# Patient Record
Sex: MALE | Race: WHITE | Employment: OTHER | URBAN - NONMETROPOLITAN AREA
[De-identification: names, ages, dates, MRNs, and addresses within clinical notes are randomized per-mention and may not be internally consistent; named-entity substitution may affect disease eponyms.]

---

## 2018-03-26 ENCOUNTER — HOSPITAL ENCOUNTER (OUTPATIENT)
Dept: CT IMAGING | Age: 73
Discharge: HOME | End: 2018-03-28
Payer: MEDICARE

## 2018-03-26 ENCOUNTER — HOSPITAL ENCOUNTER (OUTPATIENT)
Age: 73
Discharge: HOME | End: 2018-03-28
Payer: MEDICARE

## 2018-03-26 DIAGNOSIS — N40.1 BPH WITH OBSTRUCTION/LOWER URINARY TRACT SYMPTOMS: ICD-10-CM

## 2018-03-26 DIAGNOSIS — R10.30 ABDOMINAL PAIN, LOWER: ICD-10-CM

## 2018-03-26 DIAGNOSIS — N13.8 BPH WITH OBSTRUCTION/LOWER URINARY TRACT SYMPTOMS: ICD-10-CM

## 2018-03-26 DIAGNOSIS — K59.00 CONSTIPATION, UNSPECIFIED CONSTIPATION TYPE: ICD-10-CM

## 2018-03-26 DIAGNOSIS — K43.6 VENTRAL HERNIA WITH OBSTRUCTION AND WITHOUT GANGRENE: ICD-10-CM

## 2018-03-26 LAB
BUN BLDV-MCNC: 21 MG/DL (ref 8–23)
CREAT SERPL-MCNC: 1.4 MG/DL (ref 0.7–1.2)
GFR AFRICAN AMERICAN: >60
GFR NON-AFRICAN AMERICAN: 50 ML/MIN/1.73

## 2018-03-26 PROCEDURE — 6360000004 HC RX CONTRAST MEDICATION: Performed by: RADIOLOGY

## 2018-03-26 PROCEDURE — 36415 COLL VENOUS BLD VENIPUNCTURE: CPT

## 2018-03-26 PROCEDURE — 82565 ASSAY OF CREATININE: CPT

## 2018-03-26 PROCEDURE — 84520 ASSAY OF UREA NITROGEN: CPT

## 2018-03-26 PROCEDURE — 74177 CT ABD & PELVIS W/CONTRAST: CPT

## 2018-03-26 RX ADMIN — IOPAMIDOL 100 ML: 755 INJECTION, SOLUTION INTRAVENOUS at 13:40

## 2018-03-26 RX ADMIN — IOHEXOL 50 ML: 240 INJECTION, SOLUTION INTRATHECAL; INTRAVASCULAR; INTRAVENOUS; ORAL at 12:00

## 2018-03-28 ENCOUNTER — HOSPITAL ENCOUNTER (OUTPATIENT)
Age: 73
Setting detail: SPECIMEN
Discharge: HOME | End: 2018-03-28
Payer: MEDICARE

## 2018-03-28 LAB
BUN BLDV-MCNC: 14 MG/DL (ref 8–23)
CREAT SERPL-MCNC: 1.3 MG/DL (ref 0.7–1.2)
GFR AFRICAN AMERICAN: >60
GFR NON-AFRICAN AMERICAN: 55 ML/MIN/1.73

## 2018-03-28 PROCEDURE — 82565 ASSAY OF CREATININE: CPT

## 2018-03-28 PROCEDURE — 36415 COLL VENOUS BLD VENIPUNCTURE: CPT

## 2018-03-28 PROCEDURE — 84520 ASSAY OF UREA NITROGEN: CPT

## 2018-03-31 ENCOUNTER — HOSPITAL ENCOUNTER (OUTPATIENT)
Dept: ULTRASOUND IMAGING | Age: 73
End: 2018-03-31
Payer: MEDICARE

## 2018-03-31 ENCOUNTER — HOSPITAL ENCOUNTER (OUTPATIENT)
Dept: ULTRASOUND IMAGING | Age: 73
Discharge: HOME | End: 2018-03-31
Payer: MEDICARE

## 2018-03-31 DIAGNOSIS — R33.9 URINARY RETENTION: ICD-10-CM

## 2018-03-31 PROCEDURE — 76770 US EXAM ABDO BACK WALL COMP: CPT

## 2019-12-09 ENCOUNTER — HOSPITAL ENCOUNTER (OUTPATIENT)
Dept: GENERAL RADIOLOGY | Age: 74
Discharge: HOME | End: 2019-12-11
Payer: MEDICARE

## 2019-12-09 ENCOUNTER — HOSPITAL ENCOUNTER (OUTPATIENT)
Dept: WOUND CARE | Age: 74
Discharge: HOME | End: 2019-12-09
Payer: MEDICARE

## 2019-12-09 VITALS
HEART RATE: 80 BPM | SYSTOLIC BLOOD PRESSURE: 158 MMHG | DIASTOLIC BLOOD PRESSURE: 80 MMHG | HEIGHT: 68 IN | RESPIRATION RATE: 18 BRPM | WEIGHT: 208 LBS | TEMPERATURE: 98.3 F | BODY MASS INDEX: 31.52 KG/M2

## 2019-12-09 DIAGNOSIS — L97.811 SKIN ULCER OF RIGHT PRETIBIAL REGION LIMITED TO BREAKDOWN OF SKIN (HCC): ICD-10-CM

## 2019-12-09 DIAGNOSIS — L97.811 SKIN ULCER OF RIGHT PRETIBIAL REGION LIMITED TO BREAKDOWN OF SKIN (HCC): Primary | ICD-10-CM

## 2019-12-09 PROCEDURE — 87070 CULTURE OTHR SPECIMN AEROBIC: CPT

## 2019-12-09 PROCEDURE — 73590 X-RAY EXAM OF LOWER LEG: CPT

## 2019-12-09 PROCEDURE — 87075 CULTR BACTERIA EXCEPT BLOOD: CPT

## 2019-12-09 PROCEDURE — 99213 OFFICE O/P EST LOW 20 MIN: CPT

## 2019-12-09 PROCEDURE — 11042 DBRDMT SUBQ TIS 1ST 20SQCM/<: CPT

## 2019-12-09 PROCEDURE — 87205 SMEAR GRAM STAIN: CPT

## 2019-12-09 RX ORDER — LIDOCAINE HYDROCHLORIDE 20 MG/ML
JELLY TOPICAL ONCE
Status: DISCONTINUED | OUTPATIENT
Start: 2019-12-09 | End: 2019-12-10 | Stop reason: HOSPADM

## 2019-12-09 SDOH — HEALTH STABILITY: MENTAL HEALTH: HOW OFTEN DO YOU HAVE A DRINK CONTAINING ALCOHOL?: NEVER

## 2019-12-09 ASSESSMENT — PAIN DESCRIPTION - ONSET: ONSET: ON-GOING

## 2019-12-09 ASSESSMENT — PAIN SCALES - GENERAL: PAINLEVEL_OUTOF10: 6

## 2019-12-09 ASSESSMENT — PAIN DESCRIPTION - PROGRESSION: CLINICAL_PROGRESSION: OTHER (COMMENT)

## 2019-12-09 ASSESSMENT — PAIN DESCRIPTION - LOCATION: LOCATION: LEG

## 2019-12-09 ASSESSMENT — PAIN DESCRIPTION - DESCRIPTORS: DESCRIPTORS: ACHING;BURNING;DISCOMFORT

## 2019-12-09 ASSESSMENT — PAIN DESCRIPTION - FREQUENCY: FREQUENCY: INTERMITTENT

## 2019-12-09 ASSESSMENT — PAIN DESCRIPTION - ORIENTATION: ORIENTATION: RIGHT

## 2019-12-09 ASSESSMENT — PAIN - FUNCTIONAL ASSESSMENT: PAIN_FUNCTIONAL_ASSESSMENT: ACTIVITIES ARE NOT PREVENTED

## 2019-12-09 ASSESSMENT — PAIN DESCRIPTION - PAIN TYPE: TYPE: CHRONIC PAIN

## 2019-12-11 LAB
GRAM STAIN RESULT: NORMAL
WOUND/ABSCESS: NORMAL

## 2019-12-14 LAB — ANAEROBIC CULTURE: NORMAL

## 2019-12-16 ENCOUNTER — HOSPITAL ENCOUNTER (OUTPATIENT)
Dept: WOUND CARE | Age: 74
Discharge: HOME | End: 2019-12-16
Payer: MEDICARE

## 2019-12-16 VITALS
DIASTOLIC BLOOD PRESSURE: 70 MMHG | WEIGHT: 208 LBS | HEART RATE: 100 BPM | TEMPERATURE: 98.1 F | SYSTOLIC BLOOD PRESSURE: 128 MMHG | HEIGHT: 68 IN | RESPIRATION RATE: 18 BRPM | BODY MASS INDEX: 31.52 KG/M2

## 2019-12-16 PROCEDURE — 11042 DBRDMT SUBQ TIS 1ST 20SQCM/<: CPT

## 2019-12-16 RX ORDER — FEXOFENADINE HYDROCHLORIDE 60 MG/1
60 TABLET, FILM COATED ORAL DAILY
COMMUNITY
End: 2022-09-29 | Stop reason: ALTCHOICE

## 2019-12-16 RX ORDER — ACARBOSE 25 MG/1
50 TABLET ORAL
COMMUNITY
End: 2020-05-07

## 2019-12-16 RX ORDER — METOLAZONE 5 MG/1
5 TABLET ORAL DAILY
COMMUNITY

## 2019-12-16 RX ORDER — GLIPIZIDE 10 MG/1
10 TABLET, FILM COATED, EXTENDED RELEASE ORAL 2 TIMES DAILY
COMMUNITY

## 2019-12-16 RX ORDER — LIDOCAINE HYDROCHLORIDE 20 MG/ML
JELLY TOPICAL ONCE
Status: DISCONTINUED | OUTPATIENT
Start: 2019-12-16 | End: 2019-12-17 | Stop reason: HOSPADM

## 2019-12-16 RX ORDER — LISINOPRIL 40 MG/1
40 TABLET ORAL DAILY
COMMUNITY

## 2019-12-16 RX ORDER — NIFEDIPINE 30 MG/1
30 TABLET, FILM COATED, EXTENDED RELEASE ORAL DAILY
COMMUNITY

## 2019-12-16 ASSESSMENT — PAIN DESCRIPTION - FREQUENCY: FREQUENCY: INTERMITTENT

## 2019-12-16 ASSESSMENT — PAIN SCALES - GENERAL: PAINLEVEL_OUTOF10: 3

## 2019-12-16 ASSESSMENT — PAIN - FUNCTIONAL ASSESSMENT: PAIN_FUNCTIONAL_ASSESSMENT: ACTIVITIES ARE NOT PREVENTED

## 2019-12-16 ASSESSMENT — PAIN DESCRIPTION - PAIN TYPE: TYPE: CHRONIC PAIN

## 2019-12-16 ASSESSMENT — PAIN DESCRIPTION - DESCRIPTORS: DESCRIPTORS: ITCHING;BURNING

## 2019-12-16 ASSESSMENT — PAIN DESCRIPTION - ORIENTATION: ORIENTATION: RIGHT

## 2019-12-16 ASSESSMENT — PAIN DESCRIPTION - ONSET: ONSET: GRADUAL

## 2019-12-16 ASSESSMENT — PAIN DESCRIPTION - LOCATION: LOCATION: LEG

## 2019-12-16 ASSESSMENT — PAIN DESCRIPTION - PROGRESSION: CLINICAL_PROGRESSION: OTHER (COMMENT)

## 2019-12-17 ENCOUNTER — HOSPITAL ENCOUNTER (OUTPATIENT)
Dept: INTERVENTIONAL RADIOLOGY/VASCULAR | Age: 74
Discharge: HOME | End: 2019-12-19
Payer: MEDICARE

## 2019-12-17 DIAGNOSIS — L97.811 SKIN ULCER OF RIGHT PRETIBIAL REGION LIMITED TO BREAKDOWN OF SKIN (HCC): ICD-10-CM

## 2019-12-17 PROCEDURE — 93923 UPR/LXTR ART STDY 3+ LVLS: CPT

## 2019-12-23 ENCOUNTER — HOSPITAL ENCOUNTER (OUTPATIENT)
Dept: WOUND CARE | Age: 74
Discharge: HOME | End: 2019-12-23
Payer: MEDICARE

## 2019-12-23 VITALS
HEIGHT: 68 IN | BODY MASS INDEX: 31.52 KG/M2 | SYSTOLIC BLOOD PRESSURE: 170 MMHG | HEART RATE: 100 BPM | TEMPERATURE: 99 F | DIASTOLIC BLOOD PRESSURE: 90 MMHG | RESPIRATION RATE: 18 BRPM | WEIGHT: 208 LBS

## 2019-12-23 PROCEDURE — 11042 DBRDMT SUBQ TIS 1ST 20SQCM/<: CPT

## 2019-12-23 RX ORDER — LIDOCAINE HYDROCHLORIDE 20 MG/ML
JELLY TOPICAL ONCE
Status: DISCONTINUED | OUTPATIENT
Start: 2019-12-23 | End: 2019-12-24 | Stop reason: HOSPADM

## 2019-12-23 ASSESSMENT — PAIN DESCRIPTION - PAIN TYPE: TYPE: CHRONIC PAIN

## 2019-12-23 ASSESSMENT — PAIN DESCRIPTION - LOCATION: LOCATION: LEG

## 2019-12-23 ASSESSMENT — PAIN DESCRIPTION - ONSET: ONSET: GRADUAL

## 2019-12-23 ASSESSMENT — PAIN DESCRIPTION - DESCRIPTORS: DESCRIPTORS: ACHING;CRUSHING;DISCOMFORT

## 2019-12-23 ASSESSMENT — PAIN DESCRIPTION - PROGRESSION: CLINICAL_PROGRESSION: NOT CHANGED

## 2019-12-23 ASSESSMENT — PAIN DESCRIPTION - ORIENTATION: ORIENTATION: RIGHT

## 2019-12-23 ASSESSMENT — PAIN - FUNCTIONAL ASSESSMENT: PAIN_FUNCTIONAL_ASSESSMENT: ACTIVITIES ARE NOT PREVENTED

## 2019-12-23 ASSESSMENT — PAIN SCALES - GENERAL: PAINLEVEL_OUTOF10: 3

## 2019-12-30 ENCOUNTER — HOSPITAL ENCOUNTER (OUTPATIENT)
Dept: WOUND CARE | Age: 74
Discharge: HOME | End: 2019-12-30
Payer: MEDICARE

## 2019-12-30 VITALS
HEART RATE: 108 BPM | HEIGHT: 68 IN | WEIGHT: 208 LBS | SYSTOLIC BLOOD PRESSURE: 148 MMHG | BODY MASS INDEX: 31.52 KG/M2 | RESPIRATION RATE: 18 BRPM | DIASTOLIC BLOOD PRESSURE: 80 MMHG | TEMPERATURE: 98.2 F

## 2019-12-30 PROCEDURE — 11042 DBRDMT SUBQ TIS 1ST 20SQCM/<: CPT

## 2019-12-30 RX ORDER — LIDOCAINE HYDROCHLORIDE 20 MG/ML
JELLY TOPICAL ONCE
Status: DISCONTINUED | OUTPATIENT
Start: 2019-12-30 | End: 2019-12-31 | Stop reason: HOSPADM

## 2019-12-30 ASSESSMENT — PAIN DESCRIPTION - PAIN TYPE: TYPE: CHRONIC PAIN

## 2019-12-30 ASSESSMENT — PAIN DESCRIPTION - LOCATION: LOCATION: LEG

## 2019-12-30 ASSESSMENT — PAIN SCALES - GENERAL: PAINLEVEL_OUTOF10: 8

## 2019-12-30 ASSESSMENT — PAIN DESCRIPTION - DESCRIPTORS: DESCRIPTORS: ACHING;CRUSHING;DISCOMFORT

## 2019-12-30 ASSESSMENT — PAIN DESCRIPTION - ORIENTATION: ORIENTATION: RIGHT

## 2020-01-06 ENCOUNTER — HOSPITAL ENCOUNTER (OUTPATIENT)
Dept: WOUND CARE | Age: 75
Discharge: HOME | End: 2020-01-06
Payer: MEDICARE

## 2020-01-06 VITALS
HEART RATE: 107 BPM | SYSTOLIC BLOOD PRESSURE: 142 MMHG | DIASTOLIC BLOOD PRESSURE: 78 MMHG | WEIGHT: 208 LBS | HEIGHT: 68 IN | TEMPERATURE: 97.6 F | RESPIRATION RATE: 20 BRPM | BODY MASS INDEX: 31.52 KG/M2

## 2020-01-06 PROCEDURE — 11042 DBRDMT SUBQ TIS 1ST 20SQCM/<: CPT

## 2020-01-06 RX ORDER — LIDOCAINE HYDROCHLORIDE 20 MG/ML
JELLY TOPICAL ONCE
Status: DISCONTINUED | OUTPATIENT
Start: 2020-01-06 | End: 2020-01-07 | Stop reason: HOSPADM

## 2020-01-06 ASSESSMENT — PAIN DESCRIPTION - DESCRIPTORS: DESCRIPTORS: BURNING

## 2020-01-06 ASSESSMENT — PAIN DESCRIPTION - ONSET: ONSET: ON-GOING

## 2020-01-06 ASSESSMENT — PAIN DESCRIPTION - ORIENTATION: ORIENTATION: RIGHT

## 2020-01-06 ASSESSMENT — PAIN DESCRIPTION - LOCATION: LOCATION: PRETIBIA

## 2020-01-06 ASSESSMENT — PAIN DESCRIPTION - PROGRESSION: CLINICAL_PROGRESSION: GRADUALLY IMPROVING

## 2020-01-06 ASSESSMENT — PAIN DESCRIPTION - PAIN TYPE: TYPE: CHRONIC PAIN

## 2020-01-06 ASSESSMENT — PAIN DESCRIPTION - FREQUENCY: FREQUENCY: CONTINUOUS

## 2020-01-06 ASSESSMENT — PAIN SCALES - GENERAL: PAINLEVEL_OUTOF10: 1

## 2020-01-06 NOTE — PROGRESS NOTES
CC) 30 MG extended release tablet Take 30 mg by mouth daily      fexofenadine (ALLEGRA ALLERGY) 60 MG tablet Take 60 mg by mouth daily      Dulaglutide (TRULICITY) 0.77 VY/8.8FO SOPN Inject 0.75 mg into the skin once a week Indications: pt unsure of the dose TUESDAYS      acarbose (PRECOSE) 25 MG tablet Take 50 mg by mouth 3 times daily (with meals)       No current facility-administered medications on file prior to encounter.         REVIEW OF SYSTEMS See HPI    Objective:    BP (!) 142/78 Comment: TOOK B/P MED THIS AM   Pulse 107   Temp 97.6 °F (36.4 °C) (Oral)   Resp 20   Ht 5' 8\" (1.727 m)   Wt 208 lb (94.3 kg)   BMI 31.63 kg/m²   Wt Readings from Last 3 Encounters:   01/06/20 208 lb (94.3 kg)   12/30/19 208 lb (94.3 kg)   12/23/19 208 lb (94.3 kg)     PHYSICAL EXAM  CONSTITUTIONAL:   Awake, alert, cooperative   EYES:  lids and lashes normal   ENT: external ears and nose without lesions   NECK:  supple, symmetrical, trachea midline   SKIN:  Open wound Present    Assessment:     Problem List Items Addressed This Visit     None          Pre Debridement Measurements:  Are located in the [place]  Documentation Flow Sheet  Post Debridement Measurements:  Wound/Ulcer Descriptions are Pre Debridement except measurements:     Wound 12/02/19 Leg Right #1 rt leg aquired 10/9/19 (Active)   Wound Image   1/6/2020  8:50 AM   Dressing Status Changed 12/30/2019 10:41 AM   Dressing Changed Changed/New 12/30/2019 10:41 AM   Dressing/Treatment Other (comment) 12/30/2019 10:41 AM   Wound Cleansed Rinsed/Irrigated with saline 12/30/2019 10:41 AM   Wound Length (cm) 9.4 cm 1/6/2020  8:50 AM   Wound Width (cm) 2.3 cm 1/6/2020  8:50 AM   Wound Depth (cm) 0.3 cm 1/6/2020  8:50 AM   Wound Surface Area (cm^2) 21.62 cm^2 1/6/2020  8:50 AM   Change in Wound Size % (l*w) 68.3 1/6/2020  8:50 AM   Wound Volume (cm^3) 6.49 cm^3 1/6/2020  8:50 AM   Wound Healing % 52 1/6/2020  8:50 AM   Post-Procedure Length (cm) 9.4 cm 1/6/2020

## 2020-01-09 NOTE — DISCHARGE INSTR - COC
Continuity of Care Form    Patient Name: Rosalinda Todd   :  1945  MRN:  [id number]    Admit date:  (Not on file)  Discharge date:  ***    Code Status Order: No Order   Advance Directives:     Admitting Physician:  No admitting provider for patient encounter. PCP: Paula Wyatt DO    Discharging Nurse: [facility] Unit/Room#: No information available for this encounter. Discharging Unit Phone Number: ***    Emergency Contact:   Extended Emergency Contact Information  Primary Emergency Contact: [facility] Phone: [phone]  Relation: Other    Past Surgical History:  Past Surgical History:   Procedure Laterality Date    ENDOSCOPY, COLON, DIAGNOSTIC      EYE SURGERY      HERNIA REPAIR      TONSILLECTOMY         Immunization History: There is no immunization history on file for this patient. Active Problems: There is no problem list on file for this patient. Isolation/Infection:   Isolation          No Isolation        Patient Infection Status     None to display          Nurse Assessment:  Last Vital Signs: There were no vitals taken for this visit.     Last documented pain score (0-10 scale):    Last Weight:   Wt Readings from Last 1 Encounters:   20 208 lb (94.3 kg)     Mental Status:  {IP PT MENTAL STATUS:74402}    IV Access:  { NASIR IV ACCESS:960535904}    Nursing Mobility/ADLs:  Walking   {CHP DME FMTT:069831775}  Transfer  {CHP DME AWCO:860344788}  Bathing  {CHP DME GBOW:350399401}  Dressing  {CHP DME OEBQ:098442737}  Toileting  {CHP DME YPGL:529955509}  Feeding  {P DME BWRZ:242501615}  Med Admin  {P DME BZHL:274492514}  Med Delivery   { NASIR MED Delivery:872526397}    Wound Care Documentation and Therapy:  Wound 19 Leg Right #1 rt leg aquired 10/9/19 (Active)   Wound Image   2020  8:50 AM   Dressing Status Changed 2019 10:41 AM   Dressing Changed Changed/New 2019 10:41 AM   Dressing/Treatment Dry dressing 2020 10:05 AM INTERVENTION:0516768770}  Weight Bearing Status/Restrictions: 50[facility] Weight Bearin}  Other Medical Equipment (for information only, NOT a DME order):  {EQUIPMENT:754523487}  Other Treatments: ***    Patient's personal belongings (please select all that are sent with patient):  {CHP DME Belongings:713352325}    RN SIGNATURE:  {Esignature:552486944}    CASE MANAGEMENT/SOCIAL WORK SECTION    Inpatient Status Date: ***    Readmission Risk Assessment Score:  Readmission Risk              Risk of Unplanned Readmission:        0           Discharging to Facility/ Agency   · Name:   · Address:  · Phone:  · Fax:    Dialysis Facility (if applicable)   · Name:  · Address:  · Dialysis Schedule:  · Phone:  · Fax:    / signature: {Esignature:723739920}    PHYSICIAN SECTION    Prognosis: {Prognosis:4812333958}    Condition at Discharge: 50[facility] Patient Condition:851419965}    Rehab Potential (if transferring to Rehab): {Prognosis:1632479387}    Recommended Labs or Other Treatments After Discharge: ***    Physician Certification: I certify the above information and transfer of Jennie Garvey  is necessary for the continuing treatment of the diagnosis listed and that he requires {Admit to Appropriate Level of Care:12706} for {GREATER/LESS:073371293} 30 days.      Update Admission H&P: {CHP DME Changes in XCTMK:734690769}    PHYSICIAN SIGNATURE:  {Esignature:106551328}

## 2020-01-13 ENCOUNTER — HOSPITAL ENCOUNTER (OUTPATIENT)
Dept: WOUND CARE | Age: 75
Discharge: HOME | End: 2020-01-13
Payer: MEDICARE

## 2020-01-13 VITALS
DIASTOLIC BLOOD PRESSURE: 80 MMHG | RESPIRATION RATE: 18 BRPM | HEART RATE: 98 BPM | SYSTOLIC BLOOD PRESSURE: 160 MMHG | WEIGHT: 200 LBS | TEMPERATURE: 98.2 F | BODY MASS INDEX: 30.31 KG/M2 | HEIGHT: 68 IN

## 2020-01-13 PROCEDURE — 11042 DBRDMT SUBQ TIS 1ST 20SQCM/<: CPT

## 2020-01-13 PROCEDURE — 87186 SC STD MICRODIL/AGAR DIL: CPT

## 2020-01-13 PROCEDURE — 87147 CULTURE TYPE IMMUNOLOGIC: CPT

## 2020-01-13 PROCEDURE — 87077 CULTURE AEROBIC IDENTIFY: CPT

## 2020-01-13 PROCEDURE — 87070 CULTURE OTHR SPECIMN AEROBIC: CPT

## 2020-01-13 PROCEDURE — 87075 CULTR BACTERIA EXCEPT BLOOD: CPT

## 2020-01-13 RX ORDER — DOXYCYCLINE HYCLATE 100 MG/1
100 CAPSULE ORAL 2 TIMES DAILY
COMMUNITY
End: 2020-02-03 | Stop reason: ALTCHOICE

## 2020-01-13 ASSESSMENT — PAIN SCALES - GENERAL: PAINLEVEL_OUTOF10: 0

## 2020-01-13 NOTE — PROGRESS NOTES
wound with normal saline solution and apply Santyl to the thickness of a nickel and cover with slightly moist gauze and dry dressing and change every day.     Treatment Orders: Vascular studies reviewed.  Dr SHELTON's office will call you with appt. Start Doxycycline 100 mg twice daily for 10 days. Called into [employer] in [place]. Culture taken today.     [facility] followup visit ___________one week__________________  (Please note your next appointment above and if you are unable to keep, kindly give a 24 hour notice. Thank you.)     Physician signature:__________________________        If you experience any of the following, please call the [facility] during business hours:     * Increase in Pain  * Temperature over 101  * Increase in drainage from your wound  * Drainage with a foul odor  * Bleeding  * Increase in swelling  * Need for compression bandage changes due to slippage, breakthrough drainage.     If you need medical attention outside of the business hours of the [facility] please contact your PCP or go to the nearest emergency room.         Electronically signed by Yoana Morocho DPM on 1/13/2020 at 9:38 AM

## 2020-01-15 LAB — ANAEROBIC CULTURE: NORMAL

## 2020-01-16 LAB
ORGANISM: ABNORMAL
WOUND/ABSCESS: ABNORMAL

## 2020-01-20 ENCOUNTER — HOSPITAL ENCOUNTER (OUTPATIENT)
Dept: WOUND CARE | Age: 75
Discharge: HOME | End: 2020-01-20
Payer: MEDICARE

## 2020-01-20 VITALS
DIASTOLIC BLOOD PRESSURE: 80 MMHG | BODY MASS INDEX: 30.31 KG/M2 | TEMPERATURE: 97.8 F | SYSTOLIC BLOOD PRESSURE: 138 MMHG | HEIGHT: 68 IN | HEART RATE: 94 BPM | RESPIRATION RATE: 18 BRPM | WEIGHT: 200 LBS

## 2020-01-20 PROCEDURE — 11042 DBRDMT SUBQ TIS 1ST 20SQCM/<: CPT

## 2020-01-20 RX ORDER — LIDOCAINE HYDROCHLORIDE 20 MG/ML
JELLY TOPICAL ONCE
Status: DISCONTINUED | OUTPATIENT
Start: 2020-01-20 | End: 2020-01-21 | Stop reason: HOSPADM

## 2020-01-20 ASSESSMENT — PAIN DESCRIPTION - DESCRIPTORS: DESCRIPTORS: BURNING;DISCOMFORT;CONSTANT

## 2020-01-20 ASSESSMENT — PAIN DESCRIPTION - ONSET: ONSET: ON-GOING

## 2020-01-20 ASSESSMENT — PAIN - FUNCTIONAL ASSESSMENT: PAIN_FUNCTIONAL_ASSESSMENT: ACTIVITIES ARE NOT PREVENTED

## 2020-01-20 ASSESSMENT — PAIN DESCRIPTION - PAIN TYPE: TYPE: CHRONIC PAIN

## 2020-01-20 ASSESSMENT — PAIN DESCRIPTION - FREQUENCY: FREQUENCY: CONTINUOUS

## 2020-01-20 ASSESSMENT — PAIN DESCRIPTION - PROGRESSION: CLINICAL_PROGRESSION: GRADUALLY IMPROVING

## 2020-01-20 ASSESSMENT — PAIN DESCRIPTION - LOCATION: LOCATION: PRETIBIA

## 2020-01-20 ASSESSMENT — PAIN DESCRIPTION - ORIENTATION: ORIENTATION: RIGHT

## 2020-01-20 ASSESSMENT — PAIN SCALES - GENERAL: PAINLEVEL_OUTOF10: 2

## 2020-01-20 NOTE — PROGRESS NOTES
[facility] Followup Visit Note    Referring Physician : Betzaida Matt  MEDICAL RECORD NUMBER:  [id number]  AGE: 69 y.o. GENDER: male  : 1945  EPISODE DATE:  2020    Subjective:     Chief Complaint   Patient presents with    Wound Check     rt leg      HISTORY of PRESENT ILLNESS HPI   Walt Anderson is a 69 y.o. male who presents today in regards to follow up evaluation and treatment of wound/ulcer. That patient's past medical, family and social hx were reviewed and changes were made if present. History of Wound Context:  Right leg wound     Wound/Ulcer Pain Timing/Severity: intermittent  Quality of pain: sharp  Severity:  5 / 10   Modifying Factors: Pain worsens with walking  Associated Signs/Symptoms: edema    Ulcer Identification:  Ulcer Type: arterial  Contributing Factors: edema    Diabetic/Pressure/Non Pressure Ulcers only:  Ulcer: Non-Pressure ulcer, fat layer exposed    Wound: right leg        PAST MEDICAL HISTORY      Diagnosis Date    Arthritis     Diabetes mellitus ([address]     Hyperlipidemia     Hypertension      Past Surgical History:   Procedure Laterality Date    ENDOSCOPY, COLON, DIAGNOSTIC      EYE SURGERY      HERNIA REPAIR      TONSILLECTOMY       History reviewed. No pertinent family history.   Social History     Tobacco Use    Smoking status: Never Smoker    Smokeless tobacco: Never Used   Substance Use Topics    Alcohol use: Never     Frequency: Never    Drug use: Never     Allergies   Allergen Reactions    Penicillins     Seasonal     Sulfa Antibiotics      Current Outpatient Medications on File Prior to Encounter   Medication Sig Dispense Refill    doxycycline hyclate (VIBRAMYCIN) 100 MG capsule Take 100 mg by mouth 2 times daily      metOLazone (ZAROXOLYN) 5 MG tablet Take 5 mg by mouth daily      lisinopril (PRINIVIL;ZESTRIL) 40 MG tablet Take 40 mg by mouth daily      glipiZIDE (GLUCOTROL XL) 10 MG extended release tablet Take 10 mg by mouth daily      metFORMIN (GLUCOPHAGE) 500 MG tablet Take 500 mg by mouth 2 times daily (with meals)      NIFEdipine (ADALAT CC) 30 MG extended release tablet Take 30 mg by mouth daily      acarbose (PRECOSE) 25 MG tablet Take 50 mg by mouth 3 times daily (with meals)      fexofenadine (ALLEGRA ALLERGY) 60 MG tablet Take 60 mg by mouth daily      Dulaglutide (TRULICITY) 6.53 HE/3.7QY SOPN Inject 0.75 mg into the skin once a week Indications: pt unsure of the dose TUESDAYS       No current facility-administered medications on file prior to encounter.         REVIEW OF SYSTEMS See HPI    Objective:    /80   Pulse 94   Temp 97.8 °F (36.6 °C)   Resp 18   Ht 5' 8\" (1.727 m)   Wt 200 lb (90.7 kg)   BMI 30.41 kg/m²   Wt Readings from Last 3 Encounters:   01/20/20 200 lb (90.7 kg)   01/13/20 200 lb (90.7 kg)   01/06/20 208 lb (94.3 kg)     PHYSICAL EXAM  CONSTITUTIONAL:   Awake, alert, cooperative   EYES:  lids and lashes normal   ENT: external ears and nose without lesions   NECK:  supple, symmetrical, trachea midline   SKIN:  Open wound Present    Assessment:     Problem List Items Addressed This Visit     None          Pre Debridement Measurements:  Are located in the [place]  Documentation Flow Sheet  Post Debridement Measurements:  Wound/Ulcer Descriptions are Pre Debridement except measurements:     Wound 12/02/19 Leg Right #1 rt leg aquired 10/9/19 (Active)   Wound Image   1/6/2020  8:50 AM   Dressing Status Changed 12/30/2019 10:41 AM   Dressing Changed Changed/New 12/30/2019 10:41 AM   Dressing/Treatment Dry dressing 1/6/2020 10:05 AM   Wound Cleansed Rinsed/Irrigated with saline 1/6/2020 10:05 AM   Wound Length (cm) 7.9 cm 1/20/2020  8:54 AM   Wound Width (cm) 1.9 cm 1/20/2020  8:54 AM   Wound Depth (cm) 0.2 cm 1/20/2020  8:54 AM   Wound Surface Area (cm^2) 15.01 cm^2 1/20/2020  8:54 AM   Change in Wound Size % (l*w) 77.99 1/20/2020  8:54 AM   Wound Volume (cm^3) 3 cm^3 1/20/2020  8:54 AM   Wound Healing % 78 1/20/2020  8:54 AM   Post-Procedure Length (cm) 7.9 cm 1/20/2020  9:09 AM   Post-Procedure Width (cm) 1.9 cm 1/20/2020  9:09 AM   Post-Procedure Depth (cm) 0.2 cm 1/20/2020  9:09 AM   Post-Procedure Surface Area (cm^2) 15.01 cm^2 1/20/2020  9:09 AM   Post-Procedure Volume (cm^3) 3 cm^3 1/20/2020  9:09 AM   Wound Assessment Slough; Yellow;Red 1/20/2020  8:54 AM   Drainage Amount Moderate 1/20/2020  8:54 AM   Drainage Description Yellow 1/20/2020  8:54 AM   Odor None 1/20/2020  8:54 AM   Kesha-wound Assessment Pink 1/13/2020  8:54 AM   Number of days: 48          Procedure Note  Indications:  Based on my examination of this patient's wound(s)/ulcer(s) today, debridement is required to promote healing and evaluate the wound base. Performed by: Morales Bergeron DPM    Consent obtained:  Yes    Time out taken:  Yes    Pain Control: Anesthetic  Anesthetic: 2% Lidocaine Gel Topical     Debridement:Excisional Debridement    Using curette the wound(s)/ulcer(s) was/were sharply debrided down through and including the removal of subcutaneous tissue. Devitalized Tissue Debrided:  slough to stimulate bleeding to promote healing, post debridement good bleeding base and wound edges noted    Wound/Ulcer #: 1    Percent of Wound/Ulcer Debrided: 100%    Total Surface Area Debrided:  15.01 sq cm     Estimated Blood Loss:  Minimal  Hemostasis Achieved:  by pressure    Procedural Pain:  3  / 10   Post Procedural Pain:  3 / 10     Response to treatment:  Well tolerated by patient. Plan:   Treatment Note please see attached Discharge Instructions    Written patient dismissal instructions given to patient and signed by patient or POA.          Discharge Instructions       Visit Discharge/Physician Orders     Discharge condition: Stable     Assessment of pain at discharge: minimal     Anesthetic used: 2% lidocaine gel     Discharge to: Home     Left via:Private automobile     Accompanied by: accompanied by    ECF/HHA: [facility] care     Dressing Orders:  Cleanse wound with normal saline solution and apply Santyl to the thickness of a nickel and cover with slightly moist gauze and dry dressing and change every day.     Treatment Orders: Vascular studies reviewed.  Dr SHELTON's office will call you with appt. continue Doxycycline 100 mg twice daily for 10 days. Called into [address] [place] [address] in [place]. Culture reviewed in clinic today.      [facility] followup visit ___________one week__________________  (Please note your next appointment above and if you are unable to keep, kindly give a 24 hour notice. Thank you.)     Physician signature:__________________________        If you experience any of the following, please call the [facility] during business hours:     * Increase in Pain  * Temperature over 101  * Increase in drainage from your wound  * Drainage with a foul odor  * Bleeding  * Increase in swelling  * Need for compression bandage changes due to slippage, breakthrough drainage.     If you need medical attention outside of the business hours of the [facility] please contact your PCP or go to the nearest emergency room.         Electronically signed by Jeri Felipe DPM on 1/20/2020 at 9:15 AM

## 2020-01-27 ENCOUNTER — HOSPITAL ENCOUNTER (OUTPATIENT)
Dept: WOUND CARE | Age: 75
Discharge: HOME | End: 2020-01-27
Payer: MEDICARE

## 2020-01-27 VITALS
HEART RATE: 84 BPM | WEIGHT: 200 LBS | BODY MASS INDEX: 30.31 KG/M2 | SYSTOLIC BLOOD PRESSURE: 160 MMHG | RESPIRATION RATE: 18 BRPM | TEMPERATURE: 98 F | HEIGHT: 68 IN | DIASTOLIC BLOOD PRESSURE: 72 MMHG

## 2020-01-27 PROCEDURE — 11042 DBRDMT SUBQ TIS 1ST 20SQCM/<: CPT

## 2020-01-27 RX ORDER — LIDOCAINE HYDROCHLORIDE 20 MG/ML
JELLY TOPICAL ONCE
Status: DISCONTINUED | OUTPATIENT
Start: 2020-01-27 | End: 2020-01-28 | Stop reason: HOSPADM

## 2020-01-27 ASSESSMENT — PAIN DESCRIPTION - DESCRIPTORS: DESCRIPTORS: DISCOMFORT;CONSTANT;BURNING

## 2020-01-27 ASSESSMENT — PAIN DESCRIPTION - LOCATION: LOCATION: PRETIBIA

## 2020-01-27 ASSESSMENT — PAIN DESCRIPTION - FREQUENCY: FREQUENCY: CONTINUOUS

## 2020-01-27 ASSESSMENT — PAIN DESCRIPTION - PAIN TYPE: TYPE: CHRONIC PAIN

## 2020-01-27 ASSESSMENT — PAIN DESCRIPTION - ORIENTATION: ORIENTATION: RIGHT

## 2020-01-27 ASSESSMENT — PAIN DESCRIPTION - PROGRESSION: CLINICAL_PROGRESSION: GRADUALLY IMPROVING

## 2020-01-27 ASSESSMENT — PAIN DESCRIPTION - ONSET: ONSET: ON-GOING

## 2020-01-27 ASSESSMENT — PAIN SCALES - GENERAL: PAINLEVEL_OUTOF10: 2

## 2020-01-27 NOTE — PROGRESS NOTES
daily      metFORMIN (GLUCOPHAGE) 500 MG tablet Take 500 mg by mouth 2 times daily (with meals)      NIFEdipine (ADALAT CC) 30 MG extended release tablet Take 30 mg by mouth daily      acarbose (PRECOSE) 25 MG tablet Take 50 mg by mouth 3 times daily (with meals)      fexofenadine (ALLEGRA ALLERGY) 60 MG tablet Take 60 mg by mouth daily      Dulaglutide (TRULICITY) 4.10 JM/7.8HQ SOPN Inject 0.75 mg into the skin once a week Indications: pt unsure of the dose TUESDAYS       No current facility-administered medications on file prior to encounter. REVIEW OF SYSTEMS See HPI    Objective:    BP (!) 160/72   Pulse 84   Temp 98 °F (36.7 °C) (Oral)   Resp 18   Ht 5' 8\" (1.727 m)   Wt 200 lb (90.7 kg)   BMI 30.41 kg/m²   Wt Readings from Last 3 Encounters:   01/27/20 200 lb (90.7 kg)   01/20/20 200 lb (90.7 kg)   01/13/20 200 lb (90.7 kg)     PHYSICAL EXAM  CONSTITUTIONAL:   Awake, alert, cooperative   EYES:  lids and lashes normal   ENT: external ears and nose without lesions   NECK:  supple, symmetrical, trachea midline   SKIN:  Open wound Present    Assessment:     Problem List Items Addressed This Visit     None          Pre Debridement Measurements:  Are located in the [place]  Documentation Flow Sheet  Post Debridement Measurements:  Wound/Ulcer Descriptions are Pre Debridement except measurements:     Wound 12/02/19 Leg Right #1 rt leg aquired 10/9/19 (Active)   Wound Image   1/6/2020  8:50 AM   Dressing Status Intact; Changed 1/20/2020  9:32 AM   Dressing Changed Changed/New 1/20/2020  9:32 AM   Wound Cleansed Rinsed/Irrigated with saline 1/20/2020  9:32 AM   Wound Length (cm) 7.8 cm 1/27/2020  8:49 AM   Wound Width (cm) 1.5 cm 1/27/2020  8:49 AM   Wound Depth (cm) 0.2 cm 1/27/2020  8:49 AM   Wound Surface Area (cm^2) 11.7 cm^2 1/27/2020  8:49 AM   Change in Wound Size % (l*w) 82.84 1/27/2020  8:49 AM   Wound Volume (cm^3) 2.34 cm^3 1/27/2020  8:49 AM   Wound Healing % 83 1/27/2020  8:49 AM Post-Procedure Length (cm) 7.8 cm 1/27/2020  8:59 AM   Post-Procedure Width (cm) 1.5 cm 1/27/2020  8:59 AM   Post-Procedure Depth (cm) 0.2 cm 1/27/2020  8:59 AM   Post-Procedure Surface Area (cm^2) 11.7 cm^2 1/27/2020  8:59 AM   Post-Procedure Volume (cm^3) 2.34 cm^3 1/27/2020  8:59 AM   Wound Assessment Slough; Yellow;Red 1/27/2020  8:49 AM   Drainage Amount Moderate 1/27/2020  8:49 AM   Drainage Description Yellow 1/27/2020  8:49 AM   Odor None 1/27/2020  8:49 AM   Kesha-wound Assessment Pink 1/27/2020  8:49 AM   Number of days: 55          Procedure Note  Indications:  Based on my examination of this patient's wound(s)/ulcer(s) today, debridement is required to promote healing and evaluate the wound base. Performed by: Pretty Grover DPM    Consent obtained:  Yes    Time out taken:  Yes    Pain Control: Anesthetic  Anesthetic: 2% Lidocaine Gel Topical     Debridement:Excisional Debridement    Using curette the wound(s)/ulcer(s) was/were sharply debrided down through and including the removal of subcutaneous tissue. Devitalized Tissue Debrided:  slough to stimulate bleeding to promote healing, post debridement good bleeding base and wound edges noted    Wound/Ulcer #: 1    Percent of Wound/Ulcer Debrided: 100%    Total Surface Area Debrided:  11.7 sq cm     Estimated Blood Loss:  None  Hemostasis Achieved:  by pressure    Procedural Pain:  5  / 10   Post Procedural Pain:  5 / 10     Response to treatment:  Well tolerated by patient. Plan:   Treatment Note please see attached Discharge Instructions    Written patient dismissal instructions given to patient and signed by patient or POA.          Discharge Instructions       Visit Discharge/Physician Orders     Discharge condition: Stable     Assessment of pain at discharge: minimal     Anesthetic used: 2% lidocaine gel     Discharge to: Home     Left via:Private automobile     Accompanied by: accompanied by      ECF/HHA: [id number] [facility]

## 2020-02-03 ENCOUNTER — HOSPITAL ENCOUNTER (OUTPATIENT)
Dept: WOUND CARE | Age: 75
Discharge: HOME | End: 2020-02-03
Payer: MEDICARE

## 2020-02-03 VITALS
DIASTOLIC BLOOD PRESSURE: 70 MMHG | BODY MASS INDEX: 30.31 KG/M2 | TEMPERATURE: 98.2 F | RESPIRATION RATE: 16 BRPM | HEIGHT: 68 IN | HEART RATE: 88 BPM | SYSTOLIC BLOOD PRESSURE: 152 MMHG | WEIGHT: 200 LBS

## 2020-02-03 PROCEDURE — 11042 DBRDMT SUBQ TIS 1ST 20SQCM/<: CPT

## 2020-02-03 RX ORDER — LIDOCAINE HYDROCHLORIDE 20 MG/ML
JELLY TOPICAL ONCE
Status: DISCONTINUED | OUTPATIENT
Start: 2020-02-03 | End: 2020-02-04 | Stop reason: HOSPADM

## 2020-02-03 NOTE — PROGRESS NOTES
 NIFEdipine (ADALAT CC) 30 MG extended release tablet Take 30 mg by mouth daily      acarbose (PRECOSE) 25 MG tablet Take 50 mg by mouth 3 times daily (with meals)      fexofenadine (ALLEGRA ALLERGY) 60 MG tablet Take 60 mg by mouth daily      Dulaglutide (TRULICITY) 6.96 AC/6.0OH SOPN Inject 0.75 mg into the skin once a week Indications: pt unsure of the dose TUESDAYS       No current facility-administered medications on file prior to encounter. REVIEW OF SYSTEMS See HPI    Objective:    BP (!) 152/70   Pulse 88   Temp 98.2 °F (36.8 °C) (Oral)   Resp 16   Ht 5' 8\" (1.727 m)   Wt 200 lb (90.7 kg)   BMI 30.41 kg/m²   Wt Readings from Last 3 Encounters:   02/03/20 200 lb (90.7 kg)   01/27/20 200 lb (90.7 kg)   01/20/20 200 lb (90.7 kg)     PHYSICAL EXAM  CONSTITUTIONAL:   Awake, alert, cooperative   EYES:  lids and lashes normal   ENT: external ears and nose without lesions   NECK:  supple, symmetrical, trachea midline   SKIN:  Open wound Present    Assessment:     Problem List Items Addressed This Visit     None          Pre Debridement Measurements:  Are located in the [place]  Documentation Flow Sheet  Post Debridement Measurements:  Wound/Ulcer Descriptions are Pre Debridement except measurements:     Wound 12/02/19 Leg Right #1 rt leg aquired 10/9/19 (Active)   Wound Image   1/6/2020  8:50 AM   Dressing Status Intact; Changed 1/20/2020  9:32 AM   Dressing Changed Changed/New 1/27/2020  9:43 AM   Dressing/Treatment Dry dressing 1/27/2020  9:43 AM   Wound Cleansed Rinsed/Irrigated with saline 1/27/2020  9:43 AM   Wound Length (cm) 8 cm 2/3/2020  9:04 AM   Wound Width (cm) 1.8 cm 2/3/2020  9:04 AM   Wound Depth (cm) 0.2 cm 2/3/2020  9:04 AM   Wound Surface Area (cm^2) 14.4 cm^2 2/3/2020  9:04 AM   Change in Wound Size % (l*w) 78.89 2/3/2020  9:04 AM   Wound Volume (cm^3) 2.88 cm^3 2/3/2020  9:04 AM   Wound Healing % 79 2/3/2020  9:04 AM   Post-Procedure Length (cm) 7.8 cm 1/27/2020  8:59 AM Post-Procedure Width (cm) 1.5 cm 1/27/2020  8:59 AM   Post-Procedure Depth (cm) 0.2 cm 1/27/2020  8:59 AM   Post-Procedure Surface Area (cm^2) 11.7 cm^2 1/27/2020  8:59 AM   Post-Procedure Volume (cm^3) 2.34 cm^3 1/27/2020  8:59 AM   Wound Assessment Yellow;Pink;Slough 2/3/2020  9:04 AM   Drainage Amount Moderate 2/3/2020  9:04 AM   Drainage Description Yellow 2/3/2020  9:04 AM   Odor None 2/3/2020  9:04 AM   Kesha-wound Assessment Pale;Pink;Maceration 2/3/2020  9:04 AM   Number of days: 62          Procedure Note  Indications:  Based on my examination of this patient's wound(s)/ulcer(s) today, debridement is required to promote healing and evaluate the wound base. Performed by: Matthew Mcdermott DPM    Consent obtained:  Yes    Time out taken:  Yes    Pain Control: Anesthetic  Anesthetic: 2% Lidocaine Gel Topical     Debridement:Excisional Debridement    Using curette the wound(s)/ulcer(s) was/were sharply debrided down through and including the removal of subcutaneous tissue. Devitalized Tissue Debrided:  slough to stimulate bleeding to promote healing, post debridement good bleeding base and wound edges noted    Wound/Ulcer #: 1    Percent of Wound/Ulcer Debrided: 100%    Total Surface Area Debrided:  11.7 sq cm     Estimated Blood Loss:  Minimal  Hemostasis Achieved:  by pressure    Procedural Pain:  5  / 10   Post Procedural Pain:  5 / 10     Response to treatment:  Well tolerated by patient. Plan:   Treatment Note please see attached Discharge Instructions    Written patient dismissal instructions given to patient and signed by patient or POA.          Discharge Instructions       Visit Discharge/Physician Orders     Discharge condition: Stable     Assessment of pain at discharge: minimal     Anesthetic used: 2% lidocaine gel     Discharge to: Home     Left via:Private automobile     Accompanied by: accompanied by      ECF/HHA: [facility] care     Dressing Orders:  Cleanse wound with normal

## 2020-02-10 ENCOUNTER — HOSPITAL ENCOUNTER (OUTPATIENT)
Dept: WOUND CARE | Age: 75
Discharge: HOME | End: 2020-02-10
Payer: MEDICARE

## 2020-02-10 VITALS
HEART RATE: 86 BPM | HEIGHT: 68 IN | TEMPERATURE: 97.7 F | BODY MASS INDEX: 30.31 KG/M2 | DIASTOLIC BLOOD PRESSURE: 70 MMHG | SYSTOLIC BLOOD PRESSURE: 124 MMHG | WEIGHT: 200 LBS | RESPIRATION RATE: 16 BRPM

## 2020-02-10 PROCEDURE — 11042 DBRDMT SUBQ TIS 1ST 20SQCM/<: CPT

## 2020-02-10 RX ORDER — LIDOCAINE HYDROCHLORIDE 20 MG/ML
JELLY TOPICAL ONCE
Status: DISCONTINUED | OUTPATIENT
Start: 2020-02-10 | End: 2020-02-11 | Stop reason: HOSPADM

## 2020-02-10 NOTE — PROGRESS NOTES
Wound Healing Center Followup Visit Note    Referring Physician : Maria Teresa Matt  MEDICAL RECORD NUMBER:  [id number]  AGE: 69 y.o. GENDER: male  : 1945  EPISODE DATE:  2/10/2020    Subjective:     Chief Complaint   Patient presents with    Wound Check     rt elg      HISTORY of PRESENT ILLNESS HPI   Trudy Fatima is a 69 y.o. male who presents today in regards to follow up evaluation and treatment of wound/ulcer. That patient's past medical, family and social hx were reviewed and changes were made if present. History of Wound Context:  Right leg wound with edema     Wound/Ulcer Pain Timing/Severity: intermittent  Quality of pain: sharp  Severity:  3 / 10   Modifying Factors: Pain worsens with walking  Associated Signs/Symptoms: edema    Ulcer Identification:  Ulcer Type: venous  Contributing Factors: edema    Diabetic/Pressure/Non Pressure Ulcers only:  Ulcer: Non-Pressure ulcer, fat layer exposed    Wound: N/A        PAST MEDICAL HISTORY      Diagnosis Date    Arthritis     Diabetes mellitus ([address]     Hyperlipidemia     Hypertension      Past Surgical History:   Procedure Laterality Date    ENDOSCOPY, COLON, DIAGNOSTIC      EYE SURGERY      HERNIA REPAIR      TONSILLECTOMY       History reviewed. No pertinent family history.   Social History     Tobacco Use    Smoking status: Never Smoker    Smokeless tobacco: Never Used   Substance Use Topics    Alcohol use: Never     Frequency: Never    Drug use: Never     Allergies   Allergen Reactions    Penicillins     Seasonal     Sulfa Antibiotics      Current Outpatient Medications on File Prior to Encounter   Medication Sig Dispense Refill    metOLazone (ZAROXOLYN) 5 MG tablet Take 5 mg by mouth daily      lisinopril (PRINIVIL;ZESTRIL) 40 MG tablet Take 40 mg by mouth daily      glipiZIDE (GLUCOTROL XL) 10 MG extended release tablet Take 10 mg by mouth daily      metFORMIN (GLUCOPHAGE) 500 MG tablet Take 500 mg by mouth 2 times daily (with meals)      NIFEdipine (ADALAT CC) 30 MG extended release tablet Take 30 mg by mouth daily      acarbose (PRECOSE) 25 MG tablet Take 50 mg by mouth 3 times daily (with meals)      fexofenadine (ALLEGRA ALLERGY) 60 MG tablet Take 60 mg by mouth daily      Dulaglutide (TRULICITY) 9.92 EL/9.5JK SOPN Inject 0.75 mg into the skin once a week Indications: pt unsure of the dose TUESDAYS       No current facility-administered medications on file prior to encounter. REVIEW OF SYSTEMS See HPI    Objective:    /70   Pulse 86   Temp 97.7 °F (36.5 °C)   Resp 16   Ht 5' 8\" (1.727 m)   Wt 200 lb (90.7 kg)   BMI 30.41 kg/m²   Wt Readings from Last 3 Encounters:   02/10/20 200 lb (90.7 kg)   02/03/20 200 lb (90.7 kg)   01/27/20 200 lb (90.7 kg)     PHYSICAL EXAM  CONSTITUTIONAL:   Awake, alert, cooperative   EYES:  lids and lashes normal   ENT: external ears and nose without lesions   NECK:  supple, symmetrical, trachea midline   SKIN:  Open wound Present    Assessment:     Problem List Items Addressed This Visit     None          Pre Debridement Measurements:  Are located in the [place]  Documentation Flow Sheet  Post Debridement Measurements:  Wound/Ulcer Descriptions are Pre Debridement except measurements:     Wound 12/02/19 Leg Right #1 rt leg aquired 10/9/19 (Active)   Wound Image   1/6/2020  8:50 AM   Dressing Status Intact; Changed 1/20/2020  9:32 AM   Dressing Changed Changed/New 2/3/2020  9:04 AM   Dressing/Treatment Pharmaceutical agent (see MAR); Dry dressing 2/3/2020  9:04 AM   Wound Cleansed Rinsed/Irrigated with saline 2/3/2020  9:04 AM   Wound Length (cm) 7.9 cm 2/10/2020  8:45 AM   Wound Width (cm) 1.5 cm 2/10/2020  8:45 AM   Wound Depth (cm) 0.2 cm 2/10/2020  8:45 AM   Wound Surface Area (cm^2) 11.85 cm^2 2/10/2020  8:45 AM   Change in Wound Size % (l*w) 82.62 2/10/2020  8:45 AM   Wound Volume (cm^3) 2.37 cm^3 2/10/2020  8:45 AM   Wound Healing % 83 2/10/2020  8:45 AM care     Dressing Orders:  Cleanse wound with normal saline solution and apply collagen and unna boot and coban. Treatment Orders: Do not get wrap wet.     [facility] followup visit ___________one week__________________  (Please note your next appointment above and if you are unable to keep, kindly give a 24 hour notice. Thank you.)     Physician signature:__________________________        If you experience any of the following, please call the [facility] during business hours:     * Increase in Pain  * Temperature over 101  * Increase in drainage from your wound  * Drainage with a foul odor  * Bleeding  * Increase in swelling  * Need for compression bandage changes due to slippage, breakthrough drainage.     If you need medical attention outside of the business hours of the [facility] please contact your PCP or go to the nearest emergency room.         Electronically signed by Leopoldo Bennett DPM on 2/10/2020 at 9:21 AM

## 2020-02-17 ENCOUNTER — HOSPITAL ENCOUNTER (OUTPATIENT)
Dept: WOUND CARE | Age: 75
Discharge: HOME | End: 2020-02-17
Payer: MEDICARE

## 2020-02-17 VITALS
SYSTOLIC BLOOD PRESSURE: 140 MMHG | HEIGHT: 68 IN | DIASTOLIC BLOOD PRESSURE: 82 MMHG | RESPIRATION RATE: 16 BRPM | TEMPERATURE: 97.6 F | BODY MASS INDEX: 30.31 KG/M2 | WEIGHT: 200 LBS | HEART RATE: 80 BPM

## 2020-02-17 PROCEDURE — 11042 DBRDMT SUBQ TIS 1ST 20SQCM/<: CPT

## 2020-02-17 RX ORDER — LIDOCAINE HYDROCHLORIDE 40 MG/ML
SOLUTION TOPICAL ONCE
Status: DISCONTINUED | OUTPATIENT
Start: 2020-02-17 | End: 2020-02-18 | Stop reason: HOSPADM

## 2020-02-17 NOTE — PROGRESS NOTES
mouth 2 times daily (with meals)      NIFEdipine (ADALAT CC) 30 MG extended release tablet Take 30 mg by mouth daily      acarbose (PRECOSE) 25 MG tablet Take 50 mg by mouth 3 times daily (with meals)      fexofenadine (ALLEGRA ALLERGY) 60 MG tablet Take 60 mg by mouth daily      Dulaglutide (TRULICITY) 0.98 CALLUM/0.0IF SOPN Inject 0.75 mg into the skin once a week Indications: pt unsure of the dose TUESDAYS       No current facility-administered medications on file prior to encounter. REVIEW OF SYSTEMS See HPI    Objective:    BP (!) 140/82   Pulse 80   Temp 97.6 °F (36.4 °C) (Oral)   Resp 16   Ht 5' 8\" (1.727 m)   Wt 200 lb (90.7 kg)   BMI 30.41 kg/m²   Wt Readings from Last 3 Encounters:   02/17/20 200 lb (90.7 kg)   02/10/20 200 lb (90.7 kg)   02/03/20 200 lb (90.7 kg)     PHYSICAL EXAM  CONSTITUTIONAL:   Awake, alert, cooperative   EYES:  lids and lashes normal   ENT: external ears and nose without lesions   NECK:  supple, symmetrical, trachea midline   SKIN:  Open wound Present    Assessment:     Problem List Items Addressed This Visit     None          Pre Debridement Measurements:  Are located in the [place]  Documentation Flow Sheet  Post Debridement Measurements:  Wound/Ulcer Descriptions are Pre Debridement except measurements:     Wound 12/02/19 Leg Right #1 rt leg aquired 10/9/19 (Active)   Wound Image   1/6/2020  8:50 AM   Dressing Status Intact; Changed 1/20/2020  9:32 AM   Dressing Changed Changed/New 2/3/2020  9:04 AM   Dressing/Treatment Coban;Collagen 2/17/2020  9:49 AM   Wound Cleansed Rinsed/Irrigated with saline 2/17/2020  9:49 AM   Wound Length (cm) 11 cm 2/17/2020  8:47 AM   Wound Width (cm) 2 cm 2/17/2020  8:47 AM   Wound Depth (cm) 0.1 cm 2/17/2020  8:47 AM   Wound Surface Area (cm^2) 22 cm^2 2/17/2020  8:47 AM   Change in Wound Size % (l*w) 67.74 2/17/2020  8:47 AM   Wound Volume (cm^3) 2.2 cm^3 2/17/2020  8:47 AM   Wound Healing % 84 2/17/2020  8:47 AM   Post-Procedure  Cleanse wound with normal saline solution and apply collagen and unna boot and coban.     Treatment Orders: Do not get wrap wet.     [facility] followup visit ___________one week__________________  (Please note your next appointment above and if you are unable to keep, kindly give a 24 hour notice. Thank you.)     Physician signature:__________________________        If you experience any of the following, please call the [facility] during business hours:     * Increase in Pain  * Temperature over 101  * Increase in drainage from your wound  * Drainage with a foul odor  * Bleeding  * Increase in swelling  * Need for compression bandage changes due to slippage, breakthrough drainage.     If you need medical attention outside of the business hours of the [facility] please contact your PCP or go to the nearest emergency room.         Electronically signed by Liane Marina DPM on 2/17/2020 at 9:57 AM

## 2020-02-24 ENCOUNTER — HOSPITAL ENCOUNTER (OUTPATIENT)
Dept: WOUND CARE | Age: 75
Discharge: HOME | End: 2020-02-24
Payer: MEDICARE

## 2020-02-24 VITALS
SYSTOLIC BLOOD PRESSURE: 160 MMHG | TEMPERATURE: 98.4 F | HEART RATE: 86 BPM | RESPIRATION RATE: 16 BRPM | DIASTOLIC BLOOD PRESSURE: 80 MMHG

## 2020-02-24 PROCEDURE — 11042 DBRDMT SUBQ TIS 1ST 20SQCM/<: CPT

## 2020-02-24 NOTE — PROGRESS NOTES
Wound Healing Center Followup Visit Note    Referring Physician : Florin Matt  MEDICAL RECORD NUMBER:  [id number]  AGE: 69 y.o. GENDER: male  : 1945  EPISODE DATE:  2020    Subjective:     Chief Complaint   Patient presents with    Wound Check     wound right leg      HISTORY of PRESENT ILLNESS HPI   Sheri Blevins is a 69 y.o. male who presents today in regards to follow up evaluation and treatment of wound/ulcer. That patient's past medical, family and social hx were reviewed and changes were made if present. :  History of Wound Context:  Right leg wound      Wound/Ulcer Pain Timing/Severity: intermittent  Quality of pain: sharp  Severity:  6 / 10   Modifying Factors: Pain is relieved/improved with rest  Associated Signs/Symptoms: edema     Ulcer Identification:  Ulcer Type: venous  Contributing Factors: venous stasis    :    History of Wound Context:  Right leg wound     Wound/Ulcer Pain Timing/Severity: intermittent  Quality of pain: sharp  Severity:  7 / 10   Modifying Factors: Pain is relieved/improved with rest  Associated Signs/Symptoms: edema    Ulcer Identification:  Ulcer Type: venous  Contributing Factors: venous stasis    Diabetic/Pressure/Non Pressure Ulcers only:  Ulcer: Non-Pressure ulcer, fat layer exposed    Wound: N/A     Wound is improving. Smaller in size. Responding well to compression. Recommend pain management referral - [facility] pain management group        PAST MEDICAL HISTORY      Diagnosis Date    Arthritis     Diabetes mellitus ([address]     Hyperlipidemia     Hypertension      Past Surgical History:   Procedure Laterality Date    ENDOSCOPY, COLON, DIAGNOSTIC      EYE SURGERY      HERNIA REPAIR      TONSILLECTOMY       History reviewed. No pertinent family history.   Social History     Tobacco Use    Smoking status: Never Smoker    Smokeless tobacco: Never Used   Substance Use Topics    Alcohol use: Never     Frequency: Never    Drug use: Never     Allergies   Allergen Reactions    Penicillins     Seasonal     Sulfa Antibiotics      Current Outpatient Medications on File Prior to Encounter   Medication Sig Dispense Refill    metOLazone (ZAROXOLYN) 5 MG tablet Take 5 mg by mouth daily      lisinopril (PRINIVIL;ZESTRIL) 40 MG tablet Take 40 mg by mouth daily      glipiZIDE (GLUCOTROL XL) 10 MG extended release tablet Take 10 mg by mouth daily      metFORMIN (GLUCOPHAGE) 500 MG tablet Take 500 mg by mouth 2 times daily (with meals)      NIFEdipine (ADALAT CC) 30 MG extended release tablet Take 30 mg by mouth daily      acarbose (PRECOSE) 25 MG tablet Take 50 mg by mouth 3 times daily (with meals)      fexofenadine (ALLEGRA ALLERGY) 60 MG tablet Take 60 mg by mouth daily      Dulaglutide (TRULICITY) 0.35 XJ/5.7RR SOPN Inject 0.75 mg into the skin once a week Indications: pt unsure of the dose TUESDAYS       No current facility-administered medications on file prior to encounter. REVIEW OF SYSTEMS See HPI    Objective:    BP (!) 160/80   Pulse 86   Temp 98.4 °F (36.9 °C) (Oral)   Resp 16   Wt Readings from Last 3 Encounters:   02/17/20 200 lb (90.7 kg)   02/10/20 200 lb (90.7 kg)   02/03/20 200 lb (90.7 kg)     PHYSICAL EXAM  CONSTITUTIONAL:   Awake, alert, cooperative   EYES:  lids and lashes normal   ENT: external ears and nose without lesions   NECK:  supple, symmetrical, trachea midline   SKIN:  Open wound Present    Assessment:     Problem List Items Addressed This Visit     None          Pre Debridement Measurements:  Are located in the [place]  Documentation Flow Sheet  Post Debridement Measurements:  Wound/Ulcer Descriptions are Pre Debridement except measurements:     Wound 12/02/19 Leg Right #1 rt leg aquired 10/9/19 (Active)   Wound Image   1/6/2020  8:50 AM   Dressing Status Intact; Changed 1/20/2020  9:32 AM   Dressing Changed Changed/New 2/3/2020  9:04 AM   Dressing/Treatment Coban;Collagen 2/17/2020  9:49 AM

## 2020-03-02 ENCOUNTER — HOSPITAL ENCOUNTER (OUTPATIENT)
Dept: WOUND CARE | Age: 75
Discharge: HOME | End: 2020-03-02
Payer: MEDICARE

## 2020-03-02 VITALS
TEMPERATURE: 98.6 F | WEIGHT: 200 LBS | SYSTOLIC BLOOD PRESSURE: 156 MMHG | HEIGHT: 68 IN | RESPIRATION RATE: 18 BRPM | HEART RATE: 76 BPM | DIASTOLIC BLOOD PRESSURE: 76 MMHG | BODY MASS INDEX: 30.31 KG/M2

## 2020-03-02 PROCEDURE — 11042 DBRDMT SUBQ TIS 1ST 20SQCM/<: CPT

## 2020-03-02 RX ORDER — LIDOCAINE HYDROCHLORIDE 40 MG/ML
SOLUTION TOPICAL ONCE
Status: DISCONTINUED | OUTPATIENT
Start: 2020-03-02 | End: 2020-03-03 | Stop reason: HOSPADM

## 2020-03-02 ASSESSMENT — PAIN SCALES - GENERAL: PAINLEVEL_OUTOF10: 0

## 2020-03-02 NOTE — PROGRESS NOTES
Surgical History:   Procedure Laterality Date    ENDOSCOPY, COLON, DIAGNOSTIC      EYE SURGERY      HERNIA REPAIR      TONSILLECTOMY       History reviewed. No pertinent family history. Social History     Tobacco Use    Smoking status: Never Smoker    Smokeless tobacco: Never Used   Substance Use Topics    Alcohol use: Never     Frequency: Never    Drug use: Never     Allergies   Allergen Reactions    Penicillins     Seasonal     Sulfa Antibiotics      Current Outpatient Medications on File Prior to Encounter   Medication Sig Dispense Refill    metOLazone (ZAROXOLYN) 5 MG tablet Take 5 mg by mouth daily      lisinopril (PRINIVIL;ZESTRIL) 40 MG tablet Take 40 mg by mouth daily      glipiZIDE (GLUCOTROL XL) 10 MG extended release tablet Take 10 mg by mouth daily      metFORMIN (GLUCOPHAGE) 500 MG tablet Take 500 mg by mouth 2 times daily (with meals)      NIFEdipine (ADALAT CC) 30 MG extended release tablet Take 30 mg by mouth daily      acarbose (PRECOSE) 25 MG tablet Take 50 mg by mouth 3 times daily (with meals)      fexofenadine (ALLEGRA ALLERGY) 60 MG tablet Take 60 mg by mouth daily      Dulaglutide (TRULICITY) 7.80 QD/6.5HF SOPN Inject 0.75 mg into the skin once a week Indications: pt unsure of the dose TUESDAYS       No current facility-administered medications on file prior to encounter.         REVIEW OF SYSTEMS See HPI    Objective:    BP (!) 156/76   Pulse 76   Temp 98.6 °F (37 °C) (Oral)   Resp 18   Ht 5' 8\" (1.727 m)   Wt 200 lb (90.7 kg)   BMI 30.41 kg/m²   Wt Readings from Last 3 Encounters:   03/02/20 200 lb (90.7 kg)   02/17/20 200 lb (90.7 kg)   02/10/20 200 lb (90.7 kg)     PHYSICAL EXAM  CONSTITUTIONAL:   Awake, alert, cooperative   EYES:  lids and lashes normal   ENT: external ears and nose without lesions   NECK:  supple, symmetrical, trachea midline   SKIN:  Open wound Present    Assessment:     Problem List Items Addressed This Visit     None          Pre Debridement Measurements:  Are located in the [facility]  Documentation Flow Sheet  Post Debridement Measurements:  Wound/Ulcer Descriptions are Pre Debridement except measurements:     Wound 12/02/19 Leg Right #1 rt leg aquired 10/9/19 (Active)   Wound Image   1/6/2020  8:50 AM   Dressing Status Intact; Changed 1/20/2020  9:32 AM   Dressing Changed Changed/New 2/3/2020  9:04 AM   Dressing/Treatment Coban;Collagen 2/17/2020  9:49 AM   Wound Cleansed Rinsed/Irrigated with saline 2/17/2020  9:49 AM   Wound Length (cm) 10.6 cm 3/2/2020  9:49 AM   Wound Width (cm) 1.4 cm 3/2/2020  9:49 AM   Wound Depth (cm) 0.2 cm 3/2/2020  9:49 AM   Wound Surface Area (cm^2) 14.84 cm^2 3/2/2020  9:49 AM   Change in Wound Size % (l*w) 78.24 3/2/2020  9:49 AM   Wound Volume (cm^3) 2.97 cm^3 3/2/2020  9:49 AM   Wound Healing % 78 3/2/2020  9:49 AM   Post-Procedure Length (cm) 10.6 cm 3/2/2020  9:58 AM   Post-Procedure Width (cm) 1.4 cm 3/2/2020  9:58 AM   Post-Procedure Depth (cm) 0.2 cm 3/2/2020  9:58 AM   Post-Procedure Surface Area (cm^2) 14.84 cm^2 3/2/2020  9:58 AM   Post-Procedure Volume (cm^3) 2.97 cm^3 3/2/2020  9:58 AM   Wound Assessment Pink;Slough; Yellow 3/2/2020  9:49 AM   Drainage Amount Moderate 3/2/2020  9:49 AM   Drainage Description Serosanguinous; Yellow 3/2/2020  9:49 AM   Odor None 3/2/2020  9:49 AM   Kesha-wound Assessment Pink; Intact 3/2/2020  9:49 AM   Number of days: 90          Procedure Note  Indications:  Based on my examination of this patient's wound(s)/ulcer(s) today, debridement is required to promote healing and evaluate the wound base. Performed by: Pretty Grover DPM    Consent obtained:  Yes    Time out taken:  Yes    Pain Control: Anesthetic  Anesthetic: 4% Lidocaine Liquid Topical     Debridement:Excisional Debridement    Using curette the wound(s)/ulcer(s) was/were sharply debrided down through and including the removal of subcutaneous tissue.         Devitalized Tissue Debrided:  slough to stimulate bleeding to

## 2020-03-09 ENCOUNTER — HOSPITAL ENCOUNTER (OUTPATIENT)
Dept: WOUND CARE | Age: 75
Discharge: HOME | End: 2020-03-09
Payer: MEDICARE

## 2020-03-09 VITALS
TEMPERATURE: 98 F | HEART RATE: 78 BPM | HEIGHT: 68 IN | WEIGHT: 200 LBS | BODY MASS INDEX: 30.31 KG/M2 | DIASTOLIC BLOOD PRESSURE: 70 MMHG | SYSTOLIC BLOOD PRESSURE: 118 MMHG | RESPIRATION RATE: 18 BRPM

## 2020-03-09 PROCEDURE — 11042 DBRDMT SUBQ TIS 1ST 20SQCM/<: CPT

## 2020-03-09 RX ORDER — LIDOCAINE HYDROCHLORIDE 40 MG/ML
SOLUTION TOPICAL ONCE
Status: DISCONTINUED | OUTPATIENT
Start: 2020-03-09 | End: 2020-03-10 | Stop reason: HOSPADM

## 2020-03-09 ASSESSMENT — PAIN SCALES - GENERAL: PAINLEVEL_OUTOF10: 2

## 2020-03-09 ASSESSMENT — PAIN DESCRIPTION - FREQUENCY: FREQUENCY: CONTINUOUS

## 2020-03-09 ASSESSMENT — PAIN DESCRIPTION - ONSET: ONSET: ON-GOING

## 2020-03-09 ASSESSMENT — PAIN DESCRIPTION - LOCATION: LOCATION: PRETIBIA

## 2020-03-09 ASSESSMENT — PAIN DESCRIPTION - ORIENTATION: ORIENTATION: RIGHT

## 2020-03-09 ASSESSMENT — PAIN DESCRIPTION - DESCRIPTORS: DESCRIPTORS: DISCOMFORT;CONSTANT;BURNING

## 2020-03-09 ASSESSMENT — PAIN - FUNCTIONAL ASSESSMENT: PAIN_FUNCTIONAL_ASSESSMENT: ACTIVITIES ARE NOT PREVENTED

## 2020-03-09 ASSESSMENT — PAIN DESCRIPTION - PROGRESSION: CLINICAL_PROGRESSION: GRADUALLY WORSENING

## 2020-03-09 ASSESSMENT — PAIN DESCRIPTION - PAIN TYPE: TYPE: CHRONIC PAIN

## 2020-03-09 NOTE — PROGRESS NOTES
Wound Healing Center Followup Visit Note    Referring Physician : Vladimir Matt  MEDICAL RECORD NUMBER:  [id number]  AGE: 69 y.o. GENDER: male  : 1945  EPISODE DATE:  3/9/2020    Subjective:     Chief Complaint   Patient presents with    Wound Check     rt leg      HISTORY of PRESENT ILLNESS HPI   Thais Palomares is a 69 y.o. male who presents today in regards to follow up evaluation and treatment of wound/ulcer. That patient's past medical, family and social hx were reviewed and changes were made if present. :  History of Wound Context:  Right leg wound      Wound/Ulcer Pain Timing/Severity: intermittent  Quality of pain: sharp  Severity:  6 / 10   Modifying Factors: Pain is relieved/improved with rest  Associated Signs/Symptoms: edema     Ulcer Identification:  Ulcer Type: venous  Contributing Factors: venous stasis    :    History of Wound Context:  Right leg wound     Wound/Ulcer Pain Timing/Severity: intermittent  Quality of pain: sharp  Severity:  7 / 10   Modifying Factors: Pain is relieved/improved with rest  Associated Signs/Symptoms: edema    Ulcer Identification:  Ulcer Type: venous  Contributing Factors: venous stasis     3/2:    History of Wound Context:  Right leg wound     Wound/Ulcer Pain Timing/Severity: intermittent  Quality of pain: sharp  Severity:  7 / 10   Modifying Factors: Pain is relieved/improved with rest  Associated Signs/Symptoms: edema    Ulcer Identification:  Ulcer Type: venous  Contributing Factors: venous stasis    Diabetic/Pressure/Non Pressure Ulcers only:  Ulcer: Non-Pressure ulcer, fat layer exposed    Wound: N/A     Wound is improving. Smaller in size. Responding well to compression. [facility]. Continue Unna boot compression and debridement. 3/9: Wound is improving. Smaller in size. Responding well to compression.   Recommend pain management referral - [facility] without lesions   NECK:  supple, symmetrical, trachea midline   SKIN:  Open wound Present    Assessment:     Problem List Items Addressed This Visit     None          Pre Debridement Measurements:  Are located in the [place]  Documentation Flow Sheet  Post Debridement Measurements:  Wound/Ulcer Descriptions are Pre Debridement except measurements:     Wound 12/02/19 Leg Right #1 rt leg aquired 10/9/19 (Active)   Wound Image   1/6/2020  8:50 AM   Dressing Status Intact; Changed 1/20/2020  9:32 AM   Dressing Changed Changed/New 2/3/2020  9:04 AM   Dressing/Treatment Collagen;Alginate 3/2/2020  9:58 AM   Wound Cleansed Rinsed/Irrigated with saline 2/17/2020  9:49 AM   Wound Length (cm) 10 cm 3/9/2020  9:21 AM   Wound Width (cm) 1.3 cm 3/9/2020  9:21 AM   Wound Depth (cm) 0.1 cm 3/9/2020  9:21 AM   Wound Surface Area (cm^2) 13 cm^2 3/9/2020  9:21 AM   Change in Wound Size % (l*w) 80.94 3/9/2020  9:21 AM   Wound Volume (cm^3) 1.3 cm^3 3/9/2020  9:21 AM   Wound Healing % 90 3/9/2020  9:21 AM   Post-Procedure Length (cm) 10.6 cm 3/2/2020  9:58 AM   Post-Procedure Width (cm) 1.4 cm 3/2/2020  9:58 AM   Post-Procedure Depth (cm) 0.2 cm 3/2/2020  9:58 AM   Post-Procedure Surface Area (cm^2) 14.84 cm^2 3/2/2020  9:58 AM   Post-Procedure Volume (cm^3) 2.97 cm^3 3/2/2020  9:58 AM   Wound Assessment Bleeding;Red;Pink;Slough; Yellow 3/9/2020  9:21 AM   Drainage Amount Moderate 3/9/2020  9:21 AM   Drainage Description Serosanguinous; Yellow 3/9/2020  9:21 AM   Odor None 3/9/2020  9:21 AM   Kesha-wound Assessment Pink; Intact 3/9/2020  9:21 AM   Number of days: 97          Procedure Note  Indications:  Based on my examination of this patient's wound(s)/ulcer(s) today, debridement is required to promote healing and evaluate the wound base.     Performed by: Morales Bergeron DPM    Consent obtained:  Yes    Time out taken:  Yes    Pain Control: Anesthetic  Anesthetic: 4% Lidocaine Liquid Topical     Debridement:Excisional Debridement    Using curette the wound(s)/ulcer(s) was/were sharply debrided down through and including the removal of subcutaneous tissue. Devitalized Tissue Debrided:  slough to stimulate bleeding to promote healing, post debridement good bleeding base and wound edges noted    Wound/Ulcer #: 1    Percent of Wound/Ulcer Debrided: 100%    Total Surface Area Debrided:  14.84 sq cm     Estimated Blood Loss:  Minimal  Hemostasis Achieved:  by pressure    Procedural Pain:  5  / 10   Post Procedural Pain:  5 / 10     Response to treatment:  Well tolerated by patient. Plan:   Treatment Note please see attached Discharge Instructions    Written patient dismissal instructions given to patient and signed by patient or POA. Discharge Instructions            Visit Discharge/Physician Orders     Discharge condition: Stable     Assessment of pain at discharge: minimal     Anesthetic used: 4% lidocaine solution   Discharge to: Home     Left via:Private automobile     Accompanied by: accompanied by      ECF/HHA: N/A     Dressing Orders:  Cleanse wound with normal saline solution and apply collagen covered with a piece of alginate and unna boot and coban.     Treatment Orders: Do not get wrap wet. [facility] followup visit ___________one week__________________  (Please note your next appointment above and if you are unable to keep, kindly give a 24 hour notice.  Thank you.)     Physician signature:__________________________        If you experience any of the following, please call the [facility] [address] during business hours:     * Increase in Pain  * Temperature over 101  * Increase in drainage from your wound  * Drainage with a foul odor  * Bleeding  * Increase in swelling  * Need for compression bandage changes due to slippage, breakthrough drainage.     If you need medical attention outside of the business hours of the [facility] [address] please contact your PCP or go to the nearest emergency room.          Electronically signed by Selena Staples DPM on 3/9/2020 at 10:00 AM

## 2020-03-16 ENCOUNTER — HOSPITAL ENCOUNTER (OUTPATIENT)
Dept: WOUND CARE | Age: 75
Discharge: HOME | End: 2020-03-16
Payer: MEDICARE

## 2020-03-16 VITALS
DIASTOLIC BLOOD PRESSURE: 80 MMHG | HEART RATE: 90 BPM | RESPIRATION RATE: 16 BRPM | SYSTOLIC BLOOD PRESSURE: 158 MMHG | TEMPERATURE: 97.7 F

## 2020-03-16 PROCEDURE — 11042 DBRDMT SUBQ TIS 1ST 20SQCM/<: CPT

## 2020-03-16 RX ORDER — LIDOCAINE HYDROCHLORIDE 40 MG/ML
SOLUTION TOPICAL ONCE
Status: DISCONTINUED | OUTPATIENT
Start: 2020-03-16 | End: 2020-03-17 | Stop reason: HOSPADM

## 2020-03-16 NOTE — PROGRESS NOTES
[facility]aling Center Followup Visit Note    Referring Physician : Rian Matt  MEDICAL RECORD NUMBER:  [id number]  AGE: 69 y.o. GENDER: male  : 1945  EPISODE DATE:  3/16/2020    Subjective:     Chief Complaint   Patient presents with    Wound Check     wound right leg      HISTORY of PRESENT ILLNESS HPI   Nena Solis is a 69 y.o. male who presents today in regards to follow up evaluation and treatment of wound/ulcer. That patient's past medical, family and social hx were reviewed and changes were made if present. :  History of Wound Context:  Right leg wound      Wound/Ulcer Pain Timing/Severity: intermittent  Quality of pain: sharp  Severity:  6 / 10   Modifying Factors: Pain is relieved/improved with rest  Associated Signs/Symptoms: edema     Ulcer Identification:  Ulcer Type: venous  Contributing Factors: venous stasis    :    History of Wound Context:  Right leg wound     Wound/Ulcer Pain Timing/Severity: intermittent  Quality of pain: sharp  Severity:  7 / 10   Modifying Factors: Pain is relieved/improved with rest  Associated Signs/Symptoms: edema    Ulcer Identification:  Ulcer Type: venous  Contributing Factors: venous stasis     3/2:    History of Wound Context:  Right leg wound     Wound/Ulcer Pain Timing/Severity: intermittent  Quality of pain: sharp  Severity:  7 / 10   Modifying Factors: Pain is relieved/improved with rest  Associated Signs/Symptoms: edema    Ulcer Identification:  Ulcer Type: venous  Contributing Factors: venous stasis    Diabetic/Pressure/Non Pressure Ulcers only:  Ulcer: Non-Pressure ulcer, fat layer exposed    Wound: N/A     Wound is improving. Smaller in size. Responding well to compression. [facility]. Continue Unna boot compression and debridement. 3/9: Wound is improving. Smaller in size. Responding well to compression.   Recommend [facility] - [facility])   02/17/20 200 lb (90.7 kg)     PHYSICAL EXAM  CONSTITUTIONAL:   Awake, alert, cooperative   EYES:  lids and lashes normal   ENT: external ears and nose without lesions   NECK:  supple, symmetrical, trachea midline   SKIN:  Open wound Present    Assessment:     Problem List Items Addressed This Visit     None          Pre Debridement Measurements:  Are located in the [place]  Documentation Flow Sheet  Post Debridement Measurements:  Wound/Ulcer Descriptions are Pre Debridement except measurements:     Wound 12/02/19 Leg Right #1 rt leg aquired 10/9/19 (Active)   Wound Image   1/6/2020  8:50 AM   Dressing Status Intact; Changed 1/20/2020  9:32 AM   Dressing Changed Changed/New 2/3/2020  9:04 AM   Dressing/Treatment Collagen;Alginate 3/2/2020  9:58 AM   Wound Cleansed Rinsed/Irrigated with saline 2/17/2020  9:49 AM   Wound Length (cm) 10 cm 3/16/2020  8:46 AM   Wound Width (cm) 1.1 cm 3/16/2020  8:46 AM   Wound Depth (cm) 0.2 cm 3/16/2020  8:46 AM   Wound Surface Area (cm^2) 11 cm^2 3/16/2020  8:46 AM   Change in Wound Size % (l*w) 83.87 3/16/2020  8:46 AM   Wound Volume (cm^3) 2.2 cm^3 3/16/2020  8:46 AM   Wound Healing % 84 3/16/2020  8:46 AM   Post-Procedure Length (cm) 10 cm 3/16/2020  8:53 AM   Post-Procedure Width (cm) 1.1 cm 3/16/2020  8:53 AM   Post-Procedure Depth (cm) 0.2 cm 3/16/2020  8:53 AM   Post-Procedure Surface Area (cm^2) 11 cm^2 3/16/2020  8:53 AM   Post-Procedure Volume (cm^3) 2.2 cm^3 3/16/2020  8:53 AM   Wound Assessment Drainage;Red;Yellow 3/16/2020  8:46 AM   Drainage Amount Small 3/16/2020  8:46 AM   Drainage Description Serosanguinous 3/16/2020  8:46 AM   Odor None 3/16/2020  8:46 AM   Kesha-wound Assessment Dry; Intact 3/16/2020  8:46 AM   Number of days: 104          Procedure Note  Indications:  Based on my examination of this patient's wound(s)/ulcer(s) today, debridement is required to promote healing and evaluate the wound base.     Performed by: DON Mike

## 2020-03-23 ENCOUNTER — HOSPITAL ENCOUNTER (OUTPATIENT)
Dept: WOUND CARE | Age: 75
Discharge: HOME | End: 2020-03-23
Payer: MEDICARE

## 2020-03-23 VITALS
TEMPERATURE: 98.8 F | HEIGHT: 68 IN | HEART RATE: 88 BPM | BODY MASS INDEX: 30.31 KG/M2 | DIASTOLIC BLOOD PRESSURE: 74 MMHG | SYSTOLIC BLOOD PRESSURE: 140 MMHG | RESPIRATION RATE: 16 BRPM | WEIGHT: 200 LBS

## 2020-03-23 PROCEDURE — 11042 DBRDMT SUBQ TIS 1ST 20SQCM/<: CPT

## 2020-03-23 RX ORDER — LIDOCAINE HYDROCHLORIDE 40 MG/ML
SOLUTION TOPICAL ONCE
Status: DISCONTINUED | OUTPATIENT
Start: 2020-03-23 | End: 2020-03-24 | Stop reason: HOSPADM

## 2020-03-23 ASSESSMENT — PAIN SCALES - GENERAL: PAINLEVEL_OUTOF10: 0

## 2020-03-27 NOTE — PROGRESS NOTES
See HPI    Objective:    BP (!) 140/74   Pulse 88   Temp 98.8 °F (37.1 °C) (Oral)   Resp 16   Ht 5' 8\" (1.727 m)   Wt 200 lb (90.7 kg)   BMI 30.41 kg/m²   Wt Readings from Last 3 Encounters:   03/23/20 200 lb (90.7 kg)   03/09/20 200 lb (90.7 kg)   03/02/20 200 lb (90.7 kg)     PHYSICAL EXAM  CONSTITUTIONAL:   Awake, alert, cooperative   EYES:  lids and lashes normal   ENT: external ears and nose without lesions   NECK:  supple, symmetrical, trachea midline   SKIN:  Open wound Present    Assessment:     Problem List Items Addressed This Visit     None          Pre Debridement Measurements:  Are located in the [place]  Documentation Flow Sheet  Post Debridement Measurements:  Wound/Ulcer Descriptions are Pre Debridement except measurements:     Wound 12/02/19 Leg Right #1 rt leg aquired 10/9/19 (Active)   Wound Image   3/23/2020  8:55 AM   Dressing Status Intact; Changed 1/20/2020  9:32 AM   Dressing Changed Changed/New 2/3/2020  9:04 AM   Dressing/Treatment Collagen;Alginate 3/2/2020  9:58 AM   Wound Cleansed Rinsed/Irrigated with saline 2/17/2020  9:49 AM   Wound Length (cm) 10 cm 3/23/2020  8:55 AM   Wound Width (cm) 1.1 cm 3/23/2020  8:55 AM   Wound Depth (cm) 0.2 cm 3/23/2020  8:55 AM   Wound Surface Area (cm^2) 11 cm^2 3/23/2020  8:55 AM   Change in Wound Size % (l*w) 83.87 3/23/2020  8:55 AM   Wound Volume (cm^3) 2.2 cm^3 3/23/2020  8:55 AM   Wound Healing % 84 3/23/2020  8:55 AM   Post-Procedure Length (cm) 7.4 cm 3/23/2020  9:13 AM   Post-Procedure Width (cm) 1 cm 3/23/2020  9:13 AM   Post-Procedure Depth (cm) 0.1 cm 3/23/2020  9:13 AM   Post-Procedure Surface Area (cm^2) 7.4 cm^2 3/23/2020  9:13 AM   Post-Procedure Volume (cm^3) 0.74 cm^3 3/23/2020  9:13 AM   Wound Assessment Drainage;Brown;Pink;Red; Tan 3/23/2020  8:55 AM   Drainage Amount Moderate 3/23/2020  9:13 AM   Drainage Description Serosanguinous 3/23/2020  9:13 AM   Odor None 3/23/2020  8:55 AM   Kesha-wound Assessment Dry; Intact 3/23/2020 8:55 AM   Number of days: 115          Procedure Note  Indications:  Based on my examination of this patient's wound(s)/ulcer(s) today, debridement is required to promote healing and evaluate the wound base. Performed by: Govind Goldstein DPM    Consent obtained:  Yes    Time out taken:  Yes    Pain Control: Anesthetic  Anesthetic: 4% Lidocaine Liquid Topical     Debridement:Excisional Debridement    Using curette the wound(s)/ulcer(s) was/were sharply debrided down through and including the removal of subcutaneous tissue. Devitalized Tissue Debrided:  slough to stimulate bleeding to promote healing, post debridement good bleeding base and wound edges noted    Wound/Ulcer #: 1    Percent of Wound/Ulcer Debrided: 100%    Total Surface Area Debrided:  7.4 sq cm     Estimated Blood Loss:  Minimal  Hemostasis Achieved:  by pressure    Procedural Pain:  5  / 10   Post Procedural Pain:  5 / 10     Response to treatment:  Well tolerated by patient. Plan:   Treatment Note please see attached Discharge Instructions    Written patient dismissal instructions given to patient and signed by patient or POA. Discharge Instructions       Visit Discharge/Physician Orders     Discharge condition: Stable     Assessment of pain at discharge: minimal     Anesthetic used: 4% lidocaine solution   Discharge to: Home     Left via:Private automobile     Accompanied by: accompanied by      ECF/HHA: N/A     Dressing Orders:  RT LOWER LEG: Cleanse wound with normal saline solution and apply collagen covered with adaptic and unna boot and coban.     Treatment Orders: Do not get wrap wet. COMPRESSION HOSE ORDERED      [address] [place] [address],[address] followup visit ___________one week__________________  (Please note your next appointment above and if you are unable to keep, kindly give a 24 hour notice.  Thank you.)     Physician signature:__________________________        If you experience any of the following, please call the [address] during business hours:     * Increase in Pain  * Temperature over 101  * Increase in drainage from your wound  * Drainage with a foul odor  * Bleeding  * Increase in swelling  * Need for compression bandage changes due to slippage, breakthrough drainage.     If you need medical attention outside of the business hours of the [address] [place] [address] Road please contact your PCP or go to the nearest emergency room.         Electronically signed by Jennifer Wei DPM on 3/27/2020 at 8:00 AM

## 2020-03-30 ENCOUNTER — HOSPITAL ENCOUNTER (OUTPATIENT)
Dept: WOUND CARE | Age: 75
Discharge: HOME | End: 2020-03-30
Payer: MEDICARE

## 2020-03-30 VITALS
TEMPERATURE: 99.1 F | SYSTOLIC BLOOD PRESSURE: 110 MMHG | DIASTOLIC BLOOD PRESSURE: 80 MMHG | RESPIRATION RATE: 20 BRPM | HEART RATE: 72 BPM

## 2020-03-30 PROCEDURE — 11042 DBRDMT SUBQ TIS 1ST 20SQCM/<: CPT

## 2020-03-30 RX ORDER — LIDOCAINE HYDROCHLORIDE 40 MG/ML
SOLUTION TOPICAL ONCE
Status: DISCONTINUED | OUTPATIENT
Start: 2020-03-30 | End: 2020-03-31 | Stop reason: HOSPADM

## 2020-03-30 ASSESSMENT — PAIN SCALES - GENERAL: PAINLEVEL_OUTOF10: 2

## 2020-03-30 ASSESSMENT — PAIN DESCRIPTION - PAIN TYPE: TYPE: CHRONIC PAIN

## 2020-03-30 ASSESSMENT — PAIN DESCRIPTION - ORIENTATION: ORIENTATION: RIGHT

## 2020-03-30 ASSESSMENT — PAIN DESCRIPTION - DESCRIPTORS: DESCRIPTORS: NUMBNESS;TINGLING

## 2020-03-30 ASSESSMENT — PAIN DESCRIPTION - LOCATION: LOCATION: LEG

## 2020-03-30 NOTE — PROGRESS NOTES
Wound Healing Center Followup Visit Note    Referring Physician : Jessi Matt  MEDICAL RECORD NUMBER:  [id number]  AGE: 69 y.o. GENDER: male  : 1945  EPISODE DATE:  3/30/2020    Subjective:     Chief Complaint   Patient presents with    Wound Check     right pretib VLU      HISTORY of PRESENT ILLNESS HPI   Gladys Dunaway is a 69 y.o. male who presents today in regards to follow up evaluation and treatment of wound/ulcer. That patient's past medical, family and social hx were reviewed and changes were made if present. :  History of Wound Context:  Right leg wound      Wound/Ulcer Pain Timing/Severity: intermittent  Quality of pain: sharp  Severity:  6 / 10   Modifying Factors: Pain is relieved/improved with rest  Associated Signs/Symptoms: edema     Ulcer Identification:  Ulcer Type: venous  Contributing Factors: venous stasis    :    History of Wound Context:  Right leg wound     Wound/Ulcer Pain Timing/Severity: intermittent  Quality of pain: sharp  Severity:  7 / 10   Modifying Factors: Pain is relieved/improved with rest  Associated Signs/Symptoms: edema    Ulcer Identification:  Ulcer Type: venous  Contributing Factors: venous stasis     3/2:    History of Wound Context:  Right leg wound     Wound/Ulcer Pain Timing/Severity: intermittent  Quality of pain: sharp  Severity:  7 / 10   Modifying Factors: Pain is relieved/improved with rest  Associated Signs/Symptoms: edema    Ulcer Identification:  Ulcer Type: venous  Contributing Factors: venous stasis    Diabetic/Pressure/Non Pressure Ulcers only:  Ulcer: Non-Pressure ulcer, fat layer exposed    Wound: N/A     Wound is improving. Smaller in size. Responding well to compression. [facility]. Continue Unna boot compression and debridement. 3/9: Wound is improving. Smaller in size. Responding well to compression.   Recommend [facility]. Continue Unna boot compression and debridement. 3/16: Wound is improving. Smaller in size. Responding well to compression. Recommend pain management referral - [facility] group. Continue Unna boot compression and debridement. 3/23: Wound is improving. Smaller in size. Responding well to compression. Recommend pain management referral - [facility] management group. Continue Unna boot compression and debridement. 3/30 - wound resolving. Referral for compression stocking. FU 1 week. Continue unna boot compression and debridement. PAST MEDICAL HISTORY      Diagnosis Date    Arthritis     Diabetes mellitus ([address]     Hyperlipidemia     Hypertension      Past Surgical History:   Procedure Laterality Date    ENDOSCOPY, COLON, DIAGNOSTIC      EYE SURGERY      HERNIA REPAIR      TONSILLECTOMY       History reviewed. No pertinent family history.   Social History     Tobacco Use    Smoking status: Never Smoker    Smokeless tobacco: Never Used   Substance Use Topics    Alcohol use: Never     Frequency: Never    Drug use: Never     Allergies   Allergen Reactions    Penicillins     Seasonal     Sulfa Antibiotics      Current Outpatient Medications on File Prior to Encounter   Medication Sig Dispense Refill    metOLazone (ZAROXOLYN) 5 MG tablet Take 5 mg by mouth daily      lisinopril (PRINIVIL;ZESTRIL) 40 MG tablet Take 40 mg by mouth daily      glipiZIDE (GLUCOTROL XL) 10 MG extended release tablet Take 10 mg by mouth daily      NIFEdipine (ADALAT CC) 30 MG extended release tablet Take 30 mg by mouth daily      acarbose (PRECOSE) 25 MG tablet Take 50 mg by mouth 3 times daily (with meals)      fexofenadine (ALLEGRA ALLERGY) 60 MG tablet Take 60 mg by mouth daily      Dulaglutide (TRULICITY) 3.13 BJ/5.1SZ SOPN Inject 0.75 mg into the skin once a week Indications: pt unsure of the dose TUESDAYS      metFORMIN (GLUCOPHAGE) 500 MG tablet Take 500 mg by mouth 2 times daily (with meals)       No current facility-administered medications on file prior to encounter. REVIEW OF SYSTEMS See HPI    Objective:    /80   Pulse 72   Temp 99.1 °F (37.3 °C) (Oral)   Resp 20   Wt Readings from Last 3 Encounters:   03/23/20 200 lb (90.7 kg)   03/09/20 200 lb (90.7 kg)   03/02/20 200 lb (90.7 kg)     PHYSICAL EXAM  CONSTITUTIONAL:   Awake, alert, cooperative   EYES:  lids and lashes normal   ENT: external ears and nose without lesions   NECK:  supple, symmetrical, trachea midline   SKIN:  Open wound Present    Assessment:     Problem List Items Addressed This Visit     None          Pre Debridement Measurements:  Are located in the [place]  Documentation Flow Sheet  Post Debridement Measurements:  Wound/Ulcer Descriptions are Pre Debridement except measurements:     Wound 12/02/19 Leg Right #1 rt leg aquired 10/9/19 (Active)   Wound Image   3/23/2020  8:55 AM   Dressing Status Intact; Changed 1/20/2020  9:32 AM   Dressing Changed Changed/New 2/3/2020  9:04 AM   Dressing/Treatment Collagen;Alginate 3/2/2020  9:58 AM   Wound Cleansed Rinsed/Irrigated with saline 2/17/2020  9:49 AM   Wound Length (cm) 1.9 cm 3/30/2020  8:41 AM   Wound Width (cm) 0.7 cm 3/30/2020  8:41 AM   Wound Depth (cm) 0.2 cm 3/30/2020  8:41 AM   Wound Surface Area (cm^2) 1.33 cm^2 3/30/2020  8:41 AM   Change in Wound Size % (l*w) 98.05 3/30/2020  8:41 AM   Wound Volume (cm^3) 0.27 cm^3 3/30/2020  8:41 AM   Wound Healing % 98 3/30/2020  8:41 AM   Post-Procedure Length (cm) 1.9 cm 3/30/2020  9:37 AM   Post-Procedure Width (cm) 0.7 cm 3/30/2020  9:37 AM   Post-Procedure Depth (cm) 0.2 cm 3/30/2020  9:37 AM   Post-Procedure Surface Area (cm^2) 1.33 cm^2 3/30/2020  9:37 AM   Post-Procedure Volume (cm^3) 0.27 cm^3 3/30/2020  9:37 AM   Wound Assessment Yellow;Pink 3/30/2020  8:41 AM   Drainage Amount Moderate 3/30/2020  8:41 AM   Drainage Description Yellow 3/30/2020  8:41 AM   Odor None 3/30/2020 8:41 AM   Kesha-wound Assessment Intact; Pink 3/30/2020  8:41 AM   Number of days: 118          Procedure Note  Indications:  Based on my examination of this patient's wound(s)/ulcer(s) today, debridement is required to promote healing and evaluate the wound base. Performed by: Yoana Morocho DPM    Consent obtained:  Yes    Time out taken:  Yes    Pain Control: Anesthetic  Anesthetic: 4% Lidocaine Liquid Topical     Debridement:Excisional Debridement    Using curette the wound(s)/ulcer(s) was/were sharply debrided down through and including the removal of subcutaneous tissue. Devitalized Tissue Debrided:  slough to stimulate bleeding to promote healing, post debridement good bleeding base and wound edges noted    Wound/Ulcer #: 1    Percent of Wound/Ulcer Debrided: 100%    Total Surface Area Debrided:  1.33 sq cm     Estimated Blood Loss:  Minimal  Hemostasis Achieved:  by pressure    Procedural Pain:  5  / 10   Post Procedural Pain:  5 / 10     Response to treatment:  Well tolerated by patient. Plan:   Treatment Note please see attached Discharge Instructions    Written patient dismissal instructions given to patient and signed by patient or POA. Discharge Instructions       Visit Discharge/Physician Orders     Discharge condition: Stable     Assessment of pain at discharge: minimal     Anesthetic used: 4% lidocaine solution   Discharge to: Home     Left via:Private automobile     Accompanied by: accompanied by      ECF/HHA: N/A     Dressing Orders:  RT LOWER LEG: Cleanse wound with normal saline solution and apply collagen covered with adaptic and unna boot and coban.     Treatment Orders: Do not get wrap wet.     COMPRESSION HOSE RECEIVED     [facility] followup visit ___________one week__________________  (Please note your next appointment above and if you are unable to keep, kindly give a 24 hour notice.  Thank you.)     Physician signature:__________________________        If you experience any of the following, please call the [facility] during business hours:     * Increase in Pain  * Temperature over 101  * Increase in drainage from your wound  * Drainage with a foul odor  * Bleeding  * Increase in swelling  * Need for compression bandage changes due to slippage, breakthrough drainage.     If you need medical attention outside of the business hours of the [facility] please contact your PCP or go to the nearest emergency room.                 Electronically signed by Karma Zamora DPM on 3/30/2020 at 9:48 AM

## 2020-04-06 ENCOUNTER — HOSPITAL ENCOUNTER (OUTPATIENT)
Dept: WOUND CARE | Age: 75
Discharge: HOME | End: 2020-04-06
Payer: MEDICARE

## 2020-04-06 VITALS
RESPIRATION RATE: 20 BRPM | DIASTOLIC BLOOD PRESSURE: 78 MMHG | TEMPERATURE: 97.9 F | HEART RATE: 84 BPM | SYSTOLIC BLOOD PRESSURE: 132 MMHG

## 2020-04-06 PROCEDURE — 11042 DBRDMT SUBQ TIS 1ST 20SQCM/<: CPT

## 2020-04-06 RX ORDER — LIDOCAINE HYDROCHLORIDE 40 MG/ML
SOLUTION TOPICAL ONCE
Status: DISCONTINUED | OUTPATIENT
Start: 2020-04-06 | End: 2020-04-07 | Stop reason: HOSPADM

## 2020-04-06 ASSESSMENT — PAIN SCALES - GENERAL: PAINLEVEL_OUTOF10: 0

## 2020-04-13 ENCOUNTER — HOSPITAL ENCOUNTER (OUTPATIENT)
Dept: WOUND CARE | Age: 75
Discharge: HOME | End: 2020-04-13
Payer: MEDICARE

## 2020-04-13 VITALS — HEART RATE: 100 BPM | TEMPERATURE: 98.8 F | RESPIRATION RATE: 16 BRPM

## 2020-04-13 PROCEDURE — 29580 STRAPPING UNNA BOOT: CPT

## 2020-04-13 ASSESSMENT — PAIN SCALES - GENERAL: PAINLEVEL_OUTOF10: 0

## 2020-04-20 ENCOUNTER — HOSPITAL ENCOUNTER (OUTPATIENT)
Dept: WOUND CARE | Age: 75
Discharge: HOME | End: 2020-04-20
Payer: MEDICARE

## 2020-04-20 VITALS
DIASTOLIC BLOOD PRESSURE: 66 MMHG | WEIGHT: 202 LBS | TEMPERATURE: 98.4 F | RESPIRATION RATE: 16 BRPM | HEIGHT: 68 IN | HEART RATE: 84 BPM | SYSTOLIC BLOOD PRESSURE: 124 MMHG | BODY MASS INDEX: 30.62 KG/M2

## 2020-04-20 PROCEDURE — 11042 DBRDMT SUBQ TIS 1ST 20SQCM/<: CPT

## 2020-04-20 RX ORDER — LIDOCAINE HYDROCHLORIDE 40 MG/ML
SOLUTION TOPICAL ONCE
Status: DISCONTINUED | OUTPATIENT
Start: 2020-04-20 | End: 2020-04-21 | Stop reason: HOSPADM

## 2020-04-27 ENCOUNTER — HOSPITAL ENCOUNTER (OUTPATIENT)
Dept: WOUND CARE | Age: 75
Discharge: HOME | End: 2020-04-27
Payer: MEDICARE

## 2020-04-27 VITALS
HEIGHT: 68 IN | HEART RATE: 90 BPM | TEMPERATURE: 98.1 F | RESPIRATION RATE: 18 BRPM | WEIGHT: 202 LBS | BODY MASS INDEX: 30.62 KG/M2 | SYSTOLIC BLOOD PRESSURE: 124 MMHG | DIASTOLIC BLOOD PRESSURE: 80 MMHG

## 2020-04-27 PROCEDURE — 99212 OFFICE O/P EST SF 10 MIN: CPT

## 2020-04-27 NOTE — PROGRESS NOTES
group. Continue Unna boot compression and debridement. 3/16: Wound is improving. Smaller in size. Responding well to compression. Recommend pain management referral - [facility] pain management group. Continue Unna boot compression and debridement. 3/23: Wound is improving. Smaller in size. Responding well to compression. Recommend pain management referral - [facility] pain management group. Continue Unna boot compression and debridement. 3/30 - wound resolving. Referral for compression stocking. FU 1 week. Continue unna boot compression and debridement. 4/6 - wound resolving. FU 1 week. Continue unna boot compression and debridement. 4/20 - wound resolving. FU 1 week. Continue unna boot compression and debridement. 4/27 - wound healed. Transition to new compression stocking. PAST MEDICAL HISTORY      Diagnosis Date    Arthritis     Diabetes mellitus [address] [address]     Hyperlipidemia     Hypertension      Past Surgical History:   Procedure Laterality Date    ENDOSCOPY, COLON, DIAGNOSTIC      EYE SURGERY      HERNIA REPAIR      TONSILLECTOMY       History reviewed. No pertinent family history.   Social History     Tobacco Use    Smoking status: Never Smoker    Smokeless tobacco: Never Used   Substance Use Topics    Alcohol use: Never     Frequency: Never    Drug use: Never     Allergies   Allergen Reactions    Penicillins     Seasonal     Sulfa Antibiotics      Current Outpatient Medications on File Prior to Encounter   Medication Sig Dispense Refill    metOLazone (ZAROXOLYN) 5 MG tablet Take 5 mg by mouth daily      lisinopril (PRINIVIL;ZESTRIL) 40 MG tablet Take 40 mg by mouth daily      glipiZIDE (GLUCOTROL XL) 10 MG extended release tablet Take 10 mg by mouth daily      metFORMIN (GLUCOPHAGE) 500 MG tablet Take 500 mg by mouth 2 times daily (with meals)      NIFEdipine (ADALAT CC) 30 MG extended release tablet Take 30 mg by mouth daily      acarbose (PRECOSE) 25 MG

## 2020-04-27 NOTE — DISCHARGE INSTR - COC
HKBL:[id number]}  Med Admin  {CHP DME YOEM:341557854}  Med Delivery   { NASIR MED Delivery:251501687}    Wound Care Documentation and Therapy:  Wound 12/02/19 Leg Right #1 rt leg aquired 10/9/19 (Active)   Wound Image   4/27/2020  8:48 AM   Dressing Status Clean;Dry; Intact 4/6/2020 10:33 AM   Dressing Changed Changed/New 4/20/2020 10:05 AM   Dressing/Treatment Collagen;Vaseline gauze 4/20/2020 10:05 AM   Wound Cleansed Rinsed/Irrigated with saline 4/20/2020 10:05 AM   Wound Length (cm) 0 cm 4/27/2020  8:48 AM   Wound Width (cm) 0 cm 4/27/2020  8:48 AM   Wound Depth (cm) 0 cm 4/27/2020  8:48 AM   Wound Surface Area (cm^2) 0 cm^2 4/27/2020  8:48 AM   Change in Wound Size % (l*w) 100 4/27/2020  8:48 AM   Wound Volume (cm^3) 0 cm^3 4/27/2020  8:48 AM   Wound Healing % 100 4/27/2020  8:48 AM   Post-Procedure Length (cm) 0.5 cm 4/20/2020 10:05 AM   Post-Procedure Width (cm) 0.5 cm 4/20/2020 10:05 AM   Post-Procedure Depth (cm) 0.2 cm 4/20/2020 10:05 AM   Post-Procedure Surface Area (cm^2) 0.25 cm^2 4/20/2020 10:05 AM   Post-Procedure Volume (cm^3) 0.05 cm^3 4/20/2020 10:05 AM   Wound Assessment Red 4/20/2020  9:54 AM   Drainage Amount Scant 4/20/2020  9:54 AM   Drainage Description Yellow 4/20/2020  9:54 AM   Odor None 4/20/2020  9:54 AM   Kesha-wound Assessment Intact 4/20/2020  9:54 AM   Number of days: 146        Elimination:  Continence:   · Bowel: {YES / TT:62818}  · Bladder: {YES / NE:45050}  Urinary Catheter: {Urinary Catheter:453086397}   Colostomy/Ileostomy/Ileal Conduit: {YES / RI:07577}       Date of Last BM: ***  No intake or output data in the 24 hours ending 04/27/20 0858  No intake/output data recorded.     Safety Concerns:     [address] [employer] Safety Concerns:088341837}    Impairments/Disabilities:      [address] [employer] Impairments/Disabilities:100702610}    Nutrition Therapy:  Current Nutrition Therapy:   [address] [employer] Diet List:552424333}    Routes of Feeding: {CHP DME Other Feedings:333433543}  Liquids: {Slp liquid thickness:35536}  Daily Fluid Restriction: {CHP DME Yes amt example:339077339}  Last Modified Barium Swallow with Video (Video Swallowing Test): {Done Not Done GNJ:923035917}    Treatments at the Time of Hospital Discharge:   Respiratory Treatments: ***  Oxygen Therapy:  {Therapy; copd oxygen:82710}  Ventilator:    { CC Vent UTO}    Rehab Therapies: {THERAPEUTIC INTERVENTION:4227056137}  Weight Bearing Status/Restrictions: { CC Weight Bearin}  Other Medical Equipment (for information only, NOT a DME order):  {EQUIPMENT:024001785}  Other Treatments: ***    Patient's personal belongings (please select all that are sent with patient):  {CHP DME Belongings:899666667}    RN SIGNATURE:  {Esignature:224116127}    CASE MANAGEMENT/SOCIAL WORK SECTION    Inpatient Status Date: ***    Readmission Risk Assessment Score:  Readmission Risk              Risk of Unplanned Readmission:        0           Discharging to Facility/ Agency   · Name:   · Address:  · Phone:  · Fax:    Dialysis Facility (if applicable)   · Name:  · Address:  · Dialysis Schedule:  · Phone:  · Fax:    / signature: {Esignature:254661422}    PHYSICIAN SECTION    Prognosis: {Prognosis:9281538095}    Condition at Discharge: [facility] Patient Condition:965902313}    Rehab Potential (if transferring to Rehab): {Prognosis:3965494305}    Recommended Labs or Other Treatments After Discharge: ***    Physician Certification: I certify the above information and transfer of Alexei Conroy  is necessary for the continuing treatment of the diagnosis listed and that he requires {Admit to Appropriate Level of Care:61833} for {GREATER/LESS:282370278} 30 days.      Update Admission H&P: {[facility] DME [facility]}    PHYSICIAN SIGNATURE:  {Esignature:941379698}

## 2020-05-05 NOTE — H&P
Department of Orthopedic Surgery  Physician Assistant Pre-operative History and Physical        DIAGNOSIS:  Right shoulder arthropathy    INDICATION:  Right shoulder pain    PROCEDURE:  Right reverse total shoulder arthroplasty    CHIEF COMPLAINT:  Right shoulder pain    History Obtained From:  patient    HISTORY OF PRESENT ILLNESS:      The patient is a 69 y.o. male with significant past medical history of PMHx-Type 2 DM (Hgb A1C 6.9), HTN, no kidney, liver, or pulmonary disease, nonsmoker, no hx of VTE  who presents with shoulder pain and decreased ROM here for definitive treatment. Past Medical History:        Diagnosis Date    Arthritis     Diabetes mellitus ([facility] [address]     Hyperlipidemia     Hypertension      Past Surgical History:        Procedure Laterality Date    ENDOSCOPY, COLON, DIAGNOSTIC      EYE SURGERY      HERNIA REPAIR      TONSILLECTOMY       Medications Prior to Admission:   No medications prior to admission. Allergies:  Penicillins;  Seasonal; and Sulfa antibiotics  History of allergic reaction to anesthesia:  No    Social History:   Social History     Socioeconomic History    Marital status: Single     Spouse name: Not on file    Number of children: Not on file    Years of education: Not on file    Highest education level: Not on file   Occupational History    Not on file   Social Needs    Financial resource strain: Not on file    Food insecurity     Worry: Not on file     Inability: Not on file    Transportation needs     Medical: Not on file     Non-medical: Not on file   Tobacco Use    Smoking status: Never Smoker    Smokeless tobacco: Never Used   Substance and Sexual Activity    Alcohol use: Never     Frequency: Never    Drug use: Never    Sexual activity: Not on file   Lifestyle    Physical activity     Days per week: Not on file     Minutes per session: Not on file    Stress: Not on file   Relationships    Social connections     Talks on phone: Not on file     Gets together: Not on file     Attends Quaker service: Not on file     Active member of club or organization: Not on file     Attends meetings of clubs or organizations: Not on file     Relationship status: Not on file    Intimate partner violence     Fear of current or ex partner: Not on file     Emotionally abused: Not on file     Physically abused: Not on file     Forced sexual activity: Not on file   Other Topics Concern    Not on file   Social History Narrative    Not on file       Family History:   No family history on file. REVIEW OF SYSTEMS:  Right shoulder pain    PHYSICAL EXAM:     VITALS:  There were no vitals taken for this visit. Eyes:  Lids and lashes normal, pupils equal, round and reactive to light, extra ocular muscles intact, sclera clear, conjunctiva normal    Head/ENT:  Normocephalic, without obvious abnormality, atraumatic, sinuses nontender on palpation, external ears without lesions, oral pharynx with moist mucus membranes, tonsils without erythema or exudates, gums normal and good dentition. Neck:  Supple, symmetrical, trachea midline, no adenopathy, thyroid symmetric, not enlarged and no tenderness, skin normal    Heart:  Normal apical impulse, regular rate and rhythm, normal S1 and S2, no S3 or S4, and no murmur noted    Lungs:  Clear to auscultation    Abdomen:  No scars, normal bowel sounds, soft, non-distended, non-tender, no masses palpated, no hepatosplenomegally    Extremities:  No clubbing, cyanosis, or edema    Musculoskeletal:  Right shoulder skin clean and dry; PROM 90/15/L2; Strength 4-/5 ER and ABD      DATA:  Preoperative labs as ordered    ASSESSMENT AND PLAN:    1. Patient is a 69 y.o. male with above specified procedure planned right reverse total shoulder arthroplasty with anesthesia    2. Procedure options, risks and benefits reviewed with patient. Patient expresses understanding.       Electronically signed by Cricket Pedroza PA-C on 5/5/2020 at 8:29 AM

## 2020-05-07 ENCOUNTER — HOSPITAL ENCOUNTER (OUTPATIENT)
Dept: PREADMISSION TESTING | Age: 75
Discharge: HOME | End: 2020-05-07
Payer: MEDICARE

## 2020-05-07 ENCOUNTER — HOSPITAL ENCOUNTER (OUTPATIENT)
Age: 75
Discharge: HOME | End: 2020-05-09
Payer: MEDICARE

## 2020-05-07 ENCOUNTER — ANESTHESIA EVENT (OUTPATIENT)
Dept: OPERATING ROOM | Age: 75
DRG: 483 | End: 2020-05-07
Payer: MEDICARE

## 2020-05-07 VITALS
HEART RATE: 92 BPM | TEMPERATURE: 97.1 F | OXYGEN SATURATION: 96 % | BODY MASS INDEX: 32.3 KG/M2 | SYSTOLIC BLOOD PRESSURE: 141 MMHG | DIASTOLIC BLOOD PRESSURE: 83 MMHG | HEIGHT: 66 IN | RESPIRATION RATE: 16 BRPM | WEIGHT: 201 LBS

## 2020-05-07 LAB
ANION GAP SERPL CALCULATED.3IONS-SCNC: 13 MMOL/L (ref 7–16)
BASOPHILS ABSOLUTE: 0.05 E9/L (ref 0–0.2)
BASOPHILS RELATIVE PERCENT: 0.7 % (ref 0–2)
BUN BLDV-MCNC: 18 MG/DL (ref 8–23)
CALCIUM SERPL-MCNC: 9.6 MG/DL (ref 8.6–10.2)
CHLORIDE BLD-SCNC: 97 MMOL/L (ref 98–107)
CO2: 29 MMOL/L (ref 22–29)
CREAT SERPL-MCNC: 1.1 MG/DL (ref 0.7–1.2)
EKG ATRIAL RATE: 87 BPM
EKG P AXIS: 6 DEGREES
EKG P-R INTERVAL: 190 MS
EKG Q-T INTERVAL: 368 MS
EKG QRS DURATION: 100 MS
EKG QTC CALCULATION (BAZETT): 442 MS
EKG R AXIS: 0 DEGREES
EKG T AXIS: 55 DEGREES
EKG VENTRICULAR RATE: 87 BPM
EOSINOPHILS ABSOLUTE: 0.15 E9/L (ref 0.05–0.5)
EOSINOPHILS RELATIVE PERCENT: 2.2 % (ref 0–6)
GFR AFRICAN AMERICAN: >60
GFR NON-AFRICAN AMERICAN: >60 ML/MIN/1.73
GLUCOSE BLD-MCNC: 162 MG/DL (ref 74–99)
HCT VFR BLD CALC: 40.8 % (ref 37–54)
HEMOGLOBIN: 13.5 G/DL (ref 12.5–16.5)
IMMATURE GRANULOCYTES #: 0.02 E9/L
IMMATURE GRANULOCYTES %: 0.3 % (ref 0–5)
LYMPHOCYTES ABSOLUTE: 2.01 E9/L (ref 1.5–4)
LYMPHOCYTES RELATIVE PERCENT: 29 % (ref 20–42)
MCH RBC QN AUTO: 29.9 PG (ref 26–35)
MCHC RBC AUTO-ENTMCNC: 33.1 % (ref 32–34.5)
MCV RBC AUTO: 90.5 FL (ref 80–99.9)
MONOCYTES ABSOLUTE: 0.47 E9/L (ref 0.1–0.95)
MONOCYTES RELATIVE PERCENT: 6.8 % (ref 2–12)
NEUTROPHILS ABSOLUTE: 4.22 E9/L (ref 1.8–7.3)
NEUTROPHILS RELATIVE PERCENT: 61 % (ref 43–80)
PDW BLD-RTO: 12.8 FL (ref 11.5–15)
PLATELET # BLD: 242 E9/L (ref 130–450)
PMV BLD AUTO: 10.8 FL (ref 7–12)
POTASSIUM SERPL-SCNC: 4.4 MMOL/L (ref 3.5–5)
PREALBUMIN: 27 MG/DL (ref 20–40)
RBC # BLD: 4.51 E12/L (ref 3.8–5.8)
SODIUM BLD-SCNC: 139 MMOL/L (ref 132–146)
WBC # BLD: 6.9 E9/L (ref 4.5–11.5)

## 2020-05-07 PROCEDURE — 93005 ELECTROCARDIOGRAM TRACING: CPT | Performed by: PHYSICIAN ASSISTANT

## 2020-05-07 PROCEDURE — 36415 COLL VENOUS BLD VENIPUNCTURE: CPT

## 2020-05-07 PROCEDURE — 84134 ASSAY OF PREALBUMIN: CPT

## 2020-05-07 PROCEDURE — 80048 BASIC METABOLIC PNL TOTAL CA: CPT

## 2020-05-07 PROCEDURE — 87081 CULTURE SCREEN ONLY: CPT

## 2020-05-07 PROCEDURE — U0003 INFECTIOUS AGENT DETECTION BY NUCLEIC ACID (DNA OR RNA); SEVERE ACUTE RESPIRATORY SYNDROME CORONAVIRUS 2 (SARS-COV-2) (CORONAVIRUS DISEASE [COVID-19]), AMPLIFIED PROBE TECHNIQUE, MAKING USE OF HIGH THROUGHPUT TECHNOLOGIES AS DESCRIBED BY CMS-2020-01-R: HCPCS

## 2020-05-07 PROCEDURE — 93010 ELECTROCARDIOGRAM REPORT: CPT | Performed by: INTERNAL MEDICINE

## 2020-05-07 PROCEDURE — 85025 COMPLETE CBC W/AUTO DIFF WBC: CPT

## 2020-05-07 RX ORDER — ROPIVACAINE HYDROCHLORIDE 5 MG/ML
30 INJECTION, SOLUTION EPIDURAL; INFILTRATION; PERINEURAL ONCE
Status: CANCELLED | OUTPATIENT
Start: 2020-05-07 | End: 2020-05-07

## 2020-05-07 RX ORDER — FENTANYL CITRATE 50 UG/ML
100 INJECTION, SOLUTION INTRAMUSCULAR; INTRAVENOUS ONCE
Status: CANCELLED | OUTPATIENT
Start: 2020-05-07

## 2020-05-07 RX ORDER — MIDAZOLAM HYDROCHLORIDE 1 MG/ML
2 INJECTION INTRAMUSCULAR; INTRAVENOUS ONCE
Status: CANCELLED | OUTPATIENT
Start: 2020-05-07 | End: 2020-05-07

## 2020-05-07 RX ORDER — MIDAZOLAM HYDROCHLORIDE 1 MG/ML
2 INJECTION INTRAMUSCULAR; INTRAVENOUS ONCE
Status: DISCONTINUED | OUTPATIENT
Start: 2020-05-07 | End: 2020-05-08 | Stop reason: HOSPADM

## 2020-05-07 RX ORDER — ROPIVACAINE HYDROCHLORIDE 5 MG/ML
30 INJECTION, SOLUTION EPIDURAL; INFILTRATION; PERINEURAL ONCE
Status: DISCONTINUED | OUTPATIENT
Start: 2020-05-07 | End: 2020-05-08 | Stop reason: HOSPADM

## 2020-05-07 RX ORDER — FENTANYL CITRATE 50 UG/ML
100 INJECTION, SOLUTION INTRAMUSCULAR; INTRAVENOUS ONCE
Status: DISCONTINUED | OUTPATIENT
Start: 2020-05-07 | End: 2020-05-08 | Stop reason: HOSPADM

## 2020-05-07 RX ORDER — ACETAMINOPHEN 500 MG
500 TABLET ORAL EVERY 6 HOURS PRN
COMMUNITY

## 2020-05-07 RX ORDER — M-VIT,TX,IRON,MINS/CALC/FOLIC 27MG-0.4MG
1 TABLET ORAL DAILY
COMMUNITY

## 2020-05-07 RX ORDER — ACARBOSE 50 MG/1
TABLET ORAL
COMMUNITY
Start: 2020-03-10

## 2020-05-07 ASSESSMENT — PAIN DESCRIPTION - PAIN TYPE
TYPE_2: CHRONIC PAIN
TYPE: CHRONIC PAIN

## 2020-05-07 ASSESSMENT — PAIN DESCRIPTION - DURATION: DURATION_2: INTERMITTENT

## 2020-05-07 ASSESSMENT — PAIN DESCRIPTION - PROGRESSION
CLINICAL_PROGRESSION_2: RAPIDLY WORSENING
CLINICAL_PROGRESSION: RAPIDLY WORSENING

## 2020-05-07 ASSESSMENT — PAIN - FUNCTIONAL ASSESSMENT: PAIN_FUNCTIONAL_ASSESSMENT: PREVENTS OR INTERFERES WITH MANY ACTIVE NOT PASSIVE ACTIVITIES

## 2020-05-07 ASSESSMENT — PAIN SCALES - GENERAL: PAINLEVEL_OUTOF10: 0

## 2020-05-07 ASSESSMENT — PAIN DESCRIPTION - ORIENTATION
ORIENTATION: RIGHT
ORIENTATION_2: LEFT

## 2020-05-07 ASSESSMENT — PAIN DESCRIPTION - DESCRIPTORS
DESCRIPTORS: STABBING
DESCRIPTORS_2: STABBING

## 2020-05-07 ASSESSMENT — PAIN DESCRIPTION - INTENSITY: RATING_2: 0

## 2020-05-07 ASSESSMENT — PAIN DESCRIPTION - ONSET
ONSET_2: GRADUAL
ONSET: PROGRESSIVE

## 2020-05-07 ASSESSMENT — PAIN DESCRIPTION - LOCATION
LOCATION: SHOULDER
LOCATION_2: SHOULDER

## 2020-05-07 ASSESSMENT — PAIN DESCRIPTION - FREQUENCY: FREQUENCY: INTERMITTENT

## 2020-05-07 NOTE — PROGRESS NOTES
Message left on VM for Kiah Suero at [facility] office ext [phone], pt has had recent wound right leg treated per podiatry, pt states healed.

## 2020-05-07 NOTE — ANESTHESIA PRE PROCEDURE
Department of Anesthesiology  Preprocedure Note       Name:  Yany Cortes   Age:  69 y.o.  :  1945                                          MRN:  [id number]         Date:  2020      Surgeon: Vicki Arndt):  Riaan Owen MD    Procedure: RIGHT SHOULDER TOTAL ARTHROPLASTY REVERSE (DARA) (Right )    Medications prior to admission:   Prior to Admission medications    Medication Sig Start Date End Date Taking?  Authorizing Provider   acarbose (PRECOSE) 50 MG tablet take 1 tablet by mouth three times a day with meals 3/10/20  Yes Historical Provider, MD   Multiple Vitamins-Minerals (THERAPEUTIC MULTIVITAMIN-MINERALS) tablet Take 1 tablet by mouth daily Last taken 2020   Yes Historical Provider, MD   Ascorbic Acid (VITAMIN C PO) Take 1 tablet by mouth daily Last taken    Yes Historical Provider, MD   acetaminophen (TYLENOL) 500 MG tablet Take 500 mg by mouth every 6 hours as needed for Pain   Yes Historical Provider, MD   metOLazone (ZAROXOLYN) 5 MG tablet Take 5 mg by mouth daily   Yes Historical Provider, MD   lisinopril (PRINIVIL;ZESTRIL) 40 MG tablet Take 40 mg by mouth daily   Yes Historical Provider, MD   glipiZIDE (GLUCOTROL XL) 10 MG extended release tablet Take 10 mg by mouth daily   Yes Historical Provider, MD   metFORMIN (GLUCOPHAGE) 500 MG tablet Take 500 mg by mouth 2 times daily (with meals)   Yes Historical Provider, MD   NIFEdipine (ADALAT CC) 30 MG extended release tablet Take 30 mg by mouth daily Take am day of surgery 2020   Yes Historical Provider, MD   fexofenadine (ALLEGRA ALLERGY) 60 MG tablet Take 60 mg by mouth daily   Yes Historical Provider, MD   Dulaglutide (TRULICITY) 8.15 VX/7.2ZI SOPN Inject 0.75 mg into the skin once a week Indications: pt unsure of the dose Friday's   Yes Historical Provider, MD       Current medications:    Current Outpatient Medications   Medication Sig Dispense Refill    acarbose (PRECOSE) 50 MG tablet take 1 tablet by mouth three times a

## 2020-05-08 LAB
SARS-COV-2: NOT DETECTED
SOURCE: NORMAL

## 2020-05-09 LAB — MRSA CULTURE ONLY: NORMAL

## 2020-05-12 ENCOUNTER — HOSPITAL ENCOUNTER (INPATIENT)
Age: 75
LOS: 1 days | Discharge: HOME | DRG: 483 | End: 2020-05-13
Attending: ORTHOPAEDIC SURGERY | Admitting: ORTHOPAEDIC SURGERY
Payer: MEDICARE

## 2020-05-12 ENCOUNTER — APPOINTMENT (OUTPATIENT)
Dept: GENERAL RADIOLOGY | Age: 75
DRG: 483 | End: 2020-05-12
Attending: ORTHOPAEDIC SURGERY
Payer: MEDICARE

## 2020-05-12 ENCOUNTER — ANESTHESIA (OUTPATIENT)
Dept: OPERATING ROOM | Age: 75
DRG: 483 | End: 2020-05-12
Payer: MEDICARE

## 2020-05-12 VITALS — OXYGEN SATURATION: 100 % | SYSTOLIC BLOOD PRESSURE: 166 MMHG | DIASTOLIC BLOOD PRESSURE: 91 MMHG | TEMPERATURE: 96.4 F

## 2020-05-12 PROBLEM — M19.011 ARTHROPATHY OF RIGHT SHOULDER: Status: ACTIVE | Noted: 2020-05-12

## 2020-05-12 LAB
METER GLUCOSE: 123 MG/DL (ref 74–99)
METER GLUCOSE: 172 MG/DL (ref 74–99)
METER GLUCOSE: 284 MG/DL (ref 74–99)
METER GLUCOSE: 296 MG/DL (ref 74–99)

## 2020-05-12 PROCEDURE — 3600000005 HC SURGERY LEVEL 5 BASE: Performed by: ORTHOPAEDIC SURGERY

## 2020-05-12 PROCEDURE — 2720000010 HC SURG SUPPLY STERILE: Performed by: ORTHOPAEDIC SURGERY

## 2020-05-12 PROCEDURE — 99221 1ST HOSP IP/OBS SF/LOW 40: CPT | Performed by: INTERNAL MEDICINE

## 2020-05-12 PROCEDURE — 2500000003 HC RX 250 WO HCPCS

## 2020-05-12 PROCEDURE — 73030 X-RAY EXAM OF SHOULDER: CPT

## 2020-05-12 PROCEDURE — 6360000002 HC RX W HCPCS: Performed by: ANESTHESIOLOGY

## 2020-05-12 PROCEDURE — 3700000001 HC ADD 15 MINUTES (ANESTHESIA): Performed by: ORTHOPAEDIC SURGERY

## 2020-05-12 PROCEDURE — 7100000000 HC PACU RECOVERY - FIRST 15 MIN: Performed by: ORTHOPAEDIC SURGERY

## 2020-05-12 PROCEDURE — 97161 PT EVAL LOW COMPLEX 20 MIN: CPT

## 2020-05-12 PROCEDURE — 1200000000 HC SEMI PRIVATE

## 2020-05-12 PROCEDURE — 2580000003 HC RX 258: Performed by: PHYSICIAN ASSISTANT

## 2020-05-12 PROCEDURE — 97530 THERAPEUTIC ACTIVITIES: CPT

## 2020-05-12 PROCEDURE — 3600000015 HC SURGERY LEVEL 5 ADDTL 15MIN: Performed by: ORTHOPAEDIC SURGERY

## 2020-05-12 PROCEDURE — 0RRJ00Z REPLACEMENT OF RIGHT SHOULDER JOINT WITH REVERSE BALL AND SOCKET SYNTHETIC SUBSTITUTE, OPEN APPROACH: ICD-10-PCS | Performed by: ORTHOPAEDIC SURGERY

## 2020-05-12 PROCEDURE — 7100000001 HC PACU RECOVERY - ADDTL 15 MIN: Performed by: ORTHOPAEDIC SURGERY

## 2020-05-12 PROCEDURE — 2500000003 HC RX 250 WO HCPCS: Performed by: NURSE ANESTHETIST, CERTIFIED REGISTERED

## 2020-05-12 PROCEDURE — 6360000002 HC RX W HCPCS: Performed by: PHYSICIAN ASSISTANT

## 2020-05-12 PROCEDURE — 6370000000 HC RX 637 (ALT 250 FOR IP): Performed by: INTERNAL MEDICINE

## 2020-05-12 PROCEDURE — 6370000000 HC RX 637 (ALT 250 FOR IP): Performed by: PHYSICIAN ASSISTANT

## 2020-05-12 PROCEDURE — 82962 GLUCOSE BLOOD TEST: CPT

## 2020-05-12 PROCEDURE — 64415 NJX AA&/STRD BRCH PLXS IMG: CPT | Performed by: ANESTHESIOLOGY

## 2020-05-12 PROCEDURE — C1769 GUIDE WIRE: HCPCS | Performed by: ORTHOPAEDIC SURGERY

## 2020-05-12 PROCEDURE — C1776 JOINT DEVICE (IMPLANTABLE): HCPCS | Performed by: ORTHOPAEDIC SURGERY

## 2020-05-12 PROCEDURE — 6360000002 HC RX W HCPCS: Performed by: NURSE ANESTHETIST, CERTIFIED REGISTERED

## 2020-05-12 PROCEDURE — 3700000000 HC ANESTHESIA ATTENDED CARE: Performed by: ORTHOPAEDIC SURGERY

## 2020-05-12 PROCEDURE — 2709999900 HC NON-CHARGEABLE SUPPLY: Performed by: ORTHOPAEDIC SURGERY

## 2020-05-12 PROCEDURE — 97535 SELF CARE MNGMENT TRAINING: CPT

## 2020-05-12 PROCEDURE — 97165 OT EVAL LOW COMPLEX 30 MIN: CPT

## 2020-05-12 DEVICE — 6.5MM CENTER SCREW
Type: IMPLANTABLE DEVICE | Site: SHOULDER | Status: FUNCTIONAL
Brand: REUNION

## 2020-05-12 DEVICE — GLENOSPHERE , CONCENTRIC
Type: IMPLANTABLE DEVICE | Site: SHOULDER | Status: FUNCTIONAL
Brand: REUNION

## 2020-05-12 DEVICE — BASEPLATE
Type: IMPLANTABLE DEVICE | Status: FUNCTIONAL
Brand: REUNION

## 2020-05-12 DEVICE — PRESS-FIT HUMERAL STEM
Type: IMPLANTABLE DEVICE | Site: SHOULDER | Status: FUNCTIONAL
Brand: REUNION

## 2020-05-12 DEVICE — X3 HUMERAL INSERT, STANDARD
Type: IMPLANTABLE DEVICE | Site: SHOULDER | Status: FUNCTIONAL
Brand: REUNION

## 2020-05-12 DEVICE — 4.5MM PERIPHERAL SCREW
Type: IMPLANTABLE DEVICE | Site: SHOULDER | Status: FUNCTIONAL
Brand: REUNION

## 2020-05-12 DEVICE — HUMERAL CUP
Type: IMPLANTABLE DEVICE | Site: SHOULDER | Status: FUNCTIONAL
Brand: REUNION

## 2020-05-12 RX ORDER — CEPHALEXIN 500 MG/1
500 CAPSULE ORAL 4 TIMES DAILY
Qty: 4 CAPSULE | Refills: 0 | Status: SHIPPED | OUTPATIENT
Start: 2020-05-12 | End: 2020-05-13 | Stop reason: HOSPADM

## 2020-05-12 RX ORDER — METOLAZONE 5 MG/1
5 TABLET ORAL DAILY
Status: DISCONTINUED | OUTPATIENT
Start: 2020-05-13 | End: 2020-05-13 | Stop reason: HOSPADM

## 2020-05-12 RX ORDER — SODIUM CHLORIDE 0.9 % (FLUSH) 0.9 %
10 SYRINGE (ML) INJECTION EVERY 12 HOURS SCHEDULED
Status: DISCONTINUED | OUTPATIENT
Start: 2020-05-12 | End: 2020-05-12 | Stop reason: HOSPADM

## 2020-05-12 RX ORDER — GABAPENTIN 300 MG/1
300 CAPSULE ORAL ONCE
Status: COMPLETED | OUTPATIENT
Start: 2020-05-12 | End: 2020-05-12

## 2020-05-12 RX ORDER — PROMETHAZINE HYDROCHLORIDE 25 MG/ML
6.25 INJECTION, SOLUTION INTRAMUSCULAR; INTRAVENOUS PRN
Status: DISCONTINUED | OUTPATIENT
Start: 2020-05-12 | End: 2020-05-12 | Stop reason: HOSPADM

## 2020-05-12 RX ORDER — GLYCOPYRROLATE 1 MG/5 ML
SYRINGE (ML) INTRAVENOUS PRN
Status: DISCONTINUED | OUTPATIENT
Start: 2020-05-12 | End: 2020-05-12 | Stop reason: SDUPTHER

## 2020-05-12 RX ORDER — FENTANYL CITRATE 50 UG/ML
INJECTION, SOLUTION INTRAMUSCULAR; INTRAVENOUS PRN
Status: DISCONTINUED | OUTPATIENT
Start: 2020-05-12 | End: 2020-05-12 | Stop reason: SDUPTHER

## 2020-05-12 RX ORDER — NICOTINE POLACRILEX 4 MG
15 LOZENGE BUCCAL PRN
Status: DISCONTINUED | OUTPATIENT
Start: 2020-05-12 | End: 2020-05-13 | Stop reason: HOSPADM

## 2020-05-12 RX ORDER — MIDAZOLAM HYDROCHLORIDE 1 MG/ML
2 INJECTION INTRAMUSCULAR; INTRAVENOUS ONCE
Status: COMPLETED | OUTPATIENT
Start: 2020-05-12 | End: 2020-05-12

## 2020-05-12 RX ORDER — LIDOCAINE HYDROCHLORIDE 20 MG/ML
INJECTION, SOLUTION EPIDURAL; INFILTRATION; INTRACAUDAL; PERINEURAL PRN
Status: DISCONTINUED | OUTPATIENT
Start: 2020-05-12 | End: 2020-05-12 | Stop reason: SDUPTHER

## 2020-05-12 RX ORDER — OXYCODONE HYDROCHLORIDE AND ACETAMINOPHEN 5; 325 MG/1; MG/1
1 TABLET ORAL
Status: DISCONTINUED | OUTPATIENT
Start: 2020-05-12 | End: 2020-05-12 | Stop reason: HOSPADM

## 2020-05-12 RX ORDER — PROPOFOL 10 MG/ML
INJECTION, EMULSION INTRAVENOUS PRN
Status: DISCONTINUED | OUTPATIENT
Start: 2020-05-12 | End: 2020-05-12 | Stop reason: SDUPTHER

## 2020-05-12 RX ORDER — SODIUM CHLORIDE 9 MG/ML
INJECTION, SOLUTION INTRAVENOUS CONTINUOUS
Status: DISCONTINUED | OUTPATIENT
Start: 2020-05-12 | End: 2020-05-13 | Stop reason: HOSPADM

## 2020-05-12 RX ORDER — MEPERIDINE HYDROCHLORIDE 25 MG/ML
12.5 INJECTION INTRAMUSCULAR; INTRAVENOUS; SUBCUTANEOUS EVERY 10 MIN PRN
Status: DISCONTINUED | OUTPATIENT
Start: 2020-05-12 | End: 2020-05-12 | Stop reason: HOSPADM

## 2020-05-12 RX ORDER — DEXTROSE MONOHYDRATE 25 G/50ML
12.5 INJECTION, SOLUTION INTRAVENOUS PRN
Status: DISCONTINUED | OUTPATIENT
Start: 2020-05-12 | End: 2020-05-13 | Stop reason: HOSPADM

## 2020-05-12 RX ORDER — NIFEDIPINE 30 MG/1
30 TABLET, FILM COATED, EXTENDED RELEASE ORAL DAILY
Status: DISCONTINUED | OUTPATIENT
Start: 2020-05-13 | End: 2020-05-13 | Stop reason: HOSPADM

## 2020-05-12 RX ORDER — FENTANYL CITRATE 50 UG/ML
100 INJECTION, SOLUTION INTRAMUSCULAR; INTRAVENOUS ONCE
Status: COMPLETED | OUTPATIENT
Start: 2020-05-12 | End: 2020-05-12

## 2020-05-12 RX ORDER — DEXAMETHASONE SODIUM PHOSPHATE 10 MG/ML
8 INJECTION, SOLUTION INTRAMUSCULAR; INTRAVENOUS ONCE
Status: DISCONTINUED | OUTPATIENT
Start: 2020-05-12 | End: 2020-05-12 | Stop reason: HOSPADM

## 2020-05-12 RX ORDER — SODIUM CHLORIDE 0.9 % (FLUSH) 0.9 %
10 SYRINGE (ML) INJECTION EVERY 12 HOURS SCHEDULED
Status: DISCONTINUED | OUTPATIENT
Start: 2020-05-12 | End: 2020-05-13 | Stop reason: HOSPADM

## 2020-05-12 RX ORDER — ONDANSETRON 4 MG/1
4 TABLET, ORALLY DISINTEGRATING ORAL EVERY 8 HOURS PRN
Status: DISCONTINUED | OUTPATIENT
Start: 2020-05-12 | End: 2020-05-13 | Stop reason: HOSPADM

## 2020-05-12 RX ORDER — LISINOPRIL 20 MG/1
40 TABLET ORAL DAILY
Status: DISCONTINUED | OUTPATIENT
Start: 2020-05-12 | End: 2020-05-13 | Stop reason: HOSPADM

## 2020-05-12 RX ORDER — ROPIVACAINE HYDROCHLORIDE 5 MG/ML
30 INJECTION, SOLUTION EPIDURAL; INFILTRATION; PERINEURAL ONCE
Status: DISCONTINUED | OUTPATIENT
Start: 2020-05-12 | End: 2020-05-12 | Stop reason: SDUPTHER

## 2020-05-12 RX ORDER — MORPHINE SULFATE 2 MG/ML
2 INJECTION, SOLUTION INTRAMUSCULAR; INTRAVENOUS EVERY 4 HOURS PRN
Status: DISCONTINUED | OUTPATIENT
Start: 2020-05-12 | End: 2020-05-13 | Stop reason: HOSPADM

## 2020-05-12 RX ORDER — ROCURONIUM BROMIDE 10 MG/ML
INJECTION, SOLUTION INTRAVENOUS PRN
Status: DISCONTINUED | OUTPATIENT
Start: 2020-05-12 | End: 2020-05-12 | Stop reason: SDUPTHER

## 2020-05-12 RX ORDER — NEOSTIGMINE METHYLSULFATE 1 MG/ML
INJECTION, SOLUTION INTRAVENOUS PRN
Status: DISCONTINUED | OUTPATIENT
Start: 2020-05-12 | End: 2020-05-12 | Stop reason: SDUPTHER

## 2020-05-12 RX ORDER — ACETAMINOPHEN 325 MG/1
650 TABLET ORAL EVERY 6 HOURS
Status: DISCONTINUED | OUTPATIENT
Start: 2020-05-12 | End: 2020-05-13 | Stop reason: HOSPADM

## 2020-05-12 RX ORDER — SODIUM CHLORIDE, SODIUM LACTATE, POTASSIUM CHLORIDE, CALCIUM CHLORIDE 600; 310; 30; 20 MG/100ML; MG/100ML; MG/100ML; MG/100ML
INJECTION, SOLUTION INTRAVENOUS CONTINUOUS
Status: DISCONTINUED | OUTPATIENT
Start: 2020-05-12 | End: 2020-05-13 | Stop reason: HOSPADM

## 2020-05-12 RX ORDER — OXYCODONE AND ACETAMINOPHEN 7.5; 325 MG/1; MG/1
1 TABLET ORAL EVERY 4 HOURS PRN
Qty: 40 TABLET | Refills: 0 | Status: SHIPPED | OUTPATIENT
Start: 2020-05-12 | End: 2020-05-19

## 2020-05-12 RX ORDER — DEXAMETHASONE SODIUM PHOSPHATE 4 MG/ML
INJECTION, SOLUTION INTRA-ARTICULAR; INTRALESIONAL; INTRAMUSCULAR; INTRAVENOUS; SOFT TISSUE PRN
Status: DISCONTINUED | OUTPATIENT
Start: 2020-05-12 | End: 2020-05-12 | Stop reason: SDUPTHER

## 2020-05-12 RX ORDER — DEXTROSE MONOHYDRATE 50 MG/ML
100 INJECTION, SOLUTION INTRAVENOUS PRN
Status: DISCONTINUED | OUTPATIENT
Start: 2020-05-12 | End: 2020-05-13 | Stop reason: HOSPADM

## 2020-05-12 RX ORDER — CEFAZOLIN SODIUM 2 G/50ML
2 SOLUTION INTRAVENOUS EVERY 8 HOURS
Status: COMPLETED | OUTPATIENT
Start: 2020-05-12 | End: 2020-05-13

## 2020-05-12 RX ORDER — OXYCODONE HYDROCHLORIDE AND ACETAMINOPHEN 5; 325 MG/1; MG/1
1 TABLET ORAL EVERY 4 HOURS PRN
Status: DISCONTINUED | OUTPATIENT
Start: 2020-05-12 | End: 2020-05-13 | Stop reason: HOSPADM

## 2020-05-12 RX ORDER — MORPHINE SULFATE 4 MG/ML
4 INJECTION, SOLUTION INTRAMUSCULAR; INTRAVENOUS EVERY 4 HOURS PRN
Status: DISCONTINUED | OUTPATIENT
Start: 2020-05-12 | End: 2020-05-13 | Stop reason: HOSPADM

## 2020-05-12 RX ORDER — CEFAZOLIN SODIUM 2 G/50ML
2 SOLUTION INTRAVENOUS
Status: COMPLETED | OUTPATIENT
Start: 2020-05-12 | End: 2020-05-12

## 2020-05-12 RX ORDER — ROPIVACAINE HYDROCHLORIDE 5 MG/ML
30 INJECTION, SOLUTION EPIDURAL; INFILTRATION; PERINEURAL ONCE
Status: COMPLETED | OUTPATIENT
Start: 2020-05-12 | End: 2020-05-12

## 2020-05-12 RX ORDER — IBUPROFEN 400 MG/1
400 TABLET ORAL ONCE
Status: COMPLETED | OUTPATIENT
Start: 2020-05-12 | End: 2020-05-12

## 2020-05-12 RX ORDER — MIDAZOLAM HYDROCHLORIDE 1 MG/ML
INJECTION INTRAMUSCULAR; INTRAVENOUS PRN
Status: DISCONTINUED | OUTPATIENT
Start: 2020-05-12 | End: 2020-05-12 | Stop reason: SDUPTHER

## 2020-05-12 RX ORDER — MIDAZOLAM HYDROCHLORIDE 1 MG/ML
2 INJECTION INTRAMUSCULAR; INTRAVENOUS ONCE
Status: DISCONTINUED | OUTPATIENT
Start: 2020-05-12 | End: 2020-05-12 | Stop reason: SDUPTHER

## 2020-05-12 RX ORDER — ACETAMINOPHEN 500 MG
1000 TABLET ORAL ONCE
Status: COMPLETED | OUTPATIENT
Start: 2020-05-12 | End: 2020-05-12

## 2020-05-12 RX ORDER — SODIUM CHLORIDE 0.9 % (FLUSH) 0.9 %
10 SYRINGE (ML) INJECTION PRN
Status: DISCONTINUED | OUTPATIENT
Start: 2020-05-12 | End: 2020-05-12 | Stop reason: HOSPADM

## 2020-05-12 RX ORDER — MELOXICAM 7.5 MG/1
3.75 TABLET ORAL DAILY
Status: DISCONTINUED | OUTPATIENT
Start: 2020-05-12 | End: 2020-05-13 | Stop reason: HOSPADM

## 2020-05-12 RX ORDER — SENNA AND DOCUSATE SODIUM 50; 8.6 MG/1; MG/1
1 TABLET, FILM COATED ORAL 2 TIMES DAILY
Status: DISCONTINUED | OUTPATIENT
Start: 2020-05-12 | End: 2020-05-13 | Stop reason: HOSPADM

## 2020-05-12 RX ORDER — FENTANYL CITRATE 50 UG/ML
50 INJECTION, SOLUTION INTRAMUSCULAR; INTRAVENOUS EVERY 5 MIN PRN
Status: DISCONTINUED | OUTPATIENT
Start: 2020-05-12 | End: 2020-05-12 | Stop reason: HOSPADM

## 2020-05-12 RX ORDER — LIDOCAINE HYDROCHLORIDE 10 MG/ML
INJECTION, SOLUTION INFILTRATION; PERINEURAL
Status: COMPLETED
Start: 2020-05-12 | End: 2020-05-12

## 2020-05-12 RX ORDER — FENTANYL CITRATE 50 UG/ML
100 INJECTION, SOLUTION INTRAMUSCULAR; INTRAVENOUS ONCE
Status: DISCONTINUED | OUTPATIENT
Start: 2020-05-12 | End: 2020-05-12 | Stop reason: SDUPTHER

## 2020-05-12 RX ORDER — OXYCODONE HYDROCHLORIDE AND ACETAMINOPHEN 5; 325 MG/1; MG/1
2 TABLET ORAL EVERY 4 HOURS PRN
Status: DISCONTINUED | OUTPATIENT
Start: 2020-05-12 | End: 2020-05-13 | Stop reason: HOSPADM

## 2020-05-12 RX ORDER — LIDOCAINE HYDROCHLORIDE 10 MG/ML
5 INJECTION, SOLUTION EPIDURAL; INFILTRATION; INTRACAUDAL; PERINEURAL ONCE
Status: DISCONTINUED | OUTPATIENT
Start: 2020-05-12 | End: 2020-05-12 | Stop reason: HOSPADM

## 2020-05-12 RX ORDER — SODIUM CHLORIDE 0.9 % (FLUSH) 0.9 %
10 SYRINGE (ML) INJECTION PRN
Status: DISCONTINUED | OUTPATIENT
Start: 2020-05-12 | End: 2020-05-13 | Stop reason: HOSPADM

## 2020-05-12 RX ORDER — ROPIVACAINE HYDROCHLORIDE 5 MG/ML
INJECTION, SOLUTION EPIDURAL; INFILTRATION; PERINEURAL
Status: COMPLETED | OUTPATIENT
Start: 2020-05-12 | End: 2020-05-12

## 2020-05-12 RX ORDER — ONDANSETRON 2 MG/ML
4 INJECTION INTRAMUSCULAR; INTRAVENOUS EVERY 6 HOURS PRN
Status: DISCONTINUED | OUTPATIENT
Start: 2020-05-12 | End: 2020-05-13 | Stop reason: HOSPADM

## 2020-05-12 RX ORDER — ONDANSETRON 2 MG/ML
INJECTION INTRAMUSCULAR; INTRAVENOUS PRN
Status: DISCONTINUED | OUTPATIENT
Start: 2020-05-12 | End: 2020-05-12 | Stop reason: SDUPTHER

## 2020-05-12 RX ADMIN — SODIUM CHLORIDE, POTASSIUM CHLORIDE, SODIUM LACTATE AND CALCIUM CHLORIDE: 600; 310; 30; 20 INJECTION, SOLUTION INTRAVENOUS at 15:32

## 2020-05-12 RX ADMIN — ONDANSETRON HYDROCHLORIDE 4 MG: 2 INJECTION, SOLUTION INTRAMUSCULAR; INTRAVENOUS at 12:53

## 2020-05-12 RX ADMIN — ACETAMINOPHEN 650 MG: 325 TABLET ORAL at 20:22

## 2020-05-12 RX ADMIN — PHENYLEPHRINE HYDROCHLORIDE 100 MCG: 10 INJECTION INTRAVENOUS at 12:22

## 2020-05-12 RX ADMIN — PHENYLEPHRINE HYDROCHLORIDE 50 MCG: 10 INJECTION INTRAVENOUS at 12:09

## 2020-05-12 RX ADMIN — PROPOFOL 140 MG: 10 INJECTION, EMULSION INTRAVENOUS at 11:15

## 2020-05-12 RX ADMIN — ACETAMINOPHEN 1000 MG: 500 TABLET ORAL at 09:14

## 2020-05-12 RX ADMIN — LIDOCAINE HYDROCHLORIDE 50 MG: 10 INJECTION, SOLUTION INFILTRATION; PERINEURAL at 10:07

## 2020-05-12 RX ADMIN — SENNOSIDES AND DOCUSATE SODIUM 1 TABLET: 8.6; 5 TABLET ORAL at 20:22

## 2020-05-12 RX ADMIN — ROPIVACAINE HYDROCHLORIDE 30 ML: 5 INJECTION, SOLUTION EPIDURAL; INFILTRATION; PERINEURAL at 10:11

## 2020-05-12 RX ADMIN — LIDOCAINE HYDROCHLORIDE 40 MG: 20 INJECTION, SOLUTION EPIDURAL; INFILTRATION; INTRACAUDAL; PERINEURAL at 11:15

## 2020-05-12 RX ADMIN — ACETAMINOPHEN 650 MG: 325 TABLET ORAL at 15:32

## 2020-05-12 RX ADMIN — ROPIVACAINE HYDROCHLORIDE 30 ML: 5 INJECTION, SOLUTION EPIDURAL; INFILTRATION; PERINEURAL at 10:10

## 2020-05-12 RX ADMIN — LISINOPRIL 40 MG: 20 TABLET ORAL at 18:02

## 2020-05-12 RX ADMIN — FENTANYL CITRATE 50 MCG: 50 INJECTION, SOLUTION INTRAMUSCULAR; INTRAVENOUS at 11:15

## 2020-05-12 RX ADMIN — FENTANYL CITRATE 50 MCG: 50 INJECTION, SOLUTION INTRAMUSCULAR; INTRAVENOUS at 10:00

## 2020-05-12 RX ADMIN — ROCURONIUM BROMIDE 10 MG: 10 INJECTION, SOLUTION INTRAVENOUS at 11:56

## 2020-05-12 RX ADMIN — CEFAZOLIN SODIUM 2 G: 2 SOLUTION INTRAVENOUS at 18:44

## 2020-05-12 RX ADMIN — MIDAZOLAM 1 MG: 1 INJECTION INTRAMUSCULAR; INTRAVENOUS at 11:04

## 2020-05-12 RX ADMIN — GABAPENTIN 300 MG: 300 CAPSULE ORAL at 09:14

## 2020-05-12 RX ADMIN — PHENYLEPHRINE HYDROCHLORIDE 100 MCG: 10 INJECTION INTRAVENOUS at 12:49

## 2020-05-12 RX ADMIN — PHENYLEPHRINE HYDROCHLORIDE 100 MCG: 10 INJECTION INTRAVENOUS at 12:13

## 2020-05-12 RX ADMIN — INSULIN LISPRO 6 UNITS: 100 INJECTION, SOLUTION INTRAVENOUS; SUBCUTANEOUS at 18:14

## 2020-05-12 RX ADMIN — PHENYLEPHRINE HYDROCHLORIDE 100 MCG: 10 INJECTION INTRAVENOUS at 12:57

## 2020-05-12 RX ADMIN — ROCURONIUM BROMIDE 40 MG: 10 INJECTION, SOLUTION INTRAVENOUS at 11:16

## 2020-05-12 RX ADMIN — INSULIN LISPRO 3 UNITS: 100 INJECTION, SOLUTION INTRAVENOUS; SUBCUTANEOUS at 20:22

## 2020-05-12 RX ADMIN — PHENYLEPHRINE HYDROCHLORIDE 100 MCG: 10 INJECTION INTRAVENOUS at 12:02

## 2020-05-12 RX ADMIN — MELOXICAM 3.75 MG: 7.5 TABLET ORAL at 16:33

## 2020-05-12 RX ADMIN — DEXAMETHASONE SODIUM PHOSPHATE 10 MG: 4 INJECTION, SOLUTION INTRAMUSCULAR; INTRAVENOUS at 11:28

## 2020-05-12 RX ADMIN — SODIUM CHLORIDE: 9 INJECTION, SOLUTION INTRAVENOUS at 12:31

## 2020-05-12 RX ADMIN — SODIUM CHLORIDE: 9 INJECTION, SOLUTION INTRAVENOUS at 11:04

## 2020-05-12 RX ADMIN — PHENYLEPHRINE HYDROCHLORIDE 100 MCG: 10 INJECTION INTRAVENOUS at 12:39

## 2020-05-12 RX ADMIN — PHENYLEPHRINE HYDROCHLORIDE 50 MCG: 10 INJECTION INTRAVENOUS at 11:51

## 2020-05-12 RX ADMIN — PHENYLEPHRINE HYDROCHLORIDE 50 MCG: 10 INJECTION INTRAVENOUS at 11:59

## 2020-05-12 RX ADMIN — Medication 3 MG: at 13:14

## 2020-05-12 RX ADMIN — MIDAZOLAM HYDROCHLORIDE 1 MG: 1 INJECTION, SOLUTION INTRAMUSCULAR; INTRAVENOUS at 10:00

## 2020-05-12 RX ADMIN — CEFAZOLIN SODIUM 2 G: 2 SOLUTION INTRAVENOUS at 11:07

## 2020-05-12 RX ADMIN — IBUPROFEN 400 MG: 400 TABLET ORAL at 09:45

## 2020-05-12 RX ADMIN — Medication 0.6 MG: at 13:14

## 2020-05-12 RX ADMIN — PHENYLEPHRINE HYDROCHLORIDE 50 MCG: 10 INJECTION INTRAVENOUS at 12:26

## 2020-05-12 ASSESSMENT — PULMONARY FUNCTION TESTS
PIF_VALUE: 15
PIF_VALUE: 13
PIF_VALUE: 11
PIF_VALUE: 4
PIF_VALUE: 16
PIF_VALUE: 12
PIF_VALUE: 16
PIF_VALUE: 16
PIF_VALUE: 15
PIF_VALUE: 13
PIF_VALUE: 15
PIF_VALUE: 20
PIF_VALUE: 16
PIF_VALUE: 15
PIF_VALUE: 0
PIF_VALUE: 20
PIF_VALUE: 16
PIF_VALUE: 16
PIF_VALUE: 12
PIF_VALUE: 2
PIF_VALUE: 1
PIF_VALUE: 15
PIF_VALUE: 15
PIF_VALUE: 16
PIF_VALUE: 3
PIF_VALUE: 12
PIF_VALUE: 16
PIF_VALUE: 2
PIF_VALUE: 16
PIF_VALUE: 16
PIF_VALUE: 15
PIF_VALUE: 16
PIF_VALUE: 13
PIF_VALUE: 13
PIF_VALUE: 17
PIF_VALUE: 0
PIF_VALUE: 16
PIF_VALUE: 15
PIF_VALUE: 16
PIF_VALUE: 0
PIF_VALUE: 3
PIF_VALUE: 16
PIF_VALUE: 16
PIF_VALUE: 5
PIF_VALUE: 15
PIF_VALUE: 12
PIF_VALUE: 0
PIF_VALUE: 12
PIF_VALUE: 15
PIF_VALUE: 16
PIF_VALUE: 15
PIF_VALUE: 16
PIF_VALUE: 3
PIF_VALUE: 4
PIF_VALUE: 15
PIF_VALUE: 15
PIF_VALUE: 16
PIF_VALUE: 15
PIF_VALUE: 16
PIF_VALUE: 15
PIF_VALUE: 16
PIF_VALUE: 15
PIF_VALUE: 17
PIF_VALUE: 16
PIF_VALUE: 17
PIF_VALUE: 12
PIF_VALUE: 16
PIF_VALUE: 15
PIF_VALUE: 16
PIF_VALUE: 15
PIF_VALUE: 12
PIF_VALUE: 16
PIF_VALUE: 17
PIF_VALUE: 16
PIF_VALUE: 1
PIF_VALUE: 16
PIF_VALUE: 12
PIF_VALUE: 16
PIF_VALUE: 16
PIF_VALUE: 15
PIF_VALUE: 15
PIF_VALUE: 16
PIF_VALUE: 20
PIF_VALUE: 12
PIF_VALUE: 17
PIF_VALUE: 15
PIF_VALUE: 16
PIF_VALUE: 15
PIF_VALUE: 1
PIF_VALUE: 16
PIF_VALUE: 15
PIF_VALUE: 2
PIF_VALUE: 16
PIF_VALUE: 16
PIF_VALUE: 14
PIF_VALUE: 16
PIF_VALUE: 12
PIF_VALUE: 16
PIF_VALUE: 6
PIF_VALUE: 16
PIF_VALUE: 15
PIF_VALUE: 16

## 2020-05-12 ASSESSMENT — PAIN SCALES - GENERAL
PAINLEVEL_OUTOF10: 1
PAINLEVEL_OUTOF10: 0
PAINLEVEL_OUTOF10: 3
PAINLEVEL_OUTOF10: 0

## 2020-05-12 ASSESSMENT — PAIN - FUNCTIONAL ASSESSMENT: PAIN_FUNCTIONAL_ASSESSMENT: 0-10

## 2020-05-12 NOTE — BRIEF OP NOTE
Brief Postoperative Note      Patient: Gelacio Oviedo  YOB: 1945  MRN: [id number]    Date of Procedure: 5/12/2020    Pre-Op Diagnosis: Right shoulder rotator cuff arthropathy    Post-Op Diagnosis: Same       Procedure(s):  RIGHT SHOULDER TOTAL ARTHROPLASTY REVERSE (ASHWIN)    Surgeon(s):  Rochelle Saleh MD    Assistant:  JESSE Melendez PA-C    Anesthesia: General and regional block    Estimated Blood Loss (mL): less than 350     Complications: None    Specimens:   None    Implants:  Ashwin reverse TSA implants      Drains: none    Electronically signed by Berenice Storm PA-C on 5/12/2020 at 6:54 AM

## 2020-05-12 NOTE — H&P
Reviewed prior H&P. No changes or updates.     Electronically signed by Juan Tadeo PA-C on 5/12/2020 at 6:58 AM

## 2020-05-12 NOTE — PROGRESS NOTES
evaluation. Treatment/Education:     UE testing deferred to OT. Mobility as above . Pulse ox on RA at rest 93%. Decreased to 82% after mobility. O2 re-applied @ 2LNC , recovered to 95%. RN informed     Pt educated on fall risk,  R UE precautions, safety with mobility        Patient response to education:   Pt verbalized understanding Pt demonstrated skill Pt requires further education in this area   x With cues  x       Comments:  Pt left in chair after session, with call light in reach. Rehab potential is Good for reaching above PT goals. Pts/ family goals   1. Home     Patient and or family understand(s) diagnosis, prognosis, and plan of care. -  Yes     PLAN  PT care will be provided in accordance with the objectives noted above. Whenever appropriate, clear delegation orders will be provided for nursing staff. Exercises and functional mobility practice will be used as well as appropriate assistive devices or modalities to obtain goals. Patient and family education will also be administered as needed. Frequency of treatments will be 2-5x/week x  1-2 days. Time in: 1530   Time out:  1600       Evaluation Time includes thorough review of current medical information, gathering information on past medical history/social history and prior level of function, completion of standardized testing/informal observation of tasks, assessment of data and education on plan of care and goals.     CPT codes:  [x] Low Complexity PT evaluation 06917  [] Moderate Complexity PT evaluation 70642  [] High Complexity PT evaluation 49166  [] PT Re-evaluation 55555  [] Gait training 03959  minutes  [x] Therapeutic activities 18566 10 minutes  [] Therapeutic exercises 92034  minutes  [] Neuromuscular reeducation 09694  minutes       Brennan  number:  PT 2618

## 2020-05-12 NOTE — CONSULTS
[facility]       Reason for Consult:  Medical management   Informant(s) for H&P: Pt and chart     History of Present Illness:    Virginie Torres is a 69 y.o. male with PMH (HTN, DM) who was admitted for elective right shoulder arthroplasty due to Right shoulder rotator cuff arthropathy, patient had the surgery this morning, he feels better, not having pain. Internal medicine was consulted for medical management   · Denied any fever, nausea, vomiting, diarrhea, blood in stool or black stool. · Denied any chest pain, no recent lightheadedness or syncope     REVIEW OF SYSTEMS:  A comprehensive review of systems was negative except for: what is in the HPI    PMH:  Past Medical History:   Diagnosis Date    Arthritis     Bilateral shoulder pain 05/2020    for TJAS Right 05/12/2020 Dr Saira Harris    Diabetes mellitus [facility])     Enlarged prostate     follows with Dr Damion Srinivasan History of irregular heartbeat     follows with PCP    Hyperlipidemia     Hypertension     Leg wound, right 05/2020    recent history of; see podiatry notes in epic       Surgical History:  Past Surgical History:   Procedure Laterality Date    CATARACT REMOVAL WITH IMPLANT Bilateral     ENDOSCOPY, COLON, DIAGNOSTIC      EYE SURGERY      HERNIA REPAIR      TONSILLECTOMY         Medications Prior to Admission:    Prior to Admission medications    Medication Sig Start Date End Date Taking? Authorizing Provider   oxyCODONE-acetaminophen (PERCOCET) 7.5-325 MG per tablet Take 1 tablet by mouth every 4 hours as needed for Pain for up to 7 days.  Intended supply: 30 days 5/12/20 5/19/20 Yes TRE Drake   oxaprozin (DAYPRO) 600 MG tablet Take 1 tablet by mouth daily for 14 days 5/12/20 5/26/20 Yes TRE Drake   cephALEXin (KEFLEX) 500 MG capsule Take 1 capsule by mouth 4 times daily for 1 day 5/12/20 5/13/20 Yes TRE Drake   acarbose (PRECOSE) 50 MG tablet take 1 tablet by mouth three times a day with meals 3/10/20  Yes Historical Provider, MD   acetaminophen (TYLENOL) 500 MG tablet Take 500 mg by mouth every 6 hours as needed for Pain   Yes Historical Provider, MD   metOLazone (ZAROXOLYN) 5 MG tablet Take 5 mg by mouth daily   Yes Historical Provider, MD   lisinopril (PRINIVIL;ZESTRIL) 40 MG tablet Take 40 mg by mouth daily   Yes Historical Provider, MD   glipiZIDE (GLUCOTROL XL) 10 MG extended release tablet Take 10 mg by mouth daily   Yes Historical Provider, MD   metFORMIN (GLUCOPHAGE) 500 MG tablet Take 500 mg by mouth 2 times daily (with meals)   Yes Historical Provider, MD   NIFEdipine (ADALAT CC) 30 MG extended release tablet Take 30 mg by mouth daily Take am day of surgery 05/12/2020   Yes Historical Provider, MD   fexofenadine (ALLEGRA ALLERGY) 60 MG tablet Take 60 mg by mouth daily   Yes Historical Provider, MD   Dulaglutide (TRULICITY) 0.38 TM/2.3EA SOPN Inject 0.75 mg into the skin once a week Indications: pt unsure of the dose Friday's   Yes Historical Provider, MD   Multiple Vitamins-Minerals (THERAPEUTIC MULTIVITAMIN-MINERALS) tablet Take 1 tablet by mouth daily Last taken 05/03/2020    Historical Provider, MD   Ascorbic Acid (VITAMIN C PO) Take 1 tablet by mouth daily Last taken 05/03    Historical Provider, MD       Allergies:    Penicillins; Seasonal; and Sulfa antibiotics    Social History:    reports that he has never smoked. He has never used smokeless tobacco. He reports that he does not drink alcohol or use drugs. Family History:   family history includes No Known Problems in his father and mother.   PHYSICAL EXAM:  Vitals:  BP (!) 140/67   Pulse 66   Temp 97.1 °F (36.2 °C) (Oral)   Resp 18   Wt 199 lb (90.3 kg)   SpO2 97%   BMI 32.61 kg/m²   General Appearance: AOx3 and NAD  Skin: Warm and dry  Head: Normocephalic and atraumatic, mucous membranes well hydrated   Eyes: Pupils equal, round, and reactive to light  Neck: Supple and non tender without mass   Pulmonary/Chest: Clear to

## 2020-05-12 NOTE — PROGRESS NOTES
Report called to floor RN  Patient stable for transfer without complaints of pain or discomfort    Floor notified that we are awaiting further orders    Critical access hospital

## 2020-05-12 NOTE — PROGRESS NOTES
Min A - with use of AE, as needed/appropriate   Bathing Mod A  Min A - with use of AE/DME, as needed/appropriate   Toileting Mod A  Mod I   Bed Mobility  Supine-to-Sit: SBA   Independent   Functional Transfers Sit-to-Stand: Min A   from standard-height toilet (with use of arm rest on L side)  Independent   Functional Mobility CGA - Min A  (with handheld assistance through L hand occasionally) within patient's room, bathroom, and hallway. Mod I / Independent - with use of device, as needed/appropriate   Balance Sitting: Good  (at EOB)  Standing: Fair- to Page Siad  (with occasional handheld assistance)  Good dynamic standing balance during completion of ADLs and other functional tasks. Activity Tolerance Fair  Patient will demonstrate Good understanding and consistent implementation of energy conservation techniques and work simplification techniques into ADL/IADL routines. Visual/  Perceptual WFL  Patient wears glasses, as needed. N/A     Long-Term Goal: Patient will increase functional independence to PLOF in order to allow patient to live in least restrictive environment. Strength: ROM: Additional Information:    R UE  Not assessed. R shoulder not assessed. AAROM of R elbow, wrist, and hand WFL. L UE Proximally: 3-/5  Distally: WFL 0 - 90 degrees of active shoulder flexion; distal AROM WFL    Hand Dominance: Right    Hearing: Impaired - wears bilateral hearing aids  Sensation: Patient reported experiencing numbness/tingling in his R thumb only. Tone: WFL  Edema: No    Comments: RN approved patient's participation in [address] [place] [address] activities. Upon arrival, patient supine in bed. At end of session, patient seated in bedside chair with call light and phone within reach and all lines and tubes intact. Patient would benefit from continued skilled OT to increase safety and independence with completion of ADL/IADL tasks for functional independence and quality of life.     Treatment: Patient education provided regarding:

## 2020-05-12 NOTE — ANESTHESIA PROCEDURE NOTES
Peripheral Block    Patient location during procedure: procedure area  Start time: 5/12/2020 10:01 AM  End time: 5/12/2020 10:11 AM  Staffing  Anesthesiologist: Maldonaod Last MD  Performed: anesthesiologist   Preanesthetic Checklist  Completed: patient identified, site marked, surgical consent, pre-op evaluation, timeout performed, IV checked, risks and benefits discussed, monitors and equipment checked, anesthesia consent given, oxygen available and patient being monitored  Peripheral Block  Patient position: supine  Prep: ChloraPrep  Patient monitoring: cardiac monitor, continuous pulse ox, frequent blood pressure checks and IV access  Block type: Brachial plexus  Laterality: right  Injection technique: single-shot  Procedures: ultrasound guided and nerve stimulator  Local infiltration: lidocaine  Infiltration strength: 1 %  Dose: 3 mL  Interscalene  Provider prep: mask and sterile gloves  Local infiltration: lidocaine  Needle  Needle type: combined needle/nerve stimulator   Needle gauge: 22 G  Needle length: 5 cm  Needle localization: ultrasound guidance and nerve stimulator  Assessment  Injection assessment: no paresthesia on injection, negative aspiration for heme and local visualized surrounding nerve on ultrasound  Paresthesia pain: none  Slow fractionated injection: yes  Hemodynamics: stable  Medications Administered  Ropivacaine (NAROPIN) injection 0.5%, 30 mL  Reason for block: post-op pain management and at surgeon's request

## 2020-05-12 NOTE — OP NOTE
Operative Note      Patient: Yany Cortes  YOB: 1945  MRN: [id number]    Date of Procedure: 5/12/2020    Pre-Op Diagnosis: Rotator cuff tear arthropathy  RIGHT SHOULDER    Post-Op Diagnosis: Same       Procedure(s):  RIGHT SHOULDER TOTAL ARTHROPLASTY REVERSE    Surgeon(s):  Riana Owen MD    First Assistant:   Denia Angulo, [facility]    2nd Assistant:  Kerline La, DO - PGY 2    Anesthesia: General + Scalene block    Estimated Blood Loss (mL): less than 457     Complications: None    Specimens:   * No specimens in log *    Implants:  Implant Name Type Inv.  Item Serial No.  Lot No. LRB No. Used Action   IMPL REUNION BASEPLATE GLENOID 27GY Shoulder/Arm/Wrist/Hand IMPL REUNION BASEPLATE GLENOID [id number]  DARA: ORTHOP[employer] [id number] Right 1 Implanted   SCREW CTR 6.5X24 Screw/Plate/Nail/Wilman SCREW CTR 6.5X24  DARA: ORTHOPAEDICS [id number] Right 1 Implanted   SCREW PERIPH 4.5X32MM Screw/Plate/Nail/Wilman SCREW PERIPH 4.5X32MM  DARA: ORTHOPAEDICS [id number] Right 2 Implanted   SCREW PERIPH 4.5X24 Screw/Plate/Nail/Wilman SCREW PERIPH 4.5X24  DARA: ORTHOPAEDICS [id number] Right 1 Implanted   SCREW PERIPH 4.5X16 Screw/Plate/Nail/Wilman SCREW PERIPH 4.5X16  DARA: ORTHOPAEDICS [id number] Right 1 Implanted   IMPL REUNION CONCENTRIC GLENOSPHERE Shoulder/Arm/Wrist/Hand IMPL REUNION CONCENTRIC GLENOSPHERE  DARA: ORTHOPAEDICS [id number] Right 1 Implanted   IMPL STEM HUMERAL [id number] SHLD Shoulder/Arm/Wrist/Hand IMPL STEM HUMERAL [id number] SHLD  DARA: ORTHOPAEDICS [id number] Right 1 Implanted   IMPL HUM INSRT X3 REUNION 36X4MM Shoulder/Arm/Wrist/Hand IMPL HUM INSRT X3 REUNION 36X4MM  DARA: ORTHOPAEDICS [id number] Right 1 Implanted   IMPL HUM CUP 36X4MM Shoulder/Arm/Wrist/Hand IMPL HUM CUP 36X4MM  DARA: ORTHOPAEDICS [id number] Right 1 Implanted         Drains: * No LDAs found *    Findings: Rotator cuff tear arthropathy with complete deficiency of superior cuff, severe LHB degeneration, GH and AC DJD    Detailed Description stable and permitted internal rotation to the abdomen w/out bounce, external rotation w/out impingement or instability and abduction and adduction without separation of components. Tension in the deltoid and conjoint tendon was appropriate. Trial components were then removed. The actual 36 +6 glenosphere was then impacted and the humerus was  reduced. The shoulder was re-irrigated. Platelet rich gel was injected. The subscapularis was assessed and was left unrepaired due to lack of tendon excursion and attenuation. Repair to the LT would have had to be done in less than 20 degrees ER. The long head bicep tendon was tenodesed in its groove w/#2 fiberwire. The deltopectoral interval was reapproximated with 0-vicryl suture. The skin was closed in layers using 3-0 monocryl. A dry sterile dressing was applied. The arm was placed in a sling. The patient was transferred to an orthopaedic bed and then to recovery in stable condition.       Electronically signed by Lyn Jordan MD on 5/12/2020 at 5:23 PM

## 2020-05-13 VITALS
DIASTOLIC BLOOD PRESSURE: 70 MMHG | OXYGEN SATURATION: 94 % | SYSTOLIC BLOOD PRESSURE: 156 MMHG | WEIGHT: 199 LBS | TEMPERATURE: 98.2 F | HEART RATE: 82 BPM | RESPIRATION RATE: 16 BRPM | BODY MASS INDEX: 32.61 KG/M2

## 2020-05-13 LAB
ANION GAP SERPL CALCULATED.3IONS-SCNC: 10 MMOL/L (ref 7–16)
BUN BLDV-MCNC: 21 MG/DL (ref 8–23)
CALCIUM SERPL-MCNC: 8.9 MG/DL (ref 8.6–10.2)
CHLORIDE BLD-SCNC: 99 MMOL/L (ref 98–107)
CO2: 27 MMOL/L (ref 22–29)
CREAT SERPL-MCNC: 1.2 MG/DL (ref 0.7–1.2)
GFR AFRICAN AMERICAN: >60
GFR NON-AFRICAN AMERICAN: 59 ML/MIN/1.73
GLUCOSE BLD-MCNC: 202 MG/DL (ref 74–99)
HCT VFR BLD CALC: 36 % (ref 37–54)
HEMOGLOBIN: 12 G/DL (ref 12.5–16.5)
MCH RBC QN AUTO: 30.2 PG (ref 26–35)
MCHC RBC AUTO-ENTMCNC: 33.3 % (ref 32–34.5)
MCV RBC AUTO: 90.7 FL (ref 80–99.9)
METER GLUCOSE: 178 MG/DL (ref 74–99)
PDW BLD-RTO: 12.7 FL (ref 11.5–15)
PLATELET # BLD: 245 E9/L (ref 130–450)
PMV BLD AUTO: 10.7 FL (ref 7–12)
POTASSIUM SERPL-SCNC: 4.7 MMOL/L (ref 3.5–5)
RBC # BLD: 3.97 E12/L (ref 3.8–5.8)
SODIUM BLD-SCNC: 136 MMOL/L (ref 132–146)
WBC # BLD: 11.6 E9/L (ref 4.5–11.5)

## 2020-05-13 PROCEDURE — 82962 GLUCOSE BLOOD TEST: CPT

## 2020-05-13 PROCEDURE — 97110 THERAPEUTIC EXERCISES: CPT

## 2020-05-13 PROCEDURE — 97530 THERAPEUTIC ACTIVITIES: CPT

## 2020-05-13 PROCEDURE — 6370000000 HC RX 637 (ALT 250 FOR IP): Performed by: INTERNAL MEDICINE

## 2020-05-13 PROCEDURE — 6360000002 HC RX W HCPCS: Performed by: PHYSICIAN ASSISTANT

## 2020-05-13 PROCEDURE — 6370000000 HC RX 637 (ALT 250 FOR IP): Performed by: PHYSICIAN ASSISTANT

## 2020-05-13 PROCEDURE — 80048 BASIC METABOLIC PNL TOTAL CA: CPT

## 2020-05-13 PROCEDURE — 97535 SELF CARE MNGMENT TRAINING: CPT

## 2020-05-13 PROCEDURE — 2580000003 HC RX 258: Performed by: PHYSICIAN ASSISTANT

## 2020-05-13 PROCEDURE — 36415 COLL VENOUS BLD VENIPUNCTURE: CPT

## 2020-05-13 PROCEDURE — 85027 COMPLETE CBC AUTOMATED: CPT

## 2020-05-13 RX ORDER — OXYCODONE HYDROCHLORIDE AND ACETAMINOPHEN 5; 325 MG/1; MG/1
1 TABLET ORAL EVERY 4 HOURS PRN
Qty: 40 TABLET | Refills: 0 | Status: SHIPPED | OUTPATIENT
Start: 2020-05-13 | End: 2020-05-20

## 2020-05-13 RX ORDER — SENNA AND DOCUSATE SODIUM 50; 8.6 MG/1; MG/1
1 TABLET, FILM COATED ORAL 2 TIMES DAILY
Qty: 30 TABLET | Refills: 0 | Status: SHIPPED | OUTPATIENT
Start: 2020-05-13 | End: 2022-09-29

## 2020-05-13 RX ADMIN — INSULIN LISPRO 2 UNITS: 100 INJECTION, SOLUTION INTRAVENOUS; SUBCUTANEOUS at 09:28

## 2020-05-13 RX ADMIN — CEFAZOLIN SODIUM 2 G: 2 SOLUTION INTRAVENOUS at 03:50

## 2020-05-13 RX ADMIN — NIFEDIPINE 30 MG: 30 TABLET, EXTENDED RELEASE ORAL at 09:23

## 2020-05-13 RX ADMIN — ACETAMINOPHEN 650 MG: 325 TABLET ORAL at 03:50

## 2020-05-13 RX ADMIN — MELOXICAM 3.75 MG: 7.5 TABLET ORAL at 09:22

## 2020-05-13 RX ADMIN — SODIUM CHLORIDE, PRESERVATIVE FREE 10 ML: 5 INJECTION INTRAVENOUS at 04:58

## 2020-05-13 RX ADMIN — SENNOSIDES AND DOCUSATE SODIUM 1 TABLET: 8.6; 5 TABLET ORAL at 09:22

## 2020-05-13 RX ADMIN — LISINOPRIL 40 MG: 20 TABLET ORAL at 09:23

## 2020-05-13 RX ADMIN — METOLAZONE 5 MG: 5 TABLET ORAL at 09:22

## 2020-05-13 RX ADMIN — OXYCODONE HYDROCHLORIDE AND ACETAMINOPHEN 2 TABLET: 5; 325 TABLET ORAL at 04:59

## 2020-05-13 RX ADMIN — Medication 10 ML: at 09:24

## 2020-05-13 RX ADMIN — OXYCODONE HYDROCHLORIDE AND ACETAMINOPHEN 2 TABLET: 5; 325 TABLET ORAL at 00:50

## 2020-05-13 ASSESSMENT — PAIN SCALES - GENERAL
PAINLEVEL_OUTOF10: 6
PAINLEVEL_OUTOF10: 4
PAINLEVEL_OUTOF10: 6
PAINLEVEL_OUTOF10: 4
PAINLEVEL_OUTOF10: 6

## 2020-05-13 ASSESSMENT — PAIN DESCRIPTION - DESCRIPTORS: DESCRIPTORS: ACHING;DULL

## 2020-05-13 ASSESSMENT — PAIN DESCRIPTION - PAIN TYPE: TYPE: SURGICAL PAIN;ACUTE PAIN

## 2020-05-13 ASSESSMENT — PAIN DESCRIPTION - ORIENTATION: ORIENTATION: RIGHT

## 2020-05-13 ASSESSMENT — PAIN DESCRIPTION - FREQUENCY: FREQUENCY: CONTINUOUS

## 2020-05-13 ASSESSMENT — PAIN DESCRIPTION - ONSET: ONSET: ON-GOING

## 2020-05-13 ASSESSMENT — PAIN DESCRIPTION - LOCATION: LOCATION: SHOULDER

## 2020-05-13 ASSESSMENT — PAIN - FUNCTIONAL ASSESSMENT: PAIN_FUNCTIONAL_ASSESSMENT: PREVENTS OR INTERFERES SOME ACTIVE ACTIVITIES AND ADLS

## 2020-05-13 NOTE — PROGRESS NOTES
Occupational Therapy  OT BEDSIDE TREATMENT NOTE      Date:2020  Patient Name: Ty Severino  MRN: [id number]  : 1945  Room: [address] [place], [place] [address]     Referring Provider: Ethel Roche PA-C  Evaluating OT: Kristy Esparza. Jeana OTR/L - BP.4345     AM-PAC Daily Activity Raw Score: 17      Recommended Adaptive Equipment: Continue to assess.     Diagnosis: Arthropathy of right shoulder [M19.011]  Surgery: Patient underwent Right RTSA on 2020. Pertinent Medical History: DM, arthritis, HTN     Precautions: falls, NWB R UE, R UE sling, no shoulder ROM - AROM of wrist, hand, and digits only     Home Living: Patient lives alone in a one-floor home (2 steps to enter - no handrail); patient's bedroom and bathroom are on the main living level. Bathroom Setup: walk-in shower (no grab bar, no seat)  Equipment Owned: N/A     Prior Level of Function (PLOF): Per patient, he was independent with ADLs, independent with IADLs, and independent with functional mobility (without device) prior to this hospitalization. Patient noted that two friends will be staying with him upon his return home; patient indicated that his friends can assist with IADLs, as needed. Driving: Yes     Pain Level: pain 3/10  In shoulder   Cognition: Pt alert and oriented. Limited safety awareness.                    Functional Assessment:    Initial Eval Status  Date: 2020 Treatment Status   Short Term Goals  Treatment Frequency/Duration: 2-5x/week for 3-7 days PRN   Feeding SBA with self-feeding of applesauce toward conclusion of this session. Setup   Setup   Grooming Min A for washing of L hand while standing at sink.  min  A Mod I  (standing/seated at sink)   UB Dressing Mod A to [facility] hospital gown. Mod A after instruction of dressing technique.   SBA   LB Dressing Max A Min A   Assist to fasten pants and buckle belt.   Assist to arrange underwear over R hip/buttock   Min A - with use of AE, as needed/appropriate   Bathing Mod A   Min A - for R distal UE completed. Therapeutic activity to address balance and endurance for ADL and transfers. Other: pt remained in chair at end of the session. · Pt has made  progress towards set goals.    · Continue with current plan of care        Treatment Charges: Mins Units    ADL/Home Mgt 23784 20 1    Thera Activities 96375      Ther Ex 43623 10 1    Manual Therapy 25227      Neuro Re-ed 39550      Orthotic manage/training  83417      Non Billable Time      Total Timed Treatment 30  [address] [place]/L [address]

## 2020-05-13 NOTE — PROGRESS NOTES
Department of Orthopedic Surgery  Physician Assistant LEANNE Progress Note    Kelley Wilhelm  5/13/2020  7:21 AM      Subjective:  No complaints    Vitals  VITALS:  BP (!) 166/76   Pulse 72   Temp 97.7 °F (36.5 °C) (Oral)   Resp 16   Wt 199 lb (90.3 kg)   SpO2 99%   BMI 32.61 kg/m²     PHYSICAL EXAM:    Orientation:  alert and oriented to person, place and time    Right Upper Extremity  Incision:  intact  Sling in place properly. Upper arm and forearm compartments soft, non-tender. Axillary sensation intact. Musculocutaneous nerve function intact. R,U,M, AIN, PIN motor function intact. Able to move all 5 digits. Normal capillary refill. Digits well perfused and sensate. 2+ radial and brachial pulses.     LABS:  CBC:   Lab Results   Component Value Date    WBC 11.6 05/13/2020    RBC 3.97 05/13/2020    HGB 12.0 05/13/2020    HCT 36.0 05/13/2020    MCV 90.7 05/13/2020    MCH 30.2 05/13/2020    MCHC 33.3 05/13/2020    RDW 12.7 05/13/2020     05/13/2020    MPV 10.7 05/13/2020     CBC with Differential:    Lab Results   Component Value Date    WBC 11.6 05/13/2020    RBC 3.97 05/13/2020    HGB 12.0 05/13/2020    HCT 36.0 05/13/2020     05/13/2020    MCV 90.7 05/13/2020    MCH 30.2 05/13/2020    MCHC 33.3 05/13/2020    RDW 12.7 05/13/2020    LYMPHOPCT 29.0 05/07/2020    MONOPCT 6.8 05/07/2020    BASOPCT 0.7 05/07/2020    MONOSABS 0.47 05/07/2020    LYMPHSABS 2.01 05/07/2020    EOSABS 0.15 05/07/2020    BASOSABS 0.05 05/07/2020     WBC:    Lab Results   Component Value Date    WBC 11.6 05/13/2020     Platelets:    Lab Results   Component Value Date     05/13/2020     Hemoglobin/Hematocrit:    Lab Results   Component Value Date    HGB 12.0 05/13/2020    HCT 36.0 05/13/2020       ASSESSMENT AND PLAN:    Post operative day #1 status post right reverse total shoulder arthroplasty  Doing well no complaints  Dressing intact  NV intact  No shoulder ROM tested  D/c home today         Travis Wintersberg Samson HOLCOMB

## 2020-05-13 NOTE — CARE COORDINATION
Met with patient about diagnosis and discharge plan of care. Pt lives alone but does have friends staying with him. He is on 1 st floor of home. Feels he needs nothing for discharge. Home with no needs-[facility]     The Plan for Transition of Care is related to the following treatment goals: home     The Patient and/or patient representative pt  was provided with a choice of provider and agrees   with the discharge plan. [x] Yes [] No    Freedom of choice list was provided with basic dialogue that supports the patient's individualized plan of care/goals, treatment preferences and shares the quality data associated with the providers.  [x] Yes [] No

## 2020-05-13 NOTE — DISCHARGE SUMMARY
deformity   Heart:    Regular rate and rhythm, S1 and S2 normal, no murmur, rub   or gallop   Abdomen:     Soft, non-tender, bowel sounds active all four quadrants,     no masses, no organomegaly   Genitalia:    Normal male without lesion, discharge or tenderness   Rectal:    Normal tone, normal prostate, no masses or tenderness;    guaiac negative stool   Extremities:   Extremities normal, atraumatic, no cyanosis or edema   Pulses:   2+ and symmetric all extremities   Skin:   Skin color, texture, turgor normal, no rashes or lesions   Lymph nodes:   Cervical, supraclavicular, and axillary nodes normal   Neurologic:   CNII-XII intact. Normal strength, sensation and reflexes       throughout       Disposition: home    Patient Instructions:   Percocet and daypro   Activity: sling to arm; no shoulder ROM  Diet: regular diet  Wound Care: keep wound clean and dry; aquacell on for 7 days    Follow-up with Dr. Shadi Michael in 2 weeks.     SignedHassell Human  5/13/2020  7:22 AM

## 2022-03-18 NOTE — PROGRESS NOTES
Patient admitted to PACU and placed on monitor  Patient drowsy and able to follow commands  Small movement of fingers, partial sensation and strong radial pulse.  Sling intact  VSS  Rory Cones no

## 2022-07-29 ENCOUNTER — HOSPITAL ENCOUNTER (OUTPATIENT)
Dept: NON INVASIVE DIAGNOSTICS | Age: 77
Discharge: HOME | End: 2022-07-29
Payer: MEDICARE

## 2022-07-29 PROCEDURE — 93225 XTRNL ECG REC<48 HRS REC: CPT

## 2022-07-29 PROCEDURE — 93226 XTRNL ECG REC<48 HR SCAN A/R: CPT

## 2022-09-15 PROBLEM — E66.9 MODERATE OBESITY: Status: ACTIVE | Noted: 2022-09-15

## 2022-09-15 PROBLEM — E66.8 MODERATE OBESITY: Status: ACTIVE | Noted: 2022-09-15

## 2022-09-29 ENCOUNTER — OFFICE VISIT (OUTPATIENT)
Dept: CARDIOLOGY CLINIC | Age: 77
End: 2022-09-29
Payer: MEDICARE

## 2022-09-29 VITALS
HEIGHT: 68 IN | DIASTOLIC BLOOD PRESSURE: 72 MMHG | WEIGHT: 187 LBS | RESPIRATION RATE: 16 BRPM | SYSTOLIC BLOOD PRESSURE: 136 MMHG | HEART RATE: 93 BPM | BODY MASS INDEX: 28.34 KG/M2

## 2022-09-29 DIAGNOSIS — E78.00 PURE HYPERCHOLESTEROLEMIA: Chronic | ICD-10-CM

## 2022-09-29 DIAGNOSIS — I10 PRIMARY HYPERTENSION: Chronic | ICD-10-CM

## 2022-09-29 DIAGNOSIS — I48.0 PAROXYSMAL ATRIAL FIBRILLATION (HCC): Primary | ICD-10-CM

## 2022-09-29 DIAGNOSIS — E11.9 TYPE 2 DIABETES MELLITUS WITHOUT COMPLICATION, WITHOUT LONG-TERM CURRENT USE OF INSULIN (HCC): Chronic | ICD-10-CM

## 2022-09-29 PROCEDURE — 93000 ELECTROCARDIOGRAM COMPLETE: CPT | Performed by: INTERNAL MEDICINE

## 2022-09-29 PROCEDURE — G8427 DOCREV CUR MEDS BY ELIG CLIN: HCPCS | Performed by: INTERNAL MEDICINE

## 2022-09-29 PROCEDURE — 1036F TOBACCO NON-USER: CPT | Performed by: INTERNAL MEDICINE

## 2022-09-29 PROCEDURE — 3046F HEMOGLOBIN A1C LEVEL >9.0%: CPT | Performed by: INTERNAL MEDICINE

## 2022-09-29 PROCEDURE — 99204 OFFICE O/P NEW MOD 45 MIN: CPT | Performed by: INTERNAL MEDICINE

## 2022-09-29 PROCEDURE — 1123F ACP DISCUSS/DSCN MKR DOCD: CPT | Performed by: INTERNAL MEDICINE

## 2022-09-29 PROCEDURE — 3017F COLORECTAL CA SCREEN DOC REV: CPT | Performed by: INTERNAL MEDICINE

## 2022-09-29 PROCEDURE — G8417 CALC BMI ABV UP PARAM F/U: HCPCS | Performed by: INTERNAL MEDICINE

## 2022-09-29 PROCEDURE — 2022F DILAT RTA XM EVC RTNOPTHY: CPT | Performed by: INTERNAL MEDICINE

## 2022-09-29 RX ORDER — DULAGLUTIDE 1.5 MG/.5ML
INJECTION, SOLUTION SUBCUTANEOUS
COMMUNITY
Start: 2022-09-16

## 2022-09-29 RX ORDER — ATORVASTATIN CALCIUM 40 MG/1
TABLET, FILM COATED ORAL
COMMUNITY
Start: 2022-07-29

## 2022-09-29 NOTE — PROGRESS NOTES
OUTPATIENT CARDIOLOGY CONSULT    Name: Dot Snellen    Age: 78 y.o. Date of Service: 9/29/2022    Reason for Consultation: Paroxysmal atrial fibrillation, hypertension    Referring Physician: Ignacio Cohen DO    History of Present Illness: The patient is a 60-year-old white male with no previous documented structural heart disease and a risk profile of hypertension, diabetes and hyperlipidemia. He was apparently in the absence of symptoms noted to have evidence of an irregular heartbeat with arrhythmia monitoring performed demonstrating the predominance of sinus rhythm with a less than 1% atrial fibrillation burden heart rates within atrial fibrillation ranging  bpm with a mean ventricular response of 75 bpm and a longest reported atrial fibrillation episode of approximately 45 seconds. He denies any symptoms of palpitations or other arrhythmia related symptoms and has noted symptoms of mild fatigue with a slight reduction of his functional capabilities. He otherwise denies symptoms of anginal-like chest discomfort or other ischemic equivalents nor additional manifestations of decompensated left ventricular systolic dysfunction or volume overload. He relates no symptoms of a focal neurologic origin or bleeding. On day of evaluation, borderline stage I systolic hypertension is noted with by report most recent acceptable control of his diabetes with glycosylated hemoglobin levels of approximately 7%. Review of Systems: The remainder of a complete multisystem review including consitutional, central nervous, respiratory, circulatory, gastrointestinal, genitourinary, endocrinologic, hematologic, musculoskeletal and psychiatric are negative.     Past Medical History:  Past Medical History:   Diagnosis Date    Arthritis     Bilateral shoulder pain 05/2020    for TJAS Right 05/12/2020 Dr Stringer Sis    Diabetes mellitus [facility])     Enlarged prostate     follows with Dr Aline Bland    History of Holter monitoring 07/29/2022    History of irregular heartbeat     follows with PCP    Hyperlipidemia     Hypertension     Leg wound, right 05/2020    recent history of; see podiatry notes in epic       Past Surgical History:  Past Surgical History:   Procedure Laterality Date    CATARACT REMOVAL WITH IMPLANT Bilateral     ENDOSCOPY, COLON, DIAGNOSTIC      EYE SURGERY      HERNIA REPAIR      SHOULDER SURGERY Right 5/12/2020    RIGHT SHOULDER TOTAL ARTHROPLASTY REVERSE performed by Liz Martinez MD at [place] [id number]         Family History:  Family History   Problem Relation Age of Onset    No Known Problems Mother     No Known Problems Father        Social History:  Social History     Socioeconomic History    Marital status: Single     Spouse name: Not on file    Number of children: Not on file    Years of education: Not on file    Highest education level: Not on file   Occupational History    Not on file   Tobacco Use    Smoking status: Never    Smokeless tobacco: Never   Vaping Use    Vaping Use: Never used   Substance and Sexual Activity    Alcohol use: Never    Drug use: Never    Sexual activity: Not on file   Other Topics Concern    Not on file   Social History Narrative    Not on file     Social Determinants of Health     Financial Resource Strain: Not on file   Food Insecurity: Not on file   Transportation Needs: Not on file   Physical Activity: Not on file   Stress: Not on file   Social Connections: Not on file   Intimate Partner Violence: Not on file   Housing Stability: Not on file       Allergies:   Allergies   Allergen Reactions    Penicillins Other (See Comments)     Occurred as child, rxn unknown    Seasonal     Sulfa Antibiotics Other (See Comments)     Occurred as child, rxn unknown       Current Medications:  Current Outpatient Medications   Medication Sig Dispense Refill    atorvastatin (LIPITOR) 40 MG tablet take 1 tablet by mouth daily      TRULICITY 1.5 ZT/5.0UW SOPN inject 0.5 milliliters ( 1 AND 1/2 milligrams ) subcutaneously every week      acarbose (PRECOSE) 50 MG tablet take 1 tablet by mouth three times a day with meals      Multiple Vitamins-Minerals (THERAPEUTIC MULTIVITAMIN-MINERALS) tablet Take 1 tablet by mouth daily Last taken 05/03/2020      Ascorbic Acid (VITAMIN C PO) Take 1 tablet by mouth daily Last taken 05/03      acetaminophen (TYLENOL) 500 MG tablet Take 500 mg by mouth every 6 hours as needed for Pain      metOLazone (ZAROXOLYN) 5 MG tablet Take 5 mg by mouth daily      lisinopril (PRINIVIL;ZESTRIL) 40 MG tablet Take 40 mg by mouth daily      glipiZIDE (GLUCOTROL XL) 10 MG extended release tablet Take 10 mg by mouth in the morning and at bedtime      metFORMIN (GLUCOPHAGE) 500 MG tablet Take 500 mg by mouth 2 times daily (with meals)      NIFEdipine (ADALAT CC) 30 MG extended release tablet Take 30 mg by mouth daily Take am day of surgery 05/12/2020      sennosides-docusate sodium (SENOKOT-S) 8.6-50 MG tablet Take 1 tablet by mouth 2 times daily (Patient not taking: Reported on 9/29/2022) 30 tablet 0    oxaprozin (DAYPRO) 600 MG tablet Take 1 tablet by mouth daily for 14 days 14 tablet 0    fexofenadine (ALLEGRA) 60 MG tablet Take 60 mg by mouth daily (Patient not taking: Reported on 9/29/2022)       No current facility-administered medications for this visit. Physical Exam:  /72   Pulse 93   Resp 16   Ht 5' 8\" (1.727 m)   Wt 187 lb (84.8 kg)   BMI 28.43 kg/m²   Wt Readings from Last 3 Encounters:   09/29/22 187 lb (84.8 kg)   05/12/20 199 lb (90.3 kg)   05/07/20 201 lb (91.2 kg)     The patient is awake, alert and in no discomfort or distress. No gross musculoskeletal deformity or lymphadenopathy are present. No significant skin or nail changes are present. Gross examination of head, eyes, nose and throat are negative. Jugular venous pressure is normal and no carotid bruits are present. No thyromegaly is noted.  Normal respiratory effort is noted with no accessory muscle usage present. Lung fields are clear to ascultation. Cardiac examination is notable for an irregular rhythm with no palpable thrill. No gallop rhythm or cardiac murmur are identified. A benign abdominal examination is present with no masses or organomegaly. A normal abdominal aortic pulsation is present. Intact pulses are present throughout all extremities and no peripheral edema is present. No focal neurologic deficits are present. Laboratory Tests:  Lab Results   Component Value Date    CREATININE 1.2 05/13/2020    BUN 21 05/13/2020     05/13/2020    K 4.7 05/13/2020    CL 99 05/13/2020    CO2 27 05/13/2020     No results found for: BNP  Lab Results   Component Value Date/Time    WBC 11.6 05/13/2020 03:50 AM    RBC 3.97 05/13/2020 03:50 AM    HGB 12.0 05/13/2020 03:50 AM    HCT 36.0 05/13/2020 03:50 AM    MCV 90.7 05/13/2020 03:50 AM    MCH 30.2 05/13/2020 03:50 AM    MCHC 33.3 05/13/2020 03:50 AM    RDW 12.7 05/13/2020 03:50 AM     05/13/2020 03:50 AM    MPV 10.7 05/13/2020 03:50 AM     No results for input(s): ALKPHOS, ALT, AST, PROT, BILITOT, BILIDIR, LABALBU in the last 72 hours. No results found for: MG  No results found for: PROTIME, INR  Lab Results   Component Value Date/Time    TSH 1.696 03/28/2011 06:05 PM     No components found for: CHLPL  No results found for: TRIG  No results found for: HDL  No results found for: [address]    Cardiac Tests:  ECG: A resting electrocardiogram demonstrates evidence of atrial fibrillation with a mean ventricular response of approximately 95 bpm with nonspecific ST changes      ASSESSMENT / PLAN: On a clinical basis, the patient presents with recurrent paroxysmal atrial fibrillation presently with acceptable rate control in the absence of therapy suggesting a component of sinus node dysfunction.   I have extensively discussed this with him and have recommended the initiation of medical management and on a temporary basis a rate control and

## 2022-10-27 ENCOUNTER — HOSPITAL ENCOUNTER (OUTPATIENT)
Dept: CARDIOLOGY | Age: 77
Discharge: HOME | End: 2022-10-27
Payer: MEDICARE

## 2022-10-27 DIAGNOSIS — I48.0 PAROXYSMAL ATRIAL FIBRILLATION (HCC): ICD-10-CM

## 2022-10-27 LAB
LV EF: 58 %
LVEF MODALITY: NORMAL

## 2022-10-27 PROCEDURE — 93306 TTE W/DOPPLER COMPLETE: CPT

## 2022-10-31 ENCOUNTER — TELEPHONE (OUTPATIENT)
Dept: CARDIOLOGY CLINIC | Age: 77
End: 2022-10-31

## 2022-10-31 NOTE — TELEPHONE ENCOUNTER
----- Message from Ary Ruggiero MD sent at 10/27/2022  1:45 PM EDT -----  Please notify patient that echocardiogram demonstrates normal heart muscle function in spite of his arrhythmia.   Continue as directed and will reassess status at time of follow-up

## 2022-10-31 NOTE — TELEPHONE ENCOUNTER
----- Message from Vince Lopez MD sent at 10/27/2022  1:45 PM EDT -----  Please notify patient that echocardiogram demonstrates normal heart muscle function in spite of his arrhythmia.   Continue as directed and will reassess status at time of follow-up

## 2022-11-14 ENCOUNTER — OFFICE VISIT (OUTPATIENT)
Dept: CARDIOLOGY CLINIC | Age: 77
End: 2022-11-14
Payer: MEDICARE

## 2022-11-14 ENCOUNTER — TELEPHONE (OUTPATIENT)
Dept: CARDIOLOGY CLINIC | Age: 77
End: 2022-11-14

## 2022-11-14 ENCOUNTER — HOSPITAL ENCOUNTER (OUTPATIENT)
Age: 77
Discharge: HOME | End: 2022-11-14
Payer: MEDICARE

## 2022-11-14 VITALS
DIASTOLIC BLOOD PRESSURE: 70 MMHG | WEIGHT: 188 LBS | HEIGHT: 68 IN | HEART RATE: 85 BPM | RESPIRATION RATE: 16 BRPM | SYSTOLIC BLOOD PRESSURE: 122 MMHG | BODY MASS INDEX: 28.49 KG/M2

## 2022-11-14 DIAGNOSIS — E11.9 TYPE 2 DIABETES MELLITUS WITHOUT COMPLICATION, WITHOUT LONG-TERM CURRENT USE OF INSULIN (HCC): Chronic | ICD-10-CM

## 2022-11-14 DIAGNOSIS — I10 PRIMARY HYPERTENSION: Chronic | ICD-10-CM

## 2022-11-14 DIAGNOSIS — E78.00 PURE HYPERCHOLESTEROLEMIA: Chronic | ICD-10-CM

## 2022-11-14 DIAGNOSIS — I48.0 PAROXYSMAL ATRIAL FIBRILLATION (HCC): Primary | ICD-10-CM

## 2022-11-14 DIAGNOSIS — I48.0 PAROXYSMAL ATRIAL FIBRILLATION (HCC): ICD-10-CM

## 2022-11-14 LAB
ANION GAP SERPL CALCULATED.3IONS-SCNC: 8 MMOL/L (ref 7–16)
BUN BLDV-MCNC: 13 MG/DL (ref 6–23)
CALCIUM SERPL-MCNC: 9.4 MG/DL (ref 8.6–10.2)
CHLORIDE BLD-SCNC: 97 MMOL/L (ref 98–107)
CO2: 31 MMOL/L (ref 22–29)
CREAT SERPL-MCNC: 1.3 MG/DL (ref 0.7–1.2)
GFR SERPL CREATININE-BSD FRML MDRD: 57 ML/MIN/1.73
GLUCOSE BLD-MCNC: 160 MG/DL (ref 74–99)
POTASSIUM SERPL-SCNC: 4.4 MMOL/L (ref 3.5–5)
SODIUM BLD-SCNC: 136 MMOL/L (ref 132–146)

## 2022-11-14 PROCEDURE — 3046F HEMOGLOBIN A1C LEVEL >9.0%: CPT | Performed by: INTERNAL MEDICINE

## 2022-11-14 PROCEDURE — 99214 OFFICE O/P EST MOD 30 MIN: CPT | Performed by: INTERNAL MEDICINE

## 2022-11-14 PROCEDURE — G8484 FLU IMMUNIZE NO ADMIN: HCPCS | Performed by: INTERNAL MEDICINE

## 2022-11-14 PROCEDURE — 1123F ACP DISCUSS/DSCN MKR DOCD: CPT | Performed by: INTERNAL MEDICINE

## 2022-11-14 PROCEDURE — 93000 ELECTROCARDIOGRAM COMPLETE: CPT | Performed by: INTERNAL MEDICINE

## 2022-11-14 PROCEDURE — 3017F COLORECTAL CA SCREEN DOC REV: CPT | Performed by: INTERNAL MEDICINE

## 2022-11-14 PROCEDURE — G8427 DOCREV CUR MEDS BY ELIG CLIN: HCPCS | Performed by: INTERNAL MEDICINE

## 2022-11-14 PROCEDURE — 80048 BASIC METABOLIC PNL TOTAL CA: CPT

## 2022-11-14 PROCEDURE — 1036F TOBACCO NON-USER: CPT | Performed by: INTERNAL MEDICINE

## 2022-11-14 PROCEDURE — 3078F DIAST BP <80 MM HG: CPT | Performed by: INTERNAL MEDICINE

## 2022-11-14 PROCEDURE — G8417 CALC BMI ABV UP PARAM F/U: HCPCS | Performed by: INTERNAL MEDICINE

## 2022-11-14 PROCEDURE — 36415 COLL VENOUS BLD VENIPUNCTURE: CPT

## 2022-11-14 PROCEDURE — 2022F DILAT RTA XM EVC RTNOPTHY: CPT | Performed by: INTERNAL MEDICINE

## 2022-11-14 PROCEDURE — 3074F SYST BP LT 130 MM HG: CPT | Performed by: INTERNAL MEDICINE

## 2022-11-14 RX ORDER — DAPAGLIFLOZIN 5 MG/1
TABLET, FILM COATED ORAL
COMMUNITY
Start: 2022-10-23

## 2022-11-14 RX ORDER — SODIUM CHLORIDE 0.9 % (FLUSH) 0.9 %
5-40 SYRINGE (ML) INJECTION EVERY 12 HOURS SCHEDULED
Status: CANCELLED | OUTPATIENT
Start: 2022-11-14

## 2022-11-14 RX ORDER — SODIUM CHLORIDE 9 MG/ML
INJECTION, SOLUTION INTRAVENOUS PRN
Status: CANCELLED | OUTPATIENT
Start: 2022-11-14

## 2022-11-14 RX ORDER — SODIUM CHLORIDE 0.9 % (FLUSH) 0.9 %
5-40 SYRINGE (ML) INJECTION PRN
Status: CANCELLED | OUTPATIENT
Start: 2022-11-14

## 2022-11-14 NOTE — PROGRESS NOTES
OUTPATIENT CARDIOLOGY FOLLOW-UP    Name: Rossy Vences    Age: 78 y.o. Primary Care Physician: Dori Mazariegos DO    Date of Service: 11/14/2022    Chief Complaint: Paroxysmal atrial fibrillation, hypertension    Interim History: Since his initial evaluation, the patient remains compensated from a cardiovascular standpoint with persistent atrial fibrillation and acceptable rate control in the absence of negative chronotropic therapy suggesting a component of sinus node dysfunction. 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 He has undergone modification of his oral hypoglycemic medical regimen as recommended and has remained compliant with oral anticoagulation with no dose omission and in the absence of symptoms of a focal neurologic origin nor bleeding. He is normotensive at the time of his assessment. Review of Systems: The remainder of a complete multisystem review including consitutional, central nervous, respiratory, circulatory, gastrointestinal, genitourinary, endocrinologic, hematologic, musculoskeletal and psychiatric are negative.     Past Medical History:  Past Medical History:   Diagnosis Date    Arthritis     Bilateral shoulder pain 05/2020    for TJAS Right 05/12/2020 Dr Cinthia Candelario    Diabetes mellitus [facility])     Enlarged prostate     follows with Dr Leland Daniels    History of Holter monitoring 07/29/2022    History of irregular heartbeat     follows with PCP    Hyperlipidemia     Hypertension     Leg wound, right 05/2020    recent history of; see podiatry notes in epic       Past Surgical History:  Past Surgical History:   Procedure Laterality Date    CATARACT REMOVAL WITH IMPLANT Bilateral     ENDOSCOPY, COLON, DIAGNOSTIC      EYE SURGERY      HERNIA REPAIR      SHOULDER SURGERY Right 5/12/2020    RIGHT SHOULDER TOTAL ARTHROPLASTY REVERSE performed by Winford Nageotte, MD at [place] [address]         Family History:  Family History   Problem Relation Age of Onset    No Known Problems Mother     No Known Problems Father        Social History:  Social History     Socioeconomic History    Marital status: Single     Spouse name: Not on file    Number of children: Not on file    Years of education: Not on file    Highest education level: Not on file   Occupational History    Not on file   Tobacco Use    Smoking status: Never    Smokeless tobacco: Never   Vaping Use    Vaping Use: Never used   Substance and Sexual Activity    Alcohol use: Never    Drug use: Never    Sexual activity: Not on file   Other Topics Concern    Not on file   Social History Narrative    Not on file     Social Determinants of Health     Financial Resource Strain: Not on file   Food Insecurity: Not on file   Transportation Needs: Not on file   Physical Activity: Not on file   Stress: Not on file   Social Connections: Not on file   Intimate Partner Violence: Not on file   Housing Stability: Not on file       Allergies:   Allergies   Allergen Reactions    Penicillins Other (See Comments)     Occurred as child, rxn unknown    Seasonal     Sulfa Antibiotics Other (See Comments)     Occurred as child, rxn unknown       Current Medications:  Current Outpatient Medications   Medication Sig Dispense Refill    FARXIGA 5 MG tablet take 1 tablet by mouth once daily      apixaban (ELIQUIS) 5 MG TABS tablet Take 1 tablet by mouth 2 times daily 180 tablet 3    atorvastatin (LIPITOR) 40 MG tablet take 1 tablet by mouth daily      TRULICITY 1.5 RE/2.8JC SOPN inject 0.5 milliliters ( 1 AND 1/2 milligrams ) subcutaneously every week      Multiple Vitamins-Minerals (THERAPEUTIC MULTIVITAMIN-MINERALS) tablet Take 1 tablet by mouth daily Last taken 05/03/2020 Ascorbic Acid (VITAMIN C PO) Take 1 tablet by mouth daily Last taken 05/03      acetaminophen (TYLENOL) 500 MG tablet Take 500 mg by mouth every 6 hours as needed for Pain      metOLazone (ZAROXOLYN) 5 MG tablet Take 5 mg by mouth daily      lisinopril (PRINIVIL;ZESTRIL) 40 MG tablet Take 40 mg by mouth daily      metFORMIN (GLUCOPHAGE) 500 MG tablet Take 500 mg by mouth 2 times daily (with meals)      NIFEdipine (ADALAT CC) 30 MG extended release tablet Take 30 mg by mouth daily Take am day of surgery 05/12/2020      acarbose (PRECOSE) 50 MG tablet take 1 tablet by mouth three times a day with meals (Patient not taking: Reported on 11/14/2022)      glipiZIDE (GLUCOTROL XL) 10 MG extended release tablet Take 10 mg by mouth in the morning and at bedtime (Patient not taking: Reported on 11/14/2022)       No current facility-administered medications for this visit. Physical Exam:  /70   Pulse 85   Resp 16   Ht 5' 8\" (1.727 m)   Wt 188 lb (85.3 kg)   BMI 28.59 kg/m²   Wt Readings from Last 3 Encounters:   11/14/22 188 lb (85.3 kg)   09/29/22 187 lb (84.8 kg)   05/12/20 199 lb (90.3 kg)     The patient is awake, alert and in no discomfort or distress. No gross musculoskeletal deformity is present. No significant skin or nail changes are present. Gross examination of head, eyes, nose and throat are negative. Jugular venous pressure is normal and no carotid bruits are present. Normal respiratory effort is noted with no accessory muscle usage present. Lung fields are clear to ascultation. Cardiac examination is notable for a regular rate and rhythm with no palpable thrill. No gallop rhythm or cardiac murmur are identified. A benign abdominal examination is present with no masses or organomegaly. Intact pulses are present throughout all extremities and no peripheral edema is present. No focal neurologic deficits are present.     Laboratory Tests:  Lab Results   Component Value Date    CREATININE 1.2 05/13/2020    BUN 21 05/13/2020     05/13/2020    K 4.7 05/13/2020    CL 99 05/13/2020    CO2 27 05/13/2020     No results found for: BNP  Lab Results   Component Value Date/Time    WBC 11.6 05/13/2020 03:50 AM    RBC 3.97 05/13/2020 03:50 AM    HGB 12.0 05/13/2020 03:50 AM    HCT 36.0 05/13/2020 03:50 AM    MCV 90.7 05/13/2020 03:50 AM    MCH 30.2 05/13/2020 03:50 AM    MCHC 33.3 05/13/2020 03:50 AM    RDW 12.7 05/13/2020 03:50 AM     05/13/2020 03:50 AM    MPV 10.7 05/13/2020 03:50 AM     No results for input(s): ALKPHOS, ALT, AST, PROT, BILITOT, BILIDIR, LABALBU in the last 72 hours. No results found for: MG  No results found for: PROTIME, INR  Lab Results   Component Value Date/Time    TSH 1.696 03/28/2011 06:05 PM     No components found for: CHLPL  No results found for: TRIG  No results found for: HDL  No results found for: [address]    Cardiac Tests:  ECG: A resting electrocardiogram demonstrates evidence of atrial fibrillation with a mean ventricular response of approximately 85 bpm with nonspecific ST changes  Last Echocardiogram: An echocardiogram of October, 2022 demonstrated evidence of a normal-sized left ventricular chamber with mild concentric left ventricular hypertrophy and normal left ventricular systolic function with mild left atrial enlargement and a 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   I have discussed cardioversion in detail including that of proposed benefits, risks and alternatives and he is agreeable in proceeding in the near future. Prior to the procedure, I have obtained repeat laboratory assessment of serum chemistries to evaluate renal function and electrolytes. On long-term basis, ongoing aggressive risk factor modification of blood pressure, diabetes and serum lipids will remain essential to reducing risk of future atherosclerotic development. I presently plan his clinical reevaluation in 1 month and would happily reevaluate him in the interim should additional cardiovascular difficulties or concerns arise. The patient's current medication list, allergies, problem list and results of all previously ordered testing were reviewed at today's visit. Follow-up office visit in 1 month      Note: This report was completed using computerized voice recognition software. Every effort has been made to ensure accuracy, however; inadvertent computerized transcription errors may be present. Trang José.  Jesus Alves, [address] [place] [address] Cardiology    An electronic copy of this follow-up progress note was forwarded to Dr. Maryam Whitman

## 2022-11-14 NOTE — TELEPHONE ENCOUNTER
Patient scheduled for a cardioversion for 11/15/2022 arrival time was at 10:30 for 12:30 appointment.

## 2022-11-15 ENCOUNTER — HOSPITAL ENCOUNTER (OUTPATIENT)
Dept: CARDIAC CATH/INVASIVE PROCEDURES | Age: 77
Discharge: HOME | End: 2022-11-15
Payer: MEDICARE

## 2022-11-15 PROCEDURE — 93005 ELECTROCARDIOGRAM TRACING: CPT | Performed by: INTERNAL MEDICINE

## 2022-11-15 RX ORDER — SODIUM CHLORIDE 0.9 % (FLUSH) 0.9 %
5-40 SYRINGE (ML) INJECTION PRN
Status: DISCONTINUED | OUTPATIENT
Start: 2022-11-15 | End: 2022-11-16 | Stop reason: HOSPADM

## 2022-11-15 RX ORDER — SODIUM CHLORIDE 9 MG/ML
INJECTION, SOLUTION INTRAVENOUS PRN
Status: DISCONTINUED | OUTPATIENT
Start: 2022-11-15 | End: 2022-11-16 | Stop reason: HOSPADM

## 2022-11-15 RX ORDER — SODIUM CHLORIDE 0.9 % (FLUSH) 0.9 %
5-40 SYRINGE (ML) INJECTION EVERY 12 HOURS SCHEDULED
Status: DISCONTINUED | OUTPATIENT
Start: 2022-11-15 | End: 2022-11-16 | Stop reason: HOSPADM

## 2022-11-15 NOTE — PROCEDURES
Patient presented for elective DC cardioversion and was found to be in sinus rhythm. Procedure thus cancelled. Patient instructed to continue same medications and follow up with Dr. Gilberto Alaniz.     Sima Ramirez MD

## 2022-11-16 LAB
EKG ATRIAL RATE: 72 BPM
EKG P AXIS: 4 DEGREES
EKG P-R INTERVAL: 202 MS
EKG Q-T INTERVAL: 394 MS
EKG QRS DURATION: 98 MS
EKG QTC CALCULATION (BAZETT): 431 MS
EKG R AXIS: 0 DEGREES
EKG T AXIS: 27 DEGREES
EKG VENTRICULAR RATE: 72 BPM

## 2022-12-26 ENCOUNTER — APPOINTMENT (OUTPATIENT)
Dept: ULTRASOUND IMAGING | Age: 77
End: 2022-12-26
Payer: MEDICARE

## 2022-12-26 ENCOUNTER — APPOINTMENT (OUTPATIENT)
Dept: GENERAL RADIOLOGY | Age: 77
End: 2022-12-26
Payer: MEDICARE

## 2022-12-26 ENCOUNTER — HOSPITAL ENCOUNTER (EMERGENCY)
Age: 77
Discharge: HOME | End: 2022-12-26
Payer: MEDICARE

## 2022-12-26 VITALS
SYSTOLIC BLOOD PRESSURE: 178 MMHG | TEMPERATURE: 98.3 F | RESPIRATION RATE: 17 BRPM | HEART RATE: 94 BPM | DIASTOLIC BLOOD PRESSURE: 84 MMHG | OXYGEN SATURATION: 100 %

## 2022-12-26 DIAGNOSIS — S80.01XA HEMATOMA OF RIGHT KNEE REGION: ICD-10-CM

## 2022-12-26 DIAGNOSIS — M25.561 ACUTE PAIN OF RIGHT KNEE: Primary | ICD-10-CM

## 2022-12-26 DIAGNOSIS — S80.01XA CONTUSION OF RIGHT KNEE, INITIAL ENCOUNTER: ICD-10-CM

## 2022-12-26 LAB
ALBUMIN SERPL-MCNC: 4.3 G/DL (ref 3.5–5.2)
ALP BLD-CCNC: 107 U/L (ref 40–129)
ALT SERPL-CCNC: 11 U/L (ref 0–40)
ANION GAP SERPL CALCULATED.3IONS-SCNC: 15 MMOL/L (ref 7–16)
AST SERPL-CCNC: 18 U/L (ref 0–39)
BASOPHILS ABSOLUTE: 0.04 E9/L (ref 0–0.2)
BASOPHILS RELATIVE PERCENT: 0.5 % (ref 0–2)
BILIRUB SERPL-MCNC: 0.8 MG/DL (ref 0–1.2)
BUN BLDV-MCNC: 20 MG/DL (ref 6–23)
CALCIUM SERPL-MCNC: 10.1 MG/DL (ref 8.6–10.2)
CHLORIDE BLD-SCNC: 93 MMOL/L (ref 98–107)
CO2: 27 MMOL/L (ref 22–29)
CREAT SERPL-MCNC: 1.5 MG/DL (ref 0.7–1.2)
EOSINOPHILS ABSOLUTE: 0.07 E9/L (ref 0.05–0.5)
EOSINOPHILS RELATIVE PERCENT: 0.8 % (ref 0–6)
GFR SERPL CREATININE-BSD FRML MDRD: 48 ML/MIN/1.73
GLUCOSE BLD-MCNC: 122 MG/DL (ref 74–99)
HCT VFR BLD CALC: 27.8 % (ref 37–54)
HEMOGLOBIN: 9.5 G/DL (ref 12.5–16.5)
IMMATURE GRANULOCYTES #: 0.04 E9/L
IMMATURE GRANULOCYTES %: 0.5 % (ref 0–5)
LACTIC ACID: 1.9 MMOL/L (ref 0.5–2.2)
LYMPHOCYTES ABSOLUTE: 1.17 E9/L (ref 1.5–4)
LYMPHOCYTES RELATIVE PERCENT: 13.8 % (ref 20–42)
MCH RBC QN AUTO: 30.6 PG (ref 26–35)
MCHC RBC AUTO-ENTMCNC: 34.2 % (ref 32–34.5)
MCV RBC AUTO: 89.7 FL (ref 80–99.9)
MONOCYTES ABSOLUTE: 0.61 E9/L (ref 0.1–0.95)
MONOCYTES RELATIVE PERCENT: 7.2 % (ref 2–12)
NEUTROPHILS ABSOLUTE: 6.56 E9/L (ref 1.8–7.3)
NEUTROPHILS RELATIVE PERCENT: 77.2 % (ref 43–80)
PDW BLD-RTO: 13.5 FL (ref 11.5–15)
PLATELET # BLD: 357 E9/L (ref 130–450)
PMV BLD AUTO: 9.6 FL (ref 7–12)
POTASSIUM REFLEX MAGNESIUM: 4.4 MMOL/L (ref 3.5–5)
RBC # BLD: 3.1 E12/L (ref 3.8–5.8)
SEDIMENTATION RATE, ERYTHROCYTE: 50 MM/HR (ref 0–15)
SODIUM BLD-SCNC: 135 MMOL/L (ref 132–146)
TOTAL PROTEIN: 7 G/DL (ref 6.4–8.3)
WBC # BLD: 8.5 E9/L (ref 4.5–11.5)

## 2022-12-26 PROCEDURE — 99284 EMERGENCY DEPT VISIT MOD MDM: CPT

## 2022-12-26 PROCEDURE — 93971 EXTREMITY STUDY: CPT

## 2022-12-26 PROCEDURE — 73590 X-RAY EXAM OF LOWER LEG: CPT

## 2022-12-26 PROCEDURE — 80053 COMPREHEN METABOLIC PANEL: CPT

## 2022-12-26 PROCEDURE — 83605 ASSAY OF LACTIC ACID: CPT

## 2022-12-26 PROCEDURE — 85651 RBC SED RATE NONAUTOMATED: CPT

## 2022-12-26 PROCEDURE — 73552 X-RAY EXAM OF FEMUR 2/>: CPT

## 2022-12-26 PROCEDURE — 85025 COMPLETE CBC W/AUTO DIFF WBC: CPT

## 2022-12-26 PROCEDURE — 73560 X-RAY EXAM OF KNEE 1 OR 2: CPT

## 2022-12-26 RX ORDER — HYDROCODONE BITARTRATE AND ACETAMINOPHEN 5; 325 MG/1; MG/1
1 TABLET ORAL EVERY 6 HOURS PRN
Qty: 12 TABLET | Refills: 0 | Status: SHIPPED | OUTPATIENT
Start: 2022-12-26 | End: 2022-12-29

## 2022-12-26 NOTE — ED PROVIDER NOTES
[address] [facility]                                                                                                                                    Department of Emergency Medicine   ED  Provider Note  Admit Date/RoomTime: 12/26/2022  2:47 PM  ED Room: 25/25        HPI:  12/26/22,   Time: 6:28 PM ROSALES Ortiz is a 78 y.o. male presenting to the ED for right knee and leg pain, beginning 1 week ago. The complaint has been persistent, moderate in severity, and worsened by walking, bending. Patient states that he dropped some steel on his right knee last week. He reports that it hit the medial surface of the knee. Since then he has had worsening pain, swelling and bruising to the site. The patient is on Eliquis. He states that he is able to put weight on the extremity but it is quite difficult to walk and bend the knee. The patient has no other complaints. ROS:     Constitutional: Negative for fever and chills  HENT: Negative for ear pain, sore throat and sinus pressure  Eyes: Negative for pain, discharge and redness  Respiratory:  Negative for shortness of breath, cough and wheezing  Cardiovascular: Negative for CP, edema or palpitations  Gastrointestinal: Negative for nausea, vomiting, diarrhea and abdominal distention  Genitourinary: Negative for dysuria and frequency  Musculoskeletal:  See HPI  Skin: Negative for rash and wound  Neurological: Negative for weakness and headaches  Hematological: Negative for adenopathy    All other systems reviewed and are negative      -------------------------------- PAST HISTORY ----------------------------------  Past Medical History:  has a past medical history of Arthritis, Bilateral shoulder pain, Diabetes mellitus ([facility] [address] Enlarged prostate, History of Holter monitoring, History of irregular heartbeat, Hyperlipidemia, Hypertension, and Leg wound, right.     Past Surgical History:  has a past surgical history that includes Endoscopy, colon, diagnostic; eye surgery; hernia repair; Tonsillectomy; Cataract removal with implant (Bilateral); and shoulder surgery (Right, 5/12/2020). Social History:  reports that he has never smoked. He has never used smokeless tobacco. He reports that he does not drink alcohol and does not use drugs. Family History: family history includes No Known Problems in his father and mother. The patients home medications have been reviewed.     Allergies: Penicillins, Seasonal, and Sulfa antibiotics    --------------------------------- RESULTS ------------------------------------------  All laboratory and radiology results have been personally reviewed by myself   LABS:  Results for orders placed or performed during the hospital encounter of 12/26/22   CBC with Auto Differential   Result Value Ref Range    WBC 8.5 4.5 - 11.5 E9/L    RBC 3.10 (L) 3.80 - 5.80 E12/L    Hemoglobin 9.5 (L) 12.5 - 16.5 g/dL    Hematocrit 27.8 (L) 37.0 - 54.0 %    MCV 89.7 80.0 - 99.9 fL    MCH 30.6 26.0 - 35.0 pg    MCHC 34.2 32.0 - 34.5 %    RDW 13.5 11.5 - 15.0 fL    Platelets 918 748 - 756 E9/L    MPV 9.6 7.0 - 12.0 fL    Neutrophils % 77.2 43.0 - 80.0 %    Immature Granulocytes % 0.5 0.0 - 5.0 %    Lymphocytes % 13.8 (L) 20.0 - 42.0 %    Monocytes % 7.2 2.0 - 12.0 %    Eosinophils % 0.8 0.0 - 6.0 %    Basophils % 0.5 0.0 - 2.0 %    Neutrophils Absolute 6.56 1.80 - 7.30 E9/L    Immature Granulocytes # 0.04 E9/L    Lymphocytes Absolute 1.17 (L) 1.50 - 4.00 E9/L    Monocytes Absolute 0.61 0.10 - 0.95 E9/L    Eosinophils Absolute 0.07 0.05 - 0.50 E9/L    Basophils Absolute 0.04 0.00 - 0.20 E9/L   Comprehensive Metabolic Panel w/ Reflex to MG   Result Value Ref Range    Sodium 135 132 - 146 mmol/L    Potassium reflex Magnesium 4.4 3.5 - 5.0 mmol/L    Chloride 93 (L) 98 - 107 mmol/L    CO2 27 22 - 29 mmol/L    Anion Gap 15 7 - 16 mmol/L    Glucose 122 (H) 74 - 99 mg/dL    BUN 20 6 - 23 mg/dL    Creatinine 1.5 (H) 0.7 - 1.2 mg/dL    Est, Glom Filt Rate 48 >=60 mL/min/1.73    Calcium 10.1 8.6 - 10.2 mg/dL    Total Protein 7.0 6.4 - 8.3 g/dL    Albumin 4.3 3.5 - 5.2 g/dL    Total Bilirubin 0.8 0.0 - 1.2 mg/dL    Alkaline Phosphatase 107 40 - 129 U/L    ALT 11 0 - 40 U/L    AST 18 0 - 39 U/L   Lactic Acid   Result Value Ref Range    Lactic Acid 1.9 0.5 - 2.2 mmol/L   Sedimentation Rate   Result Value Ref Range    Sed Rate 50 (H) 0 - 15 mm/Hr       RADIOLOGY:  Interpreted by Radiologist.  US DUP LOWER EXTREMITY RIGHT MARILIN   Final Result   No evidence of DVT in the right lower extremity. XR FEMUR RIGHT (MIN 2 VIEWS)   Final Result   No acute osseous abnormality. XR TIBIA FIBULA RIGHT (2 VIEWS)   Final Result   No acute osseous abnormality. XR KNEE RIGHT (1-2 VIEWS)   Final Result   No acute bony abnormalities. Mild joint effusion. Chondrocalcinosis.             ----------------- NURSING NOTES AND VITALS REVIEWED ---------------   The nursing notes within the ED encounter and vital signs as below have been reviewed. BP (!) 178/84   Pulse 94   Temp 98.3 °F (36.8 °C)   Resp 17   SpO2 100%   Oxygen Saturation Interpretation: Normal      --------------------------------PHYSICAL EXAM------------------------------------      Constitutional/General: Alert and oriented x3, well appearing, non toxic in NAD  Head: NC/AT  Eyes: PERRL, EOMI  Mouth: Oropharynx clear, handling secretions, no trismus  Neck: Supple, full ROM, no meningeal signs  Pulmonary: Lungs clear to auscultation bilaterally, no wheezes, rales, or rhonchi. Not in respiratory distress  Cardiovascular:  Regular rate and rhythm, no murmurs, gallops, or rubs. 2+ distal pulses  Abdomen: Soft, + BS. No distension. Nontender. No palpable rigidity, rebound or guarding  Extremities: Moves all extremities x 4. See pictures. Marked swelling, bruising and discoloration to right knee and lower leg. Significant point tenderness as  well over right medial knee joint. Antalgic gait noted. Decreased ROM right knee due to pain and edema. Warm and well perfused  Skin:  See HPI  Neurologic: GCS 15,  Intact. No focal deficits  Psych: Normal Affect                ------------------------ ED COURSE/MEDICAL DECISION MAKING----------------------  Medications - No data to display      Medical Decision Making:    X-rays of right knee and leg negative for acute bony injury or fracture. US demonstrated no DVT. There is large hematoma right medial knee joint. He remains on the Eliquis. The patient was advised that this blood and fluid is all good to take weeks to months even to resolve. He has been seen by the orthopedist at [address] [place] [address] in the past and should make an appointment for follow-up. There is no evidence of compartment syndrome. Repeat CBC is advised at some point. We did talk about this. I Ronda Jj give him something for pain for home. Counseling: The emergency provider has spoken with the patient and discussed todays results, in addition to providing specific details for the plan of care and counseling regarding the diagnosis and prognosis. Questions are answered at this time and they are agreeable with the plan.      ------------------------ IMPRESSION AND DISPOSITION -------------------------------    IMPRESSION  1. Acute pain of right knee    2. Contusion of right knee, initial encounter    3.  Hematoma of right knee region        DISPOSITION  Disposition: Discharge to home  Patient condition is stable                   Stas Samuels PA-C  12/26/22 5528

## 2022-12-26 NOTE — ED NOTES
Department of Emergency Medicine  FIRST PROVIDER TRIAGE NOTE             Independent MLP           12/26/22  2:15 PM EST    Date of Encounter: 12/26/22   MRN: [id number]      HPI: Raghav Medina is a 78 y.o. male who presents to the ED for Knee Pain (R knee pain dropped steel on it last week, leg is now swollen & bruised )   Patient dropped 100 pound steel piece of metal on his leg 1 week ago having increasing pain swelling ecchymosis. Denies any fever or chills. ROS: Negative for cp or sob. PE: Gen Appearance/Constitutional: alert  CV: regular rate  Pulm: CTA bilat     Initial Plan of Care: All treatment areas with department are currently occupied. Plan to order/Initiate the following while awaiting opening in ED: labs and imaging studies.   Initiate Treatment-Testing, Proceed toTreatment Area When Bed Available for ED Attending/MLP to Continue Care    Electronically signed by Harris Severe, PA   DD: 12/26/22      Harris Severe, PA  12/26/22 5320

## 2023-01-05 ENCOUNTER — APPOINTMENT (OUTPATIENT)
Dept: CT IMAGING | Age: 78
DRG: 638 | End: 2023-01-05
Payer: MEDICARE

## 2023-01-05 ENCOUNTER — HOSPITAL ENCOUNTER (INPATIENT)
Age: 78
LOS: 6 days | Discharge: NURSING, REHAB OR LONG-TERM CARE | DRG: 638 | End: 2023-01-11
Attending: EMERGENCY MEDICINE | Admitting: INTERNAL MEDICINE
Payer: MEDICARE
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
## 2023-01-05 DIAGNOSIS — E13.10 DIABETIC KETOACIDOSIS WITHOUT COMA ASSOCIATED WITH OTHER SPECIFIED DIABETES MELLITUS (HCC): Primary | ICD-10-CM

## 2023-01-05 DIAGNOSIS — E11.42 TYPE 2 DIABETES MELLITUS WITH DIABETIC POLYNEUROPATHY, WITHOUT LONG-TERM CURRENT USE OF INSULIN (HCC): Chronic | ICD-10-CM

## 2023-01-05 DIAGNOSIS — K92.2 GASTROINTESTINAL HEMORRHAGE, UNSPECIFIED GASTROINTESTINAL HEMORRHAGE TYPE: ICD-10-CM

## 2023-01-05 DIAGNOSIS — D64.9 ANEMIA, UNSPECIFIED TYPE: ICD-10-CM

## 2023-01-05 DIAGNOSIS — E87.20 LACTIC ACIDOSIS: ICD-10-CM

## 2023-01-05 DIAGNOSIS — N17.9 ACUTE KIDNEY INJURY (HCC): ICD-10-CM

## 2023-01-05 DIAGNOSIS — E87.5 HYPERKALEMIA: ICD-10-CM

## 2023-01-05 PROBLEM — E10.10 DKA, TYPE 1, NOT AT GOAL (HCC): Status: ACTIVE | Noted: 2023-01-05

## 2023-01-05 LAB
ABO/RH: NORMAL
ALBUMIN SERPL-MCNC: 2.8 G/DL (ref 3.5–5.2)
ALP BLD-CCNC: 53 U/L (ref 40–129)
ALT SERPL-CCNC: 7 U/L (ref 0–40)
ANION GAP SERPL CALCULATED.3IONS-SCNC: 25 MMOL/L (ref 7–16)
ANION GAP SERPL CALCULATED.3IONS-SCNC: 28 MMOL/L (ref 7–16)
ANION GAP SERPL CALCULATED.3IONS-SCNC: 36 MMOL/L (ref 7–16)
ANTIBODY SCREEN: NORMAL
APTT: 27.5 SEC (ref 24.5–35.1)
AST SERPL-CCNC: 10 U/L (ref 0–39)
BASOPHILS ABSOLUTE: 0.02 E9/L (ref 0–0.2)
BASOPHILS RELATIVE PERCENT: 0.1 % (ref 0–2)
BETA-HYDROXYBUTYRATE: 0.99 MMOL/L (ref 0.02–0.27)
BILIRUB SERPL-MCNC: 0.2 MG/DL (ref 0–1.2)
BILIRUBIN URINE: NEGATIVE
BLOOD, URINE: NEGATIVE
BUN BLDV-MCNC: 135 MG/DL (ref 6–23)
BUN BLDV-MCNC: 139 MG/DL (ref 6–23)
BUN BLDV-MCNC: 141 MG/DL (ref 6–23)
CALCIUM SERPL-MCNC: 7.8 MG/DL (ref 8.6–10.2)
CALCIUM SERPL-MCNC: 7.9 MG/DL (ref 8.6–10.2)
CALCIUM SERPL-MCNC: 8.9 MG/DL (ref 8.6–10.2)
CHLORIDE BLD-SCNC: 81 MMOL/L (ref 98–107)
CHLORIDE BLD-SCNC: 91 MMOL/L (ref 98–107)
CHLORIDE BLD-SCNC: 92 MMOL/L (ref 98–107)
CLARITY: CLEAR
CO2: 10 MMOL/L (ref 22–29)
CO2: 11 MMOL/L (ref 22–29)
CO2: 9 MMOL/L (ref 22–29)
COLOR: ABNORMAL
CREAT SERPL-MCNC: 1.8 MG/DL (ref 0.7–1.2)
CREAT SERPL-MCNC: 1.9 MG/DL (ref 0.7–1.2)
CREAT SERPL-MCNC: 2.1 MG/DL (ref 0.7–1.2)
EOSINOPHILS ABSOLUTE: 0 E9/L (ref 0.05–0.5)
EOSINOPHILS RELATIVE PERCENT: 0 % (ref 0–6)
GFR SERPL CREATININE-BSD FRML MDRD: 32 ML/MIN/1.73
GFR SERPL CREATININE-BSD FRML MDRD: 36 ML/MIN/1.73
GFR SERPL CREATININE-BSD FRML MDRD: 39 ML/MIN/1.73
GLUCOSE BLD-MCNC: 498 MG/DL (ref 74–99)
GLUCOSE BLD-MCNC: 556 MG/DL (ref 74–99)
GLUCOSE BLD-MCNC: 662 MG/DL (ref 74–99)
GLUCOSE URINE: >=1000 MG/DL
HCT VFR BLD CALC: 19.1 % (ref 37–54)
HEMOGLOBIN: 5.7 G/DL (ref 12.5–16.5)
IMMATURE GRANULOCYTES #: 0.45 E9/L
IMMATURE GRANULOCYTES %: 2.5 % (ref 0–5)
INFLUENZA A BY PCR: NOT DETECTED
INFLUENZA B BY PCR: NOT DETECTED
INR BLD: 2.1
KETONES, URINE: NEGATIVE MG/DL
LACTIC ACID: 17.2 MMOL/L (ref 0.5–2.2)
LEUKOCYTE ESTERASE, URINE: NEGATIVE
LIPASE: 99 U/L (ref 13–60)
LYMPHOCYTES ABSOLUTE: 1.48 E9/L (ref 1.5–4)
LYMPHOCYTES RELATIVE PERCENT: 8.2 % (ref 20–42)
MAGNESIUM: 1.4 MG/DL (ref 1.6–2.6)
MAGNESIUM: 1.6 MG/DL (ref 1.6–2.6)
MCH RBC QN AUTO: 29.1 PG (ref 26–35)
MCHC RBC AUTO-ENTMCNC: 29.8 % (ref 32–34.5)
MCV RBC AUTO: 97.4 FL (ref 80–99.9)
METER GLUCOSE: >500 MG/DL (ref 74–99)
MONOCYTES ABSOLUTE: 0.79 E9/L (ref 0.1–0.95)
MONOCYTES RELATIVE PERCENT: 4.4 % (ref 2–12)
NEUTROPHILS ABSOLUTE: 15.37 E9/L (ref 1.8–7.3)
NEUTROPHILS RELATIVE PERCENT: 84.8 % (ref 43–80)
NITRITE, URINE: NEGATIVE
PDW BLD-RTO: 14.4 FL (ref 11.5–15)
PH UA: 5 (ref 5–9)
PH VENOUS: 7.15 (ref 7.35–7.45)
PHOSPHORUS: 2.9 MG/DL (ref 2.5–4.5)
PLATELET # BLD: 527 E9/L (ref 130–450)
PMV BLD AUTO: 10.9 FL (ref 7–12)
POTASSIUM SERPL-SCNC: 3.9 MMOL/L (ref 3.5–5)
POTASSIUM SERPL-SCNC: 4.2 MMOL/L (ref 3.5–5)
POTASSIUM SERPL-SCNC: 6.9 MMOL/L (ref 3.5–5)
PROTEIN UA: NEGATIVE MG/DL
PROTHROMBIN TIME: 23.7 SEC (ref 9.3–12.4)
RBC # BLD: 1.96 E12/L (ref 3.8–5.8)
SARS-COV-2, NAAT: NOT DETECTED
SODIUM BLD-SCNC: 126 MMOL/L (ref 132–146)
SODIUM BLD-SCNC: 126 MMOL/L (ref 132–146)
SODIUM BLD-SCNC: 131 MMOL/L (ref 132–146)
SPECIFIC GRAVITY UA: 1.01 (ref 1–1.03)
TOTAL CK: 81 U/L (ref 20–200)
TOTAL PROTEIN: 4.2 G/DL (ref 6.4–8.3)
TROPONIN, HIGH SENSITIVITY: 40 NG/L (ref 0–11)
TROPONIN, HIGH SENSITIVITY: 52 NG/L (ref 0–11)
UROBILINOGEN, URINE: 0.2 E.U./DL
WBC # BLD: 18.1 E9/L (ref 4.5–11.5)

## 2023-01-05 PROCEDURE — 84100 ASSAY OF PHOSPHORUS: CPT

## 2023-01-05 PROCEDURE — P9016 RBC LEUKOCYTES REDUCED: HCPCS

## 2023-01-05 PROCEDURE — 86901 BLOOD TYPING SEROLOGIC RH(D): CPT

## 2023-01-05 PROCEDURE — 87040 BLOOD CULTURE FOR BACTERIA: CPT

## 2023-01-05 PROCEDURE — 83550 IRON BINDING TEST: CPT

## 2023-01-05 PROCEDURE — 2580000003 HC RX 258

## 2023-01-05 PROCEDURE — 83690 ASSAY OF LIPASE: CPT

## 2023-01-05 PROCEDURE — 83605 ASSAY OF LACTIC ACID: CPT

## 2023-01-05 PROCEDURE — 81003 URINALYSIS AUTO W/O SCOPE: CPT

## 2023-01-05 PROCEDURE — 80053 COMPREHEN METABOLIC PANEL: CPT

## 2023-01-05 PROCEDURE — 71275 CT ANGIOGRAPHY CHEST: CPT

## 2023-01-05 PROCEDURE — 96361 HYDRATE IV INFUSION ADD-ON: CPT

## 2023-01-05 PROCEDURE — 86900 BLOOD TYPING SEROLOGIC ABO: CPT

## 2023-01-05 PROCEDURE — 6370000000 HC RX 637 (ALT 250 FOR IP): Performed by: EMERGENCY MEDICINE

## 2023-01-05 PROCEDURE — 83735 ASSAY OF MAGNESIUM: CPT

## 2023-01-05 PROCEDURE — 70450 CT HEAD/BRAIN W/O DYE: CPT

## 2023-01-05 PROCEDURE — 82800 BLOOD PH: CPT

## 2023-01-05 PROCEDURE — 36415 COLL VENOUS BLD VENIPUNCTURE: CPT

## 2023-01-05 PROCEDURE — P9059 PLASMA, FRZ BETWEEN 8-24HOUR: HCPCS

## 2023-01-05 PROCEDURE — 85610 PROTHROMBIN TIME: CPT

## 2023-01-05 PROCEDURE — 82607 VITAMIN B-12: CPT

## 2023-01-05 PROCEDURE — 86923 COMPATIBILITY TEST ELECTRIC: CPT

## 2023-01-05 PROCEDURE — 74177 CT ABD & PELVIS W/CONTRAST: CPT

## 2023-01-05 PROCEDURE — 02HV33Z INSERTION OF INFUSION DEVICE INTO SUPERIOR VENA CAVA, PERCUTANEOUS APPROACH: ICD-10-PCS | Performed by: EMERGENCY MEDICINE

## 2023-01-05 PROCEDURE — 6370000000 HC RX 637 (ALT 250 FOR IP)

## 2023-01-05 PROCEDURE — 6360000002 HC RX W HCPCS

## 2023-01-05 PROCEDURE — 85730 THROMBOPLASTIN TIME PARTIAL: CPT

## 2023-01-05 PROCEDURE — 87635 SARS-COV-2 COVID-19 AMP PRB: CPT

## 2023-01-05 PROCEDURE — 2000000000 HC ICU R&B

## 2023-01-05 PROCEDURE — 96365 THER/PROPH/DIAG IV INF INIT: CPT

## 2023-01-05 PROCEDURE — 83540 ASSAY OF IRON: CPT

## 2023-01-05 PROCEDURE — 99285 EMERGENCY DEPT VISIT HI MDM: CPT

## 2023-01-05 PROCEDURE — 87502 INFLUENZA DNA AMP PROBE: CPT

## 2023-01-05 PROCEDURE — 36592 COLLECT BLOOD FROM PICC: CPT

## 2023-01-05 PROCEDURE — 82550 ASSAY OF CK (CPK): CPT

## 2023-01-05 PROCEDURE — 96375 TX/PRO/DX INJ NEW DRUG ADDON: CPT

## 2023-01-05 PROCEDURE — 36430 TRANSFUSION BLD/BLD COMPNT: CPT

## 2023-01-05 PROCEDURE — 86850 RBC ANTIBODY SCREEN: CPT

## 2023-01-05 PROCEDURE — 72125 CT NECK SPINE W/O DYE: CPT

## 2023-01-05 PROCEDURE — 2580000003 HC RX 258: Performed by: EMERGENCY MEDICINE

## 2023-01-05 PROCEDURE — 82962 GLUCOSE BLOOD TEST: CPT

## 2023-01-05 PROCEDURE — 85025 COMPLETE CBC W/AUTO DIFF WBC: CPT

## 2023-01-05 PROCEDURE — 6360000002 HC RX W HCPCS: Performed by: EMERGENCY MEDICINE

## 2023-01-05 PROCEDURE — 93005 ELECTROCARDIOGRAM TRACING: CPT

## 2023-01-05 PROCEDURE — 82746 ASSAY OF FOLIC ACID SERUM: CPT

## 2023-01-05 PROCEDURE — 6360000004 HC RX CONTRAST MEDICATION: Performed by: RADIOLOGY

## 2023-01-05 PROCEDURE — 84484 ASSAY OF TROPONIN QUANT: CPT

## 2023-01-05 PROCEDURE — 82010 KETONE BODYS QUAN: CPT

## 2023-01-05 PROCEDURE — 80048 BASIC METABOLIC PNL TOTAL CA: CPT

## 2023-01-05 RX ORDER — 0.9 % SODIUM CHLORIDE 0.9 %
1000 INTRAVENOUS SOLUTION INTRAVENOUS ONCE
Status: COMPLETED | OUTPATIENT
Start: 2023-01-05 | End: 2023-01-05

## 2023-01-05 RX ORDER — DEXTROSE AND SODIUM CHLORIDE 5; .45 G/100ML; G/100ML
INJECTION, SOLUTION INTRAVENOUS CONTINUOUS PRN
Status: DISCONTINUED | OUTPATIENT
Start: 2023-01-05 | End: 2023-01-11 | Stop reason: HOSPADM

## 2023-01-05 RX ORDER — POTASSIUM CHLORIDE 7.45 MG/ML
10 INJECTION INTRAVENOUS ONCE
Status: COMPLETED | OUTPATIENT
Start: 2023-01-05 | End: 2023-01-05

## 2023-01-05 RX ORDER — POTASSIUM CHLORIDE 7.45 MG/ML
10 INJECTION INTRAVENOUS PRN
Status: DISCONTINUED | OUTPATIENT
Start: 2023-01-05 | End: 2023-01-08

## 2023-01-05 RX ORDER — SODIUM CHLORIDE 9 MG/ML
INJECTION, SOLUTION INTRAVENOUS CONTINUOUS
Status: DISCONTINUED | OUTPATIENT
Start: 2023-01-05 | End: 2023-01-07

## 2023-01-05 RX ORDER — POTASSIUM CHLORIDE 20 MEQ/1
40 TABLET, EXTENDED RELEASE ORAL ONCE
Status: COMPLETED | OUTPATIENT
Start: 2023-01-05 | End: 2023-01-05

## 2023-01-05 RX ORDER — MAGNESIUM SULFATE 1 G/100ML
1000 INJECTION INTRAVENOUS PRN
Status: DISCONTINUED | OUTPATIENT
Start: 2023-01-05 | End: 2023-01-08

## 2023-01-05 RX ORDER — SODIUM CHLORIDE 9 MG/ML
INJECTION, SOLUTION INTRAVENOUS PRN
Status: DISCONTINUED | OUTPATIENT
Start: 2023-01-05 | End: 2023-01-07

## 2023-01-05 RX ADMIN — POTASSIUM CHLORIDE 10 MEQ: 7.46 INJECTION, SOLUTION INTRAVENOUS at 22:06

## 2023-01-05 RX ADMIN — SODIUM CHLORIDE: 9 INJECTION, SOLUTION INTRAVENOUS at 20:03

## 2023-01-05 RX ADMIN — SODIUM CHLORIDE 1000 ML: 9 INJECTION, SOLUTION INTRAVENOUS at 17:23

## 2023-01-05 RX ADMIN — SODIUM CHLORIDE 1000 ML: 9 INJECTION, SOLUTION INTRAVENOUS at 15:22

## 2023-01-05 RX ADMIN — POTASSIUM CHLORIDE 10 MEQ: 7.46 INJECTION, SOLUTION INTRAVENOUS at 20:04

## 2023-01-05 RX ADMIN — SODIUM CHLORIDE 1000 ML: 9 INJECTION, SOLUTION INTRAVENOUS at 19:47

## 2023-01-05 RX ADMIN — SODIUM CHLORIDE 0.1 UNITS/KG/HR: 9 INJECTION, SOLUTION INTRAVENOUS at 21:22

## 2023-01-05 RX ADMIN — IOPAMIDOL 75 ML: 755 INJECTION, SOLUTION INTRAVENOUS at 18:34

## 2023-01-05 RX ADMIN — POTASSIUM CHLORIDE 40 MEQ: 1500 TABLET, EXTENDED RELEASE ORAL at 20:04

## 2023-01-05 ASSESSMENT — ENCOUNTER SYMPTOMS
DIARRHEA: 0
CHOKING: 0
VOMITING: 0
NAUSEA: 0
COUGH: 0
SHORTNESS OF BREATH: 1
FACIAL SWELLING: 0
PHOTOPHOBIA: 0
EYE PAIN: 0
CONSTIPATION: 1
ABDOMINAL PAIN: 0
BACK PAIN: 0
SORE THROAT: 0

## 2023-01-05 NOTE — ED NOTES
BGL RR high at this time. Dr. Fina Dumont notified and new orders for another liter of NS.       Higinio Santamaria, RN  01/05/23 [id number]

## 2023-01-05 NOTE — ED PROVIDER NOTES
[facility]  Department of Emergency Medicine     Written by: Selvin Garduno DO  Patient Name: Manny Vences  Attending Provider: Lucretia Plaza DO  Admit Date: 2023  2:02 PM  MRN: [id number]                   : 1945        Chief Complaint   Patient presents with    Fall     Last night on floor-witnessed seizure by ems no hx, bgl 521 per ems-hx type 2 diabetes-currently only c/o \"back pain from sitting on this cart\"    - Chief complaint    79-year-old male with history of diabetes, hyperlipidemia, arthritis, hypertension, atrial fibrillation on Eliquis the presents to the ED after a fall last night. The patient is alert and oriented, provides most of the history. The patient states that last night after 10 PM, he got up to take a few steps, however fell. He states that he has been weak for the past few days as he feels constipated has not been eating or drinking anything. He says that he hurt his right leg after a fall about a month ago where he got struck with an I-beam.  He states that before that, he was normally ambulatory without any issues and was healthy. The patient states that he was too weak to get up back into the bed, and therefore spent the night on the floor, slept on it. He said in the morning, he had to drag himself on the floor in order to reach the phone. His daughter, who is bedside, has history taken from her as well. She states that she texted them because she did not want to make him up in the morning, and then when the patient reached the phone, he sent a text message to his daughter stating that he fell on the floor and he was currently on the floor. She states that his farmer manager was nearby, and came to the house to help him. The patient states that he has not taken Eliquis on Monday and Tuesday due to him feeling constipated and not able to tolerate oral intake. He also states that he is not taking his diabetic medications.   As per EMS, the patient's blood glucose levels were above 500 when the picked him up. The daughter additionally states that he had an episode of \"blacking out\", and were concerned that he had a seizure. Upon EMS arrival, when they picked him up and on the way here, they witnessed him having another seizure, which lasted about a minute, and had postictal state about 3 minutes. He has no history of seizures. The patient himself also states that he has had shortness of breath that started today. He states that 2 nights ago, the patient had a Fleet enema, and last night he finally passed stool, in addition to the morning having 1 episode of passing stool. Review of Systems   Constitutional:  Positive for fatigue. Negative for appetite change, chills, diaphoresis and fever. HENT:  Negative for ear discharge, ear pain, facial swelling, sneezing and sore throat. Eyes:  Negative for photophobia and pain. Respiratory:  Positive for shortness of breath. Negative for cough and choking. Cardiovascular:  Positive for leg swelling. Negative for chest pain and palpitations. Gastrointestinal:  Positive for constipation. Negative for abdominal pain, diarrhea, nausea and vomiting. Genitourinary:  Negative for dysuria, enuresis, flank pain, frequency and hematuria. Musculoskeletal:  Positive for arthralgias, joint swelling and myalgias. Negative for back pain and neck pain. Skin:  Positive for rash and wound. Negative for pallor. Neurological:  Positive for weakness. Negative for light-headedness and headaches. Physical Exam  Constitutional:       General: He is not in acute distress. Appearance: Normal appearance. He is not ill-appearing. HENT:      Head: Normocephalic and atraumatic. Nose: Nose normal. No congestion. Mouth/Throat:      Mouth: Mucous membranes are dry. Pharynx: No posterior oropharyngeal erythema. Eyes:      General: No scleral icterus.      Extraocular Movements: Extraocular movements intact. Pupils: Pupils are equal, round, and reactive to light. Cardiovascular:      Rate and Rhythm: Regular rhythm. Tachycardia present. Pulses: Normal pulses. Heart sounds: Normal heart sounds. Pulmonary:      Effort: Pulmonary effort is normal. No respiratory distress. Breath sounds: Normal breath sounds. No stridor. No wheezing or rhonchi. Chest:      Chest wall: No tenderness. Abdominal:      General: Bowel sounds are normal. There is no distension. Palpations: Abdomen is soft. There is no mass. Tenderness: There is no abdominal tenderness. Musculoskeletal:         General: Swelling, tenderness and signs of injury present. No deformity. Normal range of motion. Cervical back: Normal range of motion. No rigidity or tenderness. Right lower leg: Edema present. Skin:     Capillary Refill: Capillary refill takes less than 2 seconds. Coloration: Skin is not jaundiced or pale. Findings: Rash present. No erythema. Neurological:      General: No focal deficit present. Mental Status: He is alert and oriented to person, place, and time. Mental status is at baseline. Cranial Nerves: No cranial nerve deficit. Sensory: No sensory deficit. Motor: No weakness. Procedures       MDM  Number of Diagnoses or Management Options  Acute kidney injury ([address]  Anemia, unspecified type  Diabetic ketoacidosis without coma associated with other specified diabetes mellitus ([address]  Fall, initial encounter  Gastrointestinal hemorrhage, unspecified gastrointestinal hemorrhage type  Hyperkalemia  Lactic acidosis  Diagnosis management comments: This is a 53-year-old male with history of diabetes, hyperlipidemia, arthritis, hypertension, atrial fibrillation on Eliquis the presents to the ED after a fall last night. The patient is alert and oriented, provides most of the history.   The patient states that last night after 10 PM, he got up to take a few steps, however fell. He states that he has been weak for the past few days as he feels constipated has not been eating or drinking anything. He says that he hurt his right leg after a fall about a month ago where he got struck with an I-beam.  He states that before that, he was normally ambulatory without any issues and was healthy. The patient states that he was too weak to get up back into the bed, and therefore spent the night on the floor, slept on it. He said in the morning, he had to drag himself on the floor in order to reach the phone. His daughter, who is bedside, has history taken from her as well. She states that she texted them because she did not want to make him up in the morning, and then when the patient reached the phone, he sent a text message to his daughter stating that he fell on the floor and he was currently on the floor. She states that his farmer manager was nearby, and came to the house to help him. The patient states that he has not taken Eliquis on Monday and Tuesday due to him feeling constipated and not able to tolerate oral intake. He also states that he is not taking his diabetic medications. As per EMS, the patient's blood glucose levels were above 500 when the picked him up. The daughter additionally states that he had an episode of \"blacking out\", and were concerned that he had a seizure. Upon EMS arrival, when they picked him up and on the way here, they witnessed him having another seizure, which lasted about a minute, and had postictal state about 3 minutes. He has no history of seizures. The patient himself also states that he has had shortness of breath that started today. He states that 2 nights ago, the patient had a Fleet enema, and last night he finally passed stool, in addition to the morning having 1 episode of passing stool.     The patient here in the ED is hemodynamically stable albeit tachycardia with a heart rate of about 105, and in no acute distress. They are calm, alert, oriented, and pleasant to speak with. The patient has clear lungs auscultation, no abnormal heart murmurs are heard. The patient has no abdominal pain or tenderness. The patient has dry oral mucosa, no evidence of scleral icterus or jaundice. The patient has good pulses and capillary refill bilaterally in both upper and lower extremities. The patient's right lower extremity has a large hematoma the medial side of the knee, which is ecchymotic, and has necrotic tissue on it. The daughter states that this was evaluated over here couple days ago, and has been progressively improving. Ecchymosis extends down the entire leg. The patient confirms his color and swelling is improving. The patient is alert and oriented. EKG is ordered to have documentation of patient's current rhythm, and to rule out any obvious acute cardiac illnesses such as ACS. Additionally, QT interval may be of use in decision making regarding any medications administered here in the ED. Patient is placed on cardiac monitor and continuous pulse ox for monitoring. POCT glucose ordered to rule out acute hyperglycemia or hypoglycemia as a cause of symptoms. CBC is ordered to evaluate for any signs of infection or inflammation by obtaining a WBC count, or any signs of acute anemia by interpreting hemoglobin. CMP was ordered to evaluate for any electrolyte imbalances, kidney function, or any elevations in anion gap. A metabolic panel with electrolytes was ordered to evaluate for an electrolyte abnormalities and/or to evaluate for kidney function which may impact decision on types, doses of medications or imaging studies ordered here in the ER. Troponin ordered to evaluate for possible cardiac etiology of symptoms including but not limited to STEMI or NSTEMI. Urinalysis ordered to evaluate for UTI as the possible cause of symptoms, and may also evaluate hematuria as well.  Lipase levels were ordered to evaluate for possible elevations which suggest pancreatic etiology of symptoms. Lactic acid levels were ordered to evaluate for signs of ischemia or decrease perfusion to organ systems. Viral swabs are ordered to evaluate possible viral etiology of symptoms. Blood cultures are ordered to evaluate, rule out and, if present, treat bacteremia with antibiotics narrowed down to the found organism. Type and screen ordered to determine patient blood type due to possible hemorrhage requiring eventual transfusion if increased or persistent. CK levels ordered to rule out rhabdomyolysis as cause of symptoms. Chest x-ray is ordered to evaluate for any possible signs of pneumonia, pleural effusions, cardiomegaly, pneumothorax, atelectasis, rib or sternal abnormalities including fractures. A CT abdomen with IV contrast was ordered to evaluate for, but without limitation, constipation, small bowel obstruction, bowel ischemia, pneumoperitoneum, diverticulitis, cholecystitis, appendicitis, perforation. CT head and cervical spine is ordered to evaluate for findings including but not limited to hemorrhage, fractures, subluxation or displacement. Amount and/or Complexity of Data Reviewed  Decide to obtain previous medical records or to obtain history from someone other than the patient: yes       ED Course as of 01/06/23 1325   Thu Jan 05, 2023   1548 EKG: This EKG is signed and interpreted by me. Rate: 122  Rhythm: Atrial fibrillation  Interpretation: atrial fibrillation (chronic) with RVR, normal QRS duration, no QT prolongation, no acute ST changes  Comparison: Previous EKG was normal sinus without signs of A. fib RVR [AH]   1647 Notably, patient's hemoglobin is 5.7. White count is elevated 18.1. These are both elevated from baseline. Viral swabs are negative. Patient had rectal exam performed at this time. [AH]   1658 Hemoccult was positive. [AH]   1718 Patient's glucose is 165 on metabolic panel.   Additionally, the patient has 9 CO2 on BMP with hyponatremia of 126. The patient's BUN is 135 and creatinine is 2.1. This is severely elevated from baseline kidney function, likely due to dehydration. Due to this, CT abdomen with IV contrast and CTA chest to rule out PE has not been completed due to worsening kidney function. Patient will be given IV 0.9% saline 1 L and further fluid resuscitation prior to repeating kidney function labs to determine whether patient can receive Cts with contrast.  Additionally, patient lactic acid 17.2. Patient remains hemodynamically stable at this time. [AH]   1723 Patient troponin is 52, repeat troponin will be ordered. [AH]   1728 Due to patient's anemia at 5.7, patient will have 2 units of blood transfused. [AH]   1820 Due to patient's elevated glucose, patient will be given 12 units of regular insulin IV in addition to 40 mEq of potassium p.o., and 10 mEq of potassium IV. []   Fri Jan 06, 2023 0226 Patient's pH venous 7.15, lactic acidosis of 17.2, beta hydroxybutyrate of 0.99. Patient hemodynamically stable, however due to DKA and blood transfusion needed, right femoral central line was placed. [AH]   0227 Central Line Placement Procedure Note    Indication: vascular access and centrally administered medications    Consent: The patient was counseled regarding the procedure, its indications, risks, potential complications and alternatives, and any questions were answered. Consent was obtained to proceed. Procedure: The patient was positioned appropriately and the skin over the right femoral vein was prepped with betadine and draped in a sterile fashion. Local anesthesia was obtained by infiltration using 1% Lidocaine without epinephrine. A large bore needle was used to identify the vein. A guide wire was then inserted into the vein through the needle. A triple lumen catheter was then inserted into the vessel over the guide wire using the Seldinger technique.   All ports showed good, free flowing blood return and were flushed with saline solution. The catheter was then securely fastened to the skin with suture at 2 cm. Two sutures were placed into the kit included tube clamp\", \"proximal eyelets\", \"a suture end from each of the securing sutures was extended around the catheter and tied to the proximal eyelets as an added measure to prevent dislodgement. An antibiotic disk was placed and the site was then covered with a sterile dressing. A post procedure X-ray was not indicated. The patient tolerated the procedure well. Complications: None []   0228 I spoke to GI, who agreed to consult for the patient regarding GI bleed, and I spoke to ICU, who agreed to consult for ICU admission, and I spoke to Dr. Wendy Deluca who agreed to admit the patient under Dr. Gilles Garcia. []      ED Course User Index  [] She Merrill MD         --------------------------------------------- PAST HISTORY ---------------------------------------------  Past Medical History:  has a past medical history of Arthritis, Bilateral shoulder pain, Diabetes mellitus ([address] Enlarged prostate, History of Holter monitoring, History of irregular heartbeat, Hyperlipidemia, Hypertension, and Leg wound, right. Past Surgical History:  has a past surgical history that includes Endoscopy, colon, diagnostic; eye surgery; hernia repair; Tonsillectomy; Cataract removal with implant (Bilateral); and shoulder surgery (Right, 5/12/2020). Social History:  reports that he has never smoked. He has never used smokeless tobacco. He reports that he does not drink alcohol and does not use drugs. Family History: family history includes No Known Problems in his father and mother. The patients home medications have been reviewed.     Allergies: Penicillins, Seasonal, and Sulfa antibiotics    -------------------------------------------------- RESULTS -------------------------------------------------    Lab  Results for orders placed or performed during the hospital encounter of 01/05/23   COVID-19, Rapid    Specimen: Nasopharyngeal Swab   Result Value Ref Range    SARS-CoV-2, NAAT Not Detected Not Detected   RAPID INFLUENZA A/B ANTIGENS    Specimen: Nasopharyngeal   Result Value Ref Range    Influenza A by PCR Not Detected Not Detected    Influenza B by PCR Not Detected Not Detected   CBC with Auto Differential   Result Value Ref Range    WBC 18.1 (H) 4.5 - 11.5 E9/L    RBC 1.96 (L) 3.80 - 5.80 E12/L    Hemoglobin 5.7 (LL) 12.5 - 16.5 g/dL    Hematocrit 19.1 (L) 37.0 - 54.0 %    MCV 97.4 80.0 - 99.9 fL    MCH 29.1 26.0 - 35.0 pg    MCHC 29.8 (L) 32.0 - 34.5 %    RDW 14.4 11.5 - 15.0 fL    Platelets 761 (H) 871 - 450 E9/L    MPV 10.9 7.0 - 12.0 fL    Neutrophils % 84.8 (H) 43.0 - 80.0 %    Immature Granulocytes % 2.5 0.0 - 5.0 %    Lymphocytes % 8.2 (L) 20.0 - 42.0 %    Monocytes % 4.4 2.0 - 12.0 %    Eosinophils % 0.0 0.0 - 6.0 %    Basophils % 0.1 0.0 - 2.0 %    Neutrophils Absolute 15.37 (H) 1.80 - 7.30 E9/L    Immature Granulocytes # 0.45 E9/L    Lymphocytes Absolute 1.48 (L) 1.50 - 4.00 E9/L    Monocytes Absolute 0.79 0.10 - 0.95 E9/L    Eosinophils Absolute 0.00 (L) 0.05 - 0.50 E9/L    Basophils Absolute 0.02 0.00 - 0.20 E9/L   BMP   Result Value Ref Range    Sodium 126 (L) 132 - 146 mmol/L    Potassium 3.9 3.5 - 5.0 mmol/L    Chloride 81 (L) 98 - 107 mmol/L    CO2 9 (LL) 22 - 29 mmol/L    Anion Gap 36 (H) 7 - 16 mmol/L    Glucose 662 (HH) 74 - 99 mg/dL     (HH) 6 - 23 mg/dL    Creatinine 2.1 (H) 0.7 - 1.2 mg/dL    Est, Glom Filt Rate 32 >=60 mL/min/1.73    Calcium 8.9 8.6 - 10.2 mg/dL   Lactic Acid   Result Value Ref Range    Lactic Acid 17.2 (HH) 0.5 - 2.2 mmol/L   Lipase   Result Value Ref Range    Lipase 99 (H) 13 - 60 U/L   Urinalysis   Result Value Ref Range    Color, UA Straw Straw/Yellow    Clarity, UA Clear Clear    Glucose, Ur >=1000 (A) Negative mg/dL    Bilirubin Urine Negative Negative    Ketones, Urine Negative Negative mg/dL    Specific Gravity, UA 1.010 1.005 - 1.030    Blood, Urine Negative Negative    pH, UA 5.0 5.0 - 9.0    Protein, UA Negative Negative mg/dL    Urobilinogen, Urine 0.2 <2.0 E.U./dL    Nitrite, Urine Negative Negative    Leukocyte Esterase, Urine Negative Negative   Troponin   Result Value Ref Range    Troponin, High Sensitivity 52 (H) 0 - 11 ng/L   Magnesium   Result Value Ref Range    Magnesium 1.6 1.6 - 2.6 mg/dL   CK   Result Value Ref Range    Total CK 81 20 - 200 U/L   Beta-Hydroxybutyrate   Result Value Ref Range    Beta-Hydroxybutyrate 0.99 (H) 0.02 - 0.27 mmol/L   pH, venous   Result Value Ref Range    pH, Trevon 7.15 (LL) 7.35 - 7.45   Troponin   Result Value Ref Range    Troponin, High Sensitivity 40 (H) 0 - 11 ng/L   Basic Metabolic Panel   Result Value Ref Range    Sodium 126 (L) 132 - 146 mmol/L    Potassium 6.9 (HH) 3.5 - 5.0 mmol/L    Chloride 91 (L) 98 - 107 mmol/L    CO2 10 (L) 22 - 29 mmol/L    Anion Gap 25 (H) 7 - 16 mmol/L    Glucose 498 (H) 74 - 99 mg/dL     (HH) 6 - 23 mg/dL    Creatinine 1.8 (H) 0.7 - 1.2 mg/dL    Est, Glom Filt Rate 39 >=60 mL/min/1.73    Calcium 7.9 (L) 8.6 - 10.2 mg/dL   Magnesium   Result Value Ref Range    Magnesium 1.4 (L) 1.6 - 2.6 mg/dL   Phosphorus   Result Value Ref Range    Phosphorus 2.9 2.5 - 4.5 mg/dL   Comprehensive Metabolic Panel   Result Value Ref Range    Sodium 131 (L) 132 - 146 mmol/L    Potassium 4.2 3.5 - 5.0 mmol/L    Chloride 92 (L) 98 - 107 mmol/L    CO2 11 (L) 22 - 29 mmol/L    Anion Gap 28 (H) 7 - 16 mmol/L    Glucose 556 (HH) 74 - 99 mg/dL     (HH) 6 - 23 mg/dL    Creatinine 1.9 (H) 0.7 - 1.2 mg/dL    Est, Glom Filt Rate 36 >=60 mL/min/1.73    Calcium 7.8 (L) 8.6 - 10.2 mg/dL    Total Protein 4.2 (L) 6.4 - 8.3 g/dL    Albumin 2.8 (L) 3.5 - 5.2 g/dL    Total Bilirubin 0.2 0.0 - 1.2 mg/dL    Alkaline Phosphatase 53 40 - 129 U/L    ALT 7 0 - 40 U/L    AST 10 0 - 39 U/L   APTT   Result Value Ref Range    aPTT 27.5 24.5 - 35.1 sec   Protime-INR   Result Value Ref Range    Protime 23.7 (H) 9.3 - 12.4 sec    INR 2.1    Basic Metabolic Panel   Result Value Ref Range    Sodium 133 132 - 146 mmol/L    Potassium 3.8 3.5 - 5.0 mmol/L    Chloride 96 (L) 98 - 107 mmol/L    CO2 17 (L) 22 - 29 mmol/L    Anion Gap 20 (H) 7 - 16 mmol/L    Glucose 393 (H) 74 - 99 mg/dL     (HH) 6 - 23 mg/dL    Creatinine 1.9 (H) 0.7 - 1.2 mg/dL    Est, Glom Filt Rate 36 >=60 mL/min/1.73    Calcium 8.2 (L) 8.6 - 10.2 mg/dL   CBC with Auto Differential   Result Value Ref Range    WBC 16.8 (H) 4.5 - 11.5 E9/L    RBC 1.96 (L) 3.80 - 5.80 E12/L    Hemoglobin 5.1 (LL) 12.5 - 16.5 g/dL    Hematocrit 16.2 (L) 37.0 - 54.0 %    MCV 82.7 80.0 - 99.9 fL    MCH 26.0 26.0 - 35.0 pg    MCHC 31.5 (L) 32.0 - 34.5 %    RDW 20.0 (H) 11.5 - 15.0 fL    Platelets 676 217 - 188 E9/L    MPV 10.5 7.0 - 12.0 fL    Neutrophils % 76.4 43.0 - 80.0 %    Immature Granulocytes % 2.1 0.0 - 5.0 %    Lymphocytes % 13.0 (L) 20.0 - 42.0 %    Monocytes % 8.4 2.0 - 12.0 %    Eosinophils % 0.0 0.0 - 6.0 %    Basophils % 0.1 0.0 - 2.0 %    Neutrophils Absolute 12.80 (H) 1.80 - 7.30 E9/L    Immature Granulocytes # 0.36 E9/L    Lymphocytes Absolute 2.18 1.50 - 4.00 E9/L    Monocytes Absolute 1.41 (H) 0.10 - 0.95 E9/L    Eosinophils Absolute 0.00 (L) 0.05 - 0.50 E9/L    Basophils Absolute 0.02 0.00 - 0.20 E9/L   Magnesium   Result Value Ref Range    Magnesium 1.5 (L) 1.6 - 2.6 mg/dL   Phosphorus   Result Value Ref Range    Phosphorus 1.9 (L) 2.5 - 4.5 mg/dL   Lactic Acid   Result Value Ref Range    Lactic Acid 2.5 (H) 0.5 - 2.2 mmol/L   Basic Metabolic Panel   Result Value Ref Range    Sodium 137 132 - 146 mmol/L    Potassium 4.2 3.5 - 5.0 mmol/L    Chloride 104 98 - 107 mmol/L    CO2 19 (L) 22 - 29 mmol/L    Anion Gap 14 7 - 16 mmol/L    Glucose 95 74 - 99 mg/dL     (HH) 6 - 23 mg/dL    Creatinine 1.8 (H) 0.7 - 1.2 mg/dL    Est, Glom Filt Rate 39 >=60 mL/min/1.73    Calcium 8.0 (L) 8.6 - 10.2 mg/dL   Magnesium   Result Value Ref Range    Magnesium 1.5 (L) 1.6 - 2.6 mg/dL   Phosphorus   Result Value Ref Range    Phosphorus 1.4 (L) 2.5 - 4.5 mg/dL   Hemoglobin and Hematocrit   Result Value Ref Range    Hemoglobin 6.6 (LL) 12.5 - 16.5 g/dL    Hematocrit 20.2 (L) 37.0 - 54.0 %   Hemoglobin and Hematocrit   Result Value Ref Range    Hemoglobin 7.1 (L) 12.5 - 16.5 g/dL    Hematocrit 21.6 (L) 37.0 - 54.0 %   Beta-Hydroxybutyrate   Result Value Ref Range    Beta-Hydroxybutyrate 1.57 (H) 0.02 - 0.27 mmol/L   POCT Glucose   Result Value Ref Range    Meter Glucose >500 (H) 74 - 99 mg/dL   POCT Glucose   Result Value Ref Range    Meter Glucose >500 (H) 74 - 99 mg/dL   POCT Glucose   Result Value Ref Range    Meter Glucose >500 (H) 74 - 99 mg/dL   POCT Glucose   Result Value Ref Range    Meter Glucose 423 (H) 74 - 99 mg/dL   POCT Glucose   Result Value Ref Range    Meter Glucose 470 (H) 74 - 99 mg/dL   POCT Glucose   Result Value Ref Range    Meter Glucose 337 (H) 74 - 99 mg/dL   POCT Glucose   Result Value Ref Range    Meter Glucose 273 (H) 74 - 99 mg/dL   POCT Glucose   Result Value Ref Range    Meter Glucose 178 (H) 74 - 99 mg/dL   POCT Glucose   Result Value Ref Range    Meter Glucose 158 (H) 74 - 99 mg/dL   POCT Glucose   Result Value Ref Range    Meter Glucose 196 (H) 74 - 99 mg/dL   POCT Glucose   Result Value Ref Range    Meter Glucose 137 (H) 74 - 99 mg/dL   POCT Glucose   Result Value Ref Range    Meter Glucose 136 (H) 74 - 99 mg/dL   POCT Glucose   Result Value Ref Range    Meter Glucose 183 (H) 74 - 99 mg/dL   POCT Glucose   Result Value Ref Range    Meter Glucose 199 (H) 74 - 99 mg/dL   POCT Glucose   Result Value Ref Range    Meter Glucose 303 (H) 74 - 99 mg/dL   EKG 12 Lead   Result Value Ref Range    Ventricular Rate 122 BPM    Atrial Rate 122 BPM    QRS Duration 94 ms    Q-T Interval 328 ms    QTc Calculation (Bazett) 467 ms    R Axis 8 degrees    T Axis 41 degrees   TYPE AND SCREEN   Result Value Ref Range    ABO/Rh B POS     Antibody Screen NEG    PREPARE RBC (CROSSMATCH), 2 Units   Result Value Ref Range    Product Code Blood Bank [id number]     Description Blood Bank Red Blood Cells, Leuko-reduced     Unit Number [id number]     Dispense Status Blood Bank issued     Product Code Blood Bank [id number]     Description Blood Bank Red Blood Cells, Leuko-reduced     Unit Number [id number]     Dispense Status Blood Bank transfused     Product Code Blood Bank [id number]     Description Blood Bank Red Blood Cells, Leuko-reduced     Unit Number [id number]     Dispense Status Blood Bank issued     Product Code Blood Bank [id number]     Description Blood Bank Red Blood Cells, Apheresis, Leuko-reduced     Unit Number [id number]     Dispense Status Blood Bank selected    PREPARE PLASMA   Result Value Ref Range    Product Code Blood Bank [id number]     Description Blood Bank Plasma, 5 Day, Thawed     Unit Number [id number]     Dispense Status Blood Bank issued        Radiology  CT ABDOMEN PELVIS W IV CONTRAST Additional Contrast? None   Final Result   No evidence of pulmonary embolism or acute pulmonary abnormality. Circumferential thickening of questionable esophageal is involving the   proximal through mid esophagus correlate with symptomatology for the need of   further investigation      Abdomen and pelvis: No acute findings of the abdomen or pelvis with   incidental findings as above including moderate prostatomegaly with central   area of low attenuation. No associated gas locules maximum dimension 12 mm   likely status post TURP findings of the central prostatic portion. No   inflammatory findings of the urinary bladder or obstructing uropathy      Hepatic steatosis         CTA PULMONARY W CONTRAST   Final Result   No evidence of pulmonary embolism or acute pulmonary abnormality.       Circumferential thickening of questionable esophageal is involving the   proximal through mid esophagus correlate with symptomatology for the need of   further investigation      Abdomen and pelvis: No acute findings of the abdomen or pelvis with   incidental findings as above including moderate prostatomegaly with central   area of low attenuation. No associated gas locules maximum dimension 12 mm   likely status post TURP findings of the central prostatic portion. No   inflammatory findings of the urinary bladder or obstructing uropathy      Hepatic steatosis         CT Head W/O Contrast   Final Result   1. There is no acute intracranial abnormality. Specifically, there is no   intracranial hemorrhage. 2. Atrophy and periventricular leukomalacia,         CT CSpine W/O Contrast   Final Result   1. There is no acute compression fracture or subluxation of the cervical   spine. 2. Advanced multilevel degenerative disc and degenerative joint disease.            Medications   0.9 % sodium chloride infusion (has no administration in time range)   potassium chloride 10 mEq/100 mL IVPB (Peripheral Line) (10 mEq IntraVENous New Bag 1/6/23 1045)   magnesium sulfate 1000 mg in dextrose 5% 100 mL IVPB ( IntraVENous Stopped 1/6/23 1024)   sodium phosphate 10 mmol in sodium chloride 0.9 % 250 mL IVPB ( IntraVENous See Alternative 1/6/23 1106)     Or   sodium phosphate 15 mmol in dextrose 5 % 250 mL IVPB ( IntraVENous See Alternative 1/6/23 1106)     Or   sodium phosphate 20 mmol in dextrose 5 % 500 mL IVPB ( IntraVENous Rate/Dose Verify 1/6/23 1106)   0.9 % sodium chloride infusion ( IntraVENous Stopped 1/6/23 0510)   dextrose 5 % and 0.45 % sodium chloride infusion ( IntraVENous New Bag 1/6/23 1226)   insulin regular (HUMULIN R;NOVOLIN R) 100 Units in sodium chloride 0.9 % 100 mL infusion (0.0187 Units/kg/hr × 85.3 kg IntraVENous Rate/Dose Change 1/6/23 1221)   0.9 % sodium chloride infusion (has no administration in time range)   0.9 % sodium chloride infusion (has no administration in time range)   pantoprazole (PROTONIX) 40 mg in sodium chloride (PF) 0.9 % 10 mL injection (40 mg IntraVENous Given 1/6/23 0822)   glucose chewable tablet 16 g (has no administration in time range)   dextrose bolus 10% 125 mL (has no administration in time range)     Or   dextrose bolus 10% 250 mL (has no administration in time range)   glucagon (rDNA) injection 1 mg (has no administration in time range)   dextrose 10 % infusion (has no administration in time range)   sodium chloride flush 0.9 % injection 5-40 mL (10 mLs IntraVENous Given 1/6/23 0827)   sodium chloride flush 0.9 % injection 5-40 mL (has no administration in time range)   0.9 % sodium chloride infusion (has no administration in time range)   acetaminophen (TYLENOL) tablet 650 mg (has no administration in time range)     Or   acetaminophen (TYLENOL) suppository 650 mg (has no administration in time range)   0.9 % sodium chloride infusion (has no administration in time range)   0.9 % sodium chloride bolus (0 mLs IntraVENous Stopped 1/5/23 1712)   0.9 % sodium chloride bolus (0 mLs IntraVENous Stopped 1/5/23 1908)   0.9 % sodium chloride bolus (0 mLs IntraVENous Stopped 1/5/23 1958)   iopamidol (ISOVUE-370) 76 % injection 75 mL (75 mLs IntraVENous Given 1/5/23 1834)   potassium chloride 10 mEq/100 mL IVPB (Peripheral Line) (0 mEq IntraVENous Stopped 1/5/23 2113)   potassium chloride (KLOR-CON M) extended release tablet 40 mEq (40 mEq Oral Given 1/5/23 2004)   pantoprazole (PROTONIX) 40 mg in sodium chloride (PF) 0.9 % 10 mL injection (40 mg IntraVENous Given 1/6/23 0304)       ------------------------- NURSING NOTES AND VITALS REVIEWED ---------------------------  Date / Time Roomed:  1/5/2023  2:02 PM  ED Bed Assignment:  [id number]    The nursing notes within the ED encounter and vital signs as below have been reviewed.    Patient Vitals for the past 24 hrs:   BP Temp Temp src Pulse Resp SpO2 Height Weight   01/06/23 1300 (!) 155/61 -- -- 91 17 98 % -- --   01/06/23 1208 (!) 156/64 98.5 °F (36.9 °C) Axillary 94 20 98 % -- --   01/06/23 1200 (!) 156/64 98.5 °F (36.9 °C) Axillary 97 19 95 % -- --   01/06/23 1100 (!) 151/80 -- -- (!) 101 19 100 % -- --   01/06/23 1000 (!) 150/56 -- -- 95 20 98 % -- --   01/06/23 0915 (!) 134/56 98.7 °F (37.1 °C) Axillary 92 18 99 % -- --   01/06/23 0900 (!) 137/59 -- -- 92 28 100 % -- --   01/06/23 0850 127/75 99.4 °F (37.4 °C) Axillary 96 26 100 % -- --   01/06/23 0800 (!) 124/59 99.3 °F (37.4 °C) Axillary 98 17 100 % -- --   01/06/23 0708 -- -- -- 93 17 -- -- --   01/06/23 0701 (!) 146/56 99.2 °F (37.3 °C) Axillary 94 18 100 % -- --   01/06/23 0700 (!) 146/56 -- -- 93 18 99 % -- --   01/06/23 0646 (!) 122/53 99.2 °F (37.3 °C) Axillary 88 18 100 % -- --   01/06/23 0601 (!) 122/53 99.2 °F (37.3 °C) Axillary 84 17 100 % -- --   01/06/23 0600 -- -- -- 84 18 100 % -- --   01/06/23 0500 127/61 -- -- 100 21 100 % -- --   01/06/23 0400 (!) 117/52 -- -- (!) 103 19 100 % -- --   01/06/23 0345 -- -- -- 98 17 100 % -- --   01/06/23 0343 -- -- -- 100 18 100 % 5' 8\" (1.727 m) 180 lb 9.6 oz (81.9 kg)   01/06/23 0328 -- -- -- (!) 105 -- -- -- --   01/06/23 0300 (!) 124/51 -- -- (!) 106 20 100 % -- --   01/06/23 0258 (!) 124/54 98.5 °F (36.9 °C) Axillary (!) 102 19 100 % -- --   01/06/23 0243 (!) 125/54 98.5 °F (36.9 °C) Axillary (!) 103 19 100 % -- --   01/06/23 0200 (!) 125/54 -- -- (!) 110 20 100 % -- --   01/06/23 0100 (!) 123/52 -- -- (!) 103 23 100 % -- --   01/06/23 0000 (!) 96/48 -- -- (!) 101 22 100 % -- --   01/05/23 2346 (!) 109/55 98.5 °F (36.9 °C) -- (!) 109 23 100 % -- --   01/05/23 2300 (!) 109/55 98.5 °F (36.9 °C) Axillary (!) 108 25 100 % -- 180 lb 9.6 oz (81.9 kg)   01/05/23 2200 123/60 -- -- (!) 107 26 95 % -- --   01/05/23 2134 (!) 114/52 98 °F (36.7 °C) Axillary (!) 107 25 99 % -- --   01/05/23 2131 (!) 113/54 98 °F (36.7 °C) Oral (!) 107 27 100 % -- --   01/05/23 2126 (!) 112/53 -- -- (!) 106 23 100 % -- --   01/05/23 2124 (!) 124/52 97.7 °F (36.5 °C) Oral (!) 109 19 100 % -- --   01/05/23 2118 -- -- -- (!) 110 24 98 % -- --   01/05/23 2116 125/61 97.7 °F (36.5 °C) Oral (!) 107 25 (!) 81 % -- --   01/05/23 2113 125/61 97.7 °F (36.5 °C) -- (!) 107 22 97 % -- --   01/05/23 1358 (!) 118/51 -- -- (!) 105 20 100 % -- 188 lb (85.3 kg)       Oxygen Saturation Interpretation: Normal      ------------------------------------------ PROGRESS NOTES ------------------------------------------  Re-evaluation(s):  Patients symptoms show no change  Repeat physical examination is not changed      I have spoken with the patient and daughter and discussed todays results, in addition to providing specific details for the plan of care and counseling regarding the diagnosis and prognosis. Their questions are answered at this time and they are agreeable with the plan.      --------------------------------- ADDITIONAL PROVIDER NOTES ---------------------------------  Consultations:  Spoke with GI, ICU and IM,  They will admit this patient and will provide consultation. This patient's ED course included: a personal history and physicial examination, re-evaluation prior to disposition, multiple bedside re-evaluations, IV medications, cardiac monitoring, continuous pulse oximetry, and complex medical decision making and emergency management    This patient has remained hemodynamically stable and been closely monitored during their ED course. Please note that the withdrawal or failure to initiate urgent interventions for this patient would likely result in a life threatening deterioration or permanent disability. Accordingly this patient received 47 minutes of critical care time, excluding separately billable procedures. Clinical Impression  1. Diabetic ketoacidosis without coma associated with other specified diabetes mellitus ([address]    2. Fall, initial encounter    3. Gastrointestinal hemorrhage, unspecified gastrointestinal hemorrhage type    4. Anemia, unspecified type    5.  Lactic acidosis 6. Hyperkalemia    7. Acute kidney injury ([facility] [address]          Disposition  Patient's disposition: Admit to CCU/ICU  Patient's condition is serious. Patient was seen and evaluated by myself and my attending Kan Villa DO. Assessment and Plan discussed with attending provider, please see attestation for final plan of care.      Art Medicine, DO Darryle Lent, MD  Resident  01/06/23 [id number] Marcos Lyles DO  01/06/23 2187

## 2023-01-06 ENCOUNTER — APPOINTMENT (OUTPATIENT)
Dept: NEUROLOGY | Age: 78
DRG: 638 | End: 2023-01-06
Payer: MEDICARE

## 2023-01-06 ENCOUNTER — ANESTHESIA EVENT (OUTPATIENT)
Dept: ENDOSCOPY | Age: 78
End: 2023-01-06
Payer: MEDICARE

## 2023-01-06 ENCOUNTER — ANESTHESIA (OUTPATIENT)
Dept: ENDOSCOPY | Age: 78
End: 2023-01-06
Payer: MEDICARE

## 2023-01-06 PROBLEM — E44.0 MODERATE PROTEIN-CALORIE MALNUTRITION (HCC): Chronic | Status: ACTIVE | Noted: 2023-01-06

## 2023-01-06 PROBLEM — R56.9 SEIZURE (HCC): Status: ACTIVE | Noted: 2023-01-06

## 2023-01-06 LAB
ANION GAP SERPL CALCULATED.3IONS-SCNC: 10 MMOL/L (ref 7–16)
ANION GAP SERPL CALCULATED.3IONS-SCNC: 13 MMOL/L (ref 7–16)
ANION GAP SERPL CALCULATED.3IONS-SCNC: 13 MMOL/L (ref 7–16)
ANION GAP SERPL CALCULATED.3IONS-SCNC: 14 MMOL/L (ref 7–16)
ANION GAP SERPL CALCULATED.3IONS-SCNC: 15 MMOL/L (ref 7–16)
ANION GAP SERPL CALCULATED.3IONS-SCNC: 20 MMOL/L (ref 7–16)
ANISOCYTOSIS: ABNORMAL
BASOPHILS ABSOLUTE: 0 E9/L (ref 0–0.2)
BASOPHILS ABSOLUTE: 0.02 E9/L (ref 0–0.2)
BASOPHILS RELATIVE PERCENT: 0.1 % (ref 0–2)
BASOPHILS RELATIVE PERCENT: 0.2 % (ref 0–2)
BETA-HYDROXYBUTYRATE: 1.57 MMOL/L (ref 0.02–0.27)
BLOOD BANK DISPENSE STATUS: NORMAL
BLOOD BANK PRODUCT CODE: NORMAL
BPU ID: NORMAL
BUN BLDV-MCNC: 101 MG/DL (ref 6–23)
BUN BLDV-MCNC: 111 MG/DL (ref 6–23)
BUN BLDV-MCNC: 121 MG/DL (ref 6–23)
BUN BLDV-MCNC: 127 MG/DL (ref 6–23)
BUN BLDV-MCNC: 146 MG/DL (ref 6–23)
BUN BLDV-MCNC: 149 MG/DL (ref 6–23)
BURR CELLS: ABNORMAL
CALCIUM SERPL-MCNC: 8 MG/DL (ref 8.6–10.2)
CALCIUM SERPL-MCNC: 8.1 MG/DL (ref 8.6–10.2)
CALCIUM SERPL-MCNC: 8.2 MG/DL (ref 8.6–10.2)
CALCIUM SERPL-MCNC: 8.2 MG/DL (ref 8.6–10.2)
CALCIUM SERPL-MCNC: 8.3 MG/DL (ref 8.6–10.2)
CALCIUM SERPL-MCNC: 8.3 MG/DL (ref 8.6–10.2)
CHLORIDE BLD-SCNC: 103 MMOL/L (ref 98–107)
CHLORIDE BLD-SCNC: 104 MMOL/L (ref 98–107)
CHLORIDE BLD-SCNC: 96 MMOL/L (ref 98–107)
CHLORIDE BLD-SCNC: 98 MMOL/L (ref 98–107)
CHLORIDE BLD-SCNC: 98 MMOL/L (ref 98–107)
CHLORIDE BLD-SCNC: 99 MMOL/L (ref 98–107)
CO2: 17 MMOL/L (ref 22–29)
CO2: 18 MMOL/L (ref 22–29)
CO2: 19 MMOL/L (ref 22–29)
CO2: 19 MMOL/L (ref 22–29)
CO2: 20 MMOL/L (ref 22–29)
CO2: 21 MMOL/L (ref 22–29)
CREAT SERPL-MCNC: 1.4 MG/DL (ref 0.7–1.2)
CREAT SERPL-MCNC: 1.5 MG/DL (ref 0.7–1.2)
CREAT SERPL-MCNC: 1.6 MG/DL (ref 0.7–1.2)
CREAT SERPL-MCNC: 1.6 MG/DL (ref 0.7–1.2)
CREAT SERPL-MCNC: 1.8 MG/DL (ref 0.7–1.2)
CREAT SERPL-MCNC: 1.9 MG/DL (ref 0.7–1.2)
DESCRIPTION BLOOD BANK: NORMAL
EKG ATRIAL RATE: 122 BPM
EKG Q-T INTERVAL: 328 MS
EKG QRS DURATION: 94 MS
EKG QTC CALCULATION (BAZETT): 467 MS
EKG R AXIS: 8 DEGREES
EKG T AXIS: 41 DEGREES
EKG VENTRICULAR RATE: 122 BPM
EOSINOPHILS ABSOLUTE: 0 E9/L (ref 0.05–0.5)
EOSINOPHILS ABSOLUTE: 0 E9/L (ref 0.05–0.5)
EOSINOPHILS RELATIVE PERCENT: 0 % (ref 0–6)
EOSINOPHILS RELATIVE PERCENT: 0 % (ref 0–6)
FOLATE: 13 NG/ML (ref 4.8–24.2)
GFR SERPL CREATININE-BSD FRML MDRD: 36 ML/MIN/1.73
GFR SERPL CREATININE-BSD FRML MDRD: 39 ML/MIN/1.73
GFR SERPL CREATININE-BSD FRML MDRD: 45 ML/MIN/1.73
GFR SERPL CREATININE-BSD FRML MDRD: 45 ML/MIN/1.73
GFR SERPL CREATININE-BSD FRML MDRD: 48 ML/MIN/1.73
GFR SERPL CREATININE-BSD FRML MDRD: 52 ML/MIN/1.73
GLUCOSE BLD-MCNC: 218 MG/DL (ref 74–99)
GLUCOSE BLD-MCNC: 279 MG/DL (ref 74–99)
GLUCOSE BLD-MCNC: 290 MG/DL (ref 74–99)
GLUCOSE BLD-MCNC: 393 MG/DL (ref 74–99)
GLUCOSE BLD-MCNC: 42 MG/DL (ref 74–99)
GLUCOSE BLD-MCNC: 95 MG/DL (ref 74–99)
HBA1C MFR BLD: 5.9 % (ref 4–5.6)
HCT VFR BLD CALC: 16.2 % (ref 37–54)
HCT VFR BLD CALC: 20.2 % (ref 37–54)
HCT VFR BLD CALC: 21.3 % (ref 37–54)
HCT VFR BLD CALC: 21.6 % (ref 37–54)
HCT VFR BLD CALC: 21.8 % (ref 37–54)
HEMOGLOBIN: 5.1 G/DL (ref 12.5–16.5)
HEMOGLOBIN: 6.6 G/DL (ref 12.5–16.5)
HEMOGLOBIN: 7.1 G/DL (ref 12.5–16.5)
HEMOGLOBIN: 7.2 G/DL (ref 12.5–16.5)
HEMOGLOBIN: 7.2 G/DL (ref 12.5–16.5)
IMMATURE GRANULOCYTES #: 0.36 E9/L
IMMATURE GRANULOCYTES %: 2.1 % (ref 0–5)
IRON SATURATION: 53 % (ref 20–55)
IRON: 96 MCG/DL (ref 59–158)
LACTIC ACID: 2.5 MMOL/L (ref 0.5–2.2)
LYMPHOCYTES ABSOLUTE: 1.42 E9/L (ref 1.5–4)
LYMPHOCYTES ABSOLUTE: 2.18 E9/L (ref 1.5–4)
LYMPHOCYTES RELATIVE PERCENT: 13 % (ref 20–42)
LYMPHOCYTES RELATIVE PERCENT: 7 % (ref 20–42)
MAGNESIUM: 1.5 MG/DL (ref 1.6–2.6)
MAGNESIUM: 1.5 MG/DL (ref 1.6–2.6)
MAGNESIUM: 1.7 MG/DL (ref 1.6–2.6)
MAGNESIUM: 1.8 MG/DL (ref 1.6–2.6)
MCH RBC QN AUTO: 26 PG (ref 26–35)
MCH RBC QN AUTO: 28.3 PG (ref 26–35)
MCHC RBC AUTO-ENTMCNC: 31.5 % (ref 32–34.5)
MCHC RBC AUTO-ENTMCNC: 33 % (ref 32–34.5)
MCV RBC AUTO: 82.7 FL (ref 80–99.9)
MCV RBC AUTO: 85.8 FL (ref 80–99.9)
METER GLUCOSE: 136 MG/DL (ref 74–99)
METER GLUCOSE: 137 MG/DL (ref 74–99)
METER GLUCOSE: 149 MG/DL (ref 74–99)
METER GLUCOSE: 158 MG/DL (ref 74–99)
METER GLUCOSE: 178 MG/DL (ref 74–99)
METER GLUCOSE: 183 MG/DL (ref 74–99)
METER GLUCOSE: 196 MG/DL (ref 74–99)
METER GLUCOSE: 199 MG/DL (ref 74–99)
METER GLUCOSE: 207 MG/DL (ref 74–99)
METER GLUCOSE: 258 MG/DL (ref 74–99)
METER GLUCOSE: 273 MG/DL (ref 74–99)
METER GLUCOSE: 293 MG/DL (ref 74–99)
METER GLUCOSE: 303 MG/DL (ref 74–99)
METER GLUCOSE: 318 MG/DL (ref 74–99)
METER GLUCOSE: 326 MG/DL (ref 74–99)
METER GLUCOSE: 337 MG/DL (ref 74–99)
METER GLUCOSE: 423 MG/DL (ref 74–99)
METER GLUCOSE: 470 MG/DL (ref 74–99)
METER GLUCOSE: 53 MG/DL (ref 74–99)
METER GLUCOSE: 93 MG/DL (ref 74–99)
METER GLUCOSE: 93 MG/DL (ref 74–99)
MONOCYTES ABSOLUTE: 0.81 E9/L (ref 0.1–0.95)
MONOCYTES ABSOLUTE: 1.41 E9/L (ref 0.1–0.95)
MONOCYTES RELATIVE PERCENT: 3.5 % (ref 2–12)
MONOCYTES RELATIVE PERCENT: 8.4 % (ref 2–12)
MYELOCYTE PERCENT: 0.9 % (ref 0–0)
NEUTROPHILS ABSOLUTE: 12.8 E9/L (ref 1.8–7.3)
NEUTROPHILS ABSOLUTE: 18.27 E9/L (ref 1.8–7.3)
NEUTROPHILS RELATIVE PERCENT: 76.4 % (ref 43–80)
NEUTROPHILS RELATIVE PERCENT: 88.6 % (ref 43–80)
PDW BLD-RTO: 17.4 FL (ref 11.5–15)
PDW BLD-RTO: 20 FL (ref 11.5–15)
PHOSPHORUS: 1.4 MG/DL (ref 2.5–4.5)
PHOSPHORUS: 1.9 MG/DL (ref 2.5–4.5)
PHOSPHORUS: 1.9 MG/DL (ref 2.5–4.5)
PHOSPHORUS: 2.1 MG/DL (ref 2.5–4.5)
PHOSPHORUS: 2.7 MG/DL (ref 2.5–4.5)
PHOSPHORUS: 3 MG/DL (ref 2.5–4.5)
PLATELET # BLD: 224 E9/L (ref 130–450)
PLATELET # BLD: 314 E9/L (ref 130–450)
PMV BLD AUTO: 10.5 FL (ref 7–12)
PMV BLD AUTO: 10.6 FL (ref 7–12)
POIKILOCYTES: ABNORMAL
POLYCHROMASIA: ABNORMAL
POTASSIUM SERPL-SCNC: 3.4 MMOL/L (ref 3.5–5)
POTASSIUM SERPL-SCNC: 3.4 MMOL/L (ref 3.5–5)
POTASSIUM SERPL-SCNC: 3.6 MMOL/L (ref 3.5–5)
POTASSIUM SERPL-SCNC: 3.8 MMOL/L (ref 3.5–5)
POTASSIUM SERPL-SCNC: 3.9 MMOL/L (ref 3.5–5)
POTASSIUM SERPL-SCNC: 4.2 MMOL/L (ref 3.5–5)
PROCALCITONIN: 1.03 NG/ML (ref 0–0.08)
RBC # BLD: 1.96 E12/L (ref 3.8–5.8)
RBC # BLD: 2.54 E12/L (ref 3.8–5.8)
SODIUM BLD-SCNC: 130 MMOL/L (ref 132–146)
SODIUM BLD-SCNC: 131 MMOL/L (ref 132–146)
SODIUM BLD-SCNC: 132 MMOL/L (ref 132–146)
SODIUM BLD-SCNC: 133 MMOL/L (ref 132–146)
SODIUM BLD-SCNC: 134 MMOL/L (ref 132–146)
SODIUM BLD-SCNC: 137 MMOL/L (ref 132–146)
T4 FREE: 1.87 NG/DL (ref 0.93–1.7)
TOTAL CK: 111 U/L (ref 20–200)
TOTAL IRON BINDING CAPACITY: 181 MCG/DL (ref 250–450)
TSH SERPL DL<=0.05 MIU/L-ACNC: 1.1 UIU/ML (ref 0.27–4.2)
VITAMIN B-12: 183 PG/ML (ref 211–946)
WBC # BLD: 16.8 E9/L (ref 4.5–11.5)
WBC # BLD: 20.3 E9/L (ref 4.5–11.5)

## 2023-01-06 PROCEDURE — 6370000000 HC RX 637 (ALT 250 FOR IP): Performed by: EMERGENCY MEDICINE

## 2023-01-06 PROCEDURE — 95819 EEG AWAKE AND ASLEEP: CPT

## 2023-01-06 PROCEDURE — 6360000002 HC RX W HCPCS: Performed by: INTERNAL MEDICINE

## 2023-01-06 PROCEDURE — C9113 INJ PANTOPRAZOLE SODIUM, VIA: HCPCS | Performed by: NURSE PRACTITIONER

## 2023-01-06 PROCEDURE — 87081 CULTURE SCREEN ONLY: CPT

## 2023-01-06 PROCEDURE — A4216 STERILE WATER/SALINE, 10 ML: HCPCS | Performed by: NURSE PRACTITIONER

## 2023-01-06 PROCEDURE — 3700000000 HC ANESTHESIA ATTENDED CARE: Performed by: INTERNAL MEDICINE

## 2023-01-06 PROCEDURE — 85025 COMPLETE CBC W/AUTO DIFF WBC: CPT

## 2023-01-06 PROCEDURE — 82962 GLUCOSE BLOOD TEST: CPT

## 2023-01-06 PROCEDURE — 2580000003 HC RX 258: Performed by: EMERGENCY MEDICINE

## 2023-01-06 PROCEDURE — 36415 COLL VENOUS BLD VENIPUNCTURE: CPT

## 2023-01-06 PROCEDURE — 36592 COLLECT BLOOD FROM PICC: CPT

## 2023-01-06 PROCEDURE — 83036 HEMOGLOBIN GLYCOSYLATED A1C: CPT

## 2023-01-06 PROCEDURE — 83735 ASSAY OF MAGNESIUM: CPT

## 2023-01-06 PROCEDURE — 3609017700 HC EGD DILATION GASTRIC/DUODENAL STRICTURE: Performed by: INTERNAL MEDICINE

## 2023-01-06 PROCEDURE — 6360000002 HC RX W HCPCS: Performed by: NURSE ANESTHETIST, CERTIFIED REGISTERED

## 2023-01-06 PROCEDURE — 2500000003 HC RX 250 WO HCPCS: Performed by: EMERGENCY MEDICINE

## 2023-01-06 PROCEDURE — 6360000002 HC RX W HCPCS: Performed by: NURSE PRACTITIONER

## 2023-01-06 PROCEDURE — 83605 ASSAY OF LACTIC ACID: CPT

## 2023-01-06 PROCEDURE — 93010 ELECTROCARDIOGRAM REPORT: CPT | Performed by: INTERNAL MEDICINE

## 2023-01-06 PROCEDURE — 2580000003 HC RX 258: Performed by: INTERNAL MEDICINE

## 2023-01-06 PROCEDURE — 84100 ASSAY OF PHOSPHORUS: CPT

## 2023-01-06 PROCEDURE — 2580000003 HC RX 258: Performed by: NURSE ANESTHETIST, CERTIFIED REGISTERED

## 2023-01-06 PROCEDURE — 0D738ZZ DILATION OF LOWER ESOPHAGUS, VIA NATURAL OR ARTIFICIAL OPENING ENDOSCOPIC: ICD-10-PCS | Performed by: INTERNAL MEDICINE

## 2023-01-06 PROCEDURE — 84145 PROCALCITONIN (PCT): CPT

## 2023-01-06 PROCEDURE — 6360000002 HC RX W HCPCS: Performed by: EMERGENCY MEDICINE

## 2023-01-06 PROCEDURE — 3700000001 HC ADD 15 MINUTES (ANESTHESIA): Performed by: INTERNAL MEDICINE

## 2023-01-06 PROCEDURE — 82010 KETONE BODYS QUAN: CPT

## 2023-01-06 PROCEDURE — 82550 ASSAY OF CK (CPK): CPT

## 2023-01-06 PROCEDURE — 2580000003 HC RX 258: Performed by: NURSE PRACTITIONER

## 2023-01-06 PROCEDURE — 2000000000 HC ICU R&B

## 2023-01-06 PROCEDURE — 84443 ASSAY THYROID STIM HORMONE: CPT

## 2023-01-06 PROCEDURE — 85018 HEMOGLOBIN: CPT

## 2023-01-06 PROCEDURE — 2709999900 HC NON-CHARGEABLE SUPPLY: Performed by: INTERNAL MEDICINE

## 2023-01-06 PROCEDURE — 36430 TRANSFUSION BLD/BLD COMPNT: CPT

## 2023-01-06 PROCEDURE — 80048 BASIC METABOLIC PNL TOTAL CA: CPT

## 2023-01-06 PROCEDURE — 85014 HEMATOCRIT: CPT

## 2023-01-06 PROCEDURE — 84439 ASSAY OF FREE THYROXINE: CPT

## 2023-01-06 RX ORDER — SODIUM CHLORIDE 0.9 % (FLUSH) 0.9 %
5-40 SYRINGE (ML) INJECTION EVERY 12 HOURS SCHEDULED
Status: DISCONTINUED | OUTPATIENT
Start: 2023-01-06 | End: 2023-01-11 | Stop reason: HOSPADM

## 2023-01-06 RX ORDER — SODIUM CHLORIDE 9 MG/ML
INJECTION, SOLUTION INTRAVENOUS CONTINUOUS PRN
Status: DISCONTINUED | OUTPATIENT
Start: 2023-01-06 | End: 2023-01-06 | Stop reason: SDUPTHER

## 2023-01-06 RX ORDER — SODIUM CHLORIDE 9 MG/ML
INJECTION, SOLUTION INTRAVENOUS PRN
Status: DISCONTINUED | OUTPATIENT
Start: 2023-01-06 | End: 2023-01-07

## 2023-01-06 RX ORDER — ACETAMINOPHEN 650 MG/1
650 SUPPOSITORY RECTAL EVERY 6 HOURS PRN
Status: DISCONTINUED | OUTPATIENT
Start: 2023-01-06 | End: 2023-01-11 | Stop reason: HOSPADM

## 2023-01-06 RX ORDER — SODIUM CHLORIDE 0.9 % (FLUSH) 0.9 %
5-40 SYRINGE (ML) INJECTION PRN
Status: DISCONTINUED | OUTPATIENT
Start: 2023-01-06 | End: 2023-01-11 | Stop reason: HOSPADM

## 2023-01-06 RX ORDER — PROPOFOL 10 MG/ML
INJECTION, EMULSION INTRAVENOUS CONTINUOUS PRN
Status: DISCONTINUED | OUTPATIENT
Start: 2023-01-06 | End: 2023-01-06 | Stop reason: SDUPTHER

## 2023-01-06 RX ORDER — ACETAMINOPHEN 325 MG/1
650 TABLET ORAL EVERY 6 HOURS PRN
Status: DISCONTINUED | OUTPATIENT
Start: 2023-01-06 | End: 2023-01-11 | Stop reason: HOSPADM

## 2023-01-06 RX ORDER — EPINEPHRINE 1 MG/ML
INJECTION, SOLUTION, CONCENTRATE INTRAVENOUS PRN
Status: DISCONTINUED | OUTPATIENT
Start: 2023-01-06 | End: 2023-01-06 | Stop reason: ALTCHOICE

## 2023-01-06 RX ORDER — DEXTROSE MONOHYDRATE 100 MG/ML
INJECTION, SOLUTION INTRAVENOUS CONTINUOUS PRN
Status: DISCONTINUED | OUTPATIENT
Start: 2023-01-06 | End: 2023-01-11 | Stop reason: HOSPADM

## 2023-01-06 RX ORDER — SODIUM CHLORIDE 9 MG/ML
INJECTION, SOLUTION INTRAVENOUS PRN
Status: DISCONTINUED | OUTPATIENT
Start: 2023-01-06 | End: 2023-01-11 | Stop reason: HOSPADM

## 2023-01-06 RX ADMIN — DEXTROSE AND SODIUM CHLORIDE: 5; 450 INJECTION, SOLUTION INTRAVENOUS at 05:16

## 2023-01-06 RX ADMIN — SODIUM CHLORIDE: 9 INJECTION, SOLUTION INTRAVENOUS at 02:01

## 2023-01-06 RX ADMIN — POTASSIUM CHLORIDE 10 MEQ: 7.46 INJECTION, SOLUTION INTRAVENOUS at 21:02

## 2023-01-06 RX ADMIN — SODIUM CHLORIDE, PRESERVATIVE FREE 40 MG: 5 INJECTION INTRAVENOUS at 20:06

## 2023-01-06 RX ADMIN — MAGNESIUM SULFATE HEPTAHYDRATE 1000 MG: 1 INJECTION, SOLUTION INTRAVENOUS at 08:24

## 2023-01-06 RX ADMIN — POTASSIUM CHLORIDE 10 MEQ: 7.46 INJECTION, SOLUTION INTRAVENOUS at 16:41

## 2023-01-06 RX ADMIN — POTASSIUM CHLORIDE 10 MEQ: 7.46 INJECTION, SOLUTION INTRAVENOUS at 17:44

## 2023-01-06 RX ADMIN — POTASSIUM CHLORIDE 10 MEQ: 7.46 INJECTION, SOLUTION INTRAVENOUS at 03:21

## 2023-01-06 RX ADMIN — POTASSIUM CHLORIDE 10 MEQ: 7.46 INJECTION, SOLUTION INTRAVENOUS at 18:46

## 2023-01-06 RX ADMIN — DEXTROSE AND SODIUM CHLORIDE: 5; 450 INJECTION, SOLUTION INTRAVENOUS at 12:26

## 2023-01-06 RX ADMIN — PROPOFOL 100 MCG/KG/MIN: 10 INJECTION, EMULSION INTRAVENOUS at 15:35

## 2023-01-06 RX ADMIN — POTASSIUM CHLORIDE 10 MEQ: 7.46 INJECTION, SOLUTION INTRAVENOUS at 08:25

## 2023-01-06 RX ADMIN — DEXTROSE MONOHYDRATE 125 ML: 100 INJECTION, SOLUTION INTRAVENOUS at 23:06

## 2023-01-06 RX ADMIN — SODIUM PHOSPHATE, MONOBASIC, MONOHYDRATE AND SODIUM PHOSPHATE, DIBASIC, ANHYDROUS 15 MMOL: 142; 276 INJECTION, SOLUTION INTRAVENOUS at 20:59

## 2023-01-06 RX ADMIN — POTASSIUM CHLORIDE 10 MEQ: 7.46 INJECTION, SOLUTION INTRAVENOUS at 10:45

## 2023-01-06 RX ADMIN — POTASSIUM CHLORIDE 10 MEQ: 7.46 INJECTION, SOLUTION INTRAVENOUS at 20:01

## 2023-01-06 RX ADMIN — MAGNESIUM SULFATE HEPTAHYDRATE 1000 MG: 1 INJECTION, SOLUTION INTRAVENOUS at 09:17

## 2023-01-06 RX ADMIN — POTASSIUM CHLORIDE 10 MEQ: 7.46 INJECTION, SOLUTION INTRAVENOUS at 22:20

## 2023-01-06 RX ADMIN — SODIUM CHLORIDE, PRESERVATIVE FREE 40 MG: 5 INJECTION INTRAVENOUS at 03:04

## 2023-01-06 RX ADMIN — SODIUM PHOSPHATE, MONOBASIC, MONOHYDRATE AND SODIUM PHOSPHATE, DIBASIC, ANHYDROUS 20 MMOL: 142; 276 INJECTION, SOLUTION INTRAVENOUS at 09:03

## 2023-01-06 RX ADMIN — Medication 10 ML: at 08:27

## 2023-01-06 RX ADMIN — DEXTROSE AND SODIUM CHLORIDE: 5; 450 INJECTION, SOLUTION INTRAVENOUS at 19:43

## 2023-01-06 RX ADMIN — POTASSIUM CHLORIDE 10 MEQ: 7.46 INJECTION, SOLUTION INTRAVENOUS at 04:19

## 2023-01-06 RX ADMIN — SODIUM CHLORIDE: 900 INJECTION, SOLUTION INTRAVENOUS at 15:35

## 2023-01-06 RX ADMIN — SODIUM CHLORIDE, PRESERVATIVE FREE 40 MG: 5 INJECTION INTRAVENOUS at 08:22

## 2023-01-06 RX ADMIN — POTASSIUM CHLORIDE 10 MEQ: 7.46 INJECTION, SOLUTION INTRAVENOUS at 05:25

## 2023-01-06 RX ADMIN — POTASSIUM CHLORIDE 10 MEQ: 7.46 INJECTION, SOLUTION INTRAVENOUS at 09:28

## 2023-01-06 RX ADMIN — SODIUM CHLORIDE 0.05 UNITS/KG/HR: 9 INJECTION, SOLUTION INTRAVENOUS at 06:13

## 2023-01-06 RX ADMIN — Medication 10 ML: at 20:06

## 2023-01-06 NOTE — PROGRESS NOTES
Patient is extremely hard of hearing. Has hearing aides in place, however batteries are depleted. Family Friend Frida Fowler states she will bring in the  for them this morning.

## 2023-01-06 NOTE — CONSULTS
History Of Present Illness: CHIEF COMPLAINT: Altered mental status     HISTORY OF PRESENT ILLNESS:    Patient is a 30-year-old male who presents to the ED with altered mental status. Patient was found on the floor at home unable to get up. Patient lives alone at home with 2 dogs. Otherwise patient states that he fell last night. Patient states that he fell while entering his home. He was able to get around but it was not able to ambulate. He slipped on the floor as he was not able to get onto the bed. Otherwise patient states he has noted bloody/melanotic stools the other day. States he has a previous upper endoscopy however is unsure of the results. Patient patient states that he has lost 40 pounds involuntarily over the last month or 2. Apparently patient suffered seizure as well on his way to the hospital.  He has no history of seizure. Overall patient has been weak over the last few weeks. He has also been having issues with constipation. 1/6/2023  Patient seen examined in ICU. Patient is very hard of hearing and does not have his hearing aids right now. Patient states he feels a lot better. He denies any chest or abdominal pain. He denies any nausea/vomiting or unusual shortness of breath. BUN/creatinine was 149/1.8 with CO2 electrolytes and 19. Lactic acid improved but still mildly elevated 2.5. Blood sugars improved. WBC still elevated 16.8 with hemoglobin is 6.6. Temperature 98.7 with heart rate of about 100 and blood pressure 151/80. O2 sat 100% on room air at rest with patient on insulin drip. Urinary output ranges 300-675 cc a shift. Patient is doing much better now than on admission. As above per Dr Lani Mack. The patient is a 78 y.o. male with significant past medical history of see below who presents with above.       The patient has the following symptoms:    Change in level of consciousness: alert    New Weakness: no    Numbness or Tingling: no    Difficulty Swallowing: no    Current Medications:   Scheduled Meds:   pantoprazole (PROTONIX) 40 mg injection  40 mg IntraVENous Q12H    sodium chloride flush  5-40 mL IntraVENous 2 times per day     Continuous Infusions:   sodium chloride      sodium chloride      dextrose      sodium chloride      sodium chloride      sodium chloride      sodium chloride Stopped (01/06/23 0510)    dextrose 5 % and 0.45 % NaCl 150 mL/hr at 01/06/23 1226    insulin 0.0187 Units/kg/hr (01/06/23 1221)     PRN Meds:sodium chloride, sodium chloride, glucose, dextrose bolus **OR** dextrose bolus, glucagon (rDNA), dextrose, sodium chloride flush, sodium chloride, acetaminophen **OR** acetaminophen, sodium chloride, sodium chloride, potassium chloride, magnesium sulfate, sodium phosphate IVPB **OR** sodium phosphate IVPB **OR** sodium phosphate IVPB, dextrose 5 % and 0.45 % NaCl    Allergies:  Penicillins, Seasonal, and Sulfa antibiotics    Social History:   TOBACCO:   reports that he has never smoked. He has never used smokeless tobacco.  ETOH:   reports no history of alcohol use. Past Medical History:        Diagnosis Date    Arthritis     Bilateral shoulder pain 05/2020    for TJAS Right 05/12/2020 Dr Sanchez Dark    Diabetes mellitus [facility])     Enlarged prostate     follows with Dr Baljinder Penaloza    History of Holter monitoring 07/29/2022    History of irregular heartbeat     follows with PCP    Hyperlipidemia     Hypertension     Leg wound, right 05/2020    recent history of; see podiatry notes in epic       Past Surgical History:        Procedure Laterality Date    CATARACT REMOVAL WITH IMPLANT Bilateral     ENDOSCOPY, COLON, DIAGNOSTIC      EYE SURGERY      HERNIA REPAIR      SHOULDER SURGERY Right 5/12/2020    RIGHT SHOULDER TOTAL ARTHROPLASTY REVERSE performed by Angi Wiggins MD at [place] [id number]           Outside reports reviewed: ER records, historical medical records, lab reports and radiology reports.     Patient's medications, allergies, past medical, surgical, social and family histories were reviewed and updated as appropriate. Review of Systems  A comprehensive review of systems was negative except for:       Objective:     Neuro exam 156/64 p 94 t 98  General: awake and alert. Profoundly deaf but language intact and follows simple commands with visual prompting (does not have hearing aids)  Cranial nerve testing  unable to cooperate. PERRL, corneal reflexes +  . Funduscopic eye exam revealed not testable. Motor exam:  4/5 . Deep tendon reflexes were absent bilaterally. Plantar responses were flexor bilaterally. Cerebellar exam noted finger to nose without dysmetria. Sensation was  . Lucyann Push Assessment:  Probable provoked seizure with DKA/ and severe anemia (hgb 5.7)  Head ct and ct neck negative         Plan:   EEG  Observe. No plan to start anticonvulsant unless has non provoked seizure.   No driving  GI work up in progress  Thanks for consult

## 2023-01-06 NOTE — PROGRESS NOTES
Pulmonary/Critical Care Consult Note    CHIEF COMPLAINT: Fall, hypotension, decreased appetite, and altered mental status    HISTORY OF PRESENT ILLNESS: Patient is a 79-year-old male with history of diabetes mellitus type 2, BPH, atrial fibrillation, hypertension, and hyperlipidemia. Patient presented to the ED on 1/5/2023 per EMS after being found on the floor per family. Patient apparently fell at home and was unable to to get back up. Patient also fell around 2 weeks ago and injured his right knee. Patient is unable to provide much history at this time and he was obtained per daughter, who is at bedside. Patient's daughter states the patient has not eaten since Sunday and is also recently been constipated. No history of overt GI bleeding, however his occult stool was positive for rectal exam in the ED. Patient found to be hypotensive and anemic with initial hemoglobin of 5.7. He received 3 L normal saline bolus in the ED as well as packed RBC transfusion. Patient's blood glucose was also elevated at 662 venous pH 7.15, anion gap of 36 and beta hydroxybutyrate 5.99. An insulin infusion was initiated in ED prior to admission for DKA. CT head showed atrophy and periventricular leukomalacia otherwise no intracranial abnormality. CT cervical spine negative for fracture but advanced multilevel degenerative disc and joint disease noted. Chest CTA showed no evidence of pulmonary embolism or acute pulmonary abnormality. CT abdomen pelvis with IV contrast shows circumferential thickening of the esophagus involving the proximal to mid esophagus. Prostatomegaly noted with no inflammatory findings of the urinary bladder or obstructive uropathy. Hepatic steatosis noted. Daily progress:  January 6, 2023: Patient was awake and communicating this morning. He is alert and oriented x3 however he is hard of hearing. Continues to be on insulin drip.   Beta hydroxybutyrate is mildly elevated.     ALLERGY:  Penicillins, Seasonal, and Sulfa antibiotics    FAMILY HISTORY:  Family History   Problem Relation Age of Onset    No Known Problems Mother     No Known Problems Father        SOCIAL HISTORY:  Social History     Socioeconomic History    Marital status: Single     Spouse name: Not on file    Number of children: Not on file    Years of education: Not on file    Highest education level: Not on file   Occupational History    Not on file   Tobacco Use    Smoking status: Never    Smokeless tobacco: Never   Vaping Use    Vaping Use: Never used   Substance and Sexual Activity    Alcohol use: Never    Drug use: Never    Sexual activity: Not on file   Other Topics Concern    Not on file   Social History Narrative    Not on file     Social Determinants of Health     Financial Resource Strain: Not on file   Food Insecurity: Not on file   Transportation Needs: Not on file   Physical Activity: Not on file   Stress: Not on file   Social Connections: Not on file   Intimate Partner Violence: Not on file   Housing Stability: Not on file       MEDICAL HISTORY:  Past Medical History:   Diagnosis Date    Arthritis     Bilateral shoulder pain 05/2020    for TJAS Right 05/12/2020 Dr Martha Sarkar    Diabetes mellitus [facility])     Enlarged prostate     follows with Dr Lopez King    History of Holter monitoring 07/29/2022    History of irregular heartbeat     follows with PCP    Hyperlipidemia     Hypertension     Leg wound, right 05/2020    recent history of; see podiatry notes in epic       MEDICATIONS:   pantoprazole (PROTONIX) 40 mg injection  40 mg IntraVENous Q12H    sodium chloride flush  5-40 mL IntraVENous 2 times per day      sodium chloride      sodium chloride      dextrose      sodium chloride      sodium chloride      sodium chloride      sodium chloride Stopped (01/06/23 0510)    dextrose 5 % and 0.45 % NaCl 150 mL/hr at 01/06/23 0811    insulin 0.0125 Units/kg/hr (01/06/23 1012)     sodium chloride, sodium chloride, glucose, dextrose bolus **OR** dextrose bolus, glucagon (rDNA), dextrose, sodium chloride flush, sodium chloride, acetaminophen **OR** acetaminophen, sodium chloride, sodium chloride, potassium chloride, magnesium sulfate, sodium phosphate IVPB **OR** sodium phosphate IVPB **OR** sodium phosphate IVPB, dextrose 5 % and 0.45 % NaCl    REVIEW OF SYSTEMS:  Constitutional: Denies fever, weight loss, night sweats, and fatigue  Skin: Denies pigmentation, dark lesions, and rashes   HEENT: Denies hearing loss, tinnitus, ear drainage, epistaxis, sore throat, and hoarseness. Cardiovascular: Denies palpitations, chest pain, and chest pressure. Respiratory: Denies cough, dyspnea at rest, hemoptysis, apnea, and choking.   Gastrointestinal: Denies nausea, vomiting, poor appetite, diarrhea, heartburn or reflux  Genitourinary: Denies dysuria, frequency, urgency or hematuria  Musculoskeletal: Denies myalgias, reports generalized muscle weakness and right knee pain/bruising/and swelling  Neurological: Denies dizziness, vertigo, headache, and focal weakness  Psychological: Denies anxiety and depression  Endocrine: Denies heat intolerance and cold intolerance  Hematopoietic/Lymphatic: Denies bleeding problems and blood transfusions    PHYSICAL EXAM:  Vitals:    01/06/23 0915   BP: (!) 134/56   Pulse: 92   Resp: 18   Temp: 98.7 °F (37.1 °C)   SpO2: 99%           O2 Device: None (Room air)    Constitutional: Very hard of hearing, no fever, chills, diaphoresis  HEENT: No head lesions, PERRL, EOMI, mouth without lesions, no nasal lesions, no cervical adenopathy palpated   Respiratory: Lungs with equal breath sounds bilaterally, no adventitious sounds auscultated, no accessory muscle use   CV: Regular rate and rhythm, no murmurs, JVD, his leg edema   abdomen: Soft, non tender, + bowel sounds, no lesions   Skin: Decreased skin turgor, no rash, capillary refill <2 seconds   Extremities: Muscular strength 4/4 in 4 limbs, moves 4 limbs spontaneously, distal pulses present, large amount of bruising and edema to the medial aspect of the right knee with areas of black eschar, surrounding erythema or drainage  Neurology: Awake with intermittent confusion, follows commands, moves 4 limbs on command and spontaneously, equal sensation, no dysmetria, neck is supple, no meningitic signs present.     LABS:  WBC   Date Value Ref Range Status   01/06/2023 16.8 (H) 4.5 - 11.5 E9/L Final   01/05/2023 18.1 (H) 4.5 - 11.5 E9/L Final   12/26/2022 8.5 4.5 - 11.5 E9/L Final     Hemoglobin   Date Value Ref Range Status   01/06/2023 6.6 (LL) 12.5 - 16.5 g/dL Final   01/06/2023 5.1 (LL) 12.5 - 16.5 g/dL Final   01/05/2023 5.7 (LL) 12.5 - 16.5 g/dL Final     Hematocrit   Date Value Ref Range Status   01/06/2023 20.2 (L) 37.0 - 54.0 % Final   01/06/2023 16.2 (L) 37.0 - 54.0 % Final   01/05/2023 19.1 (L) 37.0 - 54.0 % Final     MCV   Date Value Ref Range Status   01/06/2023 82.7 80.0 - 99.9 fL Final   01/05/2023 97.4 80.0 - 99.9 fL Final   12/26/2022 89.7 80.0 - 99.9 fL Final     Platelets   Date Value Ref Range Status   01/06/2023 314 130 - 450 E9/L Final   01/05/2023 527 (H) 130 - 450 E9/L Final   12/26/2022 357 130 - 450 E9/L Final     Sodium   Date Value Ref Range Status   01/06/2023 137 132 - 146 mmol/L Final   01/06/2023 133 132 - 146 mmol/L Final   01/05/2023 126 (L) 132 - 146 mmol/L Final     Potassium   Date Value Ref Range Status   01/06/2023 4.2 3.5 - 5.0 mmol/L Final   01/06/2023 3.8 3.5 - 5.0 mmol/L Final   01/05/2023 6.9 (HH) 3.5 - 5.0 mmol/L Final     Potassium reflex Magnesium   Date Value Ref Range Status   12/26/2022 4.4 3.5 - 5.0 mmol/L Final     Chloride   Date Value Ref Range Status   01/06/2023 104 98 - 107 mmol/L Final   01/06/2023 96 (L) 98 - 107 mmol/L Final   01/05/2023 91 (L) 98 - 107 mmol/L Final     CO2   Date Value Ref Range Status   01/06/2023 19 (L) 22 - 29 mmol/L Final   01/06/2023 17 (L) 22 - 29 mmol/L Final   01/05/2023 10 (L) 22 - 29 mmol/L Final     BUN   Date Value Ref Range Status   01/06/2023 149 (HH) 6 - 23 mg/dL Final   01/06/2023 146 (HH) 6 - 23 mg/dL Final   01/05/2023 139 (HH) 6 - 23 mg/dL Final     Creatinine   Date Value Ref Range Status   01/06/2023 1.8 (H) 0.7 - 1.2 mg/dL Final   01/06/2023 1.9 (H) 0.7 - 1.2 mg/dL Final   01/05/2023 1.8 (H) 0.7 - 1.2 mg/dL Final     Glucose   Date Value Ref Range Status   01/06/2023 95 74 - 99 mg/dL Final   01/06/2023 393 (H) 74 - 99 mg/dL Final   01/05/2023 498 (H) 74 - 99 mg/dL Final   03/28/2011 253 (H) 70 - 110 mg/dL Final     Calcium   Date Value Ref Range Status   01/06/2023 8.0 (L) 8.6 - 10.2 mg/dL Final   01/06/2023 8.2 (L) 8.6 - 10.2 mg/dL Final   01/05/2023 7.9 (L) 8.6 - 10.2 mg/dL Final     Total Protein   Date Value Ref Range Status   01/05/2023 4.2 (L) 6.4 - 8.3 g/dL Final   12/26/2022 7.0 6.4 - 8.3 g/dL Final   03/28/2011 7.2 5.7 - 8.2 g/dL Final     Albumin   Date Value Ref Range Status   01/05/2023 2.8 (L) 3.5 - 5.2 g/dL Final   12/26/2022 4.3 3.5 - 5.2 g/dL Final   03/28/2011 4.3 3.2 - 4.8 g/dL Final     Total Bilirubin   Date Value Ref Range Status   01/05/2023 0.2 0.0 - 1.2 mg/dL Final   12/26/2022 0.8 0.0 - 1.2 mg/dL Final     Bilirubin, Total   Date Value Ref Range Status   03/28/2011 0.2 (L) 0.3 - 1.2 mg/dL Final     Alkaline Phosphatase   Date Value Ref Range Status   01/05/2023 53 40 - 129 U/L Final   12/26/2022 107 40 - 129 U/L Final   03/28/2011 100 45 - 129 U/L Final     AST   Date Value Ref Range Status   01/05/2023 10 0 - 39 U/L Final   12/26/2022 18 0 - 39 U/L Final   03/28/2011 15 0 - 33 U/L Final     ALT   Date Value Ref Range Status   01/05/2023 7 0 - 40 U/L Final   12/26/2022 11 0 - 40 U/L Final   03/28/2011 20 10 - 49 U/L Final     Est, Glom Filt Rate   Date Value Ref Range Status   01/06/2023 39 >=60 mL/min/1.73 Final     Comment:     Pediatric calculator link  [email or web address]/gfr_calculatorped  Effective Oct 3, 2022  These results are not intended for use in patients  <25years of age. eGFR results are calculated without  a race factor using the 2021 CKD-EPI equation. Careful  clinical correlation is recommended, particularly when  comparing to results calculated using previous equations. The CKD-EPI equation is less accurate in patients with  extremes of muscle mass, extra-renal metabolism of  creatinine, excessive creatinine ingestion, or following  therapy that affects renal tubular secretion. 01/06/2023 36 >=60 mL/min/1.73 Final     Comment:     Pediatric calculator link  [email or web address] [email or web address]/gfr_calculatorped  Effective Oct 3, 2022  These results are not intended for use in patients  <25years of age. eGFR results are calculated without  a race factor using the 2021 CKD-EPI equation. Careful  clinical correlation is recommended, particularly when  comparing to results calculated using previous equations. The CKD-EPI equation is less accurate in patients with  extremes of muscle mass, extra-renal metabolism of  creatinine, excessive creatinine ingestion, or following  therapy that affects renal tubular secretion. 01/05/2023 39 >=60 mL/min/1.73 Final     Comment:     Pediatric calculator link  [email or web address] [email or web address]_[email or web address]  Effective Oct 3, 2022  These results are not intended for use in patients  <25years of age. eGFR results are calculated without  a race factor using the 2021 CKD-EPI equation. Careful  clinical correlation is recommended, particularly when  comparing to results calculated using previous equations. The CKD-EPI equation is less accurate in patients with  extremes of muscle mass, extra-renal metabolism of  creatinine, excessive creatinine ingestion, or following  therapy that affects renal tubular secretion.        GFR    Date Value Ref Range Status   05/13/2020 >60  Final   05/07/2020 >60  Final   03/28/2018 >60  Final     Magnesium   Date Value Ref Range Status   01/06/2023 1.5 (L) 1.6 - 2.6 mg/dL Final   01/06/2023 1.5 (L) 1.6 - 2.6 mg/dL Final   01/05/2023 1.4 (L) 1.6 - 2.6 mg/dL Final     Phosphorus   Date Value Ref Range Status   01/06/2023 1.4 (L) 2.5 - 4.5 mg/dL Final   01/06/2023 1.9 (L) 2.5 - 4.5 mg/dL Final   01/05/2023 2.9 2.5 - 4.5 mg/dL Final     No results for input(s): PH, PO2, PCO2, HCO3, BE, O2SAT in the last 72 hours. RADIOLOGY:  CT ABDOMEN PELVIS W IV CONTRAST Additional Contrast? None   Final Result   No evidence of pulmonary embolism or acute pulmonary abnormality. Circumferential thickening of questionable esophageal is involving the   proximal through mid esophagus correlate with symptomatology for the need of   further investigation      Abdomen and pelvis: No acute findings of the abdomen or pelvis with   incidental findings as above including moderate prostatomegaly with central   area of low attenuation. No associated gas locules maximum dimension 12 mm   likely status post TURP findings of the central prostatic portion. No   inflammatory findings of the urinary bladder or obstructing uropathy      Hepatic steatosis         CTA PULMONARY W CONTRAST   Final Result   No evidence of pulmonary embolism or acute pulmonary abnormality. Circumferential thickening of questionable esophageal is involving the   proximal through mid esophagus correlate with symptomatology for the need of   further investigation      Abdomen and pelvis: No acute findings of the abdomen or pelvis with   incidental findings as above including moderate prostatomegaly with central   area of low attenuation. No associated gas locules maximum dimension 12 mm   likely status post TURP findings of the central prostatic portion. No   inflammatory findings of the urinary bladder or obstructing uropathy      Hepatic steatosis         CT Head W/O Contrast   Final Result   1. There is no acute intracranial abnormality.   Specifically, there is no intracranial hemorrhage. 2. Atrophy and periventricular leukomalacia,         CT CSpine W/O Contrast   Final Result   1. There is no acute compression fracture or subluxation of the cervical   spine. 2. Advanced multilevel degenerative disc and degenerative joint disease. IMPRESSION:  S/P Fall  Anemia secondary to suspected upper GI bleed  Mild diabetic ketoacidosis  RED  Elevated lactic acid  Chronic atrial fibrillation on Eliquis  Protein calorie malnutrition  Right knee effusion versus large hematoma  Hypertension  Hyperlipidemia  Elevated troponin likely secondary to demand ischemia. PLAN:  N.p.o.  GI consult  H&H every 6 hours and transfuse PRBC as needed to keep hemoglobin greater than 7  Protonix 40 mg IV every 12 hours  Insulin drip per DKA protocol  BMP, magnesium, phosphorus every 4 hours and replace electrolytes as needed  Continue aggressive IV fluid hydration, monitor kidney function, and avoid nephrotoxic medications  Repeat lactic acid  Hold Eliquis  Cardiac telemetry  Inpatient orthopedic consult  Repeat beta hydroxybutyrate and if it improves insulin drip will be transitioned to subcu insulin. Patient is stable to undergo EGD from critical care point of view. ATTESTATION:  ICU Staff Physician note of personal involvement in Care  As the attending physician, I certify that I personally reviewed the patients history and personally examined the patient to confirm the physical findings described above,  And that I reviewed the relevant imaging studies and available reports. I also discussed the differential diagnosis and all of the proposed management plans with the patient and individuals accompanying the patient to this visit. They had the opportunity to ask questions about the proposed management plans and to have those questions answered.      This patient has a high probability of sudden, clinically significant deterioration, which requires the highest level of physician preparedness to intervene urgently. I managed/supervised life or organ supporting interventions that required frequent physician assessment. I devoted my full attention to the direct care of this patient for the amount of time indicated below. Time I spent with the family or surrogate(s) is included only if the patient was incapable of providing the necessary information or participating in medical decisions - Time devoted to teaching and to any procedures I billed separately is not included.     CRITICAL CARE TIME:  31 minutes    Electronically signed by Angelique Olivares MD on 1/6/2023 at 10:32 AM

## 2023-01-06 NOTE — PROGRESS NOTES
Dr Claudia Monte covering for Dr Yaya Schroeder. Dr Claudia Monte notified of ortho consult via [place].

## 2023-01-06 NOTE — CARE COORDINATION
1-6-Cm note: met with pt in ICU for transition of care, pt is extremely Hard of hearing, his family will bring in his hearing aide  so CM can do ACP . Pt asked CM to call Alexandr Agarwal (on contact list- \"unofficially adopted\" friend) per Alexandr Agarwal pt's cousins  Audra Pappas is his POA , placed call to [employer] but got no answer. Pt is independent, has no DME, no hx of SNF. Pt for EGD this afternoon, cm will follow with him at a later time to discuss dc needs/plan.  Electronically signed by Estevan Parkinson RN on 1/6/2023 at 3:01 PM

## 2023-01-06 NOTE — CONSULTS
Pulmonary/Critical Care Consult Note    CHIEF COMPLAINT: Fall, hypotension, decreased appetite, and altered mental status    HISTORY OF PRESENT ILLNESS: Patient is a 79-year-old male with history of diabetes mellitus type 2, BPH, atrial fibrillation, hypertension, and hyperlipidemia. Patient presented to the ED on 1/5/2023 per EMS after being found on the floor per family. Patient apparently fell at home and was unable to to get back up. Patient also fell around 2 weeks ago and injured his right knee. Patient is unable to provide much history at this time and he was obtained per daughter, who is at bedside. Patient's daughter states the patient has not eaten since Sunday and is also recently been constipated. No history of overt GI bleeding, however his occult stool was positive for rectal exam in the ED. Patient found to be hypotensive and anemic with initial hemoglobin of 5.7. He received 3 L normal saline bolus in the ED as well as packed RBC transfusion. Patient's blood glucose was also elevated at 662 venous pH 7.15, anion gap of 36 and beta hydroxybutyrate 5.99. An insulin infusion was initiated in ED prior to admission for DKA. CT head showed atrophy and periventricular leukomalacia otherwise no intracranial abnormality. CT cervical spine negative for fracture but advanced multilevel degenerative disc and joint disease noted. Chest CTA showed no evidence of pulmonary embolism or acute pulmonary abnormality. CT abdomen pelvis with IV contrast shows circumferential thickening of the esophagus involving the proximal to mid esophagus. Prostatomegaly noted with no inflammatory findings of the urinary bladder or obstructive uropathy. Hepatic steatosis noted.     ALLERGY:  Penicillins, Seasonal, and Sulfa antibiotics    FAMILY HISTORY:  Family History   Problem Relation Age of Onset    No Known Problems Mother     No Known Problems Father        SOCIAL HISTORY:  Social History     Socioeconomic History    Marital status: Single     Spouse name: Not on file    Number of children: Not on file    Years of education: Not on file    Highest education level: Not on file   Occupational History    Not on file   Tobacco Use    Smoking status: Never    Smokeless tobacco: Never   Vaping Use    Vaping Use: Never used   Substance and Sexual Activity    Alcohol use: Never    Drug use: Never    Sexual activity: Not on file   Other Topics Concern    Not on file   Social History Narrative    Not on file     Social Determinants of Health     Financial Resource Strain: Not on file   Food Insecurity: Not on file   Transportation Needs: Not on file   Physical Activity: Not on file   Stress: Not on file   Social Connections: Not on file   Intimate Partner Violence: Not on file   Housing Stability: Not on file       MEDICAL HISTORY:  Past Medical History:   Diagnosis Date    Arthritis     Bilateral shoulder pain 05/2020    for TJAS Right 05/12/2020 Dr Luciano Mishra    Diabetes mellitus [facility])     Enlarged prostate     follows with Dr Elva Balderrama    History of Holter monitoring 07/29/2022    History of irregular heartbeat     follows with PCP    Hyperlipidemia     Hypertension     Leg wound, right 05/2020    recent history of; see podiatry notes in epic       MEDICATIONS:   [START ON 1/6/2023] pantoprazole (PROTONIX) 40 mg injection  40 mg IntraVENous Daily      sodium chloride      sodium chloride 250 mL/hr at 01/05/23 2003    dextrose 5 % and 0.45 % NaCl      insulin 0.1 Units/kg/hr (01/05/23 2122)     sodium chloride, dextrose bolus **OR** dextrose bolus, potassium chloride, magnesium sulfate, sodium phosphate IVPB **OR** sodium phosphate IVPB **OR** sodium phosphate IVPB, dextrose 5 % and 0.45 % NaCl    REVIEW OF SYSTEMS:  Constitutional: Denies fever, weight loss, night sweats, and fatigue  Skin: Denies pigmentation, dark lesions, and rashes   HEENT: Denies hearing loss, tinnitus, ear drainage, epistaxis, sore throat, and hoarseness. Cardiovascular: Denies palpitations, chest pain, and chest pressure. Respiratory: Denies cough, dyspnea at rest, hemoptysis, apnea, and choking. Gastrointestinal: Denies nausea, vomiting, poor appetite, diarrhea, heartburn or reflux  Genitourinary: Denies dysuria, frequency, urgency or hematuria  Musculoskeletal: Denies myalgias, reports generalized muscle weakness and right knee pain/bruising/and swelling  Neurological: Denies dizziness, vertigo, headache, and focal weakness  Psychological: Denies anxiety and depression  Endocrine: Denies heat intolerance and cold intolerance  Hematopoietic/Lymphatic: Denies bleeding problems and blood transfusions    PHYSICAL EXAM:  Vitals:    01/05/23 2126   BP: (!) 112/53   Pulse: (!) 106   Resp: 23   Temp:    SpO2: 100%           O2 Device: None (Room air)    Constitutional: Very hard of hearing, no fever, chills, diaphoresis  HEENT: No head lesions, PERRL, EOMI, mouth without lesions, no nasal lesions, no cervical adenopathy palpated   Respiratory: Lungs with equal breath sounds bilaterally, no adventitious sounds auscultated, no accessory muscle use   CV: Regular rate and rhythm, no murmurs, JVD, his leg edema   abdomen: Soft, non tender, + bowel sounds, no lesions   Skin: Decreased skin turgor, no rash, capillary refill <2 seconds   Extremities: Muscular strength 4/4 in 4 limbs, moves 4 limbs spontaneously, distal pulses present, large amount of bruising and edema to the medial aspect of the right knee with areas of black eschar, surrounding erythema or drainage  Neurology: Awake with intermittent confusion, follows commands, moves 4 limbs on command and spontaneously, equal sensation, no dysmetria, neck is supple, no meningitic signs present.     LABS:  WBC   Date Value Ref Range Status   01/05/2023 18.1 (H) 4.5 - 11.5 E9/L Final   12/26/2022 8.5 4.5 - 11.5 E9/L Final   05/13/2020 11.6 (H) 4.5 - 11.5 E9/L Final     Hemoglobin   Date Value Ref Range Status 01/05/2023 5.7 (LL) 12.5 - 16.5 g/dL Final   12/26/2022 9.5 (L) 12.5 - 16.5 g/dL Final   05/13/2020 12.0 (L) 12.5 - 16.5 g/dL Final     Hematocrit   Date Value Ref Range Status   01/05/2023 19.1 (L) 37.0 - 54.0 % Final   12/26/2022 27.8 (L) 37.0 - 54.0 % Final   05/13/2020 36.0 (L) 37.0 - 54.0 % Final     MCV   Date Value Ref Range Status   01/05/2023 97.4 80.0 - 99.9 fL Final   12/26/2022 89.7 80.0 - 99.9 fL Final   05/13/2020 90.7 80.0 - 99.9 fL Final     Platelets   Date Value Ref Range Status   01/05/2023 527 (H) 130 - 450 E9/L Final   12/26/2022 357 130 - 450 E9/L Final   05/13/2020 245 130 - 450 E9/L Final     Sodium   Date Value Ref Range Status   01/05/2023 126 (L) 132 - 146 mmol/L Final   12/26/2022 135 132 - 146 mmol/L Final   11/14/2022 136 132 - 146 mmol/L Final     Potassium   Date Value Ref Range Status   01/05/2023 3.9 3.5 - 5.0 mmol/L Final   11/14/2022 4.4 3.5 - 5.0 mmol/L Final   05/13/2020 4.7 3.5 - 5.0 mmol/L Final     Potassium reflex Magnesium   Date Value Ref Range Status   12/26/2022 4.4 3.5 - 5.0 mmol/L Final     Chloride   Date Value Ref Range Status   01/05/2023 81 (L) 98 - 107 mmol/L Final   12/26/2022 93 (L) 98 - 107 mmol/L Final   11/14/2022 97 (L) 98 - 107 mmol/L Final     CO2   Date Value Ref Range Status   01/05/2023 9 (LL) 22 - 29 mmol/L Final   12/26/2022 27 22 - 29 mmol/L Final   11/14/2022 31 (H) 22 - 29 mmol/L Final     BUN   Date Value Ref Range Status   01/05/2023 135 (HH) 6 - 23 mg/dL Final   12/26/2022 20 6 - 23 mg/dL Final   11/14/2022 13 6 - 23 mg/dL Final     Creatinine   Date Value Ref Range Status   01/05/2023 2.1 (H) 0.7 - 1.2 mg/dL Final   12/26/2022 1.5 (H) 0.7 - 1.2 mg/dL Final   11/14/2022 1.3 (H) 0.7 - 1.2 mg/dL Final     Glucose   Date Value Ref Range Status   01/05/2023 662 (HH) 74 - 99 mg/dL Final   12/26/2022 122 (H) 74 - 99 mg/dL Final   11/14/2022 160 (H) 74 - 99 mg/dL Final   03/28/2011 253 (H) 70 - 110 mg/dL Final     Calcium   Date Value Ref Range Status   01/05/2023 8.9 8.6 - 10.2 mg/dL Final   12/26/2022 10.1 8.6 - 10.2 mg/dL Final   11/14/2022 9.4 8.6 - 10.2 mg/dL Final     Total Protein   Date Value Ref Range Status   12/26/2022 7.0 6.4 - 8.3 g/dL Final   03/28/2011 7.2 5.7 - 8.2 g/dL Final     Albumin   Date Value Ref Range Status   12/26/2022 4.3 3.5 - 5.2 g/dL Final   03/28/2011 4.3 3.2 - 4.8 g/dL Final     Total Bilirubin   Date Value Ref Range Status   12/26/2022 0.8 0.0 - 1.2 mg/dL Final     Bilirubin, Total   Date Value Ref Range Status   03/28/2011 0.2 (L) 0.3 - 1.2 mg/dL Final     Alkaline Phosphatase   Date Value Ref Range Status   12/26/2022 107 40 - 129 U/L Final   03/28/2011 100 45 - 129 U/L Final     AST   Date Value Ref Range Status   12/26/2022 18 0 - 39 U/L Final   03/28/2011 15 0 - 33 U/L Final     ALT   Date Value Ref Range Status   12/26/2022 11 0 - 40 U/L Final   03/28/2011 20 10 - 49 U/L Final     Est, Glom Filt Rate   Date Value Ref Range Status   01/05/2023 32 >=60 mL/min/1.73 Final     Comment:     Pediatric calculator link  [email or web address]_[email or web address]  Effective Oct 3, 2022  These results are not intended for use in patients  <25years of age. eGFR results are calculated without  a race factor using the 2021 CKD-EPI equation. Careful  clinical correlation is recommended, particularly when  comparing to results calculated using previous equations. The CKD-EPI equation is less accurate in patients with  extremes of muscle mass, extra-renal metabolism of  creatinine, excessive creatinine ingestion, or following  therapy that affects renal tubular secretion. 12/26/2022 48 >=60 mL/min/1.73 Final     Comment:     Pediatric calculator link  [email or web address]_[email or web address]  Effective Oct 3, 2022  These results are not intended for use in patients  <25years of age. eGFR results are calculated without  a race factor using the 2021 CKD-EPI equation.   Careful  clinical correlation is recommended, particularly when  comparing to results calculated using previous equations. The CKD-EPI equation is less accurate in patients with  extremes of muscle mass, extra-renal metabolism of  creatinine, excessive creatinine ingestion, or following  therapy that affects renal tubular secretion. 11/14/2022 57 >=60 mL/min/1.73 Final     Comment:     Pediatric calculator link  [email or web address]_[email or web address]  Effective Oct 3, 2022  These results are not intended for use in patients  <25years of age. eGFR results are calculated without  a race factor using the 2021 CKD-EPI equation. Careful  clinical correlation is recommended, particularly when  comparing to results calculated using previous equations. The CKD-EPI equation is less accurate in patients with  extremes of muscle mass, extra-renal metabolism of  creatinine, excessive creatinine ingestion, or following  therapy that affects renal tubular secretion. GFR    Date Value Ref Range Status   05/13/2020 >60  Final   05/07/2020 >60  Final   03/28/2018 >60  Final     Magnesium   Date Value Ref Range Status   01/05/2023 1.6 1.6 - 2.6 mg/dL Final     No results found for: PHOS  No results for input(s): PH, PO2, PCO2, HCO3, BE, O2SAT in the last 72 hours. RADIOLOGY:  CT ABDOMEN PELVIS W IV CONTRAST Additional Contrast? None   Final Result   No evidence of pulmonary embolism or acute pulmonary abnormality. Circumferential thickening of questionable esophageal is involving the   proximal through mid esophagus correlate with symptomatology for the need of   further investigation      Abdomen and pelvis: No acute findings of the abdomen or pelvis with   incidental findings as above including moderate prostatomegaly with central   area of low attenuation. No associated gas locules maximum dimension 12 mm   likely status post TURP findings of the central prostatic portion.   No inflammatory findings of the urinary bladder or obstructing uropathy      Hepatic steatosis         CTA PULMONARY W CONTRAST   Final Result   No evidence of pulmonary embolism or acute pulmonary abnormality. Circumferential thickening of questionable esophageal is involving the   proximal through mid esophagus correlate with symptomatology for the need of   further investigation      Abdomen and pelvis: No acute findings of the abdomen or pelvis with   incidental findings as above including moderate prostatomegaly with central   area of low attenuation. No associated gas locules maximum dimension 12 mm   likely status post TURP findings of the central prostatic portion. No   inflammatory findings of the urinary bladder or obstructing uropathy      Hepatic steatosis         CT Head W/O Contrast   Final Result   1. There is no acute intracranial abnormality. Specifically, there is no   intracranial hemorrhage. 2. Atrophy and periventricular leukomalacia,         CT CSpine W/O Contrast   Final Result   1. There is no acute compression fracture or subluxation of the cervical   spine. 2. Advanced multilevel degenerative disc and degenerative joint disease.              IMPRESSION:  Fall  Anemia secondary to suspected upper GI bleed  Diabetic ketoacidosis  RED  Elevated lactic acid  Chronic atrial fibrillation on Eliquis  Protein calorie malnutrition  Right knee effusion versus large hematoma  Hypertension  Hyperlipidemia      PLAN:  N.p.o.  GI consult  H&H every 6 hours and transfuse PRBC as needed to keep hemoglobin greater than 7  Protonix 40 mg IV every 12 hours  Insulin drip per DKA protocol  BMP, magnesium, phosphorus every 4 hours and replace electrolytes as needed  Continue aggressive IV fluid hydration, monitor kidney function, and avoid nephrotoxic medications  Repeat lactic acid  Hold Eliquis  Cardiac telemetry  Inpatient orthopedic consult        ATTESTATION:  ICU Staff Physician note of personal involvement in Care  As the attending physician, I certify that I personally reviewed the patients history and personally examined the patient to confirm the physical findings described above,  And that I reviewed the relevant imaging studies and available reports. I also discussed the differential diagnosis and all of the proposed management plans with the patient and individuals accompanying the patient to this visit. They had the opportunity to ask questions about the proposed management plans and to have those questions answered. This patient has a high probability of sudden, clinically significant deterioration, which requires the highest level of physician preparedness to intervene urgently. I managed/supervised life or organ supporting interventions that required frequent physician assessment. I devoted my full attention to the direct care of this patient for the amount of time indicated below. Time I spent with the family or surrogate(s) is included only if the patient was incapable of providing the necessary information or participating in medical decisions - Time devoted to teaching and to any procedures I billed separately is not included.     CRITICAL CARE TIME:  31 minutes    Electronically signed by JUAN R Evans CNP on 1/5/2023 at 9:27 PM

## 2023-01-06 NOTE — PROGRESS NOTES
Comprehensive Nutrition Assessment    Type and Reason for Visit:  Initial, Positive Nutrition Screen    Nutrition Recommendations/Plan:   Continue NPO status   Monitor nutrition progression and provide recommendations as indicated/medically appropriate        Malnutrition Assessment:  Malnutrition Status: Moderate malnutrition (01/06/23 1709)    Context:  Chronic Illness     Findings of the 6 clinical characteristics of malnutrition:  Energy Intake:  Mild decrease in energy intake (Comment)  Weight Loss:  Unable to assess ([place] d/t lack of measured wt hx per EMR, no significant wt changes noted. Per pt 40lb loss in 2 months but unable to verify.)     Body Fat Loss:  Mild body fat loss Triceps, Orbital   Muscle Mass Loss:  Mild muscle mass loss Temples (temporalis), Clavicles (pectoralis & deltoids), Hand (interosseous)  Fluid Accumulation:  Unable to assess     Strength:  Not Performed    Nutrition Assessment:    Pt admit s/p fall at home w/ DKA, RED, anemia 2/2 GI bleed - GI consulted - EGD showing Hiatus hernia, esophageal stricture. PMHx of T2DM, BPH, Afib, HTN, HLD. Pt meets criteria for moderate malnutrition, Pt is NPO at this time, on IVFs & insulin drip. Will continue to monitor for nutrition progression, will provide recommendations as indicated    Nutrition Related Findings:    abd soft, nontender, nondistended, active BS x4, no edema noted, A&O, [place], glasses, subjective wt loss 40lb in the past 2 months - per EMR no sign. wt change. Wound Type: None       Current Nutrition Intake & Therapies:    Average Meal Intake: NPO  Average Supplements Intake: NPO  Diet NPO Exceptions are: Sips of Water with Meds    Anthropometric Measures:  Height: 5' 8\" (172.7 cm)  Ideal Body Weight (IBW): 154 lbs (70 kg)    Admission Body Weight: 180 lb 10 oz (81.9 kg) (1/5 bed scale)  Current Body Weight: 180 lb 10 oz (81.9 kg) (1/5 bed scale), 117.3 % IBW.  Weight Source: Bed Scale  Current BMI (kg/m2): 27.5  Usual Body Weight:  ([place] d/t lack of measured wt per EMR, Pt reports UBW ~220lb)  Weight Adjustment For: No Adjustment  BMI Categories: Overweight (BMI 25.0-29. 9)    Estimated Daily Nutrient Needs:  Energy Requirements Based On: Formula  Weight Used for Energy Requirements: Current  Energy (kcal/day): 1800-2000kcal/day ( MSJ 1529 x 1.2-1.3)  Weight Used for Protein Requirements: Ideal  Protein (g/day): 95-105g/day ( 1.3-1.5g/kg IBW)  Method Used for Fluid Requirements: 1 ml/kcal  Fluid (ml/day): 1800-2000ml/day or 1ml/kcal    Nutrition Diagnosis:   Inadequate oral intake related to altered GI function as evidenced by NPO or clear liquid status due to medical condition    Nutrition Interventions:   Food and/or Nutrient Delivery: Continue NPO  Nutrition Education/Counseling: No recommendation at this time  Coordination of Nutrition Care: Continue to monitor while inpatient    Goals:  Goals: other (specify)  Specify Other Goals: Nutrition progression    Nutrition Monitoring and Evaluation:   Behavioral-Environmental Outcomes: None Identified  Food/Nutrient Intake Outcomes: Diet Advancement/Tolerance  Physical Signs/Symptoms Outcomes: Biochemical Data, Chewing or Swallowing, GI Status, Fluid Status or Edema, Nutrition Focused Physical Findings, Weight, Skin    Discharge Planning:     Too soon to determine     Sonja Mensah RD  Contact:

## 2023-01-06 NOTE — ACP (ADVANCE CARE PLANNING)
Advance Care Planning   Healthcare Decision Maker:  UNABLE TO COMPLETE AT THIS TIME       Click here to complete Healthcare Decision Makers including selection of the Healthcare Decision Maker Relationship (ie \"Primary\").

## 2023-01-06 NOTE — ED NOTES
October 7, 2022      Derick Abrams  [address]  [address]  [facility] [address]        To Whom It May Concern:    Derick Abrams  was seen in clinic today for an appointment.        Sincerely,        Gina Lambert DO     Potassium 4.2 on repeat cmp. Per order, pt to get 30 mEq potassium chloride. First bag hung.       Hever Hatfield, DONNA  01/05/23 9133

## 2023-01-06 NOTE — CONSULTS
Jonathon Power M.D. [facility]  Dr. Zunilda Winter M.D.,  Dr. Myron Briceño M.D.,  Dr. Yasmin Agarwal D.O.,  Dr. Otoniel Griggs D.O. ,  Dr. Hi Molina M.D.,          Levada Favors  78 y.o.  male      Re: GI bleed  Requesting physician: Dr. Norbert Vicente  Date:9:34 AM 1/6/2023          HPI: 72-year-old male patient seen in the hospital for above-described issue. Patient apparently is very hard of hearing and his hearing aid is not working secondary to depleted battery. Patient apparently presented to the hospital yesterday with chief complaint of witnessed seizure by EMS, hyperglycemia. According to medical records patient has previous history of diabetes mellitus, hyperlipidemia, arthritis, hypertension, atrial fibrillation with Eliquis. Patient also apparently has sustained a fall 3 to 4 weeks ago with some residual swelling cannot healing wounds on the  medial aspect of the right knee. Patient apparently also has history of constipation and had recently resorted to Fleet enema having bowel movement prior to coming to the hospital.  As much as possible to obtain from the patient he is not experiencing any abdominal pain, nausea or emesis. He is unaware of any bleeding at home. Also cannot provide details in regards to previous endoscopies.     Information sources:   -Patient  -medical record  -health care team    PMHx:  Past Medical History:   Diagnosis Date    Arthritis     Bilateral shoulder pain 05/2020    for TJAS Right 05/12/2020 Dr Tyrell Wing    Diabetes mellitus [facility])     Enlarged prostate     follows with Dr Leigh Alonso    History of Holter monitoring 07/29/2022    History of irregular heartbeat     follows with PCP    Hyperlipidemia     Hypertension     Leg wound, right 05/2020    recent history of; see podiatry notes in epic       PSHx:  Past Surgical History:   Procedure Laterality Date    CATARACT REMOVAL WITH IMPLANT Bilateral     ENDOSCOPY, COLON, DIAGNOSTIC      EYE SURGERY HERNIA REPAIR      SHOULDER SURGERY Right 5/12/2020    RIGHT SHOULDER TOTAL ARTHROPLASTY REVERSE performed by Bentley Olea MD at [place] [address]:  Current Facility-Administered Medications   Medication Dose Route Frequency Provider Last Rate Last Admin    0.9 % sodium chloride infusion   IntraVENous PRN Lalo Guerrero APRN - CNP        0.9 % sodium chloride infusion   IntraVENous PRN Lalo Guerrero APRN - CNP        pantoprazole (PROTONIX) 40 mg in sodium chloride (PF) 0.9 % 10 mL injection  40 mg IntraVENous Q12H JUAN R Mcdonald - CNP   40 mg at 01/06/23 6367    glucose chewable tablet 16 g  4 tablet Oral PRN Cartagena Render, DO        dextrose bolus 10% 125 mL  125 mL IntraVENous PRN Cartagena Render, DO        Or    dextrose bolus 10% 250 mL  250 mL IntraVENous PRN Cartagena Render, DO        glucagon (rDNA) injection 1 mg  1 mg SubCUTAneous PRN Cartagena Render, DO        dextrose 10 % infusion   IntraVENous Continuous PRN Cartagena Render, DO        sodium chloride flush 0.9 % injection 5-40 mL  5-40 mL IntraVENous 2 times per day Cartagena Render, DO   10 mL at 01/06/23 0827    sodium chloride flush 0.9 % injection 5-40 mL  5-40 mL IntraVENous PRN Cartagena Render, DO        0.9 % sodium chloride infusion   IntraVENous PRN Cartagena Render, DO        acetaminophen (TYLENOL) tablet 650 mg  650 mg Oral Q6H PRN Cartagena Render, DO        Or    acetaminophen (TYLENOL) suppository 650 mg  650 mg Rectal Q6H PRN Ismail U Filipe, DO        0.9 % sodium chloride infusion   IntraVENous PRN JUAN R Mcdonald - CNP        0.9 % sodium chloride infusion   IntraVENous PRN Fritz Leonardo MD        potassium chloride 10 mEq/100 mL IVPB (Peripheral Line)  10 mEq IntraVENous PRN Lavella Spinner,  mL/hr at 01/06/23 0928 10 mEq at 01/06/23 0928    magnesium sulfate 1000 mg in dextrose 5% 100 mL IVPB  1,000 mg IntraVENous PRN Lavella Spinner,  mL/hr at 01/06/23 0917 1,000 mg at 01/06/23 7588    sodium phosphate 10 mmol in sodium chloride 0.9 % 250 mL IVPB  10 mmol IntraVENous PRN Jones Mews, DO        Or    sodium phosphate 15 mmol in dextrose 5 % 250 mL IVPB  15 mmol IntraVENous PRN Jones Mews, DO        Or    sodium phosphate 20 mmol in dextrose 5 % 500 mL IVPB  20 mmol IntraVENous PRN Jones Mews, DO 83.3 mL/hr at 01/06/23 0903 20 mmol at 01/06/23 0903    0.9 % sodium chloride infusion   IntraVENous Continuous Jones Mews, DO   Stopped at 01/06/23 0510    dextrose 5 % and 0.45 % sodium chloride infusion   IntraVENous Continuous PRN Jones Mews,  mL/hr at 01/06/23 0811 Rate Verify at 01/06/23 0811    insulin regular (HUMULIN R;NOVOLIN R) 100 Units in sodium chloride 0.9 % 100 mL infusion  0.01-0.5 Units/kg/hr IntraVENous Continuous Jones Mews, DO 1.1 mL/hr at 01/06/23 0906 0.0125 Units/kg/hr at 01/06/23 0906        SocHx:  Social History     Socioeconomic History    Marital status: Single     Spouse name: Not on file    Number of children: Not on file    Years of education: Not on file    Highest education level: Not on file   Occupational History    Not on file   Tobacco Use    Smoking status: Never    Smokeless tobacco: Never   Vaping Use    Vaping Use: Never used   Substance and Sexual Activity    Alcohol use: Never    Drug use: Never    Sexual activity: Not on file   Other Topics Concern    Not on file   Social History Narrative    Not on file     Social Determinants of Health     Financial Resource Strain: Not on file   Food Insecurity: Not on file   Transportation Needs: Not on file   Physical Activity: Not on file   Stress: Not on file   Social Connections: Not on file   Intimate Partner Violence: Not on file   Housing Stability: Not on file       FamHx:  Family History   Problem Relation Age of Onset    No Known Problems Mother     No Known Problems Father        Allergy:  Allergies   Allergen Reactions    Penicillins Other (See Comments)     Occurred as child, rxn unknown    Seasonal     Sulfa Antibiotics Other (See Comments)     Occurred as child, rxn unknown         ROS: As described in the HPI and in addition is negative upon detailed review of systems or unobtainable unless otherwise stated in this dictation. PE:  BP (!) 134/56   Pulse 92   Temp 98.7 °F (37.1 °C) (Axillary)   Resp 18   Ht 5' 8\" (1.727 m)   Wt 180 lb 9.6 oz (81.9 kg)   SpO2 99%   BMI 27.46 kg/m²     Gen.: NAD/elderly  male  Head: Atraumatic/normocephalic  Eyes: EOMI/anicteric sclera  ENT: Moist oral mucosa/no discharge from nose or ears. Hard of hearing  Neck: Supple with trachea midline  Chest: CTA B  Cor: Irregular  Abd.: Soft and obese. Nontender. BS +  Extr.:  BLE trace to 1+ edema.   Right knee with crusted wound/edema on medial aspect  Muscles: Decreased tone and bulk, consistent with age and condition  Skin: Warm and dry      DATA:     Lab Results   Component Value Date/Time    WBC 16.8 01/06/2023 12:50 AM    RBC 1.96 01/06/2023 12:50 AM    HGB 6.6 01/06/2023 05:35 AM    HCT 20.2 01/06/2023 05:35 AM    MCV 82.7 01/06/2023 12:50 AM    MCH 26.0 01/06/2023 12:50 AM    MCHC 31.5 01/06/2023 12:50 AM    RDW 20.0 01/06/2023 12:50 AM     01/06/2023 12:50 AM    MPV 10.5 01/06/2023 12:50 AM     Lab Results   Component Value Date/Time     01/06/2023 05:35 AM    K 4.2 01/06/2023 05:35 AM    K 4.4 12/26/2022 03:10 PM     01/06/2023 05:35 AM    CO2 19 01/06/2023 05:35 AM     01/06/2023 05:35 AM    CREATININE 1.8 01/06/2023 05:35 AM    CALCIUM 8.0 01/06/2023 05:35 AM    PROT 4.2 01/05/2023 08:11 PM    LABALBU 2.8 01/05/2023 08:11 PM    LABALBU 4.3 03/28/2011 06:05 PM    BILITOT 0.2 01/05/2023 08:11 PM    ALKPHOS 53 01/05/2023 08:11 PM    AST 10 01/05/2023 08:11 PM    ALT 7 01/05/2023 08:11 PM     Lab Results   Component Value Date/Time    LIPASE 99 01/05/2023 02:33 PM     No results found for: AMYLASE      ASSESSMENT/PLAN:  Patient Active Problem List Diagnosis    Arthropathy of right shoulder    Diabetes mellitus ([facility] [address]    Hyperlipidemia    Hypertension    Moderate obesity    Paroxysmal atrial fibrillation (HCC)    DKA, type 1, not at goal [facility])     1. Anemia  -Acute on chronic  -Reported positive FOBT but no evidence of overt bleed  -Obtaining of iron studies given blood transfusion will be of limited clinical value  -Agree with IV PPI  -Plan for further evaluation with upper endoscopy when okay with consultants. 2.  Elevated troponin  -Likely demand ischemia/type II non-STEMI  -Recommend cardiology evaluation    3. Seizure  -Await neurology evaluation    4. Diabetes mellitus  -Await cardiology evaluation severe hyperglycemia on presentation    Thank you for the opportunity see this patient in consultation    Hay Daniels MD  1/6/2023  9:34 AM    NOTE:  This report was transcribed using voice recognition software. Every effort was made to ensure accuracy; however, inadvertent computerized transcription errors may be present.

## 2023-01-06 NOTE — PROGRESS NOTES
Intensivist notified of nursing communication order from Dr. Katt Lopez. No new orders at this time.

## 2023-01-06 NOTE — PROGRESS NOTES
EGD completed, note dictated. Findings: Hiatus hernia, esophageal stricture (bougienage done). No evidence of bleeding in the upper GI tract.

## 2023-01-06 NOTE — ANESTHESIA PRE PROCEDURE
Department of Anesthesiology  Preprocedure Note       Name:  Tammy Lester   Age:  78 y.o.  :  1945                                          MRN:  [id number]         Date:  2023      Surgeon: Akin Soler):  Rene Rosas DO    Procedure: EGD ESOPHAGOGASTRODUODENOSCOPY **KEEP LAST**    Medications prior to admission:   Prior to Admission medications    Medication Sig Start Date End Date Taking? Authorizing Provider   FARXIGA 5 MG tablet take 1 tablet by mouth once daily 10/23/22   Historical Provider, MD   apixaban (ELIQUIS) 5 MG TABS tablet Take 1 tablet by mouth 2 times daily 10/31/22 1/5/23  Hoa Jones MD   atorvastatin (LIPITOR) 40 MG tablet take 1 tablet by mouth daily 22   Historical Provider, MD   TRULICITY 1.5 KE/3.9AV SOPN inject 0.5 milliliters ( 1 AND 1/2 milligrams ) subcutaneously every week 22   Historical Provider, MD   Multiple Vitamins-Minerals (THERAPEUTIC MULTIVITAMIN-MINERALS) tablet Take 1 tablet by mouth daily Last taken 2020    Historical Provider, MD   Ascorbic Acid (VITAMIN C PO) Take 1 tablet by mouth daily Last taken     Historical Provider, MD   acetaminophen (TYLENOL) 500 MG tablet Take 500 mg by mouth every 6 hours as needed for Pain    Historical Provider, MD   metOLazone (ZAROXOLYN) 5 MG tablet Take 5 mg by mouth daily    Historical Provider, MD   lisinopril (PRINIVIL;ZESTRIL) 40 MG tablet Take 40 mg by mouth daily    Historical Provider, MD   metFORMIN (GLUCOPHAGE) 500 MG tablet Take 500 mg by mouth 2 times daily (with meals)    Historical Provider, MD   NIFEdipine (ADALAT CC) 30 MG extended release tablet Take 30 mg by mouth daily Take am day of surgery 2020    Historical Provider, MD       Current medications:    No current facility-administered medications for this visit. No current outpatient medications on file.      Facility-Administered Medications Ordered in Other Visits   Medication Dose Route Frequency Provider Last Rate Last Admin    0.9 % sodium chloride infusion   IntraVENous PRN Severo Bills, APRN - CNP        0.9 % sodium chloride infusion   IntraVENous PRN Severo Bills, JUAN R - ALLISON        pantoprazole (PROTONIX) 40 mg in sodium chloride (PF) 0.9 % 10 mL injection  40 mg IntraVENous Q12H Severo Bills, APRN - CNP   40 mg at 01/06/23 0271    glucose chewable tablet 16 g  4 tablet Oral PRN Nitza Nares, DO        dextrose bolus 10% 125 mL  125 mL IntraVENous PRN Nitza Nares, DO        Or    dextrose bolus 10% 250 mL  250 mL IntraVENous PRN Nitza Nares, DO        glucagon (rDNA) injection 1 mg  1 mg SubCUTAneous PRN Nitza Nares, DO        dextrose 10 % infusion   IntraVENous Continuous PRN Nitza Nares, DO        sodium chloride flush 0.9 % injection 5-40 mL  5-40 mL IntraVENous 2 times per day Nitza Nares, DO   10 mL at 01/06/23 0827    sodium chloride flush 0.9 % injection 5-40 mL  5-40 mL IntraVENous PRN Nitza Nares, DO        0.9 % sodium chloride infusion   IntraVENous PRN Nitza Nares, DO        acetaminophen (TYLENOL) tablet 650 mg  650 mg Oral Q6H PRN Nitza Nares, DO        Or    acetaminophen (TYLENOL) suppository 650 mg  650 mg Rectal Q6H PRN Nitza Nares, DO        0.9 % sodium chloride infusion   IntraVENous PRN Severo Bills, APRN - CNP        EPINEPHrine PF 1 MG/ML injection (Anaphylaxis)    PRN Suresh Lopez MD   0.6 mcg at 01/06/23 1458    0.9 % sodium chloride infusion   IntraVENous PRN John Fulton MD        potassium chloride 10 mEq/100 mL IVPB (Peripheral Line)  10 mEq IntraVENous PRN Horacio Kitchen,  mL/hr at 01/06/23 1045 10 mEq at 01/06/23 1045    magnesium sulfate 1000 mg in dextrose 5% 100 mL IVPB  1,000 mg IntraVENous PRN Horacio Kitchen, DO   Stopped at 01/06/23 1024    sodium phosphate 10 mmol in sodium chloride 0.9 % 250 mL IVPB  10 mmol IntraVENous PRN Horacio Kitchen, DO        Or    sodium phosphate 15 mmol in dextrose 5 % 250 mL IVPB  15 mmol IntraVENous PRN Edyta Denominational, DO        Or    sodium phosphate 20 mmol in dextrose 5 % 500 mL IVPB  20 mmol IntraVENous PRN Edyta Denominational, DO 83.3 mL/hr at 01/06/23 1106 Rate Verify at 01/06/23 1106    0.9 % sodium chloride infusion   IntraVENous Continuous Edyta Denominational, DO   Stopped at 01/06/23 0510    dextrose 5 % and 0.45 % sodium chloride infusion   IntraVENous Continuous PRN Edyta Denominational,  mL/hr at 01/06/23 1226 New Bag at 01/06/23 1226    insulin regular (HUMULIN R;NOVOLIN R) 100 Units in sodium chloride 0.9 % 100 mL infusion  0.01-0.5 Units/kg/hr IntraVENous Continuous Edyta Denominational, DO 2.4 mL/hr at 01/06/23 1405 0.028 Units/kg/hr at 01/06/23 1405       Allergies:     Allergies   Allergen Reactions    Penicillins Other (See Comments)     Occurred as child, rxn unknown    Seasonal     Sulfa Antibiotics Other (See Comments)     Occurred as child, rxn unknown       Problem List:    Patient Active Problem List   Diagnosis Code    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    Diabetes mellitus ([address] E11.9    Hyperlipidemia E78.5    Hypertension I10    Moderate obesity E66.8    Paroxysmal atrial fibrillation (HCC) I48.0    DKA, type 1, not at goal ([address] E10.10    Seizure ([facility] [address] R56.9       Past Medical History:        Diagnosis Date    Arthritis     Bilateral shoulder pain 05/2020    for TJAS Right 05/12/2020 Dr Lucero Side    Diabetes mellitus [facility])     Enlarged prostate     follows with Dr Jesus Orta History of Holter monitoring 07/29/2022    History of irregular heartbeat     follows with PCP    Hyperlipidemia     Hypertension     Leg wound, right 05/2020    recent history of; see podiatry notes in epic       Past Surgical History:        Procedure Laterality Date    CATARACT REMOVAL WITH IMPLANT Bilateral     ENDOSCOPY, COLON, DIAGNOSTIC      EYE SURGERY      HERNIA REPAIR      SHOULDER SURGERY Right 5/12/2020    RIGHT SHOULDER TOTAL ARTHROPLASTY REVERSE performed by Vandana Goode MD at [place] [address]         Social History:    Social History     Tobacco Use    Smoking status: Never    Smokeless tobacco: Never   Substance Use Topics    Alcohol use: Never                                Counseling given: Not Answered      Vital Signs (Current): There were no vitals filed for this visit. BP Readings from Last 3 Encounters:   01/06/23 (!) 144/61   12/26/22 (!) 178/84   11/14/22 122/70       NPO Status:                                                                                 BMI:   Wt Readings from Last 3 Encounters:   01/06/23 180 lb 9.6 oz (81.9 kg)   11/14/22 188 lb (85.3 kg)   09/29/22 187 lb (84.8 kg)     There is no height or weight on file to calculate BMI.    CBC:   Lab Results   Component Value Date/Time    WBC 20.3 01/06/2023 11:59 AM    RBC 2.54 01/06/2023 11:59 AM    HGB 7.2 01/06/2023 11:59 AM    HGB 7.1 01/06/2023 11:59 AM    HCT 21.8 01/06/2023 11:59 AM    HCT 21.6 01/06/2023 11:59 AM    MCV 85.8 01/06/2023 11:59 AM    RDW 17.4 01/06/2023 11:59 AM     01/06/2023 11:59 AM       CMP:   Lab Results   Component Value Date/Time     01/06/2023 01:58 PM    K 3.6 01/06/2023 01:58 PM    K 4.4 12/26/2022 03:10 PM    CL 98 01/06/2023 01:58 PM    CO2 19 01/06/2023 01:58 PM     01/06/2023 01:58 PM    CREATININE 1.6 01/06/2023 01:58 PM    GFRAA >60 05/13/2020 03:50 AM    LABGLOM 45 01/06/2023 01:58 PM    GLUCOSE 290 01/06/2023 01:58 PM    GLUCOSE 253 03/28/2011 06:05 PM    PROT 4.2 01/05/2023 08:11 PM    CALCIUM 8.2 01/06/2023 01:58 PM    BILITOT 0.2 01/05/2023 08:11 PM    ALKPHOS 53 01/05/2023 08:11 PM    AST 10 01/05/2023 08:11 PM    ALT 7 01/05/2023 08:11 PM       POC Tests: No results for input(s): POCGLU, POCNA, POCK, POCCL, POCBUN, POCHEMO, POCHCT in the last 72 hours.     Coags:   Lab Results   Component Value Date/Time    PROTIME 23.7 01/05/2023 10:24 PM    INR 2.1 01/05/2023 10:24 PM    APTT 27.5 01/05/2023 10:24 PM       HCG (If Applicable): No results found for: PREGTESTUR, PREGSERUM, HCG, HCGQUANT     ABGs: No results found for: PHART, PO2ART, PFW2CNS, MEO6AVG, BEART, I8IVLDWK     Type & Screen (If Applicable):  No results found for: LABABO, [address]    Anesthesia Evaluation  Patient summary reviewed no history of anesthetic complications:   Airway: Mallampati: II  TM distance: >3 FB   Neck ROM: full  Mouth opening: > = 3 FB   Dental:    (+) upper dentures and lower dentures      Pulmonary:Negative Pulmonary ROS breath sounds clear to auscultation                             Cardiovascular:  Exercise tolerance: good (>4 METS),   (+) hypertension:, dysrhythmias: atrial fibrillation, hyperlipidemia      ECG reviewed  Rhythm: regular  Rate: normal           Beta Blocker:  Not on Beta Blocker      ROS comment: EKG 1/2017:  Sinus rhythm with frequent premature ventricular complexes  Inferior infarct , age undetermined  Abnormal ECG  No previous ECGs available  Confirmed by Codi Worthington ([id number]) on 1/6/2017 2:59:23 PM    EKG 5/7/2020: NSR with PACs and PVCs     Neuro/Psych:   Negative Neuro/Psych ROS              GI/Hepatic/Renal:   (+) renal disease: ESRD and dialysis,          ROS comment: Enlarged prostate  Diastases of abdominal wall with Small ventral hernia. Endo/Other:    (+) DiabetesType II DM, poorly controlled, , blood dyscrasia: anemia and anticoagulation therapy, arthritis:., .                  ROS comment: DKA this admission Abdominal:   (+) obese,           Vascular:   + PVD, aortic or cerebral, . ROS comment:   . Other Findings:                 Anesthesia Plan      MAC     ASA 4       Induction: intravenous. Anesthetic plan and risks discussed with patient. DOS STAFF ADDENDUM:    Pt seen and examined, chart reviewed (including anesthesia, drug and allergy history).        Anesthetic plan, risks, benefits, alternatives, and personnel involved discussed with patient. Patient verbalized an understanding and agrees to proceed. Plan discussed with care team members and agreed upon.     Darylene Garnet, MD  Staff Anesthesiologist  3:23 PM    Darylene Garnet, MD   1/6/2023

## 2023-01-06 NOTE — PROGRESS NOTES
Department of Internal Medicine  PN    PCP: Sena Griffith DO  Admitting Physician: Dr. Buster Marte  Consultants:   Date of Service: 1/5/2023    CHIEF COMPLAINT: Altered mental status    HISTORY OF PRESENT ILLNESS:    Patient is a 68-year-old male who presents to the ED with altered mental status. Patient was found on the floor at home unable to get up. Patient lives alone at home with 2 dogs. Otherwise patient states that he fell last night. Patient states that he fell while entering his home. He was able to get around but it was not able to ambulate. He slipped on the floor as he was not able to get onto the bed. Otherwise patient states he has noted bloody/melanotic stools the other day. States he has a previous upper endoscopy however is unsure of the results. Patient patient states that he has lost 40 pounds involuntarily over the last month or 2. Apparently patient suffered seizure as well on his way to the hospital.  He has no history of seizure. Overall patient has been weak over the last few weeks. He has also been having issues with constipation. 1/6/2023  Patient seen examined in ICU. Patient is very hard of hearing and does not have his hearing aids right now. Patient states he feels a lot better. He denies any chest or abdominal pain. He denies any nausea/vomiting or unusual shortness of breath. BUN/creatinine was 149/1.8 with CO2 electrolytes and 19. Lactic acid improved but still mildly elevated 2.5. Blood sugars improved. WBC still elevated 16.8 with hemoglobin is 6.6. Temperature 98.7 with heart rate of about 100 and blood pressure 151/80. O2 sat 100% on room air at rest with patient on insulin drip. Urinary output ranges 300-675 cc a shift. Patient is doing much better now than on admission.       PAST MEDICAL Hx:  Past Medical History:   Diagnosis Date    Arthritis     Bilateral shoulder pain 05/2020    for TJAS Right 05/12/2020 Dr Nela Oliveira    Diabetes mellitus [facility]) Enlarged prostate     follows with  [facility]    History of Holter monitoring 07/29/2022    History of irregular heartbeat     follows with PCP    Hyperlipidemia     Hypertension     Leg wound, right 05/2020    recent history of; see podiatry notes in epic       PAST SURGICAL Hx:   Past Surgical History:   Procedure Laterality Date    CATARACT REMOVAL WITH IMPLANT Bilateral     ENDOSCOPY, COLON, DIAGNOSTIC      EYE SURGERY      HERNIA REPAIR      SHOULDER SURGERY Right 5/12/2020    RIGHT SHOULDER TOTAL ARTHROPLASTY REVERSE performed by Genesis Pollard MD at [address] Hx:  Family History   Problem Relation Age of Onset    No Known Problems Mother     No Known Problems Father        HOME MEDICATIONS:  Prior to Admission medications    Medication Sig Start Date End Date Taking?  Authorizing Provider   FARXIGA 5 MG tablet take 1 tablet by mouth once daily 10/23/22   Historical Provider, MD   apixaban (ELIQUIS) 5 MG TABS tablet Take 1 tablet by mouth 2 times daily 10/31/22 1/5/23  Lorin Tucker MD   atorvastatin (LIPITOR) 40 MG tablet take 1 tablet by mouth daily 7/29/22   Historical Provider, MD   TRULICITY 1.5 IF/4.4SF SOPN inject 0.5 milliliters ( 1 AND 1/2 milligrams ) subcutaneously every week 9/16/22   Historical Provider, MD   Multiple Vitamins-Minerals (THERAPEUTIC MULTIVITAMIN-MINERALS) tablet Take 1 tablet by mouth daily Last taken 05/03/2020    Historical Provider, MD   Ascorbic Acid (VITAMIN C PO) Take 1 tablet by mouth daily Last taken 05/03    Historical Provider, MD   acetaminophen (TYLENOL) 500 MG tablet Take 500 mg by mouth every 6 hours as needed for Pain    Historical Provider, MD   metOLazone (ZAROXOLYN) 5 MG tablet Take 5 mg by mouth daily    Historical Provider, MD   lisinopril (PRINIVIL;ZESTRIL) 40 MG tablet Take 40 mg by mouth daily    Historical Provider, MD   metFORMIN (GLUCOPHAGE) 500 MG tablet Take 500 mg by mouth 2 times daily (with meals)    Historical Provider, MD   NIFEdipine (ADALAT CC) 30 MG extended release tablet Take 30 mg by mouth daily Take am day of surgery 05/12/2020    Historical Provider, MD       ALLERGIES:  Penicillins, Seasonal, and Sulfa antibiotics    SOCIAL Hx:  Social History     Socioeconomic History    Marital status: Single     Spouse name: Not on file    Number of children: Not on file    Years of education: Not on file    Highest education level: Not on file   Occupational History    Not on file   Tobacco Use    Smoking status: Never    Smokeless tobacco: Never   Vaping Use    Vaping Use: Never used   Substance and Sexual Activity    Alcohol use: Never    Drug use: Never    Sexual activity: Not on file   Other Topics Concern    Not on file   Social History Narrative    Not on file     Social Determinants of Health     Financial Resource Strain: Not on file   Food Insecurity: Not on file   Transportation Needs: Not on file   Physical Activity: Not on file   Stress: Not on file   Social Connections: Not on file   Intimate Partner Violence: Not on file   Housing Stability: Not on file       ROS: Positive in bold  General:   Denies chills, fatigue, fever, malaise, night sweats or weight loss    Psychological:   Denies anxiety, disorientation or hallucinations    ENT:    Denies epistaxis, headaches, vertigo or visual changes    Cardiovascular:   Denies any chest pain, irregular heartbeats, or palpitations. No paroxysmal nocturnal dyspnea. Respiratory:   Denies shortness of breath, coughing, sputum production, hemoptysis, or wheezing. No orthopnea. Gastrointestinal:   Denies nausea, vomiting, diarrhea, or constipation. Denies any abdominal pain. Denies change in bowel habits or stools. Genito-Urinary:    Denies any urgency, frequency, hematuria. Voiding without difficulty. Musculoskeletal:   Denies joint pain, joint stiffness, joint swelling or muscle pain    Neurology:    Denies any headache or focal neurological deficits.  No weakness or paresthesia. Derm:    Denies any rashes, ulcers, or excoriations. Denies bruising. Extremities:   Denies any lower extremity swelling or edema. PHYSICAL EXAM: Abnormal findings noted  VITALS:  Vitals:    01/06/23 1100   BP: (!) 151/80   Pulse: (!) 101   Resp: 19   Temp:    SpO2: 100%         CONSTITUTIONAL:    Awake, alert, cooperative, no apparent distress, and appears stated age    EYES:    EOMI, sclera clear, conjunctiva normal    ENT:    Normocephalic, atraumatic, External ears without lesions. NECK:    Supple, symmetrical, trachea midline, no JVD    HEMATOLOGIC/LYMPHATICS:    No cervical lymphadenopathy and no supraclavicular lymphadenopathy    LUNGS:    Symmetric. No increased work of breathing, good air exchange, clear to auscultation bilaterally, no wheezes, rhonchi, or rales,     CARDIOVASCULAR:    Normal apical impulse, regular rate and rhythm, normal S1 and S2, no S3 or S4, and no murmur noted    ABDOMEN:    soft, non-distended, non-tender,    MUSCULOSKELETAL:    There is no redness, warmth, or swelling of the joints. NEUROLOGIC:    Awake, alert, oriented to name, place and time. SKIN:    No bruising or bleeding. No redness, warmth, or swelling    EXTREMITIES:    Peripheral pulses present. No edema, cyanosis, or swelling.     LINES/CATHETERS     LABORATORY DATA:  CBC with Differential:    Lab Results   Component Value Date/Time    WBC 16.8 01/06/2023 12:50 AM    RBC 1.96 01/06/2023 12:50 AM    HGB 6.6 01/06/2023 05:35 AM    HCT 20.2 01/06/2023 05:35 AM     01/06/2023 12:50 AM    MCV 82.7 01/06/2023 12:50 AM    MCH 26.0 01/06/2023 12:50 AM    MCHC 31.5 01/06/2023 12:50 AM    RDW 20.0 01/06/2023 12:50 AM    LYMPHOPCT 13.0 01/06/2023 12:50 AM    MONOPCT 8.4 01/06/2023 12:50 AM    BASOPCT 0.1 01/06/2023 12:50 AM    MONOSABS 1.41 01/06/2023 12:50 AM    LYMPHSABS 2.18 01/06/2023 12:50 AM    EOSABS 0.00 01/06/2023 12:50 AM    BASOSABS 0.02 01/06/2023 12:50 AM     CMP: Lab Results   Component Value Date/Time     01/06/2023 05:35 AM    K 4.2 01/06/2023 05:35 AM    K 4.4 12/26/2022 03:10 PM     01/06/2023 05:35 AM    CO2 19 01/06/2023 05:35 AM     01/06/2023 05:35 AM    CREATININE 1.8 01/06/2023 05:35 AM    GFRAA >60 05/13/2020 03:50 AM    LABGLOM 39 01/06/2023 05:35 AM    GLUCOSE 95 01/06/2023 05:35 AM    GLUCOSE 253 03/28/2011 06:05 PM    PROT 4.2 01/05/2023 08:11 PM    LABALBU 2.8 01/05/2023 08:11 PM    LABALBU 4.3 03/28/2011 06:05 PM    CALCIUM 8.0 01/06/2023 05:35 AM    BILITOT 0.2 01/05/2023 08:11 PM    ALKPHOS 53 01/05/2023 08:11 PM    AST 10 01/05/2023 08:11 PM    ALT 7 01/05/2023 08:11 PM       ASSESSMENT/PLAN:  DKA  Patient has not been taking his diabetic medications. Patient placed on insulin drip. Critical care consulted patient excepted to ICU. Anemia possibly secondary to GI bleed    He did describe melanotic/bloody stool. Rectal exam was positive for occult blood. Patient placed on Protonix. Eliquis held. GI consulted. Right knee effusion versus hematoma  Continue to monitor. orthopedic consultation. Non-insulin-dependent diabetes mellitus  RED  Continue IV fluids. Hold nephrotoxic medication. Esophageal thickening  GI consulted. Possible seizure  Apparently patient had 2 episodes of seizure-like activity prior to presentation to the hospital.  No history of seizure disorder. Neurology consulted. Elevated troponin  Likely secondary to demand ischemia    Hypertension  Hyperlipidemia  Atrial fibrillation on Eliquis  Enlarged prostate    Plan:  Patient admitted to ICU  Insulin drip with glucoscans every hour   Home medication reviewed  Patient received 1 unit of typed and crossed packed RBC on admission  Protonix 40 mg IV push twice daily  Consult GI  Consult intensivist  IV fluids normal saline    Transfer to monitored floor when okay with intensivist    GET, CBC in a.mJustin Beltran DO  11:40 AM  1/6/2023

## 2023-01-06 NOTE — H&P
Department of Internal Medicine  History and Physical    PCP: Deo Hatfield DO  Admitting Physician: Dr. Rosa Fine  Consultants:   Date of Service: 1/5/2023    CHIEF COMPLAINT: Altered mental status    HISTORY OF PRESENT ILLNESS:    Patient is a 51-year-old male who presents to the ED with altered mental status. Patient was found on the floor at home unable to get up. Patient lives alone at home with 2 dogs. Otherwise patient states that he fell last night. Patient states that he fell while entering his home. He was able to get around but it was not able to ambulate. He slipped on the floor as he was not able to get onto the bed. Otherwise patient states he has noted bloody/melanotic stools the other day. States he has a previous upper endoscopy however is unsure of the results. Patient patient states that he has lost 40 pounds involuntarily over the last month or 2. Apparently patient suffered seizure as well on his way to the hospital.  He has no history of seizure. Overall patient has been weak over the last few weeks. He has also been having issues with constipation.       PAST MEDICAL Hx:  Past Medical History:   Diagnosis Date    Arthritis     Bilateral shoulder pain 05/2020    for TJAS Right 05/12/2020 Dr Sanchez Dark    Diabetes mellitus [facility])     Enlarged prostate     follows with Dr Baljinder Penaloza    History of Holter monitoring 07/29/2022    History of irregular heartbeat     follows with PCP    Hyperlipidemia     Hypertension     Leg wound, right 05/2020    recent history of; see podiatry notes in epic       PAST SURGICAL Hx:   Past Surgical History:   Procedure Laterality Date    CATARACT REMOVAL WITH IMPLANT Bilateral     ENDOSCOPY, COLON, DIAGNOSTIC      EYE SURGERY      HERNIA REPAIR      SHOULDER SURGERY Right 5/12/2020    RIGHT SHOULDER TOTAL ARTHROPLASTY REVERSE performed by Angi Wiggins MD at [address] Hx:  Family History   Problem Relation Age of Onset    No Known Problems Mother     No Known Problems Father        HOME MEDICATIONS:  Prior to Admission medications    Medication Sig Start Date End Date Taking?  Authorizing Provider   FARXIGA 5 MG tablet take 1 tablet by mouth once daily 10/23/22   Historical Provider, MD   apixaban (ELIQUIS) 5 MG TABS tablet Take 1 tablet by mouth 2 times daily 10/31/22 1/5/23  Mary Lou Wadsworth MD   atorvastatin (LIPITOR) 40 MG tablet take 1 tablet by mouth daily 7/29/22   Historical Provider, MD   TRULICITY 1.5 GW/9.6NK SOPN inject 0.5 milliliters ( 1 AND 1/2 milligrams ) subcutaneously every week 9/16/22   Historical Provider, MD   Multiple Vitamins-Minerals (THERAPEUTIC MULTIVITAMIN-MINERALS) tablet Take 1 tablet by mouth daily Last taken 05/03/2020    Historical Provider, MD   Ascorbic Acid (VITAMIN C PO) Take 1 tablet by mouth daily Last taken 05/03    Historical Provider, MD   acetaminophen (TYLENOL) 500 MG tablet Take 500 mg by mouth every 6 hours as needed for Pain    Historical Provider, MD   metOLazone (ZAROXOLYN) 5 MG tablet Take 5 mg by mouth daily    Historical Provider, MD   lisinopril (PRINIVIL;ZESTRIL) 40 MG tablet Take 40 mg by mouth daily    Historical Provider, MD   metFORMIN (GLUCOPHAGE) 500 MG tablet Take 500 mg by mouth 2 times daily (with meals)    Historical Provider, MD   NIFEdipine (ADALAT CC) 30 MG extended release tablet Take 30 mg by mouth daily Take am day of surgery 05/12/2020    Historical Provider, MD       ALLERGIES:  Penicillins, Seasonal, and Sulfa antibiotics    SOCIAL Hx:  Social History     Socioeconomic History    Marital status: Single     Spouse name: Not on file    Number of children: Not on file    Years of education: Not on file    Highest education level: Not on file   Occupational History    Not on file   Tobacco Use    Smoking status: Never    Smokeless tobacco: Never   Vaping Use    Vaping Use: Never used   Substance and Sexual Activity    Alcohol use: Never    Drug use: Never    Sexual activity: Not on file   Other Topics Concern    Not on file   Social History Narrative    Not on file     Social Determinants of Health     Financial Resource Strain: Not on file   Food Insecurity: Not on file   Transportation Needs: Not on file   Physical Activity: Not on file   Stress: Not on file   Social Connections: Not on file   Intimate Partner Violence: Not on file   Housing Stability: Not on file       ROS: Positive in bold  General:   Denies chills, fatigue, fever, malaise, night sweats or weight loss    Psychological:   Denies anxiety, disorientation or hallucinations    ENT:    Denies epistaxis, headaches, vertigo or visual changes    Cardiovascular:   Denies any chest pain, irregular heartbeats, or palpitations. No paroxysmal nocturnal dyspnea. Respiratory:   Denies shortness of breath, coughing, sputum production, hemoptysis, or wheezing. No orthopnea. Gastrointestinal:   Denies nausea, vomiting, diarrhea, or constipation. Denies any abdominal pain. Denies change in bowel habits or stools. Genito-Urinary:    Denies any urgency, frequency, hematuria. Voiding without difficulty. Musculoskeletal:   Denies joint pain, joint stiffness, joint swelling or muscle pain    Neurology:    Denies any headache or focal neurological deficits. No weakness or paresthesia. Derm:    Denies any rashes, ulcers, or excoriations. Denies bruising. Extremities:   Denies any lower extremity swelling or edema. PHYSICAL EXAM: Abnormal findings noted  VITALS:  Vitals:    01/05/23 2134   BP: (!) 114/52   Pulse: (!) 107   Resp: 25   Temp: 98 °F (36.7 °C)   SpO2: 99%         CONSTITUTIONAL:    Awake, alert, cooperative, no apparent distress, and appears stated age    EYES:    EOMI, sclera clear, conjunctiva normal    ENT:    Normocephalic, atraumatic, External ears without lesions.      NECK:    Supple, symmetrical, trachea midline, no JVD    HEMATOLOGIC/LYMPHATICS:    No cervical lymphadenopathy and no supraclavicular lymphadenopathy    LUNGS:    Symmetric. No increased work of breathing, good air exchange, clear to auscultation bilaterally, no wheezes, rhonchi, or rales,     CARDIOVASCULAR:    Normal apical impulse, regular rate and rhythm, normal S1 and S2, no S3 or S4, and no murmur noted    ABDOMEN:    soft, non-distended, non-tender,    MUSCULOSKELETAL:    There is no redness, warmth, or swelling of the joints. NEUROLOGIC:    Awake, alert, oriented to name, place and time. SKIN:    No bruising or bleeding. No redness, warmth, or swelling    EXTREMITIES:    Peripheral pulses present. No edema, cyanosis, or swelling.     LINES/CATHETERS     LABORATORY DATA:  CBC with Differential:    Lab Results   Component Value Date/Time    WBC 18.1 01/05/2023 02:33 PM    RBC 1.96 01/05/2023 02:33 PM    HGB 5.7 01/05/2023 02:33 PM    HCT 19.1 01/05/2023 02:33 PM     01/05/2023 02:33 PM    MCV 97.4 01/05/2023 02:33 PM    MCH 29.1 01/05/2023 02:33 PM    MCHC 29.8 01/05/2023 02:33 PM    RDW 14.4 01/05/2023 02:33 PM    LYMPHOPCT 8.2 01/05/2023 02:33 PM    MONOPCT 4.4 01/05/2023 02:33 PM    BASOPCT 0.1 01/05/2023 02:33 PM    MONOSABS 0.79 01/05/2023 02:33 PM    LYMPHSABS 1.48 01/05/2023 02:33 PM    EOSABS 0.00 01/05/2023 02:33 PM    BASOSABS 0.02 01/05/2023 02:33 PM     CMP:    Lab Results   Component Value Date/Time     01/05/2023 02:33 PM    K 3.9 01/05/2023 02:33 PM    K 4.4 12/26/2022 03:10 PM    CL 81 01/05/2023 02:33 PM    CO2 9 01/05/2023 02:33 PM     01/05/2023 02:33 PM    CREATININE 2.1 01/05/2023 02:33 PM    GFRAA >60 05/13/2020 03:50 AM    LABGLOM 32 01/05/2023 02:33 PM    GLUCOSE 662 01/05/2023 02:33 PM    GLUCOSE 253 03/28/2011 06:05 PM    PROT 7.0 12/26/2022 03:10 PM    LABALBU 4.3 12/26/2022 03:10 PM    LABALBU 4.3 03/28/2011 06:05 PM    CALCIUM 8.9 01/05/2023 02:33 PM    BILITOT 0.8 12/26/2022 03:10 PM    ALKPHOS 107 12/26/2022 03:10 PM    AST 18 12/26/2022 03:10 PM    ALT 11 12/26/2022 03:10 PM       ASSESSMENT/PLAN:  DKA  Patient has not been taking his diabetic medications. Patient placed on insulin drip. Critical care consulted patient excepted to ICU. Anemia possibly secondary to GI bleed    He did describe melanotic/bloody stool. Rectal exam was positive for occult blood. Patient placed on Protonix. Eliquis held. GI consulted. Right knee effusion versus hematoma  Continue to monitor. orthopedic consultation. RED  Continue IV fluids. Hold nephrotoxic medication. Esophageal thickening  GI consulted. Possible seizure  Apparently patient had 2 episodes of seizure-like activity prior to presentation to the hospital.  No history of seizure disorder. Neurology consulted.   Elevated troponin  Likely secondary to demand ischemia    Hypertension  Hyperlipidemia  Atrial fibrillation on Eliquis  Enlarged prostate          Christopher El DO  9:40 PM  1/5/2023    Electronically signed by Christopher El DO on 1/5/23 at 9:40 PM EST

## 2023-01-06 NOTE — CONSENT
Informed Consent for Blood Component Transfusion Note    I have discussed with the patient the rationale for blood component transfusion; its benefits in treating or preventing fatigue, organ damage, or death; and its risk which includes mild transfusion reactions, rare risk of blood borne infection, or more serious but rare reactions. I have discussed the alternatives to transfusion, including the risk and consequences of not receiving transfusion. The patient had an opportunity to ask questions and had agreed to proceed with transfusion of blood components.     Electronically signed by Lyle Dias MD on 1/6/23 at 2:22 AM EST

## 2023-01-07 ENCOUNTER — APPOINTMENT (OUTPATIENT)
Dept: NUCLEAR MEDICINE | Age: 78
DRG: 638 | End: 2023-01-07
Payer: MEDICARE

## 2023-01-07 LAB
ANION GAP SERPL CALCULATED.3IONS-SCNC: 12 MMOL/L (ref 7–16)
ANION GAP SERPL CALCULATED.3IONS-SCNC: 13 MMOL/L (ref 7–16)
ANION GAP SERPL CALCULATED.3IONS-SCNC: 15 MMOL/L (ref 7–16)
ANION GAP SERPL CALCULATED.3IONS-SCNC: 8 MMOL/L (ref 7–16)
BASOPHILS ABSOLUTE: 0.07 E9/L (ref 0–0.2)
BASOPHILS RELATIVE PERCENT: 0.4 % (ref 0–2)
BETA-HYDROXYBUTYRATE: 2.49 MMOL/L (ref 0.02–0.27)
BUN BLDV-MCNC: 47 MG/DL (ref 6–23)
BUN BLDV-MCNC: 55 MG/DL (ref 6–23)
BUN BLDV-MCNC: 66 MG/DL (ref 6–23)
BUN BLDV-MCNC: 82 MG/DL (ref 6–23)
CALCIUM SERPL-MCNC: 7.9 MG/DL (ref 8.6–10.2)
CALCIUM SERPL-MCNC: 8 MG/DL (ref 8.6–10.2)
CALCIUM SERPL-MCNC: 8.2 MG/DL (ref 8.6–10.2)
CALCIUM SERPL-MCNC: 8.3 MG/DL (ref 8.6–10.2)
CHLORIDE BLD-SCNC: 102 MMOL/L (ref 98–107)
CHLORIDE BLD-SCNC: 103 MMOL/L (ref 98–107)
CHLORIDE BLD-SCNC: 103 MMOL/L (ref 98–107)
CHLORIDE BLD-SCNC: 107 MMOL/L (ref 98–107)
CO2: 18 MMOL/L (ref 22–29)
CO2: 19 MMOL/L (ref 22–29)
CO2: 20 MMOL/L (ref 22–29)
CO2: 23 MMOL/L (ref 22–29)
CREAT SERPL-MCNC: 1.1 MG/DL (ref 0.7–1.2)
CREAT SERPL-MCNC: 1.2 MG/DL (ref 0.7–1.2)
CREAT SERPL-MCNC: 1.2 MG/DL (ref 0.7–1.2)
CREAT SERPL-MCNC: 1.3 MG/DL (ref 0.7–1.2)
EOSINOPHILS ABSOLUTE: 0.02 E9/L (ref 0.05–0.5)
EOSINOPHILS RELATIVE PERCENT: 0.1 % (ref 0–6)
GFR SERPL CREATININE-BSD FRML MDRD: 57 ML/MIN/1.73
GFR SERPL CREATININE-BSD FRML MDRD: >60 ML/MIN/1.73
GLUCOSE BLD-MCNC: 189 MG/DL (ref 74–99)
GLUCOSE BLD-MCNC: 206 MG/DL (ref 74–99)
GLUCOSE BLD-MCNC: 220 MG/DL (ref 74–99)
GLUCOSE BLD-MCNC: 63 MG/DL (ref 74–99)
HCT VFR BLD CALC: 20.1 % (ref 37–54)
HCT VFR BLD CALC: 22.3 % (ref 37–54)
HCT VFR BLD CALC: 23.6 % (ref 37–54)
HCT VFR BLD CALC: 24.8 % (ref 37–54)
HEMOGLOBIN: 6.5 G/DL (ref 12.5–16.5)
HEMOGLOBIN: 7.6 G/DL (ref 12.5–16.5)
HEMOGLOBIN: 7.7 G/DL (ref 12.5–16.5)
HEMOGLOBIN: 8.2 G/DL (ref 12.5–16.5)
IMMATURE GRANULOCYTES #: 0.46 E9/L
IMMATURE GRANULOCYTES %: 2.7 % (ref 0–5)
LACTIC ACID: 1 MMOL/L (ref 0.5–2.2)
LYMPHOCYTES ABSOLUTE: 1.85 E9/L (ref 1.5–4)
LYMPHOCYTES RELATIVE PERCENT: 10.8 % (ref 20–42)
MAGNESIUM: 1.5 MG/DL (ref 1.6–2.6)
MAGNESIUM: 1.6 MG/DL (ref 1.6–2.6)
MAGNESIUM: 1.6 MG/DL (ref 1.6–2.6)
MAGNESIUM: 1.7 MG/DL (ref 1.6–2.6)
MCH RBC QN AUTO: 29.8 PG (ref 26–35)
MCHC RBC AUTO-ENTMCNC: 34.5 % (ref 32–34.5)
MCV RBC AUTO: 86.4 FL (ref 80–99.9)
METER GLUCOSE: 101 MG/DL (ref 74–99)
METER GLUCOSE: 138 MG/DL (ref 74–99)
METER GLUCOSE: 163 MG/DL (ref 74–99)
METER GLUCOSE: 178 MG/DL (ref 74–99)
METER GLUCOSE: 190 MG/DL (ref 74–99)
METER GLUCOSE: 194 MG/DL (ref 74–99)
METER GLUCOSE: 198 MG/DL (ref 74–99)
METER GLUCOSE: 200 MG/DL (ref 74–99)
METER GLUCOSE: 203 MG/DL (ref 74–99)
METER GLUCOSE: 206 MG/DL (ref 74–99)
METER GLUCOSE: 213 MG/DL (ref 74–99)
METER GLUCOSE: 223 MG/DL (ref 74–99)
METER GLUCOSE: 240 MG/DL (ref 74–99)
METER GLUCOSE: 241 MG/DL (ref 74–99)
METER GLUCOSE: 245 MG/DL (ref 74–99)
METER GLUCOSE: 251 MG/DL (ref 74–99)
METER GLUCOSE: 70 MG/DL (ref 74–99)
METER GLUCOSE: 76 MG/DL (ref 74–99)
MONOCYTES ABSOLUTE: 1.23 E9/L (ref 0.1–0.95)
MONOCYTES RELATIVE PERCENT: 7.2 % (ref 2–12)
MRSA CULTURE ONLY: NORMAL
NEUTROPHILS ABSOLUTE: 13.51 E9/L (ref 1.8–7.3)
NEUTROPHILS RELATIVE PERCENT: 78.8 % (ref 43–80)
PDW BLD-RTO: 18.6 FL (ref 11.5–15)
PHOSPHORUS: 2.4 MG/DL (ref 2.5–4.5)
PHOSPHORUS: 2.5 MG/DL (ref 2.5–4.5)
PHOSPHORUS: 3.2 MG/DL (ref 2.5–4.5)
PHOSPHORUS: 3.3 MG/DL (ref 2.5–4.5)
PLATELET # BLD: 211 E9/L (ref 130–450)
PMV BLD AUTO: 9.7 FL (ref 7–12)
POTASSIUM SERPL-SCNC: 3.2 MMOL/L (ref 3.5–5)
POTASSIUM SERPL-SCNC: 3.5 MMOL/L (ref 3.5–5)
POTASSIUM SERPL-SCNC: 3.7 MMOL/L (ref 3.5–5)
POTASSIUM SERPL-SCNC: 3.9 MMOL/L (ref 3.5–5)
RBC # BLD: 2.58 E12/L (ref 3.8–5.8)
SODIUM BLD-SCNC: 134 MMOL/L (ref 132–146)
SODIUM BLD-SCNC: 135 MMOL/L (ref 132–146)
SODIUM BLD-SCNC: 136 MMOL/L (ref 132–146)
SODIUM BLD-SCNC: 138 MMOL/L (ref 132–146)
WBC # BLD: 17.1 E9/L (ref 4.5–11.5)

## 2023-01-07 PROCEDURE — A4216 STERILE WATER/SALINE, 10 ML: HCPCS | Performed by: NURSE PRACTITIONER

## 2023-01-07 PROCEDURE — 6360000002 HC RX W HCPCS: Performed by: NURSE PRACTITIONER

## 2023-01-07 PROCEDURE — 6360000002 HC RX W HCPCS: Performed by: EMERGENCY MEDICINE

## 2023-01-07 PROCEDURE — 3430000000 HC RX DIAGNOSTIC RADIOPHARMACEUTICAL: Performed by: RADIOLOGY

## 2023-01-07 PROCEDURE — 85018 HEMOGLOBIN: CPT

## 2023-01-07 PROCEDURE — 2580000003 HC RX 258: Performed by: PHYSICIAN ASSISTANT

## 2023-01-07 PROCEDURE — 82962 GLUCOSE BLOOD TEST: CPT

## 2023-01-07 PROCEDURE — 83605 ASSAY OF LACTIC ACID: CPT

## 2023-01-07 PROCEDURE — 85014 HEMATOCRIT: CPT

## 2023-01-07 PROCEDURE — 84100 ASSAY OF PHOSPHORUS: CPT

## 2023-01-07 PROCEDURE — 2000000000 HC ICU R&B

## 2023-01-07 PROCEDURE — A9560 TC99M LABELED RBC: HCPCS | Performed by: RADIOLOGY

## 2023-01-07 PROCEDURE — 82010 KETONE BODYS QUAN: CPT

## 2023-01-07 PROCEDURE — 85025 COMPLETE CBC W/AUTO DIFF WBC: CPT

## 2023-01-07 PROCEDURE — C9113 INJ PANTOPRAZOLE SODIUM, VIA: HCPCS | Performed by: NURSE PRACTITIONER

## 2023-01-07 PROCEDURE — 78278 ACUTE GI BLOOD LOSS IMAGING: CPT

## 2023-01-07 PROCEDURE — 2580000003 HC RX 258: Performed by: INTERNAL MEDICINE

## 2023-01-07 PROCEDURE — 36415 COLL VENOUS BLD VENIPUNCTURE: CPT

## 2023-01-07 PROCEDURE — 83735 ASSAY OF MAGNESIUM: CPT

## 2023-01-07 PROCEDURE — 2580000003 HC RX 258: Performed by: NURSE PRACTITIONER

## 2023-01-07 PROCEDURE — 36430 TRANSFUSION BLD/BLD COMPNT: CPT

## 2023-01-07 PROCEDURE — 80048 BASIC METABOLIC PNL TOTAL CA: CPT

## 2023-01-07 RX ORDER — DEXTROSE AND SODIUM CHLORIDE 5; .45 G/100ML; G/100ML
INJECTION, SOLUTION INTRAVENOUS CONTINUOUS
Status: DISCONTINUED | OUTPATIENT
Start: 2023-01-07 | End: 2023-01-08

## 2023-01-07 RX ORDER — SENNA PLUS 8.6 MG/1
2 TABLET ORAL NIGHTLY PRN
Status: DISCONTINUED | OUTPATIENT
Start: 2023-01-07 | End: 2023-01-11 | Stop reason: HOSPADM

## 2023-01-07 RX ORDER — CYANOCOBALAMIN 1000 UG/ML
1000 INJECTION, SOLUTION INTRAMUSCULAR; SUBCUTANEOUS ONCE
Status: DISCONTINUED | OUTPATIENT
Start: 2023-01-07 | End: 2023-01-07

## 2023-01-07 RX ORDER — SODIUM CHLORIDE 9 MG/ML
INJECTION, SOLUTION INTRAVENOUS PRN
Status: DISCONTINUED | OUTPATIENT
Start: 2023-01-07 | End: 2023-01-11 | Stop reason: HOSPADM

## 2023-01-07 RX ORDER — POLYETHYLENE GLYCOL 3350 17 G/17G
17 POWDER, FOR SOLUTION ORAL DAILY
Status: DISCONTINUED | OUTPATIENT
Start: 2023-01-07 | End: 2023-01-11 | Stop reason: HOSPADM

## 2023-01-07 RX ADMIN — Medication 10 ML: at 21:05

## 2023-01-07 RX ADMIN — Medication 20 MILLICURIE: at 11:07

## 2023-01-07 RX ADMIN — SODIUM CHLORIDE, PRESERVATIVE FREE 40 MG: 5 INJECTION INTRAVENOUS at 21:05

## 2023-01-07 RX ADMIN — SODIUM CHLORIDE 0.1 UNITS/KG/HR: 9 INJECTION, SOLUTION INTRAVENOUS at 16:00

## 2023-01-07 RX ADMIN — MAGNESIUM SULFATE HEPTAHYDRATE 1000 MG: 1 INJECTION, SOLUTION INTRAVENOUS at 23:08

## 2023-01-07 RX ADMIN — DEXTROSE AND SODIUM CHLORIDE: 5; 450 INJECTION, SOLUTION INTRAVENOUS at 23:01

## 2023-01-07 RX ADMIN — SODIUM CHLORIDE 0.34 UNITS/KG/HR: 9 INJECTION, SOLUTION INTRAVENOUS at 19:23

## 2023-01-07 RX ADMIN — POTASSIUM CHLORIDE 10 MEQ: 7.46 INJECTION, SOLUTION INTRAVENOUS at 23:02

## 2023-01-07 RX ADMIN — DEXTROSE AND SODIUM CHLORIDE: 5; 450 INJECTION, SOLUTION INTRAVENOUS at 15:52

## 2023-01-07 RX ADMIN — SODIUM CHLORIDE, PRESERVATIVE FREE 40 MG: 5 INJECTION INTRAVENOUS at 12:41

## 2023-01-07 RX ADMIN — SODIUM CHLORIDE: 9 INJECTION, SOLUTION INTRAVENOUS at 09:44

## 2023-01-07 NOTE — PROGRESS NOTES
Name:  Trang Barnard  :  [id number]  MRN:  [id number]  Room:  [place] [address]  DOS:  2023    [facility]  [facility]  Dr. Yasmin Zamora M.D. Dr. Chelsi Brian M.D. Dr. Pepe Singh D.O. Dr. Marcia Geller M.D. Dr. Orlin Falcon D.O.    -NP Progress Note-    PCP:  Mike Booker DO  Admitting Physician:  Katherin Barajas DO  Chief Complaint:    Chief Complaint   Patient presents with    Fall     Last night on floor-witnessed seizure by ems no hx, bgl 521 per ems-hx type 2 diabetes-currently only c/o \"back pain from sitting on this cart\"       Subjective  Patient resting in bed. Received another unit PRBCs this morning. Discussed with nursing. Small stool with blood yesterday.     Physical Examination  Vitals:  BP (!) 153/65   Pulse 89   Temp 98.1 °F (36.7 °C) (Oral)   Resp 19   Ht 5' 8\" (1.727 m)   Wt 180 lb 9.6 oz (81.9 kg)   SpO2 98%   BMI 27.46 kg/m²   General Appearance:  awake, alert, and oriented; appears stated age and cooperative; no apparent distress no labored breathing  HEENT:  PERRL; EOMI; sclera clear; buccal mucosa moist  Neck:  supple; trachea midline; no thyromegaly; no JVD; no bruits  Heart:  rhythm regular; rate controlled; no murmurs  Lungs:  symmetrical; clear to auscultation bilaterally; no wheezes; no rhonchi; no rales  Abdomen:  soft, non-tender, non-distended; bowel sounds positive; no organomegaly or masses; no pain on palpation  Extremities:  peripheral pulses present; no peripheral edema; no ulcers  Neurologic:  alert and oriented; no focal deficit; cranial nerves grossly intact  Skin:  no petechia; no hemorrhage; no wounds    Medications  Scheduled Meds    pantoprazole (PROTONIX) 40 mg injection  40 mg IntraVENous Q12H    sodium chloride flush  5-40 mL IntraVENous 2 times per day     Infusion Meds    sodium chloride      sodium chloride      sodium chloride      dextrose      sodium chloride      sodium chloride      sodium chloride      sodium chloride 125 mL/hr at 01/07/23 0130    dextrose 5 % and 0.45 % NaCl 150 mL/hr at 01/06/23 2226    insulin Stopped (01/06/23 2258)     PRN Meds sodium chloride, sodium chloride, sodium chloride, glucose, dextrose bolus **OR** dextrose bolus, glucagon (rDNA), dextrose, sodium chloride flush, sodium chloride, acetaminophen **OR** acetaminophen, sodium chloride, sodium chloride, potassium chloride, magnesium sulfate, sodium phosphate IVPB **OR** sodium phosphate IVPB **OR** sodium phosphate IVPB, dextrose 5 % and 0.45 % NaCl    Laboratory Data  Recent Results (from the past 24 hour(s))   Lactic Acid    Collection Time: 01/06/23  5:35 AM   Result Value Ref Range    Lactic Acid 2.5 (H) 0.5 - 2.2 mmol/L   Basic Metabolic Panel    Collection Time: 01/06/23  5:35 AM   Result Value Ref Range    Sodium 137 132 - 146 mmol/L    Potassium 4.2 3.5 - 5.0 mmol/L    Chloride 104 98 - 107 mmol/L    CO2 19 (L) 22 - 29 mmol/L    Anion Gap 14 7 - 16 mmol/L    Glucose 95 74 - 99 mg/dL     (HH) 6 - 23 mg/dL    Creatinine 1.8 (H) 0.7 - 1.2 mg/dL    Est, Glom Filt Rate 39 >=60 mL/min/1.73    Calcium 8.0 (L) 8.6 - 10.2 mg/dL   Magnesium    Collection Time: 01/06/23  5:35 AM   Result Value Ref Range    Magnesium 1.5 (L) 1.6 - 2.6 mg/dL   Phosphorus    Collection Time: 01/06/23  5:35 AM   Result Value Ref Range    Phosphorus 1.4 (L) 2.5 - 4.5 mg/dL   Hemoglobin and Hematocrit    Collection Time: 01/06/23  5:35 AM   Result Value Ref Range    Hemoglobin 6.6 (LL) 12.5 - 16.5 g/dL    Hematocrit 20.2 (L) 37.0 - 54.0 %   POCT Glucose    Collection Time: 01/06/23  6:07 AM   Result Value Ref Range    Meter Glucose 158 (H) 74 - 99 mg/dL   POCT Glucose    Collection Time: 01/06/23  7:12 AM   Result Value Ref Range    Meter Glucose 196 (H) 74 - 99 mg/dL   POCT Glucose    Collection Time: 01/06/23  8:08 AM   Result Value Ref Range    Meter Glucose 137 (H) 74 - 99 mg/dL   POCT Glucose    Collection Time: 01/06/23  9:04 AM   Result Value Ref Range Meter Glucose 136 (H) 74 - 99 mg/dL   POCT Glucose    Collection Time: 01/06/23 10:09 AM   Result Value Ref Range    Meter Glucose 183 (H) 74 - 99 mg/dL   POCT Glucose    Collection Time: 01/06/23 11:03 AM   Result Value Ref Range    Meter Glucose 199 (H) 74 - 99 mg/dL   Basic Metabolic Panel    Collection Time: 01/06/23 11:59 AM   Result Value Ref Range    Sodium 131 (L) 132 - 146 mmol/L    Potassium 3.9 3.5 - 5.0 mmol/L    Chloride 98 98 - 107 mmol/L    CO2 18 (L) 22 - 29 mmol/L    Anion Gap 15 7 - 16 mmol/L    Glucose 279 (H) 74 - 99 mg/dL     (HH) 6 - 23 mg/dL    Creatinine 1.6 (H) 0.7 - 1.2 mg/dL    Est, Glom Filt Rate 45 >=60 mL/min/1.73    Calcium 8.3 (L) 8.6 - 10.2 mg/dL   Magnesium    Collection Time: 01/06/23 11:59 AM   Result Value Ref Range    Magnesium 1.8 1.6 - 2.6 mg/dL   Phosphorus    Collection Time: 01/06/23 11:59 AM   Result Value Ref Range    Phosphorus 3.0 2.5 - 4.5 mg/dL   CBC with Auto Differential    Collection Time: 01/06/23 11:59 AM   Result Value Ref Range    WBC 20.3 (H) 4.5 - 11.5 E9/L    RBC 2.54 (L) 3.80 - 5.80 E12/L    Hemoglobin 7.2 (L) 12.5 - 16.5 g/dL    Hematocrit 21.8 (L) 37.0 - 54.0 %    MCV 85.8 80.0 - 99.9 fL    MCH 28.3 26.0 - 35.0 pg    MCHC 33.0 32.0 - 34.5 %    RDW 17.4 (H) 11.5 - 15.0 fL    Platelets 113 106 - 357 E9/L    MPV 10.6 7.0 - 12.0 fL    Neutrophils % 88.6 (H) 43.0 - 80.0 %    Lymphocytes % 7.0 (L) 20.0 - 42.0 %    Monocytes % 3.5 2.0 - 12.0 %    Eosinophils % 0.0 0.0 - 6.0 %    Basophils % 0.2 0.0 - 2.0 %    Neutrophils Absolute 18.27 (H) 1.80 - 7.30 E9/L    Lymphocytes Absolute 1.42 (L) 1.50 - 4.00 E9/L    Monocytes Absolute 0.81 0.10 - 0.95 E9/L    Eosinophils Absolute 0.00 (L) 0.05 - 0.50 E9/L    Basophils Absolute 0.00 0.00 - 0.20 E9/L    Myelocyte Percent 0.9 0 - 0 %    Anisocytosis 1+     Polychromasia 1+     Poikilocytes 2+     Jim Cells 2+    TSH    Collection Time: 01/06/23 11:59 AM   Result Value Ref Range    TSH 1.100 0.270 - 4.200 uIU/mL T4, Free    Collection Time: 01/06/23 11:59 AM   Result Value Ref Range    T4 Free 1.87 (H) 0.93 - 1.70 ng/dL   Procalcitonin    Collection Time: 01/06/23 11:59 AM   Result Value Ref Range    Procalcitonin 1.03 (H) 0.00 - 0.08 ng/mL   Hemoglobin and Hematocrit    Collection Time: 01/06/23 11:59 AM   Result Value Ref Range    Hemoglobin 7.1 (L) 12.5 - 16.5 g/dL    Hematocrit 21.6 (L) 37.0 - 54.0 %   Hemoglobin A1c    Collection Time: 01/06/23 11:59 AM   Result Value Ref Range    Hemoglobin A1C 5.9 (H) 4.0 - 5.6 %   Beta-Hydroxybutyrate    Collection Time: 01/06/23 11:59 AM   Result Value Ref Range    Beta-Hydroxybutyrate 1.57 (H) 0.02 - 0.27 mmol/L   CK    Collection Time: 01/06/23 11:59 AM   Result Value Ref Range    Total  20 - 200 U/L   POCT Glucose    Collection Time: 01/06/23 12:12 PM   Result Value Ref Range    Meter Glucose 303 (H) 74 - 99 mg/dL   Basic Metabolic Panel    Collection Time: 01/06/23  1:58 PM   Result Value Ref Range    Sodium 130 (L) 132 - 146 mmol/L    Potassium 3.6 3.5 - 5.0 mmol/L    Chloride 98 98 - 107 mmol/L    CO2 19 (L) 22 - 29 mmol/L    Anion Gap 13 7 - 16 mmol/L    Glucose 290 (H) 74 - 99 mg/dL     (HH) 6 - 23 mg/dL    Creatinine 1.6 (H) 0.7 - 1.2 mg/dL    Est, Glom Filt Rate 45 >=60 mL/min/1.73    Calcium 8.2 (L) 8.6 - 10.2 mg/dL   Magnesium    Collection Time: 01/06/23  1:58 PM   Result Value Ref Range    Magnesium 1.8 1.6 - 2.6 mg/dL   Phosphorus    Collection Time: 01/06/23  1:58 PM   Result Value Ref Range    Phosphorus 2.7 2.5 - 4.5 mg/dL   POCT Glucose    Collection Time: 01/06/23  2:02 PM   Result Value Ref Range    Meter Glucose 326 (H) 74 - 99 mg/dL   POCT Glucose    Collection Time: 01/06/23  3:19 PM   Result Value Ref Range    Meter Glucose 318 (H) 74 - 99 mg/dL   POCT Glucose    Collection Time: 01/06/23  4:01 PM   Result Value Ref Range    Meter Glucose 293 (H) 74 - 99 mg/dL   POCT Glucose    Collection Time: 01/06/23  5:16 PM   Result Value Ref Range Meter Glucose 258 (H) 74 - 99 mg/dL   Basic Metabolic Panel    Collection Time: 01/06/23  5:55 PM   Result Value Ref Range    Sodium 132 132 - 146 mmol/L    Potassium 3.4 (L) 3.5 - 5.0 mmol/L    Chloride 99 98 - 107 mmol/L    CO2 20 (L) 22 - 29 mmol/L    Anion Gap 13 7 - 16 mmol/L    Glucose 218 (H) 74 - 99 mg/dL     (HH) 6 - 23 mg/dL    Creatinine 1.5 (H) 0.7 - 1.2 mg/dL    Est, Glom Filt Rate 48 >=60 mL/min/1.73    Calcium 8.3 (L) 8.6 - 10.2 mg/dL   Magnesium    Collection Time: 01/06/23  5:55 PM   Result Value Ref Range    Magnesium 1.8 1.6 - 2.6 mg/dL   Phosphorus    Collection Time: 01/06/23  5:55 PM   Result Value Ref Range    Phosphorus 2.1 (L) 2.5 - 4.5 mg/dL   Hemoglobin and Hematocrit    Collection Time: 01/06/23  5:55 PM   Result Value Ref Range    Hemoglobin 7.2 (L) 12.5 - 16.5 g/dL    Hematocrit 21.3 (L) 37.0 - 54.0 %   POCT Glucose    Collection Time: 01/06/23  8:59 PM   Result Value Ref Range    Meter Glucose 207 (H) 74 - 99 mg/dL   POCT Glucose    Collection Time: 01/06/23  9:59 PM   Result Value Ref Range    Meter Glucose 149 (H) 74 - 99 mg/dL   POCT Glucose    Collection Time: 01/06/23 10:02 PM   Result Value Ref Range    Meter Glucose 93 74 - 99 mg/dL   POCT Glucose    Collection Time: 01/06/23 10:55 PM   Result Value Ref Range    Meter Glucose 53 (L) 74 - 99 mg/dL   Basic Metabolic Panel    Collection Time: 01/06/23 11:26 PM   Result Value Ref Range    Sodium 134 132 - 146 mmol/L    Potassium 3.4 (L) 3.5 - 5.0 mmol/L    Chloride 103 98 - 107 mmol/L    CO2 21 (L) 22 - 29 mmol/L    Anion Gap 10 7 - 16 mmol/L    Glucose 42 (L) 74 - 99 mg/dL     (H) 6 - 23 mg/dL    Creatinine 1.4 (H) 0.7 - 1.2 mg/dL    Est, Glom Filt Rate 52 >=60 mL/min/1.73    Calcium 8.1 (L) 8.6 - 10.2 mg/dL   Magnesium    Collection Time: 01/06/23 11:26 PM   Result Value Ref Range    Magnesium 1.7 1.6 - 2.6 mg/dL   Phosphorus    Collection Time: 01/06/23 11:26 PM   Result Value Ref Range    Phosphorus 1.9 (L) 2.5 - 4.5 mg/dL   POCT Glucose    Collection Time: 01/06/23 11:31 PM   Result Value Ref Range    Meter Glucose 93 74 - 99 mg/dL   Hemoglobin and Hematocrit    Collection Time: 01/07/23 12:14 AM   Result Value Ref Range    Hemoglobin 6.5 (LL) 12.5 - 16.5 g/dL    Hematocrit 20.1 (L) 37.0 - 54.0 %   POCT Glucose    Collection Time: 01/07/23 12:15 AM   Result Value Ref Range    Meter Glucose 163 (H) 74 - 99 mg/dL   POCT Glucose    Collection Time: 01/07/23  1:27 AM   Result Value Ref Range    Meter Glucose 198 (H) 74 - 99 mg/dL   POCT Glucose    Collection Time: 01/07/23  2:05 AM   Result Value Ref Range    Meter Glucose 200 (H) 74 - 99 mg/dL   POCT Glucose    Collection Time: 01/07/23  3:25 AM   Result Value Ref Range    Meter Glucose 213 (H) 74 - 99 mg/dL   POCT Glucose    Collection Time: 01/07/23  4:27 AM   Result Value Ref Range    Meter Glucose 190 (H) 74 - 99 mg/dL       Imaging  XR FEMUR RIGHT (MIN 2 VIEWS)    Result Date: 12/26/2022  EXAMINATION: XRAY VIEWS OF THE RIGHT FEMUR 12/26/2022 4:01 pm COMPARISON: None. HISTORY: ORDERING SYSTEM PROVIDED HISTORY: injury TECHNOLOGIST PROVIDED HISTORY: Reason for exam:->injury FINDINGS: There is no evidence of acute fracture. There is normal alignment. No acute joint abnormality. No focal osseous lesion. No focal soft tissue abnormality. There are degenerative changes and intra-articular calcifications in the visualized knee joint. Vascular calcification noted. No acute osseous abnormality. XR KNEE RIGHT (1-2 VIEWS)    Result Date: 12/26/2022  EXAMINATION: TWO XRAY VIEWS OF THE RIGHT KNEE 12/26/2022 4:01 pm COMPARISON: None. HISTORY: ORDERING SYSTEM PROVIDED HISTORY: injury TECHNOLOGIST PROVIDED HISTORY: Reason for exam:->injury FINDINGS: There are no acute fractures or dislocations. There is mild suprapatellar soft tissue swelling. 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 Intra-articular calcification noted.      No acute bony abnormalities. Mild joint effusion. Chondrocalcinosis. XR TIBIA FIBULA RIGHT (2 VIEWS)    Result Date: 12/26/2022  EXAMINATION: XRAY VIEWS OF THE RIGHT TIBIA AND FIBULA 12/26/2022 4:01 pm COMPARISON: None. HISTORY: ORDERING SYSTEM PROVIDED HISTORY: injury TECHNOLOGIST PROVIDED HISTORY: Reason for exam:->injury FINDINGS: There is no evidence of acute fracture. There is normal alignment. No acute joint abnormality. No focal osseous lesion. There is diffuse soft tissue swelling. No acute osseous abnormality. CT Head W/O Contrast    Result Date: 1/5/2023  EXAMINATION: CT OF THE HEAD WITHOUT CONTRAST  1/5/2023 5:33 pm TECHNIQUE: CT of the head was performed without the administration of intravenous contrast. Automated exposure control, iterative reconstruction, and/or weight based adjustment of the mA/kV was utilized to reduce the radiation dose to as low as reasonably achievable. COMPARISON: None. HISTORY: ORDERING SYSTEM PROVIDED HISTORY: Fall on Eliquis TECHNOLOGIST PROVIDED HISTORY: Reason for exam:->Fall on Eliquis Has a \"code stroke\" or \"stroke alert\" been called? ->No Decision Support Exception - unselect if not a suspected or confirmed emergency medical condition->Emergency Medical Condition (MA) FINDINGS: BRAIN/VENTRICLES: There is no acute intracranial hemorrhage, mass effect or midline shift. No abnormal extra-axial fluid collection. The gray-white differentiation is maintained without evidence of an acute infarct. There is no evidence of hydrocephalus. The ventricles, cisterns and sulci are prominent consistent with atrophy. There is decreased attenuation within the periventricular white matter consistent with periventricular leukomalacia. ORBITS: The visualized portion of the orbits demonstrate no acute abnormality. SINUSES: The visualized paranasal sinuses and mastoid air cells demonstrate no acute abnormality.  SOFT TISSUES/SKULL:  No acute abnormality of the visualized skull or soft tissues. 1. There is no acute intracranial abnormality. Specifically, there is no intracranial hemorrhage. 2. Atrophy and periventricular leukomalacia,     CT CSpine W/O Contrast    Result Date: 1/5/2023  EXAMINATION: CT OF THE CERVICAL SPINE WITHOUT CONTRAST 1/5/2023 5:33 pm TECHNIQUE: CT of the cervical spine was performed without the administration of intravenous contrast. Multiplanar reformatted images are provided for review. Automated exposure control, iterative reconstruction, and/or weight based adjustment of the mA/kV was utilized to reduce the radiation dose to as low as reasonably achievable. COMPARISON: None. HISTORY: ORDERING SYSTEM PROVIDED HISTORY: Fall on Eliquis TECHNOLOGIST PROVIDED HISTORY: Reason for exam:->Fall on [employer] Decision Support Exception - unselect if not a suspected or confirmed emergency medical condition->Emergency Medical Condition (MA) FINDINGS: The ring of C1 is intact as is the dense. There is no compression fracture of the cervical spine. No jumped or perched facet is noted. Multilevel degenerative disc and degenerative joint disease is noted. The prevertebral soft tissues are unremarkable. The airway is widely patent. Images through the lung apices are negative for a pneumothorax. 1. There is no acute compression fracture or subluxation of the cervical spine. 2. Advanced multilevel degenerative disc and degenerative joint disease. CT ABDOMEN PELVIS W IV CONTRAST Additional Contrast? None    Result Date: 1/5/2023  EXAMINATION: CTA OF THE CHEST; CT OF THE ABDOMEN AND PELVIS WITH CONTRAST 1/5/2023 6:15 pm TECHNIQUE: CTA of the chest was performed after the administration of intravenous contrast.  Multiplanar reformatted images are provided for review. 3D and MIP images are provided for review.  Automated exposure control, iterative reconstruction, and/or weight based adjustment of the mA/kV was utilized to reduce the radiation dose to as low as reasonably achievable.; CT of the abdomen and pelvis was performed with the administration of intravenous contrast. Multiplanar reformatted images are provided for review. Automated exposure control, iterative reconstruction, and/or weight based adjustment of the mA/kV was utilized to reduce the radiation dose to as low as reasonably achievable. COMPARISON: None. HISTORY: ORDERING SYSTEM PROVIDED HISTORY: Rule out PE TECHNOLOGIST PROVIDED HISTORY: Reason for exam:->Rule out PE Decision Support Exception - unselect if not a suspected or confirmed emergency medical condition->Emergency Medical Condition (MA); ORDERING SYSTEM PROVIDED HISTORY: Constipation, melena TECHNOLOGIST PROVIDED HISTORY: Additional Contrast?->None Reason for exam:->Constipation, melena Decision Support Exception - unselect if not a suspected or confirmed emergency medical condition->Emergency Medical Condition (MA) FINDINGS: CTA chest: Pulmonary Arteries: Pulmonary arteries are adequately opacified for evaluation. No evidence of intraluminal filling defect to suggest pulmonary embolism. Main pulmonary artery is normal in caliber. Mediastinum: No evidence of mediastinal lymphadenopathy. The heart and pericardium demonstrate no acute abnormality. There is no acute abnormality of the thoracic aorta. Esophagus demonstrates at least questionable circumferential thickening of the proximal through midportion concerning for esophagitis with questionable small hiatal hernia. Lungs/pleura: The lungs are without acute process. No focal consolidation or pulmonary edema. No evidence of pleural effusion or pneumothorax. Upper Abdomen: Limited images of the upper abdomen are unremarkable. Soft Tissues/Bones: No acute bone or soft tissue abnormality. Abdomen/Pelvis: Organs: Liver without focal lesion. Diffuse low attenuation throughout the liver of at least mild hepatic steatosis. Gallbladder unremarkable. Pancreas and spleen unremarkable.   Adrenals without nodule. Kidneys without suspicious renal lesion and no hydronephrosis. GI/Bowel: No focal thickening or disproportion dilatation of bowel. No inflammatory findings. Moderate colonic stool burden extending through the rectum. Pelvis: Urinary bladder is mild-to-moderately distended without wall thickening. No bulky pelvic adenopathy. Moderate prostatomegaly with central area of low attenuation. No associated gas locules maximum dimension 12 mm likely status post TURP findings of the central prostatic portion. Peritoneum/Retroperitoneum: Patent nonaneurysmal abdominal aorta. No bulky retroperitoneal adenopathy. Bones/Soft Tissues: No acute osseous findings. Left L5 pars defect with anterolisthesis grade 1 involvement of L5 on S1. Fat containing bilateral direct inguinal hernias left greater than right. No evidence of pulmonary embolism or acute pulmonary abnormality. Circumferential thickening of questionable esophageal is involving the proximal through mid esophagus correlate with symptomatology for the need of further investigation Abdomen and pelvis: No acute findings of the abdomen or pelvis with incidental findings as above including moderate prostatomegaly with central area of low attenuation. No associated gas locules maximum dimension 12 mm likely status post TURP findings of the central prostatic portion. No inflammatory findings of the urinary bladder or obstructing uropathy Hepatic steatosis     CTA PULMONARY W CONTRAST    Result Date: 1/5/2023  EXAMINATION: CTA OF THE CHEST; CT OF THE ABDOMEN AND PELVIS WITH CONTRAST 1/5/2023 6:15 pm TECHNIQUE: CTA of the chest was performed after the administration of intravenous contrast.  Multiplanar reformatted images are provided for review. 3D and MIP images are provided for review.  Automated exposure control, iterative reconstruction, and/or weight based adjustment of the mA/kV was utilized to reduce the radiation dose to as low as reasonably nodule. Kidneys without suspicious renal lesion and no hydronephrosis. GI/Bowel: No focal thickening or disproportion dilatation of bowel. No inflammatory findings. Moderate colonic stool burden extending through the rectum. Pelvis: Urinary bladder is mild-to-moderately distended without wall thickening. No bulky pelvic adenopathy. Moderate prostatomegaly with central area of low attenuation. No associated gas locules maximum dimension 12 mm likely status post TURP findings of the central prostatic portion. Peritoneum/Retroperitoneum: Patent nonaneurysmal abdominal aorta. No bulky retroperitoneal adenopathy. Bones/Soft Tissues: No acute osseous findings. Left L5 pars defect with anterolisthesis grade 1 involvement of L5 on S1. Fat containing bilateral direct inguinal hernias left greater than right. No evidence of pulmonary embolism or acute pulmonary abnormality. Circumferential thickening of questionable esophageal is involving the proximal through mid esophagus correlate with symptomatology for the need of further investigation Abdomen and pelvis: No acute findings of the abdomen or pelvis with incidental findings as above including moderate prostatomegaly with central area of low attenuation. No associated gas locules maximum dimension 12 mm likely status post TURP findings of the central prostatic portion. No inflammatory findings of the urinary bladder or obstructing uropathy Hepatic steatosis     US DUP LOWER EXTREMITY RIGHT MARILIN    Result Date: 12/26/2022  EXAMINATION: DUPLEX VENOUS ULTRASOUND OF THE RIGHT LOWER EXTREMITY 12/26/2022 4:00 pm TECHNIQUE: Duplex ultrasound using B-mode/gray scaled imaging and Doppler spectral analysis and color flow was obtained of the deep venous structures of the right lower extremity. COMPARISON: None.  HISTORY: ORDERING SYSTEM PROVIDED HISTORY: injury  swelling TECHNOLOGIST PROVIDED HISTORY: Reason for exam:->injury  swelling What reading provider will be dictating this exam?->CRC FINDINGS: The visualized veins of the right lower extremity are patent and free of echogenic thrombus. The veins demonstrate good compressibility with normal color flow study and spectral analysis. Please note the calf veins are suboptimally visualized. There is a 8.0 x 3.3 x 6.0 cm complex subcutaneous collection in the medial aspect of the knee, may represent hematoma. No evidence of DVT in the right lower extremity. Assessment and Plan  Patient is a 78 y.o. male patient on consult for GI bleed. 1.  Anemia  -Acute on chronic  -Significant anemia with initial hemoglobin 5.7  -B12 deficient - supplemented  -Reported positive FOBT  -Small stool with blood per nursing  -Obtaining of iron studies given blood transfusion will be of limited clinical value  -Agree with IV PPI  -Monitor hemoglobin every 6 hours  -Keep PRBCs on hold  -Defer transfusion to admitting  -EGD 1/6/2022 with findings of hiatal hernia, esophageal stricture (dilated), otherwise unremarkable exam, no bleeding source identified  -Bleeding scan today  -Consider colonoscopy     2. Abnormal CT esophagus  -CT abdomen pelvis 1/5/2022 showing possible circumferential thickening of the proximal esophagus with possible hiatal hernia  -EGD 1/6/2022 with findings of hiatal hernia, esophageal stricture (dilated), otherwise unremarkable exam, no bleeding source identified    3. Constipation  -CT showing moderate stool burden  -Miralax daily  -Senokot nightly as needed  -Can titrate bowel regimen as needed    4. Elevated troponin  -Likely demand ischemia/type II non-STEMI  -Recommend cardiology evaluation     5. Seizure  -Await neurology evaluation     6. Diabetes mellitus  -Await cardiology evaluation severe hyperglycemia on presentation      Patient transfused a total 4 units PRBCs, one unit FFP. Most recent transfusion earlier this morning. Start bowel regimen today. Bleeding scan today.   Colonoscopy when able to adequately prep. GI will follow.     Michael Martinss, APRN - CNP  5:34 AM  1/7/2023

## 2023-01-07 NOTE — PROGRESS NOTES
Spoke with Dimple Beavers regarding restarting insulin gtt once blood sugar starts to increase. ZHANG said to keep the insulin gtt off and to continue monitoring glucose levels hourly.

## 2023-01-07 NOTE — OP NOTE
[address]                                OPERATIVE REPORT    PATIENT NAME: Darron Main                      :        1945  MED REC NO:   [id number]                            ROOM:       [facility]  ACCOUNT NO:   [de-identified]                           ADMIT DATE: 2023  PROVIDER:     Osmin Vasques MD    DATE OF PROCEDURE:  2023    PREOPERATIVE DIAGNOSIS:  Anemia. POSTOPERATIVE DIAGNOSES:  Hiatus hernia, esophageal stricture. OPERATION:  EGD, esophageal dilatation. SURGEON:  Osmin Vasques MD    INDICATIONS:  The patient is a 74-year-old. Admitted with profound  anemia. ASA classification III. Informed consent. OPERATIVE PROCEDURE:  The Olympus video upper endoscope was introduced  through the mouth. The esophagus, the stomach and proximal duodenum  were inspected. Exam of the gastric fundus by retroflexion. A  stricture in the lower esophagus at 36 cm, hiatus hernia. No evidence  of bleeding in the upper GI tract. Endoscope withdrawn. Then, the  esophagus was dilated with a 52-Ind[place]ian bougie. Secretions suctioned. Estimated blood loss none.         Jazmín Ferreira MD    D: 2023 15:48:09       T: 2023 15:51:31     [email or web address]  Job#: [id number]     Doc#: [id number]    CC:

## 2023-01-07 NOTE — CONSULTS
Department of Orthopedic Surgery  Resident Consult Note          Reason for Consult:  Right knee hematoma    HISTORY OF PRESENT ILLNESS:       Patient is a 78 y.o. male who presents with chief complaint of hematoma to the right knee. Patient states that he dropped something heavy on his knee on 12/26/2022. He was taking Eliquis at that time is noted to have significant bruising of his leg and came the emergency department for evaluation. X-rays were taken was found to have most fractures and he was discharged home. He subsequently brought back to the emergency department on 1/5/2023 for altered mental status and being found on the floor by his family. He was noted to be in DKA and has been admitted to the ICU. Orthopedics has been consulted for evaluation of his knee hematoma. Images from his original injury were reviewed and he did have some superficial skin skin damage over the hematoma. Denies any other orthopedic complaints at this time.          Past Medical History:        Diagnosis Date    Arthritis     Bilateral shoulder pain 05/2020    for TJAS Right 05/12/2020 Dr Martha Sarkar    Diabetes mellitus [facility])     Enlarged prostate     follows with Dr Lopez King    History of Holter monitoring 07/29/2022    History of irregular heartbeat     follows with PCP    Hyperlipidemia     Hypertension     Leg wound, right 05/2020    recent history of; see podiatry notes in epic     Past Surgical History:        Procedure Laterality Date    CATARACT REMOVAL WITH IMPLANT Bilateral     ENDOSCOPY, COLON, DIAGNOSTIC      EYE SURGERY      HERNIA REPAIR      SHOULDER SURGERY Right 5/12/2020    RIGHT SHOULDER TOTAL ARTHROPLASTY REVERSE performed by Agatha Brooke MD at [place] [id number]       Current Medications:   Current Facility-Administered Medications: 0.9 % sodium chloride infusion, , IntraVENous, PRN  0.9 % sodium chloride infusion, , IntraVENous, PRN  0.9 % sodium chloride infusion, , IntraVENous, PRN  pantoprazole (PROTONIX) 40 mg in sodium chloride (PF) 0.9 % 10 mL injection, 40 mg, IntraVENous, Q12H  glucose chewable tablet 16 g, 4 tablet, Oral, PRN  dextrose bolus 10% 125 mL, 125 mL, IntraVENous, PRN **OR** dextrose bolus 10% 250 mL, 250 mL, IntraVENous, PRN  glucagon (rDNA) injection 1 mg, 1 mg, SubCUTAneous, PRN  dextrose 10 % infusion, , IntraVENous, Continuous PRN  sodium chloride flush 0.9 % injection 5-40 mL, 5-40 mL, IntraVENous, 2 times per day  sodium chloride flush 0.9 % injection 5-40 mL, 5-40 mL, IntraVENous, PRN  0.9 % sodium chloride infusion, , IntraVENous, PRN  acetaminophen (TYLENOL) tablet 650 mg, 650 mg, Oral, Q6H PRN **OR** acetaminophen (TYLENOL) suppository 650 mg, 650 mg, Rectal, Q6H PRN  0.9 % sodium chloride infusion, , IntraVENous, PRN  0.9 % sodium chloride infusion, , IntraVENous, PRN  potassium chloride 10 mEq/100 mL IVPB (Peripheral Line), 10 mEq, IntraVENous, PRN  magnesium sulfate 1000 mg in dextrose 5% 100 mL IVPB, 1,000 mg, IntraVENous, PRN  sodium phosphate 10 mmol in sodium chloride 0.9 % 250 mL IVPB, 10 mmol, IntraVENous, PRN **OR** sodium phosphate 15 mmol in dextrose 5 % 250 mL IVPB, 15 mmol, IntraVENous, PRN **OR** sodium phosphate 20 mmol in dextrose 5 % 500 mL IVPB, 20 mmol, IntraVENous, PRN  0.9 % sodium chloride infusion, , IntraVENous, Continuous  dextrose 5 % and 0.45 % sodium chloride infusion, , IntraVENous, Continuous PRN  insulin regular (HUMULIN R;NOVOLIN R) 100 Units in sodium chloride 0.9 % 100 mL infusion, 0.01-0.5 Units/kg/hr, IntraVENous, Continuous  Allergies:  Penicillins, Seasonal, and Sulfa antibiotics    Social History:   TOBACCO:   reports that he has never smoked. He has never used smokeless tobacco.  ETOH:   reports no history of alcohol use. DRUGS:   reports no history of drug use.   ACTIVITIES OF DAILY LIVING:    OCCUPATION:    Family History:       Problem Relation Age of Onset    No Known Problems Mother     No Known Problems Father        REVIEW OF SYSTEMS:  CONSTITUTIONAL:  negative for  fevers, chills  EYES:  negative for blurred vision, visual disturbance  HEENT:  negative for  hearing loss, voice change  RESPIRATORY:  negative for  dyspnea, wheezing  CARDIOVASCULAR:  negative for  chest pain, palpitations  GASTROINTESTINAL:  negative for nausea, vomiting  GENITOURINARY:  negative for frequency, urinary incontinence  HEMATOLOGIC/LYMPHATIC:  negative for bleeding and petechiae  MUSCULOSKELETAL:  positive for right knee hematoma  NEUROLOGICAL:  negative for headaches, dizziness  BEHAVIOR/PSYCH:  negative for increased agitation and anxiety    PHYSICAL EXAM:    VITALS:  BP (!) 134/52   Pulse 92   Temp 97.6 °F (36.4 °C) (Oral)   Resp 19   Ht 5' 8\" (1.727 m)   Wt 180 lb 9.6 oz (81.9 kg)   SpO2 96%   BMI 27.46 kg/m²   CONSTITUTIONAL:  awake, alert, cooperative, no apparent distress, and appears stated age  MUSCULOSKELETAL:  Right lower Extremity:  Nontender palpation over the hematoma. There is some scabbing over the hematoma from where the original wounds were seen in his original image his ED visit on 12/26. There is a blister at the distal portion of the hematoma. There is no blanching of the skin or signs of impeding skin breakdown. Overall the size of the hematoma does not appear to have increased in size from the images found in the chart compared today. He has no pain with passive range of motion of the knee. He is able to straight leg raise. Compartments soft and compressible, calf non-tender  +DP & PT pulses, Brisk Cap refill, Toes warm and perfused  Sensation grossly intact superficial/deep peroneal,saphenous,sural,tibial n. distributions  +GS/TA/EHL. Secondary Exam:   bilateralUE: No obvious signs of trauma. -TTP to fingers, hand, wrist, forearm, elbow, humerus, shoulder or clavicle. leftLE: No obvious signs of trauma.    -TTP to foot, ankle, leg, knee, thigh, hip    Pelvis: -TTP, -Log roll, -Heel strike     DATA:    CBC:   Lab Results   Component Value Date/Time    WBC 20.3 01/06/2023 11:59 AM    RBC 2.54 01/06/2023 11:59 AM    HGB 6.5 01/07/2023 12:14 AM    HCT 20.1 01/07/2023 12:14 AM    MCV 85.8 01/06/2023 11:59 AM    MCH 28.3 01/06/2023 11:59 AM    MCHC 33.0 01/06/2023 11:59 AM    RDW 17.4 01/06/2023 11:59 AM     01/06/2023 11:59 AM    MPV 10.6 01/06/2023 11:59 AM     PT/INR:    Lab Results   Component Value Date/Time    PROTIME 23.7 01/05/2023 10:24 PM    INR 2.1 01/05/2023 10:24 PM     CRP/ESR:   Lab Results   Component Value Date/Time    SEDRATE 50 12/26/2022 03:10 PM     Lactic Acid :   Lab Results   Component Value Date/Time    LACTA 2.5 01/06/2023 05:35 AM       Radiology Review:  01/07/23 - XR of the right knee tib-fib reviewed demonstrates no acute fracture dislocations there are degenerative changes noted in the right knee. IMPRESSION:   Right medial knee hematoma    PLAN:  WBAT - RLE  Recommend warm compresses to help decrease the size of the hematoma. Recommend 2-3 times daily  Pain Control per primary team  PT/OT   Continue elevation to decrease swelling  At this time there are no signs of impeding skin breakdown. There are some eschars over the original injury to the skin that are also seen on the original images from December 26. Could consider operative irrigation debridement of the hematoma if the patient develops pain as he has no pain at this time. Due to the length of time and no decrease in size of the hematoma this could also be done electively however would recommend stabilization and transfer out of the ICU prior to intervention as there is no signs of immediate skin breakdown. Will consult wound care for management of the wounds.   Discuss with attending

## 2023-01-07 NOTE — PROGRESS NOTES
Department of Internal Medicine  PN    PCP: Deo Hatfield DO  Admitting Physician: Dr. Rosa Fine  Consultants:   Date of Service: 1/5/2023    CHIEF COMPLAINT: Altered mental status    HISTORY OF PRESENT ILLNESS:    Patient is a 72-year-old male who presents to the ED with altered mental status. Patient was found on the floor at home unable to get up. Patient lives alone at home with 2 dogs. Otherwise patient states that he fell last night. Patient states that he fell while entering his home. He was able to get around but it was not able to ambulate. He slipped on the floor as he was not able to get onto the bed. Otherwise patient states he has noted bloody/melanotic stools the other day. States he has a previous upper endoscopy however is unsure of the results. Patient patient states that he has lost 40 pounds involuntarily over the last month or 2. Apparently patient suffered seizure as well on his way to the hospital.  He has no history of seizure. Overall patient has been weak over the last few weeks. He has also been having issues with constipation. 1/6/2023  Patient seen examined in ICU. Patient is very hard of hearing and does not have his hearing aids right now. Patient states he feels a lot better. He denies any chest or abdominal pain. He denies any nausea/vomiting or unusual shortness of breath. BUN/creatinine was 149/1.8 with CO2 electrolytes and 19. Lactic acid improved but still mildly elevated 2.5. Blood sugars improved. WBC still elevated 16.8 with hemoglobin is 6.6. Temperature 98.7 with heart rate of about 100 and blood pressure 151/80. O2 sat 100% on room air at rest with patient on insulin drip. Urinary output ranges 300-675 cc a shift. Patient is doing much better now than on admission. 1/7/2023  Patient seen examined in the ICU. Patient is extremely hard of hearing and seems to be worse today and not able to communicate very well because of this.   Case discussed with patient nurse at the bedside. 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 Patient had esophageal dilation performed. Orthopedic note from today shows right medial knee hematoma. BUN/creatinine is improved to 82/1.3 with CO2 electrolytes of 19. Blood sugars range from . Patient had a hemoglobin A1c of 5.9. WBC 17.1 with hemoglobin 7.7. Temperature is 90.1 with heart rate 88. Blood pressure 141/64. Patient is still into atrial fibrillation with the patient having O2 sat 98% on room air at rest.  Urine output is good ranging 450-1800 cc a shift. Case discussed with intensivist.  Patient had a bleeding scan today. Patient's insulin drip was stopped since last night. PAST MEDICAL Hx:  Past Medical History:   Diagnosis Date    Arthritis     Bilateral shoulder pain 05/2020    for TJAS Right 05/12/2020 Dr Regina Sparrow    Diabetes mellitus [facility])     Enlarged prostate     follows with Dr Maribel Jonas    History of Holter monitoring 07/29/2022    History of irregular heartbeat     follows with PCP    Hyperlipidemia     Hypertension     Leg wound, right 05/2020    recent history of; see podiatry notes in epic       PAST SURGICAL Hx:   Past Surgical History:   Procedure Laterality Date    CATARACT REMOVAL WITH IMPLANT Bilateral     ENDOSCOPY, COLON, DIAGNOSTIC      EYE SURGERY      HERNIA REPAIR      SHOULDER SURGERY Right 5/12/2020    RIGHT SHOULDER TOTAL ARTHROPLASTY REVERSE performed by Sara Hernandez MD at [address] Hx:  Family History   Problem Relation Age of Onset    No Known Problems Mother     No Known Problems Father        HOME MEDICATIONS:  Prior to Admission medications    Medication Sig Start Date End Date Taking?  Authorizing Provider   FARXIGA 5 MG tablet take 1 tablet by mouth once daily 10/23/22   Historical Provider, MD   apixaban (ELIQUIS) 5 MG TABS tablet Take 1 tablet by mouth 2 times daily 10/31/22 1/5/23  Michelle Amezquita MD   atorvastatin (LIPITOR) 40 MG tablet take 1 tablet by mouth daily 7/29/22   Historical Provider, MD   TRULICITY 1.5 OW/6.6IJ SOPN inject 0.5 milliliters ( 1 AND 1/2 milligrams ) subcutaneously every week 9/16/22   Historical Provider, MD   Multiple Vitamins-Minerals (THERAPEUTIC MULTIVITAMIN-MINERALS) tablet Take 1 tablet by mouth daily Last taken 05/03/2020    Historical Provider, MD   Ascorbic Acid (VITAMIN C PO) Take 1 tablet by mouth daily Last taken 05/03    Historical Provider, MD   acetaminophen (TYLENOL) 500 MG tablet Take 500 mg by mouth every 6 hours as needed for Pain    Historical Provider, MD   metOLazone (ZAROXOLYN) 5 MG tablet Take 5 mg by mouth daily    Historical Provider, MD   lisinopril (PRINIVIL;ZESTRIL) 40 MG tablet Take 40 mg by mouth daily    Historical Provider, MD   metFORMIN (GLUCOPHAGE) 500 MG tablet Take 500 mg by mouth 2 times daily (with meals)    Historical Provider, MD   NIFEdipine (ADALAT CC) 30 MG extended release tablet Take 30 mg by mouth daily Take am day of surgery 05/12/2020    Historical Provider, MD       ALLERGIES:  Penicillins, Seasonal, and Sulfa antibiotics    SOCIAL Hx:  Social History     Socioeconomic History    Marital status: Single     Spouse name: Not on file    Number of children: Not on file    Years of education: Not on file    Highest education level: Not on file   Occupational History    Not on file   Tobacco Use    Smoking status: Never    Smokeless tobacco: Never   Vaping Use    Vaping Use: Never used   Substance and Sexual Activity    Alcohol use: Never    Drug use: Never    Sexual activity: Not on file   Other Topics Concern    Not on file   Social History Narrative    Not on file     Social Determinants of Health     Financial Resource Strain: Not on file   Food Insecurity: Not on file   Transportation Needs: Not on file   Physical Activity: Not on file   Stress: Not on file   Social Connections: Not on file Intimate Partner Violence: Not on file   Housing Stability: Not on file       ROS: Positive in bold  General:   Denies chills, fatigue, fever, malaise, night sweats or weight loss    Psychological:   Denies anxiety, disorientation or hallucinations    ENT:    Denies epistaxis, headaches, vertigo or visual changes    Cardiovascular:   Denies any chest pain, irregular heartbeats, or palpitations. No paroxysmal nocturnal dyspnea. Respiratory:   Denies shortness of breath, coughing, sputum production, hemoptysis, or wheezing. No orthopnea. Gastrointestinal:   Denies nausea, vomiting, diarrhea, or constipation. Denies any abdominal pain. Denies change in bowel habits or stools. Genito-Urinary:    Denies any urgency, frequency, hematuria. Voiding without difficulty. Musculoskeletal:   Denies joint pain, joint stiffness, joint swelling or muscle pain    Neurology:    Denies any headache or focal neurological deficits. No weakness or paresthesia. Derm:    Denies any rashes, ulcers, or excoriations. Denies bruising. Extremities:   Denies any lower extremity swelling or edema. PHYSICAL EXAM: Abnormal findings noted  VITALS:  Vitals:    01/07/23 0830   BP: (!) 141/64   Pulse: 88   Resp: 17   Temp:    SpO2: 98%         CONSTITUTIONAL:    Awake, alert, cooperative, no apparent distress, and appears stated age    EYES:    EOMI, sclera clear, conjunctiva normal    ENT:    Normocephalic, atraumatic, External ears without lesions. NECK:    Supple, symmetrical, trachea midline, no JVD    HEMATOLOGIC/LYMPHATICS:    No cervical lymphadenopathy and no supraclavicular lymphadenopathy    LUNGS:    Symmetric.  No increased work of breathing, good air exchange, clear to auscultation bilaterally, no wheezes, rhonchi, or rales,     CARDIOVASCULAR:    Normal apical impulse, regular rate and rhythm, normal S1 and S2, no S3 or S4, and no murmur noted    ABDOMEN:    soft, non-distended, non-tender,    MUSCULOSKELETAL:    There is no redness, warmth, or swelling of the joints. NEUROLOGIC:    Awake, alert, oriented to name, place and time. SKIN:    No bruising or bleeding. No redness, warmth, or swelling    EXTREMITIES:    Peripheral pulses present. No edema, cyanosis, or swelling. LINES/CATHETERS     LABORATORY DATA:  CBC with Differential:    Lab Results   Component Value Date/Time    WBC 17.1 01/07/2023 05:22 AM    RBC 2.58 01/07/2023 05:22 AM    HGB 7.7 01/07/2023 05:22 AM    HCT 22.3 01/07/2023 05:22 AM     01/07/2023 05:22 AM    MCV 86.4 01/07/2023 05:22 AM    MCH 29.8 01/07/2023 05:22 AM    MCHC 34.5 01/07/2023 05:22 AM    RDW 18.6 01/07/2023 05:22 AM    LYMPHOPCT 10.8 01/07/2023 05:22 AM    MONOPCT 7.2 01/07/2023 05:22 AM    MYELOPCT 0.9 01/06/2023 11:59 AM    BASOPCT 0.4 01/07/2023 05:22 AM    MONOSABS 1.23 01/07/2023 05:22 AM    LYMPHSABS 1.85 01/07/2023 05:22 AM    EOSABS 0.02 01/07/2023 05:22 AM    BASOSABS 0.07 01/07/2023 05:22 AM     CMP:    Lab Results   Component Value Date/Time     01/07/2023 05:22 AM    K 3.7 01/07/2023 05:22 AM    K 4.4 12/26/2022 03:10 PM     01/07/2023 05:22 AM    CO2 19 01/07/2023 05:22 AM    BUN 82 01/07/2023 05:22 AM    CREATININE 1.3 01/07/2023 05:22 AM    GFRAA >60 05/13/2020 03:50 AM    LABGLOM 57 01/07/2023 05:22 AM    GLUCOSE 189 01/07/2023 05:22 AM    GLUCOSE 253 03/28/2011 06:05 PM    PROT 4.2 01/05/2023 08:11 PM    LABALBU 2.8 01/05/2023 08:11 PM    LABALBU 4.3 03/28/2011 06:05 PM    CALCIUM 8.0 01/07/2023 05:22 AM    BILITOT 0.2 01/05/2023 08:11 PM    ALKPHOS 53 01/05/2023 08:11 PM    AST 10 01/05/2023 08:11 PM    ALT 7 01/05/2023 08:11 PM       ASSESSMENT/PLAN:  DKA  Patient has not been taking his diabetic medications. Patient placed on insulin drip. Critical care consulted patient excepted to ICU. Anemia possibly secondary to GI bleed    He did describe melanotic/bloody stool.   Rectal exam was positive for occult blood. Patient placed on Protonix. Eliquis held. GI consulted. Right knee effusion versus hematoma  Continue to monitor. orthopedic consultation. Non-insulin-dependent diabetes mellitus  RED  Continue IV fluids. Hold nephrotoxic medication. Esophageal thickening  GI consulted. Possible seizure  Apparently patient had 2 episodes of seizure-like activity prior to presentation to the hospital.  No history of seizure disorder. Neurology consulted. Elevated troponin  Likely secondary to demand ischemia    Hypertension  Hyperlipidemia  Atrial fibrillation on Eliquis  Enlarged prostate    Plan:  Patient admitted to ICU  Insulin drip with glucoscans every hour   Home medication reviewed  Patient received 1 unit of typed and crossed packed RBC on admission  Protonix 40 mg IV push twice daily  Consult GI  Consult intensivist  IV fluids normal saline    Insulin drip stopped 1/6 PM  GI bleeding scan 1/7  Increase diet  Increase activity    Transfer to monitored floor when okay with intensivist    BMP, CBC in a.mJustin Frederick DO  11:31 AM  1/7/2023

## 2023-01-07 NOTE — PROGRESS NOTES
Pulmonary/Critical Care Consult Note    CHIEF COMPLAINT: Fall, hypotension, decreased appetite, and altered mental status    HISTORY OF PRESENT ILLNESS: Patient is a 72-year-old male with history of diabetes mellitus type 2, BPH, atrial fibrillation, hypertension, and hyperlipidemia. Patient presented to the ED on 1/5/2023 per EMS after being found on the floor per family. Patient apparently fell at home and was unable to to get back up. Patient also fell around 2 weeks ago and injured his right knee. Patient is unable to provide much history at this time and he was obtained per daughter, who is at bedside. Patient's daughter states the patient has not eaten since Sunday and is also recently been constipated. No history of overt GI bleeding, however his occult stool was positive for rectal exam in the ED. Patient found to be hypotensive and anemic with initial hemoglobin of 5.7. He received 3 L normal saline bolus in the ED as well as packed RBC transfusion. Patient's blood glucose was also elevated at 662 venous pH 7.15, anion gap of 36 and beta hydroxybutyrate 5.99. An insulin infusion was initiated in ED prior to admission for DKA. CT head showed atrophy and periventricular leukomalacia otherwise no intracranial abnormality. CT cervical spine negative for fracture but advanced multilevel degenerative disc and joint disease noted. Chest CTA showed no evidence of pulmonary embolism or acute pulmonary abnormality. CT abdomen pelvis with IV contrast shows circumferential thickening of the esophagus involving the proximal to mid esophagus. Prostatomegaly noted with no inflammatory findings of the urinary bladder or obstructive uropathy. Hepatic steatosis noted. Daily progress:  January 6, 2023: Patient was awake and communicating this morning. He is alert and oriented x3 however he is hard of hearing. Continues to be on insulin drip. Beta hydroxybutyrate is mildly elevated.     January 7, 2023:Patient underwent an EGD yesterday which showed esophageal stricture and hiatal hernia. He underwent dilatation. There was no evidence of active GI bleeding. Patient was scheduled for a bleeding scan this morning. He has no fever, chills, rigors. He has been requesting to eat.     ALLERGY:  Penicillins, Seasonal, and Sulfa antibiotics    FAMILY HISTORY:  Family History   Problem Relation Age of Onset    No Known Problems Mother     No Known Problems Father        SOCIAL HISTORY:  Social History     Socioeconomic History    Marital status: Single     Spouse name: Not on file    Number of children: Not on file    Years of education: Not on file    Highest education level: Not on file   Occupational History    Not on file   Tobacco Use    Smoking status: Never    Smokeless tobacco: Never   Vaping Use    Vaping Use: Never used   Substance and Sexual Activity    Alcohol use: Never    Drug use: Never    Sexual activity: Not on file   Other Topics Concern    Not on file   Social History Narrative    Not on file     Social Determinants of Health     Financial Resource Strain: Not on file   Food Insecurity: Not on file   Transportation Needs: Not on file   Physical Activity: Not on file   Stress: Not on file   Social Connections: Not on file   Intimate Partner Violence: Not on file   Housing Stability: Not on file       MEDICAL HISTORY:  Past Medical History:   Diagnosis Date    Arthritis     Bilateral shoulder pain 05/2020    for TJAS Right 05/12/2020 Dr John Patton    Diabetes mellitus [facility])     Enlarged prostate     follows with  [facility]    History of Holter monitoring 07/29/2022    History of irregular heartbeat     follows with PCP    Hyperlipidemia     Hypertension     Leg wound, right 05/2020    recent history of; see podiatry notes in epic       MEDICATIONS:   polyethylene glycol  17 g Oral Daily    cyanocobalamin  1,000 mcg IntraMUSCular Once    pantoprazole (PROTONIX) 40 mg injection  40 mg IntraVENous Q12H sodium chloride flush  5-40 mL IntraVENous 2 times per day      sodium chloride      sodium chloride      sodium chloride      dextrose      sodium chloride      sodium chloride      sodium chloride      sodium chloride 125 mL/hr at 01/07/23 0944    dextrose 5 % and 0.45 % NaCl 125 mL/hr at 01/07/23 0608    insulin Stopped (01/06/23 2256)     sodium chloride, senna, sodium chloride, sodium chloride, glucose, dextrose bolus **OR** dextrose bolus, glucagon (rDNA), dextrose, sodium chloride flush, sodium chloride, acetaminophen **OR** acetaminophen, sodium chloride, sodium chloride, potassium chloride, magnesium sulfate, sodium phosphate IVPB **OR** sodium phosphate IVPB **OR** sodium phosphate IVPB, dextrose 5 % and 0.45 % NaCl    REVIEW OF SYSTEMS:  Constitutional: Denies fever, weight loss, night sweats, and fatigue  Skin: Denies pigmentation, dark lesions, and rashes   HEENT: Denies hearing loss, tinnitus, ear drainage, epistaxis, sore throat, and hoarseness. Cardiovascular: Denies palpitations, chest pain, and chest pressure. Respiratory: Denies cough, dyspnea at rest, hemoptysis, apnea, and choking.   Gastrointestinal: Denies nausea, vomiting, poor appetite, diarrhea, heartburn or reflux  Genitourinary: Denies dysuria, frequency, urgency or hematuria  Musculoskeletal: Denies myalgias, reports generalized muscle weakness and right knee pain/bruising/and swelling  Neurological: Denies dizziness, vertigo, headache, and focal weakness  Psychological: Denies anxiety and depression  Endocrine: Denies heat intolerance and cold intolerance  Hematopoietic/Lymphatic: Denies bleeding problems and blood transfusions    PHYSICAL EXAM:  Vitals:    01/07/23 0830   BP: (!) 141/64   Pulse: 88   Resp: 17   Temp:    SpO2: 98%           O2 Device: None (Room air)    Constitutional: Very hard of hearing, no fever, chills, diaphoresis  HEENT: No head lesions, PERRL, EOMI, mouth without lesions, no nasal lesions, no cervical adenopathy palpated   Respiratory: Lungs with equal breath sounds bilaterally, no adventitious sounds auscultated, no accessory muscle use   CV: Regular rate and rhythm, no murmurs, JVD, his leg edema   abdomen: Soft, non tender, + bowel sounds, no lesions   Skin: Decreased skin turgor, no rash, capillary refill <2 seconds   Extremities: Muscular strength 4/4 in 4 limbs, moves 4 limbs spontaneously, distal pulses present, large amount of bruising and edema to the medial aspect of the right knee with areas of black eschar, surrounding erythema or drainage  Neurology: Awake with intermittent confusion, follows commands, moves 4 limbs on command and spontaneously, equal sensation, no dysmetria, neck is supple, no meningitic signs present.     LABS:  WBC   Date Value Ref Range Status   01/07/2023 17.1 (H) 4.5 - 11.5 E9/L Final   01/06/2023 20.3 (H) 4.5 - 11.5 E9/L Final   01/06/2023 16.8 (H) 4.5 - 11.5 E9/L Final     Hemoglobin   Date Value Ref Range Status   01/07/2023 7.7 (L) 12.5 - 16.5 g/dL Final   01/07/2023 6.5 (LL) 12.5 - 16.5 g/dL Final   01/06/2023 7.2 (L) 12.5 - 16.5 g/dL Final     Hematocrit   Date Value Ref Range Status   01/07/2023 22.3 (L) 37.0 - 54.0 % Final   01/07/2023 20.1 (L) 37.0 - 54.0 % Final   01/06/2023 21.3 (L) 37.0 - 54.0 % Final     MCV   Date Value Ref Range Status   01/07/2023 86.4 80.0 - 99.9 fL Final   01/06/2023 85.8 80.0 - 99.9 fL Final   01/06/2023 82.7 80.0 - 99.9 fL Final     Platelets   Date Value Ref Range Status   01/07/2023 211 130 - 450 E9/L Final   01/06/2023 224 130 - 450 E9/L Final   01/06/2023 314 130 - 450 E9/L Final     Sodium   Date Value Ref Range Status   01/07/2023 134 132 - 146 mmol/L Final   01/06/2023 134 132 - 146 mmol/L Final   01/06/2023 132 132 - 146 mmol/L Final     Potassium   Date Value Ref Range Status   01/07/2023 3.7 3.5 - 5.0 mmol/L Final   01/06/2023 3.4 (L) 3.5 - 5.0 mmol/L Final   01/06/2023 3.4 (L) 3.5 - 5.0 mmol/L Final     Potassium reflex Magnesium Date Value Ref Range Status   12/26/2022 4.4 3.5 - 5.0 mmol/L Final     Chloride   Date Value Ref Range Status   01/07/2023 103 98 - 107 mmol/L Final   01/06/2023 103 98 - 107 mmol/L Final   01/06/2023 99 98 - 107 mmol/L Final     CO2   Date Value Ref Range Status   01/07/2023 19 (L) 22 - 29 mmol/L Final   01/06/2023 21 (L) 22 - 29 mmol/L Final   01/06/2023 20 (L) 22 - 29 mmol/L Final     BUN   Date Value Ref Range Status   01/07/2023 82 (H) 6 - 23 mg/dL Final   01/06/2023 101 (H) 6 - 23 mg/dL Final   01/06/2023 111 (HH) 6 - 23 mg/dL Final     Creatinine   Date Value Ref Range Status   01/07/2023 1.3 (H) 0.7 - 1.2 mg/dL Final   01/06/2023 1.4 (H) 0.7 - 1.2 mg/dL Final   01/06/2023 1.5 (H) 0.7 - 1.2 mg/dL Final     Glucose   Date Value Ref Range Status   01/07/2023 189 (H) 74 - 99 mg/dL Final   01/06/2023 42 (L) 74 - 99 mg/dL Final   01/06/2023 218 (H) 74 - 99 mg/dL Final   03/28/2011 253 (H) 70 - 110 mg/dL Final     Calcium   Date Value Ref Range Status   01/07/2023 8.0 (L) 8.6 - 10.2 mg/dL Final   01/06/2023 8.1 (L) 8.6 - 10.2 mg/dL Final   01/06/2023 8.3 (L) 8.6 - 10.2 mg/dL Final     Total Protein   Date Value Ref Range Status   01/05/2023 4.2 (L) 6.4 - 8.3 g/dL Final   12/26/2022 7.0 6.4 - 8.3 g/dL Final   03/28/2011 7.2 5.7 - 8.2 g/dL Final     Albumin   Date Value Ref Range Status   01/05/2023 2.8 (L) 3.5 - 5.2 g/dL Final   12/26/2022 4.3 3.5 - 5.2 g/dL Final   03/28/2011 4.3 3.2 - 4.8 g/dL Final     Total Bilirubin   Date Value Ref Range Status   01/05/2023 0.2 0.0 - 1.2 mg/dL Final   12/26/2022 0.8 0.0 - 1.2 mg/dL Final     Bilirubin, Total   Date Value Ref Range Status   03/28/2011 0.2 (L) 0.3 - 1.2 mg/dL Final     Alkaline Phosphatase   Date Value Ref Range Status   01/05/2023 53 40 - 129 U/L Final   12/26/2022 107 40 - 129 U/L Final   03/28/2011 100 45 - 129 U/L Final     AST   Date Value Ref Range Status   01/05/2023 10 0 - 39 U/L Final   12/26/2022 18 0 - 39 U/L Final   03/28/2011 15 0 - 33 U/L Final ALT   Date Value Ref Range Status   01/05/2023 7 0 - 40 U/L Final   12/26/2022 11 0 - 40 U/L Final   03/28/2011 20 10 - 49 U/L Final     Est, Yolanda Filt Rate   Date Value Ref Range Status   01/07/2023 57 >=60 mL/min/1.73 Final     Comment:     Pediatric calculator link  [email or web address]. [email or web address]  Effective Oct 3, 2022  These results are not intended for use in patients  <25years of age. eGFR results are calculated without  a race factor using the 2021 CKD-EPI equation. Careful  clinical correlation is recommended, particularly when  comparing to results calculated using previous equations. The CKD-EPI equation is less accurate in patients with  extremes of muscle mass, extra-renal metabolism of  creatinine, excessive creatinine ingestion, or following  therapy that affects renal tubular secretion. 01/06/2023 52 >=60 mL/min/1.73 Final     Comment:     Pediatric calculator link  [employer]at. [email or web address]  Effective Oct 3, 2022  These results are not intended for use in patients  <25years of age. eGFR results are calculated without  a race factor using the 2021 CKD-EPI equation. Careful  clinical correlation is recommended, particularly when  comparing to results calculated using previous equations. The CKD-EPI equation is less accurate in patients with  extremes of muscle mass, extra-renal metabolism of  creatinine, excessive creatinine ingestion, or following  therapy that affects renal tubular secretion. 01/06/2023 48 >=60 mL/min/1.73 Final     Comment:     Pediatric calculator link  [email or web address]. [email or web address]  Effective Oct 3, 2022  These results are not intended for use in patients  <25years of age. eGFR results are calculated without  a race factor using the 2021 CKD-EPI equation.   Careful  clinical correlation is recommended, particularly when  comparing to results calculated using previous equations. The CKD-EPI equation is less accurate in patients with  extremes of muscle mass, extra-renal metabolism of  creatinine, excessive creatinine ingestion, or following  therapy that affects renal tubular secretion. GFR    Date Value Ref Range Status   05/13/2020 >60  Final   05/07/2020 >60  Final   03/28/2018 >60  Final     Magnesium   Date Value Ref Range Status   01/07/2023 1.7 1.6 - 2.6 mg/dL Final   01/06/2023 1.7 1.6 - 2.6 mg/dL Final   01/06/2023 1.8 1.6 - 2.6 mg/dL Final     Phosphorus   Date Value Ref Range Status   01/07/2023 3.2 2.5 - 4.5 mg/dL Final   01/06/2023 1.9 (L) 2.5 - 4.5 mg/dL Final   01/06/2023 2.1 (L) 2.5 - 4.5 mg/dL Final     No results for input(s): PH, PO2, PCO2, HCO3, BE, O2SAT in the last 72 hours. RADIOLOGY:  CT ABDOMEN PELVIS W IV CONTRAST Additional Contrast? None   Final Result   No evidence of pulmonary embolism or acute pulmonary abnormality. Circumferential thickening of questionable esophageal is involving the   proximal through mid esophagus correlate with symptomatology for the need of   further investigation      Abdomen and pelvis: No acute findings of the abdomen or pelvis with   incidental findings as above including moderate prostatomegaly with central   area of low attenuation. No associated gas locules maximum dimension 12 mm   likely status post TURP findings of the central prostatic portion. No   inflammatory findings of the urinary bladder or obstructing uropathy      Hepatic steatosis         CTA PULMONARY W CONTRAST   Final Result   No evidence of pulmonary embolism or acute pulmonary abnormality.       Circumferential thickening of questionable esophageal is involving the   proximal through mid esophagus correlate with symptomatology for the need of   further investigation      Abdomen and pelvis: No acute findings of the abdomen or pelvis with   incidental findings as above including moderate prostatomegaly with central   area of low attenuation. No associated gas locules maximum dimension 12 mm   likely status post TURP findings of the central prostatic portion. No   inflammatory findings of the urinary bladder or obstructing uropathy      Hepatic steatosis         CT Head W/O Contrast   Final Result   1. There is no acute intracranial abnormality. Specifically, there is no   intracranial hemorrhage. 2. Atrophy and periventricular leukomalacia,         CT CSpine W/O Contrast   Final Result   1. There is no acute compression fracture or subluxation of the cervical   spine. 2. Advanced multilevel degenerative disc and degenerative joint disease. NM GI BLOOD LOSS    (Results Pending)       IMPRESSION:  S/P Fall  Anemia secondary to suspected upper GI bleed  Mild diabetic ketoacidosis  RED (Improving )  Elevated lactic acid  Chronic atrial fibrillation on Eliquis  Protein calorie malnutrition  Right knee effusion versus large hematoma  Hypertension  Hyperlipidemia  Elevated troponin likely secondary to demand ischemia. PLAN:  Clear liquid diet  Endoscopy showed hiatus hernia with esophageal stricture  H&H every 6 hours and transfuse PRBC as needed to keep hemoglobin greater than 7  Protonix 40 mg IV every 12 hours  Insulin drip per DKA protocol  BMP, magnesium, phosphorus every 4 hours and replace electrolytes as needed  C/W  with maintenance IV fluid  Hold Eliquis  Follow-up orthopedic recommendation for right medial knee hematoma. Repeat beta hydroxybutyrate and if it improves insulin drip will be transitioned to subcu insulin. Patient is stable to undergo EGD from critical care point of view.         ATTESTATION:  ICU Staff Physician note of personal involvement in Care  As the attending physician, I certify that I personally reviewed the patients history and personally examined the patient to confirm the physical findings described above,  And that I reviewed the relevant imaging studies and available reports. I also discussed the differential diagnosis and all of the proposed management plans with the patient and individuals accompanying the patient to this visit. They had the opportunity to ask questions about the proposed management plans and to have those questions answered. This patient has a high probability of sudden, clinically significant deterioration, which requires the highest level of physician preparedness to intervene urgently. I managed/supervised life or organ supporting interventions that required frequent physician assessment. I devoted my full attention to the direct care of this patient for the amount of time indicated below. Time I spent with the family or surrogate(s) is included only if the patient was incapable of providing the necessary information or participating in medical decisions - Time devoted to teaching and to any procedures I billed separately is not included.     CRITICAL CARE TIME:  31 minutes    Electronically signed by Zaira Florentino MD on 1/7/2023 at 10:49 AM

## 2023-01-08 LAB
ANION GAP SERPL CALCULATED.3IONS-SCNC: 10 MMOL/L (ref 7–16)
ANION GAP SERPL CALCULATED.3IONS-SCNC: 9 MMOL/L (ref 7–16)
BASOPHILS ABSOLUTE: 0.05 E9/L (ref 0–0.2)
BASOPHILS RELATIVE PERCENT: 0.5 % (ref 0–2)
BUN BLDV-MCNC: 38 MG/DL (ref 6–23)
BUN BLDV-MCNC: 43 MG/DL (ref 6–23)
CALCIUM SERPL-MCNC: 8.2 MG/DL (ref 8.6–10.2)
CALCIUM SERPL-MCNC: 8.2 MG/DL (ref 8.6–10.2)
CHLORIDE BLD-SCNC: 103 MMOL/L (ref 98–107)
CHLORIDE BLD-SCNC: 104 MMOL/L (ref 98–107)
CO2: 22 MMOL/L (ref 22–29)
CO2: 23 MMOL/L (ref 22–29)
CREAT SERPL-MCNC: 1.1 MG/DL (ref 0.7–1.2)
CREAT SERPL-MCNC: 1.1 MG/DL (ref 0.7–1.2)
EOSINOPHILS ABSOLUTE: 0.15 E9/L (ref 0.05–0.5)
EOSINOPHILS RELATIVE PERCENT: 1.4 % (ref 0–6)
GFR SERPL CREATININE-BSD FRML MDRD: >60 ML/MIN/1.73
GFR SERPL CREATININE-BSD FRML MDRD: >60 ML/MIN/1.73
GLUCOSE BLD-MCNC: 172 MG/DL (ref 74–99)
GLUCOSE BLD-MCNC: 229 MG/DL (ref 74–99)
HCT VFR BLD CALC: 21.6 % (ref 37–54)
HCT VFR BLD CALC: 22.3 % (ref 37–54)
HCT VFR BLD CALC: 26.5 % (ref 37–54)
HEMOGLOBIN: 7.3 G/DL (ref 12.5–16.5)
HEMOGLOBIN: 7.3 G/DL (ref 12.5–16.5)
HEMOGLOBIN: 8.2 G/DL (ref 12.5–16.5)
IMMATURE GRANULOCYTES #: 0.2 E9/L
IMMATURE GRANULOCYTES %: 1.8 % (ref 0–5)
LYMPHOCYTES ABSOLUTE: 1.51 E9/L (ref 1.5–4)
LYMPHOCYTES RELATIVE PERCENT: 13.7 % (ref 20–42)
MAGNESIUM: 1.8 MG/DL (ref 1.6–2.6)
MAGNESIUM: 2.1 MG/DL (ref 1.6–2.6)
MCH RBC QN AUTO: 29.8 PG (ref 26–35)
MCHC RBC AUTO-ENTMCNC: 32.7 % (ref 32–34.5)
MCV RBC AUTO: 91 FL (ref 80–99.9)
METER GLUCOSE: 119 MG/DL (ref 74–99)
METER GLUCOSE: 174 MG/DL (ref 74–99)
METER GLUCOSE: 180 MG/DL (ref 74–99)
METER GLUCOSE: 215 MG/DL (ref 74–99)
METER GLUCOSE: 230 MG/DL (ref 74–99)
METER GLUCOSE: 239 MG/DL (ref 74–99)
METER GLUCOSE: 248 MG/DL (ref 74–99)
METER GLUCOSE: 249 MG/DL (ref 74–99)
METER GLUCOSE: 265 MG/DL (ref 74–99)
MONOCYTES ABSOLUTE: 0.9 E9/L (ref 0.1–0.95)
MONOCYTES RELATIVE PERCENT: 8.2 % (ref 2–12)
NEUTROPHILS ABSOLUTE: 8.22 E9/L (ref 1.8–7.3)
NEUTROPHILS RELATIVE PERCENT: 74.4 % (ref 43–80)
PDW BLD-RTO: 19.8 FL (ref 11.5–15)
PHOSPHORUS: 3.1 MG/DL (ref 2.5–4.5)
PHOSPHORUS: 3.2 MG/DL (ref 2.5–4.5)
PLATELET # BLD: 238 E9/L (ref 130–450)
PMV BLD AUTO: 9.5 FL (ref 7–12)
POTASSIUM SERPL-SCNC: 3.6 MMOL/L (ref 3.5–5)
POTASSIUM SERPL-SCNC: 3.9 MMOL/L (ref 3.5–5)
PROCALCITONIN: 0.29 NG/ML (ref 0–0.08)
RBC # BLD: 2.45 E12/L (ref 3.8–5.8)
SODIUM BLD-SCNC: 135 MMOL/L (ref 132–146)
SODIUM BLD-SCNC: 136 MMOL/L (ref 132–146)
WBC # BLD: 11 E9/L (ref 4.5–11.5)

## 2023-01-08 PROCEDURE — 83735 ASSAY OF MAGNESIUM: CPT

## 2023-01-08 PROCEDURE — C9113 INJ PANTOPRAZOLE SODIUM, VIA: HCPCS | Performed by: NURSE PRACTITIONER

## 2023-01-08 PROCEDURE — 82962 GLUCOSE BLOOD TEST: CPT

## 2023-01-08 PROCEDURE — 6360000002 HC RX W HCPCS: Performed by: EMERGENCY MEDICINE

## 2023-01-08 PROCEDURE — 6360000002 HC RX W HCPCS: Performed by: NURSE PRACTITIONER

## 2023-01-08 PROCEDURE — A4216 STERILE WATER/SALINE, 10 ML: HCPCS | Performed by: NURSE PRACTITIONER

## 2023-01-08 PROCEDURE — 85014 HEMATOCRIT: CPT

## 2023-01-08 PROCEDURE — 6370000000 HC RX 637 (ALT 250 FOR IP): Performed by: NURSE PRACTITIONER

## 2023-01-08 PROCEDURE — 6370000000 HC RX 637 (ALT 250 FOR IP): Performed by: INTERNAL MEDICINE

## 2023-01-08 PROCEDURE — 80048 BASIC METABOLIC PNL TOTAL CA: CPT

## 2023-01-08 PROCEDURE — 1200000000 HC SEMI PRIVATE

## 2023-01-08 PROCEDURE — 2500000003 HC RX 250 WO HCPCS: Performed by: EMERGENCY MEDICINE

## 2023-01-08 PROCEDURE — 84145 PROCALCITONIN (PCT): CPT

## 2023-01-08 PROCEDURE — 2580000003 HC RX 258: Performed by: INTERNAL MEDICINE

## 2023-01-08 PROCEDURE — 2580000003 HC RX 258: Performed by: NURSE PRACTITIONER

## 2023-01-08 PROCEDURE — 36415 COLL VENOUS BLD VENIPUNCTURE: CPT

## 2023-01-08 PROCEDURE — 2580000003 HC RX 258: Performed by: EMERGENCY MEDICINE

## 2023-01-08 PROCEDURE — 85018 HEMOGLOBIN: CPT

## 2023-01-08 PROCEDURE — 85025 COMPLETE CBC W/AUTO DIFF WBC: CPT

## 2023-01-08 PROCEDURE — 84100 ASSAY OF PHOSPHORUS: CPT

## 2023-01-08 RX ORDER — INSULIN LISPRO 100 [IU]/ML
1 INJECTION, SOLUTION INTRAVENOUS; SUBCUTANEOUS ONCE
Status: COMPLETED | OUTPATIENT
Start: 2023-01-08 | End: 2023-01-08

## 2023-01-08 RX ORDER — DEXTROSE MONOHYDRATE 100 MG/ML
INJECTION, SOLUTION INTRAVENOUS CONTINUOUS PRN
Status: DISCONTINUED | OUTPATIENT
Start: 2023-01-08 | End: 2023-01-08 | Stop reason: SDUPTHER

## 2023-01-08 RX ORDER — INSULIN LISPRO 100 [IU]/ML
0-4 INJECTION, SOLUTION INTRAVENOUS; SUBCUTANEOUS NIGHTLY
Status: DISCONTINUED | OUTPATIENT
Start: 2023-01-08 | End: 2023-01-11 | Stop reason: HOSPADM

## 2023-01-08 RX ORDER — INSULIN GLARGINE 100 [IU]/ML
4 INJECTION, SOLUTION SUBCUTANEOUS 2 TIMES DAILY
Status: DISCONTINUED | OUTPATIENT
Start: 2023-01-08 | End: 2023-01-11

## 2023-01-08 RX ORDER — INSULIN GLARGINE 100 [IU]/ML
8 INJECTION, SOLUTION SUBCUTANEOUS ONCE
Status: COMPLETED | OUTPATIENT
Start: 2023-01-08 | End: 2023-01-08

## 2023-01-08 RX ORDER — INSULIN LISPRO 100 [IU]/ML
0-4 INJECTION, SOLUTION INTRAVENOUS; SUBCUTANEOUS
Status: DISCONTINUED | OUTPATIENT
Start: 2023-01-08 | End: 2023-01-11 | Stop reason: HOSPADM

## 2023-01-08 RX ADMIN — SODIUM CHLORIDE, PRESERVATIVE FREE 40 MG: 5 INJECTION INTRAVENOUS at 08:23

## 2023-01-08 RX ADMIN — Medication 10 ML: at 08:24

## 2023-01-08 RX ADMIN — POTASSIUM CHLORIDE 10 MEQ: 7.46 INJECTION, SOLUTION INTRAVENOUS at 01:26

## 2023-01-08 RX ADMIN — POLYETHYLENE GLYCOL-3350 AND ELECTROLYTES 4000 ML: 236; 6.74; 5.86; 2.97; 22.74 POWDER, FOR SOLUTION ORAL at 16:51

## 2023-01-08 RX ADMIN — INSULIN LISPRO 1 UNITS: 100 INJECTION, SOLUTION INTRAVENOUS; SUBCUTANEOUS at 13:09

## 2023-01-08 RX ADMIN — POTASSIUM CHLORIDE 10 MEQ: 7.46 INJECTION, SOLUTION INTRAVENOUS at 00:07

## 2023-01-08 RX ADMIN — INSULIN GLARGINE 8 UNITS: 100 INJECTION, SOLUTION SUBCUTANEOUS at 04:44

## 2023-01-08 RX ADMIN — INSULIN LISPRO 1 UNITS: 100 INJECTION, SOLUTION INTRAVENOUS; SUBCUTANEOUS at 09:15

## 2023-01-08 RX ADMIN — SODIUM CHLORIDE, PRESERVATIVE FREE 40 MG: 5 INJECTION INTRAVENOUS at 21:52

## 2023-01-08 RX ADMIN — BISACODYL 10 MG: 5 TABLET, COATED ORAL at 13:09

## 2023-01-08 RX ADMIN — MAGNESIUM SULFATE HEPTAHYDRATE 1000 MG: 1 INJECTION, SOLUTION INTRAVENOUS at 00:07

## 2023-01-08 RX ADMIN — POLYETHYLENE GLYCOL 3350 17 G: 17 POWDER, FOR SOLUTION ORAL at 08:23

## 2023-01-08 RX ADMIN — SODIUM PHOSPHATE, MONOBASIC, MONOHYDRATE AND SODIUM PHOSPHATE, DIBASIC, ANHYDROUS 10 MMOL: 142; 276 INJECTION, SOLUTION INTRAVENOUS at 01:05

## 2023-01-08 RX ADMIN — INSULIN LISPRO 2 UNITS: 100 INJECTION, SOLUTION INTRAVENOUS; SUBCUTANEOUS at 18:23

## 2023-01-08 RX ADMIN — INSULIN GLARGINE 4 UNITS: 100 INJECTION, SOLUTION SUBCUTANEOUS at 22:11

## 2023-01-08 NOTE — PROGRESS NOTES
Department of Orthopedic Surgery  Resident Progress Note    Patient seen and examined, resting comfortably in bed. Pain controlled on current regimen and improving to right knee. Does not complain of right knee pain at this time. No new complaints. Denies chest pain, shortness of breath, dizziness/lightheadedness. Denies fevers and chills. VITALS:  BP (!) 163/68   Pulse 87   Temp 98.2 °F (36.8 °C) (Oral)   Resp 26   Ht 5' 8\" (1.727 m)   Wt 180 lb 9.6 oz (81.9 kg)   SpO2 95%   BMI 27.46 kg/m²     General: Alert and in no acute distress    MUSCULOSKELETAL:   right lower extremity:  Nontender to palpation over the hematoma over the medial aspect of the knee. There is some healing wounds over the hematoma from where the original wounds were seen in his original image his ED visit on 12/26. There is a blister at the distal portion of the hematoma. There is no blanching of the skin or signs of impeding skin breakdown. Overall the size of the hematoma does not appear to have increased in size from the images found in the chart compared today. He has no pain with passive range of motion of the knee. He is able to straight leg raise. Compartments soft and compressible, calf nontender  +PF/DF/EHL  +2/4 DP & PT pulses, Brisk Cap refill, Toes warm and perfused  Distal sensation grossly intact to Peroneals, Sural, Saphenous, and tibial nrs    CBC:   Lab Results   Component Value Date/Time    WBC 11.0 01/08/2023 05:40 AM    HGB 7.3 01/08/2023 05:40 AM    HCT 22.3 01/08/2023 05:40 AM     01/08/2023 05:40 AM     PT/INR:    Lab Results   Component Value Date/Time    PROTIME 23.7 01/05/2023 10:24 PM    INR 2.1 01/05/2023 10:24 PM       ASSESSMENT  Right medial knee hematoma    PLAN    WBAT - RLE  Recommend warm compresses to help decrease the size of the hematoma.   Recommend 2-3 times daily  Pain Control per primary team  PT/OT   Continue elevation to decrease swelling  At this time there are no signs of impeding skin breakdown. There are some eschars over the original injury to the skin that are also seen on the original images from December 26. Could consider operative irrigation debridement of the hematoma if the patient develops pain as he has no pain at this time. Due to the length of time and no decrease in size of the hematoma this could also be done electively however would recommend stabilization and transfer out of the ICU prior to intervention as there is no signs of immediate skin breakdown. Will consult wound care for management of the wounds.

## 2023-01-08 NOTE — PROGRESS NOTES
Name:  Curly Mendez  :    MRN:  [id number]  Room:  [place] [address]  DOS:  2023    [facility]  [facility]  Dr. Senait Montana M.D. Dr. Benna Skiff, M.D. Dr. Park Torres D.O. Dr. Chelsie Mars M.D. Dr. Bro Garg D.O.    -NP Progress Note-    PCP:  Vinnie Young DO  Admitting Physician:  Maria E Fuentes DO  Chief Complaint:    Chief Complaint   Patient presents with    Fall     Last night on floor-witnessed seizure by ems no hx, bgl 521 per ems-hx type 2 diabetes-currently only c/o \"back pain from sitting on this cart\"       Subjective  Patient resting in bed. Some confusion. No bowel movements. Tolerated liquids.       Physical Examination  Vitals:  BP (!) 145/61   Pulse 81   Temp 98.2 °F (36.8 °C) (Oral)   Resp 22   Ht 5' 8\" (1.727 m)   Wt 180 lb 9.6 oz (81.9 kg)   SpO2 96%   BMI 27.46 kg/m²   General Appearance:  awake, alert, and oriented; appears stated age and cooperative; no apparent distress no labored breathing  HEENT:  PERRL; EOMI; sclera clear; buccal mucosa moist  Neck:  supple; trachea midline; no thyromegaly; no JVD; no bruits  Heart:  rhythm regular; rate controlled; no murmurs  Lungs:  symmetrical; clear to auscultation bilaterally; no wheezes; no rhonchi; no rales  Abdomen:  soft, non-tender, non-distended; bowel sounds positive; no organomegaly or masses; no pain on palpation  Extremities:  peripheral pulses present; no peripheral edema; no ulcers  Neurologic:  alert and oriented; no focal deficit; cranial nerves grossly intact  Skin:  no petechia; no hemorrhage; no wounds    Medications  Scheduled Meds    polyethylene glycol  17 g Oral Daily    pantoprazole (PROTONIX) 40 mg injection  40 mg IntraVENous Q12H    sodium chloride flush  5-40 mL IntraVENous 2 times per day     Infusion Meds    sodium chloride      dextrose 5 % and 0.45 % NaCl Stopped (23 0440)    dextrose      sodium chloride      dextrose 5 % and 0.45 % NaCl 125 mL/hr at 01/07/23 0943    insulin 0.01 Units/kg/hr (01/08/23 0554)     PRN Meds sodium chloride, senna, glucose, dextrose bolus **OR** dextrose bolus, glucagon (rDNA), dextrose, sodium chloride flush, sodium chloride, acetaminophen **OR** acetaminophen, potassium chloride, magnesium sulfate, sodium phosphate IVPB **OR** sodium phosphate IVPB **OR** sodium phosphate IVPB, dextrose 5 % and 0.45 % NaCl    Laboratory Data  Recent Results (from the past 24 hour(s))   POCT Glucose    Collection Time: 01/07/23  7:04 AM   Result Value Ref Range    Meter Glucose 194 (H) 74 - 99 mg/dL   POCT Glucose    Collection Time: 01/07/23  8:31 AM   Result Value Ref Range    Meter Glucose 178 (H) 74 - 99 mg/dL   POCT Glucose    Collection Time: 01/07/23  9:55 AM   Result Value Ref Range    Meter Glucose 203 (H) 74 - 99 mg/dL   Beta-Hydroxybutyrate    Collection Time: 01/07/23 12:35 PM   Result Value Ref Range    Beta-Hydroxybutyrate 2.49 (H) 0.02 - 0.27 mmol/L   POCT Glucose    Collection Time: 01/07/23 12:38 PM   Result Value Ref Range    Meter Glucose 240 (H) 74 - 99 mg/dL   Basic Metabolic Panel    Collection Time: 01/07/23 12:45 PM   Result Value Ref Range    Sodium 135 132 - 146 mmol/L    Potassium 3.9 3.5 - 5.0 mmol/L    Chloride 102 98 - 107 mmol/L    CO2 18 (L) 22 - 29 mmol/L    Anion Gap 15 7 - 16 mmol/L    Glucose 220 (H) 74 - 99 mg/dL    BUN 66 (H) 6 - 23 mg/dL    Creatinine 1.2 0.7 - 1.2 mg/dL    Est, Glom Filt Rate >60 >=60 mL/min/1.73    Calcium 8.2 (L) 8.6 - 10.2 mg/dL   Magnesium    Collection Time: 01/07/23 12:45 PM   Result Value Ref Range    Magnesium 1.6 1.6 - 2.6 mg/dL   Phosphorus    Collection Time: 01/07/23 12:45 PM   Result Value Ref Range    Phosphorus 3.3 2.5 - 4.5 mg/dL   Hemoglobin and Hematocrit    Collection Time: 01/07/23 12:45 PM   Result Value Ref Range    Hemoglobin 8.2 (L) 12.5 - 16.5 g/dL    Hematocrit 24.8 (L) 37.0 - 54.0 %   Lactic Acid    Collection Time: 01/07/23 12:45 PM   Result Value Ref Range Lactic Acid 1.0 0.5 - 2.2 mmol/L   POCT Glucose    Collection Time: 01/07/23  3:57 PM   Result Value Ref Range    Meter Glucose 251 (H) 74 - 99 mg/dL   POCT Glucose    Collection Time: 01/07/23  5:15 PM   Result Value Ref Range    Meter Glucose 245 (H) 74 - 99 mg/dL   POCT Glucose    Collection Time: 01/07/23  6:16 PM   Result Value Ref Range    Meter Glucose 241 (H) 74 - 99 mg/dL   Basic Metabolic Panel    Collection Time: 01/07/23  6:29 PM   Result Value Ref Range    Sodium 136 132 - 146 mmol/L    Potassium 3.2 (L) 3.5 - 5.0 mmol/L    Chloride 103 98 - 107 mmol/L    CO2 20 (L) 22 - 29 mmol/L    Anion Gap 13 7 - 16 mmol/L    Glucose 206 (H) 74 - 99 mg/dL    BUN 55 (H) 6 - 23 mg/dL    Creatinine 1.2 0.7 - 1.2 mg/dL    Est, Glom Filt Rate >60 >=60 mL/min/1.73    Calcium 8.3 (L) 8.6 - 10.2 mg/dL   Magnesium    Collection Time: 01/07/23  6:29 PM   Result Value Ref Range    Magnesium 1.5 (L) 1.6 - 2.6 mg/dL   Phosphorus    Collection Time: 01/07/23  6:29 PM   Result Value Ref Range    Phosphorus 2.5 2.5 - 4.5 mg/dL   Hemoglobin and Hematocrit    Collection Time: 01/07/23  6:29 PM   Result Value Ref Range    Hemoglobin 7.6 (L) 12.5 - 16.5 g/dL    Hematocrit 23.6 (L) 37.0 - 54.0 %   POCT Glucose    Collection Time: 01/07/23  7:15 PM   Result Value Ref Range    Meter Glucose 223 (H) 74 - 99 mg/dL   POCT Glucose    Collection Time: 01/07/23  8:39 PM   Result Value Ref Range    Meter Glucose 138 (H) 74 - 99 mg/dL   POCT Glucose    Collection Time: 01/07/23  9:52 PM   Result Value Ref Range    Meter Glucose 76 74 - 99 mg/dL   POCT Glucose    Collection Time: 01/07/23 10:37 PM   Result Value Ref Range    Meter Glucose 70 (L) 74 - 99 mg/dL   Basic Metabolic Panel    Collection Time: 01/07/23 10:52 PM   Result Value Ref Range    Sodium 138 132 - 146 mmol/L    Potassium 3.5 3.5 - 5.0 mmol/L    Chloride 107 98 - 107 mmol/L    CO2 23 22 - 29 mmol/L    Anion Gap 8 7 - 16 mmol/L    Glucose 63 (L) 74 - 99 mg/dL    BUN 47 (H) 6 - 23 mg/dL    Creatinine 1.1 0.7 - 1.2 mg/dL    Est, Glom Filt Rate >60 >=60 mL/min/1.73    Calcium 7.9 (L) 8.6 - 10.2 mg/dL   Magnesium    Collection Time: 01/07/23 10:52 PM   Result Value Ref Range    Magnesium 1.6 1.6 - 2.6 mg/dL   Phosphorus    Collection Time: 01/07/23 10:52 PM   Result Value Ref Range    Phosphorus 2.4 (L) 2.5 - 4.5 mg/dL   POCT Glucose    Collection Time: 01/07/23 11:39 PM   Result Value Ref Range    Meter Glucose 101 (H) 74 - 99 mg/dL   POCT Glucose    Collection Time: 01/08/23 12:39 AM   Result Value Ref Range    Meter Glucose 119 (H) 74 - 99 mg/dL   Hemoglobin and Hematocrit    Collection Time: 01/08/23 12:44 AM   Result Value Ref Range    Hemoglobin 7.3 (L) 12.5 - 16.5 g/dL    Hematocrit 21.6 (L) 37.0 - 54.0 %   POCT Glucose    Collection Time: 01/08/23  1:49 AM   Result Value Ref Range    Meter Glucose 174 (H) 74 - 99 mg/dL   Basic Metabolic Panel    Collection Time: 01/08/23  3:07 AM   Result Value Ref Range    Sodium 135 132 - 146 mmol/L    Potassium 3.9 3.5 - 5.0 mmol/L    Chloride 103 98 - 107 mmol/L    CO2 23 22 - 29 mmol/L    Anion Gap 9 7 - 16 mmol/L    Glucose 172 (H) 74 - 99 mg/dL    BUN 43 (H) 6 - 23 mg/dL    Creatinine 1.1 0.7 - 1.2 mg/dL    Est, Glom Filt Rate >60 >=60 mL/min/1.73    Calcium 8.2 (L) 8.6 - 10.2 mg/dL   Magnesium    Collection Time: 01/08/23  3:07 AM   Result Value Ref Range    Magnesium 2.1 1.6 - 2.6 mg/dL   Phosphorus    Collection Time: 01/08/23  3:07 AM   Result Value Ref Range    Phosphorus 3.1 2.5 - 4.5 mg/dL   POCT Glucose    Collection Time: 01/08/23  3:11 AM   Result Value Ref Range    Meter Glucose 180 (H) 74 - 99 mg/dL   POCT Glucose    Collection Time: 01/08/23  4:52 AM   Result Value Ref Range    Meter Glucose 215 (H) 74 - 99 mg/dL   Basic Metabolic Panel    Collection Time: 01/08/23  5:40 AM   Result Value Ref Range    Sodium 136 132 - 146 mmol/L    Potassium 3.6 3.5 - 5.0 mmol/L    Chloride 104 98 - 107 mmol/L    CO2 22 22 - 29 mmol/L    Anion Gap 10 7 - 16 mmol/L    Glucose 229 (H) 74 - 99 mg/dL    BUN 38 (H) 6 - 23 mg/dL    Creatinine 1.1 0.7 - 1.2 mg/dL    Est, Glom Filt Rate >60 >=60 mL/min/1.73    Calcium 8.2 (L) 8.6 - 10.2 mg/dL   Magnesium    Collection Time: 01/08/23  5:40 AM   Result Value Ref Range    Magnesium 1.8 1.6 - 2.6 mg/dL   Phosphorus    Collection Time: 01/08/23  5:40 AM   Result Value Ref Range    Phosphorus 3.2 2.5 - 4.5 mg/dL   CBC with Auto Differential    Collection Time: 01/08/23  5:40 AM   Result Value Ref Range    WBC 11.0 4.5 - 11.5 E9/L    RBC 2.45 (L) 3.80 - 5.80 E12/L    Hemoglobin 7.3 (L) 12.5 - 16.5 g/dL    Hematocrit 22.3 (L) 37.0 - 54.0 %    MCV 91.0 80.0 - 99.9 fL    MCH 29.8 26.0 - 35.0 pg    MCHC 32.7 32.0 - 34.5 %    RDW 19.8 (H) 11.5 - 15.0 fL    Platelets 552 471 - 685 E9/L    MPV 9.5 7.0 - 12.0 fL    Neutrophils % 74.4 43.0 - 80.0 %    Immature Granulocytes % 1.8 0.0 - 5.0 %    Lymphocytes % 13.7 (L) 20.0 - 42.0 %    Monocytes % 8.2 2.0 - 12.0 %    Eosinophils % 1.4 0.0 - 6.0 %    Basophils % 0.5 0.0 - 2.0 %    Neutrophils Absolute 8.22 (H) 1.80 - 7.30 E9/L    Immature Granulocytes # 0.20 E9/L    Lymphocytes Absolute 1.51 1.50 - 4.00 E9/L    Monocytes Absolute 0.90 0.10 - 0.95 E9/L    Eosinophils Absolute 0.15 0.05 - 0.50 E9/L    Basophils Absolute 0.05 0.00 - 0.20 E9/L   POCT Glucose    Collection Time: 01/08/23  5:45 AM   Result Value Ref Range    Meter Glucose 239 (H) 74 - 99 mg/dL       Imaging  XR FEMUR RIGHT (MIN 2 VIEWS)    Result Date: 12/26/2022  EXAMINATION: XRAY VIEWS OF THE RIGHT FEMUR 12/26/2022 4:01 pm COMPARISON: None. HISTORY: ORDERING SYSTEM PROVIDED HISTORY: injury TECHNOLOGIST PROVIDED HISTORY: Reason for exam:->injury FINDINGS: There is no evidence of acute fracture. There is normal alignment. No acute joint abnormality. No focal osseous lesion. No focal soft tissue abnormality. There are degenerative changes and intra-articular calcifications in the visualized knee joint.   Vascular calcification noted.     No acute osseous abnormality. XR KNEE RIGHT (1-2 VIEWS)    Result Date: 12/26/2022  EXAMINATION: TWO XRAY VIEWS OF THE RIGHT KNEE 12/26/2022 4:01 pm COMPARISON: None. HISTORY: ORDERING SYSTEM PROVIDED HISTORY: injury TECHNOLOGIST PROVIDED HISTORY: Reason for exam:->injury FINDINGS: There are no acute fractures or dislocations. There is mild suprapatellar soft tissue swelling. 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 Intra-articular calcification noted. No acute bony abnormalities. Mild joint effusion. Chondrocalcinosis. XR TIBIA FIBULA RIGHT (2 VIEWS)    Result Date: 12/26/2022  EXAMINATION: XRAY VIEWS OF THE RIGHT TIBIA AND FIBULA 12/26/2022 4:01 pm COMPARISON: None. HISTORY: ORDERING SYSTEM PROVIDED HISTORY: injury TECHNOLOGIST PROVIDED HISTORY: Reason for exam:->injury FINDINGS: There is no evidence of acute fracture. There is normal alignment. No acute joint abnormality. No focal osseous lesion. There is diffuse soft tissue swelling. No acute osseous abnormality. CT Head W/O Contrast    Result Date: 1/5/2023  EXAMINATION: CT OF THE HEAD WITHOUT CONTRAST  1/5/2023 5:33 pm TECHNIQUE: CT of the head was performed without the administration of intravenous contrast. Automated exposure control, iterative reconstruction, and/or weight based adjustment of the mA/kV was utilized to reduce the radiation dose to as low as reasonably achievable. COMPARISON: None. HISTORY: ORDERING SYSTEM PROVIDED HISTORY: Fall on Eliquis TECHNOLOGIST PROVIDED HISTORY: Reason for exam:->Fall on Eliquis Has a \"code stroke\" or \"stroke alert\" been called? ->No Decision Support Exception - unselect if not a suspected or confirmed emergency medical condition->Emergency Medical Condition (MA) FINDINGS: BRAIN/VENTRICLES: There is no acute intracranial hemorrhage, mass effect or midline shift. No abnormal extra-axial fluid collection.   The gray-white differentiation is maintained without evidence of an acute infarct. There is no evidence of hydrocephalus. The ventricles, cisterns and sulci are prominent consistent with atrophy. There is decreased attenuation within the periventricular white matter consistent with periventricular leukomalacia. ORBITS: The visualized portion of the orbits demonstrate no acute abnormality. SINUSES: The visualized paranasal sinuses and mastoid air cells demonstrate no acute abnormality. SOFT TISSUES/SKULL:  No acute abnormality of the visualized skull or soft tissues. 1. There is no acute intracranial abnormality. Specifically, there is no intracranial hemorrhage. 2. Atrophy and periventricular leukomalacia,     CT CSpine W/O Contrast    Result Date: 1/5/2023  EXAMINATION: CT OF THE CERVICAL SPINE WITHOUT CONTRAST 1/5/2023 5:33 pm TECHNIQUE: CT of the cervical spine was performed without the administration of intravenous contrast. Multiplanar reformatted images are provided for review. Automated exposure control, iterative reconstruction, and/or weight based adjustment of the mA/kV was utilized to reduce the radiation dose to as low as reasonably achievable. COMPARISON: None. HISTORY: ORDERING SYSTEM PROVIDED HISTORY: Fall on Eliquis TECHNOLOGIST PROVIDED HISTORY: Reason for exam:->Fall on Vitasoft Decision Support Exception - unselect if not a suspected or confirmed emergency medical condition->Emergency Medical Condition (MA) FINDINGS: The ring of C1 is intact as is the dense. There is no compression fracture of the cervical spine. No jumped or perched facet is noted. Multilevel degenerative disc and degenerative joint disease is noted. The prevertebral soft tissues are unremarkable. The airway is widely patent. Images through the lung apices are negative for a pneumothorax. 1. There is no acute compression fracture or subluxation of the cervical spine. 2. Advanced multilevel degenerative disc and degenerative joint disease.      CT ABDOMEN PELVIS W IV CONTRAST Additional Contrast? None    Result Date: 1/5/2023  EXAMINATION: CTA OF THE CHEST; CT OF THE ABDOMEN AND PELVIS WITH CONTRAST 1/5/2023 6:15 pm TECHNIQUE: CTA of the chest was performed after the administration of intravenous contrast.  Multiplanar reformatted images are provided for review. 3D and MIP images are provided for review. Automated exposure control, iterative reconstruction, and/or weight based adjustment of the mA/kV was utilized to reduce the radiation dose to as low as reasonably achievable.; CT of the abdomen and pelvis was performed with the administration of intravenous contrast. Multiplanar reformatted images are provided for review. Automated exposure control, iterative reconstruction, and/or weight based adjustment of the mA/kV was utilized to reduce the radiation dose to as low as reasonably achievable. COMPARISON: None. HISTORY: ORDERING SYSTEM PROVIDED HISTORY: Rule out PE TECHNOLOGIST PROVIDED HISTORY: Reason for exam:->Rule out PE Decision Support Exception - unselect if not a suspected or confirmed emergency medical condition->Emergency Medical Condition (MA); ORDERING SYSTEM PROVIDED HISTORY: Constipation, melena TECHNOLOGIST PROVIDED HISTORY: Additional Contrast?->None Reason for exam:->Constipation, melena Decision Support Exception - unselect if not a suspected or confirmed emergency medical condition->Emergency Medical Condition (MA) FINDINGS: CTA chest: Pulmonary Arteries: Pulmonary arteries are adequately opacified for evaluation. No evidence of intraluminal filling defect to suggest pulmonary embolism. Main pulmonary artery is normal in caliber. Mediastinum: No evidence of mediastinal lymphadenopathy. The heart and pericardium demonstrate no acute abnormality. There is no acute abnormality of the thoracic aorta.   Esophagus demonstrates at least questionable circumferential thickening of the proximal through midportion concerning for esophagitis with questionable small hiatal hernia. Lungs/pleura: The lungs are without acute process. No focal consolidation or pulmonary edema. No evidence of pleural effusion or pneumothorax. Upper Abdomen: Limited images of the upper abdomen are unremarkable. Soft Tissues/Bones: No acute bone or soft tissue abnormality. Abdomen/Pelvis: Organs: Liver without focal lesion. Diffuse low attenuation throughout the liver of at least mild hepatic steatosis. Gallbladder unremarkable. Pancreas and spleen unremarkable. Adrenals without nodule. Kidneys without suspicious renal lesion and no hydronephrosis. GI/Bowel: No focal thickening or disproportion dilatation of bowel. No inflammatory findings. Moderate colonic stool burden extending through the rectum. Pelvis: Urinary bladder is mild-to-moderately distended without wall thickening. No bulky pelvic adenopathy. Moderate prostatomegaly with central area of low attenuation. No associated gas locules maximum dimension 12 mm likely status post TURP findings of the central prostatic portion. Peritoneum/Retroperitoneum: Patent nonaneurysmal abdominal aorta. No bulky retroperitoneal adenopathy. Bones/Soft Tissues: No acute osseous findings. Left L5 pars defect with anterolisthesis grade 1 involvement of L5 on S1. Fat containing bilateral direct inguinal hernias left greater than right. No evidence of pulmonary embolism or acute pulmonary abnormality. Circumferential thickening of questionable esophageal is involving the proximal through mid esophagus correlate with symptomatology for the need of further investigation Abdomen and pelvis: No acute findings of the abdomen or pelvis with incidental findings as above including moderate prostatomegaly with central area of low attenuation. No associated gas locules maximum dimension 12 mm likely status post TURP findings of the central prostatic portion.   No inflammatory findings of the urinary bladder or obstructing uropathy Hepatic steatosis CTA PULMONARY W CONTRAST    Result Date: 1/5/2023  EXAMINATION: CTA OF THE CHEST; CT OF THE ABDOMEN AND PELVIS WITH CONTRAST 1/5/2023 6:15 pm TECHNIQUE: CTA of the chest was performed after the administration of intravenous contrast.  Multiplanar reformatted images are provided for review. 3D and MIP images are provided for review. Automated exposure control, iterative reconstruction, and/or weight based adjustment of the mA/kV was utilized to reduce the radiation dose to as low as reasonably achievable.; CT of the abdomen and pelvis was performed with the administration of intravenous contrast. Multiplanar reformatted images are provided for review. Automated exposure control, iterative reconstruction, and/or weight based adjustment of the mA/kV was utilized to reduce the radiation dose to as low as reasonably achievable. COMPARISON: None. HISTORY: ORDERING SYSTEM PROVIDED HISTORY: Rule out PE TECHNOLOGIST PROVIDED HISTORY: Reason for exam:->Rule out PE Decision Support Exception - unselect if not a suspected or confirmed emergency medical condition->Emergency Medical Condition (MA); ORDERING SYSTEM PROVIDED HISTORY: Constipation, melena TECHNOLOGIST PROVIDED HISTORY: Additional Contrast?->None Reason for exam:->Constipation, melena Decision Support Exception - unselect if not a suspected or confirmed emergency medical condition->Emergency Medical Condition (MA) FINDINGS: CTA chest: Pulmonary Arteries: Pulmonary arteries are adequately opacified for evaluation. No evidence of intraluminal filling defect to suggest pulmonary embolism. Main pulmonary artery is normal in caliber. Mediastinum: No evidence of mediastinal lymphadenopathy. The heart and pericardium demonstrate no acute abnormality. There is no acute abnormality of the thoracic aorta.   Esophagus demonstrates at least questionable circumferential thickening of the proximal through midportion concerning for esophagitis with questionable small hiatal hernia. Lungs/pleura: The lungs are without acute process. No focal consolidation or pulmonary edema. No evidence of pleural effusion or pneumothorax. Upper Abdomen: Limited images of the upper abdomen are unremarkable. Soft Tissues/Bones: No acute bone or soft tissue abnormality. Abdomen/Pelvis: Organs: Liver without focal lesion. Diffuse low attenuation throughout the liver of at least mild hepatic steatosis. Gallbladder unremarkable. Pancreas and spleen unremarkable. Adrenals without nodule. Kidneys without suspicious renal lesion and no hydronephrosis. GI/Bowel: No focal thickening or disproportion dilatation of bowel. No inflammatory findings. Moderate colonic stool burden extending through the rectum. Pelvis: Urinary bladder is mild-to-moderately distended without wall thickening. No bulky pelvic adenopathy. Moderate prostatomegaly with central area of low attenuation. No associated gas locules maximum dimension 12 mm likely status post TURP findings of the central prostatic portion. Peritoneum/Retroperitoneum: Patent nonaneurysmal abdominal aorta. No bulky retroperitoneal adenopathy. Bones/Soft Tissues: No acute osseous findings. Left L5 pars defect with anterolisthesis grade 1 involvement of L5 on S1. Fat containing bilateral direct inguinal hernias left greater than right. No evidence of pulmonary embolism or acute pulmonary abnormality. Circumferential thickening of questionable esophageal is involving the proximal through mid esophagus correlate with symptomatology for the need of further investigation Abdomen and pelvis: No acute findings of the abdomen or pelvis with incidental findings as above including moderate prostatomegaly with central area of low attenuation. No associated gas locules maximum dimension 12 mm likely status post TURP findings of the central prostatic portion.   No inflammatory findings of the urinary bladder or obstructing uropathy Hepatic steatosis     US DUP LOWER EXTREMITY RIGHT MARILIN    Result Date: 12/26/2022  EXAMINATION: DUPLEX VENOUS ULTRASOUND OF THE RIGHT LOWER EXTREMITY 12/26/2022 4:00 pm TECHNIQUE: Duplex ultrasound using B-mode/gray scaled imaging and Doppler spectral analysis and color flow was obtained of the deep venous structures of the right lower extremity. COMPARISON: None. HISTORY: ORDERING SYSTEM PROVIDED HISTORY: injury  swelling TECHNOLOGIST PROVIDED HISTORY: Reason for exam:->injury  swelling What reading provider will be dictating this exam?->CRC FINDINGS: The visualized veins of the right lower extremity are patent and free of echogenic thrombus. The veins demonstrate good compressibility with normal color flow study and spectral analysis. Please note the calf veins are suboptimally visualized. There is a 8.0 x 3.3 x 6.0 cm complex subcutaneous collection in the medial aspect of the knee, may represent hematoma. No evidence of DVT in the right lower extremity. Assessment and Plan  Patient is a 78 y.o. male patient on consult for GI bleed. 1.  Anemia  -Acute on chronic  -Significant anemia with initial hemoglobin 5.7  -B12 deficient - supplemented  -Reported positive FOBT  -Small stool with blood per nursing  -Obtaining of iron studies given blood transfusion will be of limited clinical value  -Agree with IV PPI  -Monitor hemoglobin every 6 hours  -Keep PRBCs on hold  -Defer transfusion to admitting  -EGD 1/6/2022 with findings of hiatal hernia, esophageal stricture (dilated), otherwise unremarkable exam, no bleeding source identified  -Bleeding scan negative 1/7/2023  -Consider colonoscopy     2. Abnormal CT esophagus  -CT abdomen pelvis 1/5/2022 showing possible circumferential thickening of the proximal esophagus with possible hiatal hernia  -EGD 1/6/2022 with findings of hiatal hernia, esophageal stricture (dilated), otherwise unremarkable exam, no bleeding source identified    3.   Constipation  -CT showing moderate stool burden  -Miralax daily  -Senokot nightly as needed  -Can titrate bowel regimen as needed    4. Elevated troponin  -Likely demand ischemia/type II non-STEMI  -Recommend cardiology evaluation     5. Seizure  -Per admitting     6. Diabetes mellitus / DKA  -Per admitting / critical care      Hgb stable since transfusion yesterday morning. Transfused with appropriate response - mild decrease since transfusion likely secondary to equilibration. Continue bowel regimen. Will attempt to prep today for colonoscopy tomorrow.       Demetra Atkins, APRN - CNP  6:40 AM  1/8/2023

## 2023-01-08 NOTE — PROGRESS NOTES
Department of Internal Medicine  PN    PCP: Juan Lgauerre DO  Admitting Physician: Dr. Nikky Jefferson  Consultants:   Date of Service: 1/5/2023    CHIEF COMPLAINT: Altered mental status    HISTORY OF PRESENT ILLNESS:    Patient is a 81-year-old male who presents to the ED with altered mental status. Patient was found on the floor at home unable to get up. Patient lives alone at home with 2 dogs. Otherwise patient states that he fell last night. Patient states that he fell while entering his home. He was able to get around but it was not able to ambulate. He slipped on the floor as he was not able to get onto the bed. Otherwise patient states he has noted bloody/melanotic stools the other day. States he has a previous upper endoscopy however is unsure of the results. Patient patient states that he has lost 40 pounds involuntarily over the last month or 2. Apparently patient suffered seizure as well on his way to the hospital.  He has no history of seizure. Overall patient has been weak over the last few weeks. He has also been having issues with constipation. 1/6/2023  Patient seen examined in ICU. Patient is very hard of hearing and does not have his hearing aids right now. Patient states he feels a lot better. He denies any chest or abdominal pain. He denies any nausea/vomiting or unusual shortness of breath. BUN/creatinine was 149/1.8 with CO2 electrolytes and 19. Lactic acid improved but still mildly elevated 2.5. Blood sugars improved. WBC still elevated 16.8 with hemoglobin is 6.6. Temperature 98.7 with heart rate of about 100 and blood pressure 151/80. O2 sat 100% on room air at rest with patient on insulin drip. Urinary output ranges 300-675 cc a shift. Patient is doing much better now than on admission. 1/7/2023  Patient seen examined in the ICU. Patient is extremely hard of hearing and seems to be worse today and not able to communicate very well because of this.   Case discussed with patient nurse at the bedside. 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 Patient had esophageal dilation performed. Orthopedic note from today shows right medial knee hematoma. BUN/creatinine is improved to 82/1.3 with CO2 electrolytes of 19. Blood sugars range from . Patient had a hemoglobin A1c of 5.9. WBC 17.1 with hemoglobin 7.7. Temperature is 90.1 with heart rate 88. Blood pressure 141/64. Patient is still into atrial fibrillation with the patient having O2 sat 98% on room air at rest.  Urine output is good ranging 450-1800 cc a shift. Case discussed with intensivist.  Patient had a bleeding scan today. Patient's insulin drip was stopped since last night. 1/8/2023  Patient seen examined on ICU. Patient doing better and is very hungry. Patient denies any chest or abdominal pain. BUN/creatinine 38/1.1 with normal electrolytes. Blood sugars ranging . Patient had elevated beta hydroxybutyrate yesterday 2.49. WBC 11.0 today with hemoglobin 7.3. Temperature is 98.3 with heart rate of 70 blood pressure 164/69. O2 sat 100% room air at rest.  Urine output ranges 700-2400 cc a shift. Patient did have 1 bowel movement yesterday. Nuclear medicine bleeding scan showed no evidence of active GI bleeding. Patient is to be transferred to telemetry floor. Increase activity and diet. Start subcu insulin.       PAST MEDICAL Hx:  Past Medical History:   Diagnosis Date    Arthritis     Bilateral shoulder pain 05/2020    for TJAS Right 05/12/2020 Dr Luciano Mishra    Diabetes mellitus [facility])     Enlarged prostate     follows with Dr Elva Balderrama    History of Holter monitoring 07/29/2022    History of irregular heartbeat     follows with PCP    Hyperlipidemia     Hypertension     Leg wound, right 05/2020    recent history of; see podiatry notes in epic       PAST SURGICAL Hx:   Past Surgical History:   Procedure Laterality Date CATARACT REMOVAL WITH IMPLANT Bilateral     ENDOSCOPY, COLON, DIAGNOSTIC      EYE SURGERY      HERNIA REPAIR      SHOULDER SURGERY Right 5/12/2020    RIGHT SHOULDER TOTAL ARTHROPLASTY REVERSE performed by Asael Munoz MD at [address] Hx:  Family History   Problem Relation Age of Onset    No Known Problems Mother     No Known Problems Father        HOME MEDICATIONS:  Prior to Admission medications    Medication Sig Start Date End Date Taking?  Authorizing Provider   FARXIGA 5 MG tablet take 1 tablet by mouth once daily 10/23/22   Historical Provider, MD   apixaban (ELIQUIS) 5 MG TABS tablet Take 1 tablet by mouth 2 times daily 10/31/22 1/5/23  Jeannette Schaefer MD   atorvastatin (LIPITOR) 40 MG tablet take 1 tablet by mouth daily 7/29/22   Historical Provider, MD   TRULICITY 1.5 NK/0.2DX SOPN inject 0.5 milliliters ( 1 AND 1/2 milligrams ) subcutaneously every week 9/16/22   Historical Provider, MD   Multiple Vitamins-Minerals (THERAPEUTIC MULTIVITAMIN-MINERALS) tablet Take 1 tablet by mouth daily Last taken 05/03/2020    Historical Provider, MD   Ascorbic Acid (VITAMIN C PO) Take 1 tablet by mouth daily Last taken 05/03    Historical Provider, MD   acetaminophen (TYLENOL) 500 MG tablet Take 500 mg by mouth every 6 hours as needed for Pain    Historical Provider, MD   metOLazone (ZAROXOLYN) 5 MG tablet Take 5 mg by mouth daily    Historical Provider, MD   lisinopril (PRINIVIL;ZESTRIL) 40 MG tablet Take 40 mg by mouth daily    Historical Provider, MD   metFORMIN (GLUCOPHAGE) 500 MG tablet Take 500 mg by mouth 2 times daily (with meals)    Historical Provider, MD   NIFEdipine (ADALAT CC) 30 MG extended release tablet Take 30 mg by mouth daily Take am day of surgery 05/12/2020    Historical Provider, MD       ALLERGIES:  Penicillins, Seasonal, and Sulfa antibiotics    SOCIAL Hx:  Social History     Socioeconomic History    Marital status: Single     Spouse name: Not on file    Number of children: Not on file    Years of education: Not on file    Highest education level: Not on file   Occupational History    Not on file   Tobacco Use    Smoking status: Never    Smokeless tobacco: Never   Vaping Use    Vaping Use: Never used   Substance and Sexual Activity    Alcohol use: Never    Drug use: Never    Sexual activity: Not on file   Other Topics Concern    Not on file   Social History Narrative    Not on file     Social Determinants of Health     Financial Resource Strain: Not on file   Food Insecurity: Not on file   Transportation Needs: Not on file   Physical Activity: Not on file   Stress: Not on file   Social Connections: Not on file   Intimate Partner Violence: Not on file   Housing Stability: Not on file       ROS: Positive in bold  General:   Denies chills, fatigue, fever, malaise, night sweats or weight loss    Psychological:   Denies anxiety, disorientation or hallucinations    ENT:    Denies epistaxis, headaches, vertigo or visual changes    Cardiovascular:   Denies any chest pain, irregular heartbeats, or palpitations. No paroxysmal nocturnal dyspnea. Respiratory:   Denies shortness of breath, coughing, sputum production, hemoptysis, or wheezing. No orthopnea. Gastrointestinal:   Denies nausea, vomiting, diarrhea, or constipation. Denies any abdominal pain. Denies change in bowel habits or stools. Genito-Urinary:    Denies any urgency, frequency, hematuria. Voiding without difficulty. Musculoskeletal:   Denies joint pain, joint stiffness, joint swelling or muscle pain    Neurology:    Denies any headache or focal neurological deficits. No weakness or paresthesia. Derm:    Denies any rashes, ulcers, or excoriations. Denies bruising. Extremities:   Denies any lower extremity swelling or edema.       PHYSICAL EXAM: Abnormal findings noted  VITALS:  Vitals:    01/08/23 0800   BP: (!) 164/69   Pulse: 70   Resp: 17   Temp: 98 °F (36.7 °C)   SpO2: 100% CONSTITUTIONAL:    Awake, alert, cooperative, no apparent distress, and appears stated age    EYES:    EOMI, sclera clear, conjunctiva normal    ENT:    Normocephalic, atraumatic, External ears without lesions. NECK:    Supple, symmetrical, trachea midline, no JVD    HEMATOLOGIC/LYMPHATICS:    No cervical lymphadenopathy and no supraclavicular lymphadenopathy    LUNGS:    Symmetric. No increased work of breathing, good air exchange, clear to auscultation bilaterally, no wheezes, rhonchi, or rales,     CARDIOVASCULAR:    Normal apical impulse, regular rate and rhythm, normal S1 and S2, no S3 or S4, and no murmur noted    ABDOMEN:    soft, non-distended, non-tender,    MUSCULOSKELETAL:    There is no redness, warmth, or swelling of the joints. NEUROLOGIC:    Awake, alert, oriented to name, place and time. SKIN:    No bruising or bleeding. No redness, warmth, or swelling    EXTREMITIES:    Peripheral pulses present. No edema, cyanosis, or swelling.     LINES/CATHETERS     LABORATORY DATA:  CBC with Differential:    Lab Results   Component Value Date/Time    WBC 11.0 01/08/2023 05:40 AM    RBC 2.45 01/08/2023 05:40 AM    HGB 7.3 01/08/2023 05:40 AM    HCT 22.3 01/08/2023 05:40 AM     01/08/2023 05:40 AM    MCV 91.0 01/08/2023 05:40 AM    MCH 29.8 01/08/2023 05:40 AM    MCHC 32.7 01/08/2023 05:40 AM    RDW 19.8 01/08/2023 05:40 AM    LYMPHOPCT 13.7 01/08/2023 05:40 AM    MONOPCT 8.2 01/08/2023 05:40 AM    MYELOPCT 0.9 01/06/2023 11:59 AM    BASOPCT 0.5 01/08/2023 05:40 AM    MONOSABS 0.90 01/08/2023 05:40 AM    LYMPHSABS 1.51 01/08/2023 05:40 AM    EOSABS 0.15 01/08/2023 05:40 AM    BASOSABS 0.05 01/08/2023 05:40 AM     CMP:    Lab Results   Component Value Date/Time     01/08/2023 05:40 AM    K 3.6 01/08/2023 05:40 AM    K 4.4 12/26/2022 03:10 PM     01/08/2023 05:40 AM    CO2 22 01/08/2023 05:40 AM    BUN 38 01/08/2023 05:40 AM    CREATININE 1.1 01/08/2023 05:40 AM    GFRAA >60 05/13/2020 03:50 AM    LABGLOM >60 01/08/2023 05:40 AM    GLUCOSE 229 01/08/2023 05:40 AM    GLUCOSE 253 03/28/2011 06:05 PM    PROT 4.2 01/05/2023 08:11 PM    LABALBU 2.8 01/05/2023 08:11 PM    LABALBU 4.3 03/28/2011 06:05 PM    CALCIUM 8.2 01/08/2023 05:40 AM    BILITOT 0.2 01/05/2023 08:11 PM    ALKPHOS 53 01/05/2023 08:11 PM    AST 10 01/05/2023 08:11 PM    ALT 7 01/05/2023 08:11 PM       ASSESSMENT/PLAN:  DKA  Patient has not been taking his diabetic medications. Patient placed on insulin drip. Critical care consulted patient excepted to ICU. Anemia possibly secondary to GI bleed    He did describe melanotic/bloody stool. Rectal exam was positive for occult blood. Patient placed on Protonix. Eliquis held. GI consulted. Right knee effusion versus hematoma  Continue to monitor. orthopedic consultation. Non-insulin-dependent diabetes mellitus  RED  Continue IV fluids. Hold nephrotoxic medication. Esophageal thickening  GI consulted. Possible seizure  Apparently patient had 2 episodes of seizure-like activity prior to presentation to the hospital.  No history of seizure disorder. Neurology consulted. Elevated troponin  Likely secondary to demand ischemia    Hypertension  Hyperlipidemia  Atrial fibrillation on Eliquis  Enlarged prostate    Plan:  Patient admitted to ICU  Insulin drip with glucoscans every hour   Home medication reviewed  Patient received 1 unit of typed and crossed packed RBC on admission  Protonix 40 mg IV push twice daily  Consult GI  Consult intensivist  IV fluids normal saline    Insulin drip stopped 1/6 PM  GI bleeding scan 1/7  Increase diet  Increase activity    Transfer to monitored floor when okay with intensivist-1/8    BMP, CBC in a.mJustin Blevinsman,   11:39 AM  1/8/2023

## 2023-01-08 NOTE — PROGRESS NOTES
Pulmonary/Critical Care Consult Note    CHIEF COMPLAINT: Fall, hypotension, decreased appetite, and altered mental status    HISTORY OF PRESENT ILLNESS: Patient is a 82-year-old male with history of diabetes mellitus type 2, BPH, atrial fibrillation, hypertension, and hyperlipidemia. Patient presented to the ED on 1/5/2023 per EMS after being found on the floor per family. Patient apparently fell at home and was unable to to get back up. Patient also fell around 2 weeks ago and injured his right knee. Patient is unable to provide much history at this time and he was obtained per daughter, who is at bedside. Patient's daughter states the patient has not eaten since Sunday and is also recently been constipated. No history of overt GI bleeding, however his occult stool was positive for rectal exam in the ED. Patient found to be hypotensive and anemic with initial hemoglobin of 5.7. He received 3 L normal saline bolus in the ED as well as packed RBC transfusion. Patient's blood glucose was also elevated at 662 venous pH 7.15, anion gap of 36 and beta hydroxybutyrate 5.99. An insulin infusion was initiated in ED prior to admission for DKA. CT head showed atrophy and periventricular leukomalacia otherwise no intracranial abnormality. CT cervical spine negative for fracture but advanced multilevel degenerative disc and joint disease noted. Chest CTA showed no evidence of pulmonary embolism or acute pulmonary abnormality. CT abdomen pelvis with IV contrast shows circumferential thickening of the esophagus involving the proximal to mid esophagus. Prostatomegaly noted with no inflammatory findings of the urinary bladder or obstructive uropathy. Hepatic steatosis noted. Daily progress:  January 6, 2023: Patient was awake and communicating this morning. He is alert and oriented x3 however he is hard of hearing. Continues to be on insulin drip. Beta hydroxybutyrate is mildly elevated.     January 7, 2023:Patient underwent an EGD yesterday which showed esophageal stricture and hiatal hernia. He underwent dilatation. There was no evidence of active GI bleeding. Patient was scheduled for a bleeding scan this morning. He has no fever, chills, rigors. He has been requesting to eat. January 8, 2023: Patient is awake and interactive today. He remained hemodynamically stable. He has no further GI bleeding. Hemoglobin has been stable. Patient's bicarbonate level and anion gap have normalized. He was switched off IV insulin to subcutaneous insulin. His diet was advanced. He had an uneventful night and he stable to be transferred to a telemetry unit.     ALLERGY:  Penicillins, Seasonal, and Sulfa antibiotics    FAMILY HISTORY:  Family History   Problem Relation Age of Onset    No Known Problems Mother     No Known Problems Father        SOCIAL HISTORY:  Social History     Socioeconomic History    Marital status: Single     Spouse name: Not on file    Number of children: Not on file    Years of education: Not on file    Highest education level: Not on file   Occupational History    Not on file   Tobacco Use    Smoking status: Never    Smokeless tobacco: Never   Vaping Use    Vaping Use: Never used   Substance and Sexual Activity    Alcohol use: Never    Drug use: Never    Sexual activity: Not on file   Other Topics Concern    Not on file   Social History Narrative    Not on file     Social Determinants of Health     Financial Resource Strain: Not on file   Food Insecurity: Not on file   Transportation Needs: Not on file   Physical Activity: Not on file   Stress: Not on file   Social Connections: Not on file   Intimate Partner Violence: Not on file   Housing Stability: Not on file       MEDICAL HISTORY:  Past Medical History:   Diagnosis Date    Arthritis     Bilateral shoulder pain 05/2020    for TJAS Right 05/12/2020 Dr Coleen Boles    Diabetes mellitus [facility])     Enlarged prostate     follows with Dr Javier Lorenz History of Holter monitoring 07/29/2022    History of irregular heartbeat     follows with PCP    Hyperlipidemia     Hypertension     Leg wound, right 05/2020    recent history of; see podiatry notes in epic       MEDICATIONS:   bisacodyl  10 mg Oral Once    polyethylene glycol  4,000 mL Oral Once    insulin lispro  0-4 Units SubCUTAneous TID WC    insulin lispro  0-4 Units SubCUTAneous Nightly    polyethylene glycol  17 g Oral Daily    pantoprazole (PROTONIX) 40 mg injection  40 mg IntraVENous Q12H    sodium chloride flush  5-40 mL IntraVENous 2 times per day      sodium chloride      dextrose 5 % and 0.45 % NaCl Stopped (01/08/23 0440)    dextrose      sodium chloride      dextrose 5 % and 0.45 % NaCl 125 mL/hr at 01/07/23 0943    insulin Stopped (01/08/23 0648)     sodium chloride, senna, glucose, dextrose bolus **OR** dextrose bolus, glucagon (rDNA), dextrose, sodium chloride flush, sodium chloride, acetaminophen **OR** acetaminophen, potassium chloride, magnesium sulfate, sodium phosphate IVPB **OR** sodium phosphate IVPB **OR** sodium phosphate IVPB, dextrose 5 % and 0.45 % NaCl    REVIEW OF SYSTEMS:  Constitutional: Denies fever, weight loss, night sweats, and fatigue  Skin: Denies pigmentation, dark lesions, and rashes   HEENT: Denies hearing loss, tinnitus, ear drainage, epistaxis, sore throat, and hoarseness. Cardiovascular: Denies palpitations, chest pain, and chest pressure. Respiratory: Denies cough, dyspnea at rest, hemoptysis, apnea, and choking.   Gastrointestinal: Denies nausea, vomiting, poor appetite, diarrhea, heartburn or reflux  Genitourinary: Denies dysuria, frequency, urgency or hematuria  Musculoskeletal: Denies myalgias, reports generalized muscle weakness and right knee pain/bruising/and swelling  Neurological: Denies dizziness, vertigo, headache, and focal weakness  Psychological: Denies anxiety and depression  Endocrine: Denies heat intolerance and cold intolerance  Hematopoietic/Lymphatic: Denies bleeding problems and blood transfusions    PHYSICAL EXAM:  Vitals:    01/08/23 0800   BP: (!) 164/69   Pulse: 70   Resp: 17   Temp: 98 °F (36.7 °C)   SpO2: 100%           O2 Device: None (Room air)    Constitutional: Very hard of hearing, no fever, chills, diaphoresis  HEENT: No head lesions, PERRL, EOMI, mouth without lesions, no nasal lesions, no cervical adenopathy palpated   Respiratory: Lungs with equal breath sounds bilaterally, no adventitious sounds auscultated, no accessory muscle use   CV: Regular rate and rhythm, no murmurs, JVD, his leg edema   abdomen: Soft, non tender, + bowel sounds, no lesions   Skin: Decreased skin turgor, no rash, capillary refill <2 seconds   Extremities: Muscular strength 4/4 in 4 limbs, moves 4 limbs spontaneously, distal pulses present, large amount of bruising and edema to the medial aspect of the right knee with areas of black eschar, surrounding erythema or drainage  Neurology: Awake with intermittent confusion, follows commands, moves 4 limbs on command and spontaneously, equal sensation, no dysmetria, neck is supple, no meningitic signs present.     LABS:  WBC   Date Value Ref Range Status   01/08/2023 11.0 4.5 - 11.5 E9/L Final   01/07/2023 17.1 (H) 4.5 - 11.5 E9/L Final   01/06/2023 20.3 (H) 4.5 - 11.5 E9/L Final     Hemoglobin   Date Value Ref Range Status   01/08/2023 7.3 (L) 12.5 - 16.5 g/dL Final   01/08/2023 7.3 (L) 12.5 - 16.5 g/dL Final   01/07/2023 7.6 (L) 12.5 - 16.5 g/dL Final     Hematocrit   Date Value Ref Range Status   01/08/2023 22.3 (L) 37.0 - 54.0 % Final   01/08/2023 21.6 (L) 37.0 - 54.0 % Final   01/07/2023 23.6 (L) 37.0 - 54.0 % Final     MCV   Date Value Ref Range Status   01/08/2023 91.0 80.0 - 99.9 fL Final   01/07/2023 86.4 80.0 - 99.9 fL Final   01/06/2023 85.8 80.0 - 99.9 fL Final     Platelets   Date Value Ref Range Status   01/08/2023 238 130 - 450 E9/L Final   01/07/2023 211 130 - 450 E9/L Final 01/06/2023 224 130 - 450 E9/L Final     Sodium   Date Value Ref Range Status   01/08/2023 136 132 - 146 mmol/L Final   01/08/2023 135 132 - 146 mmol/L Final   01/07/2023 138 132 - 146 mmol/L Final     Potassium   Date Value Ref Range Status   01/08/2023 3.6 3.5 - 5.0 mmol/L Final   01/08/2023 3.9 3.5 - 5.0 mmol/L Final   01/07/2023 3.5 3.5 - 5.0 mmol/L Final     Potassium reflex Magnesium   Date Value Ref Range Status   12/26/2022 4.4 3.5 - 5.0 mmol/L Final     Chloride   Date Value Ref Range Status   01/08/2023 104 98 - 107 mmol/L Final   01/08/2023 103 98 - 107 mmol/L Final   01/07/2023 107 98 - 107 mmol/L Final     CO2   Date Value Ref Range Status   01/08/2023 22 22 - 29 mmol/L Final   01/08/2023 23 22 - 29 mmol/L Final   01/07/2023 23 22 - 29 mmol/L Final     BUN   Date Value Ref Range Status   01/08/2023 38 (H) 6 - 23 mg/dL Final   01/08/2023 43 (H) 6 - 23 mg/dL Final   01/07/2023 47 (H) 6 - 23 mg/dL Final     Creatinine   Date Value Ref Range Status   01/08/2023 1.1 0.7 - 1.2 mg/dL Final   01/08/2023 1.1 0.7 - 1.2 mg/dL Final   01/07/2023 1.1 0.7 - 1.2 mg/dL Final     Glucose   Date Value Ref Range Status   01/08/2023 229 (H) 74 - 99 mg/dL Final   01/08/2023 172 (H) 74 - 99 mg/dL Final   01/07/2023 63 (L) 74 - 99 mg/dL Final   03/28/2011 253 (H) 70 - 110 mg/dL Final     Calcium   Date Value Ref Range Status   01/08/2023 8.2 (L) 8.6 - 10.2 mg/dL Final   01/08/2023 8.2 (L) 8.6 - 10.2 mg/dL Final   01/07/2023 7.9 (L) 8.6 - 10.2 mg/dL Final     Total Protein   Date Value Ref Range Status   01/05/2023 4.2 (L) 6.4 - 8.3 g/dL Final   12/26/2022 7.0 6.4 - 8.3 g/dL Final   03/28/2011 7.2 5.7 - 8.2 g/dL Final     Albumin   Date Value Ref Range Status   01/05/2023 2.8 (L) 3.5 - 5.2 g/dL Final   12/26/2022 4.3 3.5 - 5.2 g/dL Final   03/28/2011 4.3 3.2 - 4.8 g/dL Final     Total Bilirubin   Date Value Ref Range Status   01/05/2023 0.2 0.0 - 1.2 mg/dL Final   12/26/2022 0.8 0.0 - 1.2 mg/dL Final     Bilirubin, Total Date Value Ref Range Status   03/28/2011 0.2 (L) 0.3 - 1.2 mg/dL Final     Alkaline Phosphatase   Date Value Ref Range Status   01/05/2023 53 40 - 129 U/L Final   12/26/2022 107 40 - 129 U/L Final   03/28/2011 100 45 - 129 U/L Final     AST   Date Value Ref Range Status   01/05/2023 10 0 - 39 U/L Final   12/26/2022 18 0 - 39 U/L Final   03/28/2011 15 0 - 33 U/L Final     ALT   Date Value Ref Range Status   01/05/2023 7 0 - 40 U/L Final   12/26/2022 11 0 - 40 U/L Final   03/28/2011 20 10 - 49 U/L Final     Est, Glom Filt Rate   Date Value Ref Range Status   01/08/2023 >60 >=60 mL/min/1.73 Final     Comment:     Pediatric calculator link  [email or web address]_[email or web address]  Effective Oct 3, 2022  These results are not intended for use in patients  <25years of age. eGFR results are calculated without  a race factor using the 2021 CKD-EPI equation. Careful  clinical correlation is recommended, particularly when  comparing to results calculated using previous equations. The CKD-EPI equation is less accurate in patients with  extremes of muscle mass, extra-renal metabolism of  creatinine, excessive creatinine ingestion, or following  therapy that affects renal tubular secretion. 01/08/2023 >60 >=60 mL/min/1.73 Final     Comment:     Pediatric calculator link  [email or web address]_[email or web address]  Effective Oct 3, 2022  These results are not intended for use in patients  <25years of age. eGFR results are calculated without  a race factor using the 2021 CKD-EPI equation. Careful  clinical correlation is recommended, particularly when  comparing to results calculated using previous equations. The CKD-EPI equation is less accurate in patients with  extremes of muscle mass, extra-renal metabolism of  creatinine, excessive creatinine ingestion, or following  therapy that affects renal tubular secretion.      01/07/2023 >60 >=60 mL/min/1.73 Final     Comment: Pediatric calculator link  [email or web address]_[email or web address]  Effective Oct 3, 2022  These results are not intended for use in patients  <25years of age. eGFR results are calculated without  a race factor using the 2021 CKD-EPI equation. Careful  clinical correlation is recommended, particularly when  comparing to results calculated using previous equations. The CKD-EPI equation is less accurate in patients with  extremes of muscle mass, extra-renal metabolism of  creatinine, excessive creatinine ingestion, or following  therapy that affects renal tubular secretion. GFR    Date Value Ref Range Status   05/13/2020 >60  Final   05/07/2020 >60  Final   03/28/2018 >60  Final     Magnesium   Date Value Ref Range Status   01/08/2023 1.8 1.6 - 2.6 mg/dL Final   01/08/2023 2.1 1.6 - 2.6 mg/dL Final   01/07/2023 1.6 1.6 - 2.6 mg/dL Final     Phosphorus   Date Value Ref Range Status   01/08/2023 3.2 2.5 - 4.5 mg/dL Final   01/08/2023 3.1 2.5 - 4.5 mg/dL Final   01/07/2023 2.4 (L) 2.5 - 4.5 mg/dL Final     No results for input(s): PH, PO2, PCO2, HCO3, BE, O2SAT in the last 72 hours. RADIOLOGY:  NM GI BLOOD LOSS   Final Result   No evidence of active GI bleeding during acquisition. RECOMMENDATIONS:   If the patient shows hemodynamic signs of an active bleed in the next 20   hours, additional images can be acquired. CT ABDOMEN PELVIS W IV CONTRAST Additional Contrast? None   Final Result   No evidence of pulmonary embolism or acute pulmonary abnormality. Circumferential thickening of questionable esophageal is involving the   proximal through mid esophagus correlate with symptomatology for the need of   further investigation      Abdomen and pelvis: No acute findings of the abdomen or pelvis with   incidental findings as above including moderate prostatomegaly with central   area of low attenuation.  No associated gas locules maximum dimension 12 mm likely status post TURP findings of the central prostatic portion. No   inflammatory findings of the urinary bladder or obstructing uropathy      Hepatic steatosis         CTA PULMONARY W CONTRAST   Final Result   No evidence of pulmonary embolism or acute pulmonary abnormality. Circumferential thickening of questionable esophageal is involving the   proximal through mid esophagus correlate with symptomatology for the need of   further investigation      Abdomen and pelvis: No acute findings of the abdomen or pelvis with   incidental findings as above including moderate prostatomegaly with central   area of low attenuation. No associated gas locules maximum dimension 12 mm   likely status post TURP findings of the central prostatic portion. No   inflammatory findings of the urinary bladder or obstructing uropathy      Hepatic steatosis         CT Head W/O Contrast   Final Result   1. There is no acute intracranial abnormality. Specifically, there is no   intracranial hemorrhage. 2. Atrophy and periventricular leukomalacia,         CT CSpine W/O Contrast   Final Result   1. There is no acute compression fracture or subluxation of the cervical   spine. 2. Advanced multilevel degenerative disc and degenerative joint disease. IMPRESSION:  S/P Fall  Anemia secondary to suspected upper GI bleed  Mild diabetic ketoacidosis  RDE (Improved )  Elevated lactic acid  Chronic atrial fibrillation on Eliquis  Protein calorie malnutrition  Right knee effusion versus large hematoma  Hypertension  Hyperlipidemia  Elevated troponin likely secondary to demand ischemia. PLAN:  Advance diet to regular diet with carb control.   Endoscopy showed hiatus hernia with esophageal stricture  H&H every 12 hours and transfuse PRBC as needed to keep hemoglobin greater than 7  Protonix 40 mg IV every 12 hours  Insulin sliding scale with Lantus 4 units twice a day  Daily labs  Hold Eliquis  Follow-up orthopedic recommendation for right medial knee hematoma. Transfer to telemetry unit  PT OT      ATTESTATION:  ICU Staff Physician note of personal involvement in Care  As the attending physician, I certify that I personally reviewed the patients history and personally examined the patient to confirm the physical findings described above,  And that I reviewed the relevant imaging studies and available reports. I also discussed the differential diagnosis and all of the proposed management plans with the patient and individuals accompanying the patient to this visit. They had the opportunity to ask questions about the proposed management plans and to have those questions answered. This patient has a high probability of sudden, clinically significant deterioration, which requires the highest level of physician preparedness to intervene urgently. I managed/supervised life or organ supporting interventions that required frequent physician assessment. I devoted my full attention to the direct care of this patient for the amount of time indicated below. Time I spent with the family or surrogate(s) is included only if the patient was incapable of providing the necessary information or participating in medical decisions - Time devoted to teaching and to any procedures I billed separately is not included.     CRITICAL CARE TIME:  31 minutes    Electronically signed by Yolis Cox MD on 1/8/2023 at 9:43 AM

## 2023-01-09 ENCOUNTER — ANESTHESIA (OUTPATIENT)
Dept: ENDOSCOPY | Age: 78
DRG: 638 | End: 2023-01-09
Payer: MEDICARE

## 2023-01-09 ENCOUNTER — ANESTHESIA EVENT (OUTPATIENT)
Dept: ENDOSCOPY | Age: 78
DRG: 638 | End: 2023-01-09
Payer: MEDICARE

## 2023-01-09 LAB
ANION GAP SERPL CALCULATED.3IONS-SCNC: 9 MMOL/L (ref 7–16)
ANISOCYTOSIS: ABNORMAL
BASOPHILS ABSOLUTE: 0.08 E9/L (ref 0–0.2)
BASOPHILS RELATIVE PERCENT: 0.9 % (ref 0–2)
BLOOD BANK DISPENSE STATUS: NORMAL
BLOOD BANK PRODUCT CODE: NORMAL
BPU ID: NORMAL
BUN BLDV-MCNC: 17 MG/DL (ref 6–23)
CALCIUM SERPL-MCNC: 8.2 MG/DL (ref 8.6–10.2)
CHLORIDE BLD-SCNC: 102 MMOL/L (ref 98–107)
CO2: 28 MMOL/L (ref 22–29)
CREAT SERPL-MCNC: 0.9 MG/DL (ref 0.7–1.2)
DESCRIPTION BLOOD BANK: NORMAL
EOSINOPHILS ABSOLUTE: 0.23 E9/L (ref 0.05–0.5)
EOSINOPHILS RELATIVE PERCENT: 2.6 % (ref 0–6)
GFR SERPL CREATININE-BSD FRML MDRD: >60 ML/MIN/1.73
GLUCOSE BLD-MCNC: 163 MG/DL (ref 74–99)
HCT VFR BLD CALC: 24.5 % (ref 37–54)
HCT VFR BLD CALC: 26.4 % (ref 37–54)
HEMOGLOBIN: 8.1 G/DL (ref 12.5–16.5)
HEMOGLOBIN: 8.2 G/DL (ref 12.5–16.5)
HYPOCHROMIA: ABNORMAL
LYMPHOCYTES ABSOLUTE: 1.22 E9/L (ref 1.5–4)
LYMPHOCYTES RELATIVE PERCENT: 13.9 % (ref 20–42)
MAGNESIUM: 1.5 MG/DL (ref 1.6–2.6)
MCH RBC QN AUTO: 30.5 PG (ref 26–35)
MCHC RBC AUTO-ENTMCNC: 33.1 % (ref 32–34.5)
MCV RBC AUTO: 92.1 FL (ref 80–99.9)
METER GLUCOSE: 182 MG/DL (ref 74–99)
METER GLUCOSE: 199 MG/DL (ref 74–99)
METER GLUCOSE: 235 MG/DL (ref 74–99)
METER GLUCOSE: 349 MG/DL (ref 74–99)
MONOCYTES ABSOLUTE: 0.26 E9/L (ref 0.1–0.95)
MONOCYTES RELATIVE PERCENT: 2.6 % (ref 2–12)
NEUTROPHILS ABSOLUTE: 6.96 E9/L (ref 1.8–7.3)
NEUTROPHILS RELATIVE PERCENT: 80 % (ref 43–80)
OVALOCYTES: ABNORMAL
PDW BLD-RTO: 21.2 FL (ref 11.5–15)
PHOSPHORUS: 2.5 MG/DL (ref 2.5–4.5)
PLATELET # BLD: 270 E9/L (ref 130–450)
PMV BLD AUTO: 10.1 FL (ref 7–12)
POIKILOCYTES: ABNORMAL
POLYCHROMASIA: ABNORMAL
POTASSIUM SERPL-SCNC: 3.1 MMOL/L (ref 3.5–5)
RBC # BLD: 2.66 E12/L (ref 3.8–5.8)
SODIUM BLD-SCNC: 139 MMOL/L (ref 132–146)
TARGET CELLS: ABNORMAL
WBC # BLD: 8.7 E9/L (ref 4.5–11.5)

## 2023-01-09 PROCEDURE — 85018 HEMOGLOBIN: CPT

## 2023-01-09 PROCEDURE — 1200000000 HC SEMI PRIVATE

## 2023-01-09 PROCEDURE — 6370000000 HC RX 637 (ALT 250 FOR IP): Performed by: INTERNAL MEDICINE

## 2023-01-09 PROCEDURE — 7100000011 HC PHASE II RECOVERY - ADDTL 15 MIN: Performed by: INTERNAL MEDICINE

## 2023-01-09 PROCEDURE — 85014 HEMATOCRIT: CPT

## 2023-01-09 PROCEDURE — 83735 ASSAY OF MAGNESIUM: CPT

## 2023-01-09 PROCEDURE — 2580000003 HC RX 258: Performed by: NURSE PRACTITIONER

## 2023-01-09 PROCEDURE — 80048 BASIC METABOLIC PNL TOTAL CA: CPT

## 2023-01-09 PROCEDURE — 2500000003 HC RX 250 WO HCPCS: Performed by: NURSE ANESTHETIST, CERTIFIED REGISTERED

## 2023-01-09 PROCEDURE — 6360000002 HC RX W HCPCS: Performed by: NURSE ANESTHETIST, CERTIFIED REGISTERED

## 2023-01-09 PROCEDURE — 36415 COLL VENOUS BLD VENIPUNCTURE: CPT

## 2023-01-09 PROCEDURE — 2709999900 HC NON-CHARGEABLE SUPPLY: Performed by: INTERNAL MEDICINE

## 2023-01-09 PROCEDURE — 97165 OT EVAL LOW COMPLEX 30 MIN: CPT

## 2023-01-09 PROCEDURE — 2500000003 HC RX 250 WO HCPCS: Performed by: INTERNAL MEDICINE

## 2023-01-09 PROCEDURE — 2580000003 HC RX 258: Performed by: NURSE ANESTHETIST, CERTIFIED REGISTERED

## 2023-01-09 PROCEDURE — 2580000003 HC RX 258: Performed by: INTERNAL MEDICINE

## 2023-01-09 PROCEDURE — 7100000010 HC PHASE II RECOVERY - FIRST 15 MIN: Performed by: INTERNAL MEDICINE

## 2023-01-09 PROCEDURE — 85025 COMPLETE CBC W/AUTO DIFF WBC: CPT

## 2023-01-09 PROCEDURE — A4216 STERILE WATER/SALINE, 10 ML: HCPCS | Performed by: NURSE PRACTITIONER

## 2023-01-09 PROCEDURE — 3700000001 HC ADD 15 MINUTES (ANESTHESIA): Performed by: INTERNAL MEDICINE

## 2023-01-09 PROCEDURE — 6360000002 HC RX W HCPCS: Performed by: INTERNAL MEDICINE

## 2023-01-09 PROCEDURE — 6360000002 HC RX W HCPCS: Performed by: NURSE PRACTITIONER

## 2023-01-09 PROCEDURE — 97530 THERAPEUTIC ACTIVITIES: CPT | Performed by: PHYSICAL THERAPIST

## 2023-01-09 PROCEDURE — 3609010400 HC COLONOSCOPY POLYPECTOMY HOT BIOPSY: Performed by: INTERNAL MEDICINE

## 2023-01-09 PROCEDURE — 82962 GLUCOSE BLOOD TEST: CPT

## 2023-01-09 PROCEDURE — 84100 ASSAY OF PHOSPHORUS: CPT

## 2023-01-09 PROCEDURE — 88305 TISSUE EXAM BY PATHOLOGIST: CPT

## 2023-01-09 PROCEDURE — 0DBN8ZZ EXCISION OF SIGMOID COLON, VIA NATURAL OR ARTIFICIAL OPENING ENDOSCOPIC: ICD-10-PCS | Performed by: INTERNAL MEDICINE

## 2023-01-09 PROCEDURE — C9113 INJ PANTOPRAZOLE SODIUM, VIA: HCPCS | Performed by: NURSE PRACTITIONER

## 2023-01-09 PROCEDURE — 3700000000 HC ANESTHESIA ATTENDED CARE: Performed by: INTERNAL MEDICINE

## 2023-01-09 PROCEDURE — 97161 PT EVAL LOW COMPLEX 20 MIN: CPT | Performed by: PHYSICAL THERAPIST

## 2023-01-09 RX ORDER — PROPOFOL 10 MG/ML
INJECTION, EMULSION INTRAVENOUS PRN
Status: DISCONTINUED | OUTPATIENT
Start: 2023-01-09 | End: 2023-01-09 | Stop reason: SDUPTHER

## 2023-01-09 RX ORDER — MAGNESIUM SULFATE IN WATER 40 MG/ML
2000 INJECTION, SOLUTION INTRAVENOUS ONCE
Status: COMPLETED | OUTPATIENT
Start: 2023-01-09 | End: 2023-01-09

## 2023-01-09 RX ORDER — SODIUM CHLORIDE 9 MG/ML
INJECTION, SOLUTION INTRAVENOUS CONTINUOUS PRN
Status: DISCONTINUED | OUTPATIENT
Start: 2023-01-09 | End: 2023-01-09 | Stop reason: SDUPTHER

## 2023-01-09 RX ORDER — POTASSIUM CHLORIDE 7.45 MG/ML
10 INJECTION INTRAVENOUS
Status: COMPLETED | OUTPATIENT
Start: 2023-01-09 | End: 2023-01-09

## 2023-01-09 RX ORDER — DEXTROSE, SODIUM CHLORIDE, AND POTASSIUM CHLORIDE 5; .9; .15 G/100ML; G/100ML; G/100ML
INJECTION INTRAVENOUS CONTINUOUS
Status: DISCONTINUED | OUTPATIENT
Start: 2023-01-09 | End: 2023-01-10

## 2023-01-09 RX ORDER — ALFENTANIL HYDROCHLORIDE 500 UG/ML
INJECTION INTRAVENOUS PRN
Status: DISCONTINUED | OUTPATIENT
Start: 2023-01-09 | End: 2023-01-09 | Stop reason: SDUPTHER

## 2023-01-09 RX ADMIN — Medication 10 ML: at 13:33

## 2023-01-09 RX ADMIN — PROPOFOL 70 MG: 10 INJECTION, EMULSION INTRAVENOUS at 14:57

## 2023-01-09 RX ADMIN — POTASSIUM CHLORIDE 10 MEQ: 7.46 INJECTION, SOLUTION INTRAVENOUS at 13:32

## 2023-01-09 RX ADMIN — SODIUM CHLORIDE: 900 INJECTION, SOLUTION INTRAVENOUS at 14:47

## 2023-01-09 RX ADMIN — INSULIN GLARGINE 4 UNITS: 100 INJECTION, SOLUTION SUBCUTANEOUS at 21:48

## 2023-01-09 RX ADMIN — SODIUM CHLORIDE, PRESERVATIVE FREE 40 MG: 5 INJECTION INTRAVENOUS at 08:17

## 2023-01-09 RX ADMIN — INSULIN LISPRO 4 UNITS: 100 INJECTION, SOLUTION INTRAVENOUS; SUBCUTANEOUS at 21:46

## 2023-01-09 RX ADMIN — SODIUM CHLORIDE, PRESERVATIVE FREE 40 MG: 5 INJECTION INTRAVENOUS at 21:42

## 2023-01-09 RX ADMIN — POTASSIUM CHLORIDE 10 MEQ: 7.46 INJECTION, SOLUTION INTRAVENOUS at 12:31

## 2023-01-09 RX ADMIN — PROPOFOL 100 MCG/KG/MIN: 10 INJECTION, EMULSION INTRAVENOUS at 14:58

## 2023-01-09 RX ADMIN — INSULIN LISPRO 1 UNITS: 100 INJECTION, SOLUTION INTRAVENOUS; SUBCUTANEOUS at 18:01

## 2023-01-09 RX ADMIN — MAGNESIUM SULFATE HEPTAHYDRATE 2000 MG: 40 INJECTION, SOLUTION INTRAVENOUS at 12:33

## 2023-01-09 RX ADMIN — ALFENTANIL HYDROCHLORIDE 500 MCG: 500 INJECTION INTRAVENOUS at 14:57

## 2023-01-09 NOTE — PROGRESS NOTES
Physical Therapy Initial Evaluation/Plan of Care    Room #:  0165/1689-70  Patient Name: Sapphire Castellon  YOB: 1945  MRN: [id number]    Date of Service: 1/9/2023     Tentative placement recommendation: Subacute rehab  Equipment recommendation: To be determined      Evaluating Physical Therapist: Tiburcio Topete, PT #[id number]      Specific Provider Orders/Date/Referring Provider :  01/07/23 1230    PT eval and treat  Start:  01/07/23 1230,   End:  01/07/23 1230,   ONE TIME,   Standing Count:  1 Occurrences,   R         Adelso Skill, DO     Admitting Diagnosis:   DKA, type 1, not at goal [facility]) [E10.10]     fall, states that he was too weak to get up back into the bed, and therefore spent the night on the floor, slept on it. He said in the morning, he had to drag himself on the floor in order to reach the phone. Surgery: none  Visit Diagnoses         Codes    Diabetic ketoacidosis without coma associated with other specified diabetes mellitus ([address]    -  Primary E13.10    Fall, initial encounter     W19. XXXA    Gastrointestinal hemorrhage, unspecified gastrointestinal hemorrhage type     K92.2    Anemia, unspecified type     D64.9    Lactic acidosis     E87.20    Hyperkalemia     E87.5    Acute kidney injury ([address]     N17.9            Patient Active Problem List   Diagnosis    Arthropathy of right shoulder    Diabetes mellitus ([address]    Hyperlipidemia    Hypertension    Moderate obesity    Paroxysmal atrial fibrillation (HCC)    DKA, type 1, not at goal [facility])    Seizure ([address]    Moderate protein-calorie malnutrition ([address]        ASSESSMENT of Current Deficits Patient exhibits decreased strength, balance, and endurance impairing functional mobility, transfers, gait , gait distance, and tolerance to activity requires maximal encouragement. Fatigues quickly limiting increased gait distance and activity.  Patient requires continued skilled physical therapy to address concerns listed above for increased safety and function at discharge. PHYSICAL THERAPY  PLAN OF CARE       Physical therapy plan of care is established based on physician order,  patient diagnosis and clinical assessment    Current Treatment Recommendations:    -Bed Mobility: Lower extremity exercises   -Sitting Balance: Incorporate reaching activities to activate trunk muscles   -Standing Balance: Perform strengthening exercises in standing to promote motor control with or without upper extremity support   -Transfers: Provide instruction on proper hand and foot position for adequate transfer of weight onto lower extremities and use of gait device if needed and Cues for hand placement, technique and safety. Provide stabilization to prevent fall   -Gait: Gait training, Standing activities to improve: base of support, weight shift, weight bearing , and Pregait training to emphasize: Sequencing , Device control, Upright, and Safety   -Endurance: Utilize Supervised activities to increase level of endurance to allow for safe functional mobility including transfers and gait     PT long term treatment goals are located in below grid    Patient and or family understand(s) diagnosis, prognosis, and plan of care. Frequency of treatments: Patient will be seen  daily.          Prior Level of Function: Patient ambulated independently    Rehab Potential: good   for baseline    Past medical history:   Past Medical History:   Diagnosis Date    Arthritis     Bilateral shoulder pain 05/2020    for TJAS Right 05/12/2020 Dr Cortez Martha    Diabetes mellitus [facility])     Enlarged prostate     follows with Dr Noa Paulson    History of Holter monitoring 07/29/2022    History of irregular heartbeat     follows with PCP    Hyperlipidemia     Hypertension     Leg wound, right 05/2020    recent history of; see podiatry notes in epic     Past Surgical History:   Procedure Laterality Date    CATARACT REMOVAL WITH IMPLANT Bilateral     ENDOSCOPY, COLON, DIAGNOSTIC      EYE SURGERY HERNIA REPAIR      SHOULDER SURGERY Right 5/12/2020    RIGHT SHOULDER TOTAL ARTHROPLASTY REVERSE performed by Misty Teresa MD at [address] N/A 1/6/2023    EGD ESOPHAGOGASTRODUODENOSCOPY DILATATION performed by Ashly Farfan MD at [address]:    Precautions:  warm compresses to help decrease the size of the hematoma right knee ,  elevation to decrease swellingfalls ,  WBAT - RLE    Imaging results: XR FEMUR RIGHT (MIN 2 VIEWS)    Result Date: 12/26/2022  EXAMINATION: XRAY VIEWS OF THE RIGHT FEMUR 12/26/2022 4:01 pm COMPARISON: None. HISTORY: ORDERING SYSTEM PROVIDED HISTORY: injury TECHNOLOGIST PROVIDED HISTORY: Reason for exam:->injury FINDINGS: There is no evidence of acute fracture. There is normal alignment. No acute joint abnormality. No focal osseous lesion. No focal soft tissue abnormality. There are degenerative changes and intra-articular calcifications in the visualized knee joint. Vascular calcification noted. No acute osseous abnormality. XR KNEE RIGHT (1-2 VIEWS)    Result Date: 12/26/2022  EXAMINATION: TWO XRAY VIEWS OF THE RIGHT KNEE 12/26/2022 4:01 pm    No acute bony abnormalities. Mild joint effusion. Chondrocalcinosis. XR TIBIA FIBULA RIGHT (2 VIEWS)    Result Date: 12/26/2022  EXAMINATION: XRAY VIEWS OF THE RIGHT TIBIA AND FIBULA 12/26/2022 4:01 pm    No acute osseous abnormality. CT Head W/O Contrast    Result Date: 1/5/2023  EXAMINATION: CT OF THE HEAD WITHOUT CONTRAST  1/5/2023 5:33 pm    1. There is no acute intracranial abnormality. Specifically, there is no intracranial hemorrhage. 2. Atrophy and periventricular leukomalacia,     CT CSpine W/O Contrast    Result Date: 1/5/2023  EXAMINATION: CT OF THE CERVICAL SPINE WITHOUT CONTRAST 1/5/2023 5:33 pm    1. There is no acute compression fracture or subluxation of the cervical spine.  2. Advanced multilevel degenerative disc and degenerative joint disease. NM GI BLOOD LOSS    Result Date: 1/7/2023  EXAMINATION: NUCLEAR MEDICINE GASTRIC BLEEDING STUDY 1/7/2023    No evidence of active GI bleeding during acquisition. RECOMMENDATIONS: If the patient shows hemodynamic signs of an active bleed in the next 20 hours, additional images can be acquired. CT ABDOMEN PELVIS W IV CONTRAST Additional Contrast? None    Result Date: 1/5/2023  EXAMINATION: CTA OF THE CHEST; CT OF THE ABDOMEN AND PELVIS WITH CONTRAST 1/5/2023 6:15 pm    No evidence of pulmonary embolism or acute pulmonary abnormality. Circumferential thickening of questionable esophageal is involving the proximal through mid esophagus correlate with symptomatology for the need of further investigation Abdomen and pelvis: No acute findings of the abdomen or pelvis with incidental findings as above including moderate prostatomegaly with central area of low attenuation. No associated gas locules maximum dimension 12 mm likely status post TURP findings of the central prostatic portion. No inflammatory findings of the urinary bladder or obstructing uropathy Hepatic steatosis        US DUP LOWER EXTREMITY RIGHT MARILIN    Result Date: 12/26/2022  EXAMINATION: DUPLEX VENOUS ULTRASOUND OF THE RIGHT LOWER EXTREMITY 12/26/2022 4:00 pm    No evidence of DVT in the right lower extremity.        Social history: Patient lives alone in a two story home resides first  with 3 steps, with rail  to enter  Walk in shower       Equipment owned: Shower chair,       [address] [facility]   How much difficulty turning over in bed?: A Little  How much difficulty sitting down on / standing up from a chair with arms?: A Lot  How much difficulty moving from lying on back to sitting on side of bed?: A Little  How much help from another person moving to and from a bed to a chair?: A Lot  How much help from another person needed to walk in hospital room?: A Little  How much help from another person for climbing 3-5 steps with a railing?: A Lot  AM-PAC Inpatient Mobility Raw Score : 15  AM-PAC Inpatient T-Scale Score : 39.45  Mobility Inpatient CMS 0-100% Score: 57.7  Mobility Inpatient CMS G-Code Modifier : CK    Nursing cleared patient for PT evaluation. The admitting diagnosis and active problem list as listed above have been reviewed prior to the initiation of this evaluation. OBJECTIVE;   Initial Evaluation  Date: 1/9/2023 Treatment Date:     Short Term/ Long Term   Goals   Was pt agreeable to Eval/treatment? Yes  To be met in 3 days   Pain level   1-2/10  Right knee     Bed Mobility    Rolling: Not assessed     Supine to sit: Minimal assist of 1    Sit to supine: Minimal assist of 1    Scooting: Minimal assist of 1    Rolling: Supervision     Supine to sit: Supervision     Sit to supine: Supervision     Scooting: Supervision      Transfers Sit to stand:  Moderate assist of 1 Cues for hand placement and safety bed elevated  Sit to stand: Minimal assist of 1     Ambulation     20 feet using  wheeled walker with Minimal assist of 1   Patient with decreased hi and decreased step length and cues for upright posture, safety, and pacing    40 feet using  wheeled walker with Supervision     Stair negotiation: ascended and descended   Not assessed          ROM Within functional limits  righ tknee hematoma  Increase range of motion 10% of affected joints    Strength BUE:  refer to OT eval  RLE:  3/5  LLE:  3+/5  Increase strength in affected mm groups by 1/3 grade   Balance Sitting EOB:  good -  Dynamic Standing:  fair wheeled walker   Sitting EOB:  good    Dynamic Standing: fair +wheeled walker      Patient is Alert & Oriented x person, place, time, and situation and follows directions    Sensation:  Patient  denies numbness/tingling   Edema:  yes righ tknee    Endurance: fair       Vitals: room air   Blood Pressure at rest  Blood Pressure during session    Heart Rate at rest   Heart Rate during session SPO2 at rest  97%  SPO2 during session  96%     Patient education  Patient educated on role of Physical Therapy, risks of immobility, safety and plan of care,  importance of mobility while in hospital , and ankle pumps, quad set and glut set for edema control, blood clot prevention     Patient response to education:   Pt verbalized understanding Pt demonstrated skill Pt requires further education in this area   Yes Partial Yes      Treatment:  Patient practiced and was instructed/facilitated in the following treatment: Patient assisted to edge of bed,   Sat edge of bed 15 minutes with Supervision  to increase dynamic sitting balance and activity tolerance. Ambulated in room, returned to bed. Therapeutic Exercises:  ankle pumps and quad sets  x 10 reps. At end of session, patient in bed with     call light and phone within reach,  all lines and tubes intact, nursing notified. Patient would benefit from continued skilled Physical Therapy to improve functional independence and quality of life. Patient's/ family goals   get stronger    Time in  1133  Time out  1214    Total Treatment Time  11 minutes    Evaluation time includes thorough review of current medical information, gathering information on past medical history/social history and prior level of function, completion of standardized testing/informal observation of tasks, assessment of data, and development of Plan of care and goals.      CPT codes:  Low Complexity PT evaluation (83287)  Therapeutic activities (95156)   11 minutes  1 unit(s)    Jose Prince PT

## 2023-01-09 NOTE — PROGRESS NOTES
[address] [facility]  [place]. [place]        Date:2023                                                  Patient Name: Brooks Ramos    MRN: [id number]    : 1945    Room: [address] [place], [place] [address]    Evaluating OT: Zackery Wayne OTR/L #[id number]     Referring Provider and Specific Provider Orders/Date:      23 1230  OT eval and treat  Start:  23 1230,   End:  23 1230,   ONE TIME,   Standing Count:  1 Occurrences,   R         Ulysses Bars, DO      Placement Recommendation: Subacute vs Home Health Occupational Therapy if patient is able to meet goals       Diagnosis:   1. Diabetic ketoacidosis without coma associated with other specified diabetes mellitus ([address]    2. Fall, initial encounter    3. Gastrointestinal hemorrhage, unspecified gastrointestinal hemorrhage type    4. Anemia, unspecified type    5. Lactic acidosis    6. Hyperkalemia    7.  Acute kidney injury [facility])         Surgery: None       Pertinent Medical History:       Past Medical History:   Diagnosis Date    Arthritis     Bilateral shoulder pain 2020    for TJAS Right 2020 Dr Cortez Martha    Diabetes mellitus [facility])     Enlarged prostate     follows with Dr Noa Paulson    History of Holter monitoring 2022    History of irregular heartbeat     follows with PCP    Hyperlipidemia     Hypertension     Leg wound, right 2020    recent history of; see podiatry notes in epic         Past Surgical History:   Procedure Laterality Date    CATARACT REMOVAL WITH IMPLANT Bilateral     ENDOSCOPY, COLON, DIAGNOSTIC      EYE SURGERY      HERNIA REPAIR      SHOULDER SURGERY Right 2020    RIGHT SHOULDER TOTAL ARTHROPLASTY REVERSE performed by Ilda Ramos MD at [address] [place] [address] [address] N/A 2023    EGD ESOPHAGOGASTRODUODENOSCOPY DILATATION performed by Patti Cronin MD at [place] [address] Precautions:  Fall Risk, falls and alarm, up with assistance, right medial knee hematoma, weight bearing as tolerated right LE, hard of hearing      Assessment of current deficits    [x] Functional mobility  [x]ADLs  [x] Strength               [x]Cognition    [x] Functional transfers   [x] IADLs         [x] Safety Awareness   [x]Endurance    [] Fine Coordination              [x] Balance      [] Vision/perception   []Sensation     []Gross Motor Coordination  [x] ROM  [] Delirium                   [] Motor Control     OT PLAN OF CARE   OT POC based on physician orders, patient diagnosis and results of clinical assessment    Frequency/Duration 1-3 days/wk for 2 weeks PRN     Specific OT Treatment Interventions to include:   * Instruction/training on adapted ADL techniques and AE recommendations to increase functional independence within precautions       * Training on energy conservation strategies, correct breathing pattern and techniques to improve independence/tolerance for self-care routine  * Functional transfer/mobility training/DME recommendations for increased independence, safety, and fall prevention  * Patient/Family education to increase follow through with safety techniques and functional independence  * Recommendation of environmental modifications for increased safety with functional transfers/mobility and ADLs  * Cognitive retraining/development of therapeutic activities to improve problem solving, judgement, memory, and attention for increased safety/participation in ADL/IADL tasks  * Therapeutic exercise to improve motor endurance, ROM, and functional strength for ADLs/functional transfers  * Therapeutic activities to facilitate/challenge dynamic balance, stand tolerance for increased safety and independence with ADLs  * Therapeutic activities to facilitate gross/fine motor skills for increased independence with ADLs  * Positioning to improve skin integrity, interaction with environment and functional independence  * Delirium prevention/treatment    Recommended Adaptive Equipment: TBD     Home Living: Lives alone, single family home, 2 story with 1st floor set-up, 6 steps to enter with rail. Bathroom set-up: Walk-in shower         Equipment owned: shower chair, elevated commode     Prior Level of Function: Patient reports being independent with ADLs and most IADLs PTA - goes out to eat often; ambulated with wheeled walker. Drives: Yes    Pain Level: no reports of pain; Nursing notified. Cognition: A&O: 3/4; Follows 2 step directions   Memory: fair    Sequencing: fair    Problem solving: fair    Judgement/safety: fair     [facility]   AM-PAC Daily Activity Inpatient   How much help for putting on and taking off regular lower body clothing?: Total  How much help for Bathing?: A Lot  How much help for Toileting?: Total  How much help for putting on and taking off regular upper body clothing?: A Lot  How much help for taking care of personal grooming?: A Lot  How much help for eating meals?: A Little  AM-[facility] Inpatient Daily Activity Raw Score: 11  AM-PAC Inpatient ADL T-Scale Score : 29.04  ADL Inpatient CMS 0-100% Score: 70.42  ADL Inpatient CMS G-Code Modifier : CL     Functional Assessment:    Initial Eval Status  Date: 1/9/23   Treatment Status  Date: STGs = LTGs  Time frame: 10-14 days   Feeding Minimal Assist     Independent    Grooming Moderate Assist     Supervision    UB Dressing Moderate Assist    Supervision    LB Dressing Dependent     Minimal Assist    Bathing Maximal Assist     Minimal Assist    Toileting Dependent     Minimal Assist    Bed Mobility  Supine to sit: Moderate Assist   Sit to supine: Minimal Assist     Supine to sit: Independent   Sit to supine: Independent    Functional Transfers Sit to stand: Moderate Assist   Stand to sit: Moderate Assist     Transfer training with verbal cues for hand placement throughout session to improve safety.      Independent    Functional Mobility Moderate progressing to Minimal Assist with wheeled walker to improve balance 3-4 steps along side of bed, verbal cues for walker sequence and safety. Moderate [place] with use of wheeled walker    Balance Sitting:     Static: fair plus    Dynamic: fair plus   Standing: fair with walker     Sitting:     Static: good    Dynamic: good  Standing: good with walker   Activity Tolerance fair    Increase standing tolerance >3  minutes for improved engagement with functional transfers and indep in ADLs     Visual/  Perceptual Glasses: N/A     NA      Hand Dominance:      AROM (PROM) Strength Additional Info:  Goal:   RUE  WFL 3+/5 good  and wfl FMC/dexterity noted during ADL tasks   Improve overall RUE strength  for participation in functional tasks   LUE WFL 2+/5 good  and wfl FMC/dexterity noted during ADL tasks   Improve overall LUE strength  for participation in functional tasks     Hearing: [place]; wears hearing aids    Sensation No c/o numbness or tingling  Tone:  WFL   Edema:      Vitals:  HR at rest: 79 bpm HR with activity:  bpm HR at end of session:  bpm   SpO2 at rest: 96% SpO2 with activity: % SpO2 at end of session: %   BP at rest:  BP with activity:  BP at end of session:      Comments: RN cleared patient for OT. Upon arrival patient in supine. Therapist facilitated and instructed pt on adapted  techniques & compensatory strategies to improve safety and independence with basic ADLs, bed mobility, functional transfers and mobility to allow pt to achieve highest level of independence and safely. Pt demonstrated fair minus/poor understanding of education & follow through. At end of session, patient was supine with call light and phone within reach, all lines and tubes intact. Overall, patient demonstrated  decreased independence and safety during completion of ADL tasks.   Pt would benefit from continued skilled OT to increase safety and independence with completion of ADL tasks and functional mobility for improved quality of life. Rehab Potential: Good for established goals. Patient / Family Goal: Not stated       Patient and/or family were instructed on functional diagnosis, prognosis/goals and OT plan of care. Demonstrated fair minus understanding. Eval Complexity: Low    Time In: 9:37 AM   Time Out: 9:57 AM    Total Treatment Time: 0       Min Units   OT Eval Low 97165  X  1    OT Eval Medium 77492      OT Eval High 81404      OT Re-Eval [id number]            ADL/Self Care 88045     Therapeutic Activities 89440       Therapeutic Ex 17461       Orthotic Management 26999       Manual [id number]     Neuro Re-Ed 22235       Non-Billable Time        Evaluation Time additionally includes thorough review of current medical information, gathering information on past medical history/social history and prior level of function, interpretation of standardized testing/informal observation of tasks, assessment of data and development of plan of care and goals.         Evaluating OT: [employer] OTR/L #[id number]

## 2023-01-09 NOTE — PROGRESS NOTES
Department of Internal Medicine  PN    PCP: Eric Botello DO  Admitting Physician: Dr. Abigail Gallego  Consultants:   Date of Service: 1/5/2023    CHIEF COMPLAINT: Altered mental status    HISTORY OF PRESENT ILLNESS:    Patient is a 72-year-old male who presents to the ED with altered mental status. Patient was found on the floor at home unable to get up. Patient lives alone at home with 2 dogs. Otherwise patient states that he fell last night. Patient states that he fell while entering his home. He was able to get around but it was not able to ambulate. He slipped on the floor as he was not able to get onto the bed. Otherwise patient states he has noted bloody/melanotic stools the other day. States he has a previous upper endoscopy however is unsure of the results. Patient patient states that he has lost 40 pounds involuntarily over the last month or 2. Apparently patient suffered seizure as well on his way to the hospital.  He has no history of seizure. Overall patient has been weak over the last few weeks. He has also been having issues with constipation. 1/6/2023  Patient seen examined in ICU. Patient is very hard of hearing and does not have his hearing aids right now. Patient states he feels a lot better. He denies any chest or abdominal pain. He denies any nausea/vomiting or unusual shortness of breath. BUN/creatinine was 149/1.8 with CO2 electrolytes and 19. Lactic acid improved but still mildly elevated 2.5. Blood sugars improved. WBC still elevated 16.8 with hemoglobin is 6.6. Temperature 98.7 with heart rate of about 100 and blood pressure 151/80. O2 sat 100% on room air at rest with patient on insulin drip. Urinary output ranges 300-675 cc a shift. Patient is doing much better now than on admission. 1/7/2023  Patient seen examined in the ICU. Patient is extremely hard of hearing and seems to be worse today and not able to communicate very well because of this.   Case discussed with patient nurse at the bedside. 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 Patient had esophageal dilation performed. Orthopedic note from today shows right medial knee hematoma. BUN/creatinine is improved to 82/1.3 with CO2 electrolytes of 19. Blood sugars range from . Patient had a hemoglobin A1c of 5.9. WBC 17.1 with hemoglobin 7.7. Temperature is 90.1 with heart rate 88. Blood pressure 141/64. Patient is still into atrial fibrillation with the patient having O2 sat 98% on room air at rest.  Urine output is good ranging 450-1800 cc a shift. Case discussed with intensivist.  Patient had a bleeding scan today. Patient's insulin drip was stopped since last night. 1/8/2023  Patient seen examined on ICU. Patient doing better and is very hungry. Patient denies any chest or abdominal pain. BUN/creatinine 38/1.1 with normal electrolytes. Blood sugars ranging . Patient had elevated beta hydroxybutyrate yesterday 2.49. WBC 11.0 today with hemoglobin 7.3. Temperature is 98.3 with heart rate of 70 blood pressure 164/69. O2 sat 100% room air at rest.  Urine output ranges 700-2400 cc a shift. Patient did have 1 bowel movement yesterday. Nuclear medicine bleeding scan showed no evidence of active GI bleeding. Patient is to be transferred to telemetry floor. Increase activity and diet. Start subcu insulin. 1/9/2023  Patient seen examined on telemetry floor. Patient denies any chest or abdominal pain. The patient is complaining of his left shoulder which is a chronic complaint but seems to be little bit worse since he has been in the hospital.  Potassium 3.1 today with magnesium through 1.5. Blood sugars range 163-248. WBC 8.7 hemoglobin 8.1. Temperature 98.1 with heart rate 84 and blood pressure 167/74. O2 sat 96% room air at rest.  Blood sugars range 200-500 cc a shift.   Patient is set up for colonoscopy this afternoon. PAST MEDICAL Hx:  Past Medical History:   Diagnosis Date    Arthritis     Bilateral shoulder pain 05/2020    for TJAS Right 05/12/2020 Dr Carlitos James    Diabetes mellitus [facility])     Enlarged prostate     follows with Dr Stef Francis    History of Holter monitoring 07/29/2022    History of irregular heartbeat     follows with PCP    Hyperlipidemia     Hypertension     Leg wound, right 05/2020    recent history of; see podiatry notes in epic       PAST SURGICAL Hx:   Past Surgical History:   Procedure Laterality Date    CATARACT REMOVAL WITH IMPLANT Bilateral     ENDOSCOPY, COLON, DIAGNOSTIC      EYE SURGERY      HERNIA REPAIR      SHOULDER SURGERY Right 5/12/2020    RIGHT SHOULDER TOTAL ARTHROPLASTY REVERSE performed by Yfn Dunaway MD at [address] N/A 1/6/2023    EGD ESOPHAGOGASTRODUODENOSCOPY DILATATION performed by Esteban Diaz MD at [address] Hx:  Family History   Problem Relation Age of Onset    No Known Problems Mother     No Known Problems Father        HOME MEDICATIONS:  Prior to Admission medications    Medication Sig Start Date End Date Taking?  Authorizing Provider   FARXIGA 5 MG tablet take 1 tablet by mouth once daily 10/23/22   Historical Provider, MD   apixaban (ELIQUIS) 5 MG TABS tablet Take 1 tablet by mouth 2 times daily 10/31/22 1/5/23  Skylar Garcia MD   atorvastatin (LIPITOR) 40 MG tablet take 1 tablet by mouth daily 7/29/22   Historical Provider, MD   TRULICITY 1.5 NP/6.3CI SOPN inject 0.5 milliliters ( 1 AND 1/2 milligrams ) subcutaneously every week 9/16/22   Historical Provider, MD   Multiple Vitamins-Minerals (THERAPEUTIC MULTIVITAMIN-MINERALS) tablet Take 1 tablet by mouth daily Last taken 05/03/2020    Historical Provider, MD   Ascorbic Acid (VITAMIN C PO) Take 1 tablet by mouth daily Last taken 05/03    Historical Provider, MD   acetaminophen (TYLENOL) 500 MG tablet Take 500 mg by mouth every 6 hours as needed for Pain    Historical Provider, MD   metOLazone (ZAROXOLYN) 5 MG tablet Take 5 mg by mouth daily    Historical Provider, MD   lisinopril (PRINIVIL;ZESTRIL) 40 MG tablet Take 40 mg by mouth daily    Historical Provider, MD   metFORMIN (GLUCOPHAGE) 500 MG tablet Take 500 mg by mouth 2 times daily (with meals)    Historical Provider, MD   NIFEdipine (ADALAT CC) 30 MG extended release tablet Take 30 mg by mouth daily Take am day of surgery 05/12/2020    Historical Provider, MD       ALLERGIES:  Penicillins, Seasonal, and Sulfa antibiotics    SOCIAL Hx:  Social History     Socioeconomic History    Marital status: Single     Spouse name: Not on file    Number of children: Not on file    Years of education: Not on file    Highest education level: Not on file   Occupational History    Not on file   Tobacco Use    Smoking status: Never    Smokeless tobacco: Never   Vaping Use    Vaping Use: Never used   Substance and Sexual Activity    Alcohol use: Never    Drug use: Never    Sexual activity: Not on file   Other Topics Concern    Not on file   Social History Narrative    Not on file     Social Determinants of Health     Financial Resource Strain: Not on file   Food Insecurity: Not on file   Transportation Needs: Not on file   Physical Activity: Not on file   Stress: Not on file   Social Connections: Not on file   Intimate Partner Violence: Not on file   Housing Stability: Not on file       ROS: Positive in bold  General:   Denies chills, fatigue, fever, malaise, night sweats or weight loss    Psychological:   Denies anxiety, disorientation or hallucinations    ENT:    Denies epistaxis, headaches, vertigo or visual changes    Cardiovascular:   Denies any chest pain, irregular heartbeats, or palpitations. No paroxysmal nocturnal dyspnea. Respiratory:   Denies shortness of breath, coughing, sputum production, hemoptysis, or wheezing. No orthopnea.     Gastrointestinal:   Denies nausea, vomiting, diarrhea, or constipation. Denies any abdominal pain. Denies change in bowel habits or stools. Genito-Urinary:    Denies any urgency, frequency, hematuria. Voiding without difficulty. Musculoskeletal:   Denies joint pain, joint stiffness, joint swelling or muscle pain    Neurology:    Denies any headache or focal neurological deficits. No weakness or paresthesia. Derm:    Denies any rashes, ulcers, or excoriations. Denies bruising. Extremities:   Denies any lower extremity swelling or edema. PHYSICAL EXAM: Abnormal findings noted  VITALS:  Vitals:    01/09/23 0729   BP: (!) 167/74   Pulse: 84   Resp: 18   Temp: 98.1 °F (36.7 °C)   SpO2: 96%         CONSTITUTIONAL:    Awake, alert, cooperative, no apparent distress, and appears stated age    EYES:    EOMI, sclera clear, conjunctiva normal    ENT:    Normocephalic, atraumatic, External ears without lesions. NECK:    Supple, symmetrical, trachea midline, no JVD    HEMATOLOGIC/LYMPHATICS:    No cervical lymphadenopathy and no supraclavicular lymphadenopathy    LUNGS:    Symmetric. No increased work of breathing, good air exchange, clear to auscultation bilaterally, no wheezes, rhonchi, or rales,     CARDIOVASCULAR:    Normal apical impulse, regular rate and rhythm, normal S1 and S2, no S3 or S4, and no murmur noted    ABDOMEN:    soft, non-distended, non-tender,    MUSCULOSKELETAL:    There is no redness, warmth, or swelling of the joints. NEUROLOGIC:    Awake, alert, oriented to name, place and time. SKIN:    No bruising or bleeding. No redness, warmth, or swelling    EXTREMITIES:    Peripheral pulses present. No edema, cyanosis, or swelling.     LINES/CATHETERS     LABORATORY DATA:  CBC with Differential:    Lab Results   Component Value Date/Time    WBC 8.7 01/09/2023 09:02 AM    RBC 2.66 01/09/2023 09:02 AM    HGB 8.1 01/09/2023 09:02 AM    HCT 24.5 01/09/2023 09:02 AM     01/09/2023 09:02 AM    MCV 92.1 01/09/2023 09:02 AM    MCH 30.5 01/09/2023 09:02 AM    MCHC 33.1 01/09/2023 09:02 AM    RDW 21.2 01/09/2023 09:02 AM    LYMPHOPCT 13.9 01/09/2023 09:02 AM    MONOPCT 2.6 01/09/2023 09:02 AM    MYELOPCT 0.9 01/06/2023 11:59 AM    BASOPCT 0.9 01/09/2023 09:02 AM    MONOSABS 0.26 01/09/2023 09:02 AM    LYMPHSABS 1.22 01/09/2023 09:02 AM    EOSABS 0.23 01/09/2023 09:02 AM    BASOSABS 0.08 01/09/2023 09:02 AM     CMP:    Lab Results   Component Value Date/Time     01/09/2023 09:02 AM    K 3.1 01/09/2023 09:02 AM    K 4.4 12/26/2022 03:10 PM     01/09/2023 09:02 AM    CO2 28 01/09/2023 09:02 AM    BUN 17 01/09/2023 09:02 AM    CREATININE 0.9 01/09/2023 09:02 AM    GFRAA >60 05/13/2020 03:50 AM    LABGLOM >60 01/09/2023 09:02 AM    GLUCOSE 163 01/09/2023 09:02 AM    GLUCOSE 253 03/28/2011 06:05 PM    PROT 4.2 01/05/2023 08:11 PM    LABALBU 2.8 01/05/2023 08:11 PM    LABALBU 4.3 03/28/2011 06:05 PM    CALCIUM 8.2 01/09/2023 09:02 AM    BILITOT 0.2 01/05/2023 08:11 PM    ALKPHOS 53 01/05/2023 08:11 PM    AST 10 01/05/2023 08:11 PM    ALT 7 01/05/2023 08:11 PM       ASSESSMENT/PLAN:  DKA  Patient has not been taking his diabetic medications. Patient placed on insulin drip. Critical care consulted patient excepted to ICU. Anemia possibly secondary to GI bleed    He did describe melanotic/bloody stool. Rectal exam was positive for occult blood. Patient placed on Protonix. Eliquis held. GI consulted. Right knee effusion versus hematoma  Continue to monitor. orthopedic consultation. Non-insulin-dependent diabetes mellitus  RED  Continue IV fluids. Hold nephrotoxic medication. Esophageal thickening  GI consulted. Possible seizure  Apparently patient had 2 episodes of seizure-like activity prior to presentation to the hospital.  No history of seizure disorder. Neurology consulted.   Elevated troponin  Likely secondary to demand ischemia    Hypertension  Hyperlipidemia  Atrial fibrillation on Eliquis  Enlarged prostate    Plan:  Patient admitted to ICU  Insulin drip with glucoscans every hour   Home medication reviewed  Patient received 1 unit of typed and crossed packed RBC on admission  Protonix 40 mg IV push twice daily  Consult GI  Consult intensivist  IV fluids normal saline    Insulin drip stopped 1/6 PM  GI bleeding scan 1/7  Increase diet  Increase activity    Transfer to monitored floor 1/8    Colonoscopy 1/9  Resume diabetic diet along with insulin and diabetic medication following colonoscopy  Increase activity-PT/OT    BMP, CBC in a.mJustin Seaman DO  12:35 PM  1/9/2023

## 2023-01-09 NOTE — H&P
Patient seen and examined. Pertinent notes/labs reviewed. H&P reviewed -no new changes except as described above.     Vitals:    01/09/23 0729   BP: (!) 167/74   Pulse: 84   Resp: 18   Temp: 98.1 °F (36.7 °C)   SpO2: 96%     Plan: Proceed with colonoscopy after tapwater enema    Yang Diaz MD

## 2023-01-09 NOTE — ANESTHESIA PRE PROCEDURE
Department of Anesthesiology  Preprocedure Note       Name:  Martha Garcia   Age:  78 y.o.  :  1945                                          MRN:  [id number]         Date:  2023      Surgeon: William Bañuelos):  Tiny Nunez MD    Procedure: COLONOSCOPY DIAGNOSTIC    Medications prior to admission:   Prior to Admission medications    Medication Sig Start Date End Date Taking? Authorizing Provider   FARXIGA 5 MG tablet take 1 tablet by mouth once daily 10/23/22   Historical Provider, MD   apixaban (ELIQUIS) 5 MG TABS tablet Take 1 tablet by mouth 2 times daily 10/31/22 1/5/23  Donald Smith MD   atorvastatin (LIPITOR) 40 MG tablet take 1 tablet by mouth daily 22   Historical Provider, MD   TRULICITY 1.5 HX/1.8LR SOPN inject 0.5 milliliters ( 1 AND 1/2 milligrams ) subcutaneously every week 22   Historical Provider, MD   Multiple Vitamins-Minerals (THERAPEUTIC MULTIVITAMIN-MINERALS) tablet Take 1 tablet by mouth daily Last taken 2020    Historical Provider, MD   Ascorbic Acid (VITAMIN C PO) Take 1 tablet by mouth daily Last taken     Historical Provider, MD   acetaminophen (TYLENOL) 500 MG tablet Take 500 mg by mouth every 6 hours as needed for Pain    Historical Provider, MD   metOLazone (ZAROXOLYN) 5 MG tablet Take 5 mg by mouth daily    Historical Provider, MD   lisinopril (PRINIVIL;ZESTRIL) 40 MG tablet Take 40 mg by mouth daily    Historical Provider, MD   metFORMIN (GLUCOPHAGE) 500 MG tablet Take 500 mg by mouth 2 times daily (with meals)    Historical Provider, MD   NIFEdipine (ADALAT CC) 30 MG extended release tablet Take 30 mg by mouth daily Take am day of surgery 2020    Historical Provider, MD       Current medications:    No current facility-administered medications for this visit. No current outpatient medications on file.      Facility-Administered Medications Ordered in Other Visits   Medication Dose Route Frequency Provider Last Rate Last Admin    dextrose 5 % and 0.9 % NaCl with KCl 20 mEq infusion   IntraVENous Continuous Maureen Castillo, DO 75 mL/hr at 01/09/23 1224 New Bag at 01/09/23 1224    magnesium sulfate 2000 mg in 50 mL IVPB premix  2,000 mg IntraVENous Once Maureen Castillo, DO 25 mL/hr at 01/09/23 1233 2,000 mg at 01/09/23 1233    insulin lispro (HUMALOG) injection vial 0-4 Units  0-4 Units SubCUTAneous TID WC Ashlee Wilkinson MD   2 Units at 01/08/23 1823    insulin lispro (HUMALOG) injection vial 0-4 Units  0-4 Units SubCUTAneous Nightly Ashlee Wilkinson MD        insulin glargine (LANTUS) injection vial 4 Units  4 Units SubCUTAneous BID Ashlee Wilkinson MD   4 Units at 01/08/23 2211    0.9 % sodium chloride infusion   IntraVENous PRN Krista Christensen PA-C        polyethylene glycol (GLYCOLAX) packet 17 g  17 g Oral Daily Carolee Reason Masters, APRN - CNP   17 g at 01/08/23 8544    senna (SENOKOT) tablet 17.2 mg  2 tablet Oral Nightly PRN Carolee Reason Masters, APRN - CNP        pantoprazole (PROTONIX) 40 mg in sodium chloride (PF) 0.9 % 10 mL injection  40 mg IntraVENous Q12H Tequila Morrell APRN - CNP   40 mg at 01/09/23 0817    glucose chewable tablet 16 g  4 tablet Oral PRN Keily Perezang, DO        dextrose bolus 10% 125 mL  125 mL IntraVENous PRN Keily Perezang, DO   Stopped at 01/06/23 2314    Or    dextrose bolus 10% 250 mL  250 mL IntraVENous PRN Kyrieline Kiang, DO        glucagon (rDNA) injection 1 mg  1 mg SubCUTAneous PRN Gerline Kiang, DO        dextrose 10 % infusion   IntraVENous Continuous PRN Gerline Kiang, DO        sodium chloride flush 0.9 % injection 5-40 mL  5-40 mL IntraVENous 2 times per day Gerline Kiang, DO   10 mL at 01/09/23 1333    sodium chloride flush 0.9 % injection 5-40 mL  5-40 mL IntraVENous PRN Gerline Kiang, DO        0.9 % sodium chloride infusion   IntraVENous PRN Gerline Kiang, DO        acetaminophen (TYLENOL) tablet 650 mg  650 mg Oral Q6H PRN Gerline Kiang, DO        Or    acetaminophen (TYLENOL) suppository 650 mg  650 mg Rectal Q6H PRN Sulema Root DO        dextrose 5 % and 0.45 % sodium chloride infusion   IntraVENous Continuous PRN Edyta Flores  mL/hr at 01/07/23 0943 Rate Change at 01/07/23 0943       Allergies:     Allergies   Allergen Reactions    Penicillins Other (See Comments)     Occurred as child, rxn unknown    Seasonal     Sulfa Antibiotics Other (See Comments)     Occurred as child, rxn unknown       Problem List:    Patient Active Problem List   Diagnosis Code    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    Diabetes mellitus ([facility] E11.9    Hyperlipidemia E78.5    Hypertension I10    Moderate obesity E66.8    Paroxysmal atrial fibrillation (HCC) I48.0    DKA, type 1, not at goal ([facility] E10.10    Seizure ([facility] R56.9    Moderate protein-calorie malnutrition ([facility] E44.0       Past Medical History:        Diagnosis Date    Arthritis     Bilateral shoulder pain 05/2020    for TJAS Right 05/12/2020 Dr Lucero Side    Diabetes mellitus [facility])     Enlarged prostate     follows with Dr Jesus Orta History of Holter monitoring 07/29/2022    History of irregular heartbeat     follows with PCP    Hyperlipidemia     Hypertension     Leg wound, right 05/2020    recent history of; see podiatry notes in epic       Past Surgical History:        Procedure Laterality Date    CATARACT REMOVAL WITH IMPLANT Bilateral     ENDOSCOPY, COLON, DIAGNOSTIC      EYE SURGERY      HERNIA REPAIR      SHOULDER SURGERY Right 5/12/2020    RIGHT SHOULDER TOTAL ARTHROPLASTY REVERSE performed by Letitia Garibay MD at [address] ENDOSCOPY N/A 1/6/2023    EGD ESOPHAGOGASTRODUODENOSCOPY DILATATION performed by Jackie Wolf MD at [address] History:    Social History     Tobacco Use    Smoking status: Never    Smokeless tobacco: Never   Substance Use Topics    Alcohol use: Never Counseling given: Not Answered      Vital Signs (Current): There were no vitals filed for this visit. BP Readings from Last 3 Encounters:   01/09/23 (!) 167/74   12/26/22 (!) 178/84   11/14/22 122/70       NPO Status:                                                                                 BMI:   Wt Readings from Last 3 Encounters:   01/06/23 180 lb 9.6 oz (81.9 kg)   11/14/22 188 lb (85.3 kg)   09/29/22 187 lb (84.8 kg)     There is no height or weight on file to calculate BMI.    CBC:   Lab Results   Component Value Date/Time    WBC 8.7 01/09/2023 09:02 AM    RBC 2.66 01/09/2023 09:02 AM    HGB 8.1 01/09/2023 09:02 AM    HCT 24.5 01/09/2023 09:02 AM    MCV 92.1 01/09/2023 09:02 AM    RDW 21.2 01/09/2023 09:02 AM     01/09/2023 09:02 AM       CMP:   Lab Results   Component Value Date/Time     01/09/2023 09:02 AM    K 3.1 01/09/2023 09:02 AM    K 4.4 12/26/2022 03:10 PM     01/09/2023 09:02 AM    CO2 28 01/09/2023 09:02 AM    BUN 17 01/09/2023 09:02 AM    CREATININE 0.9 01/09/2023 09:02 AM    GFRAA >60 05/13/2020 03:50 AM    LABGLOM >60 01/09/2023 09:02 AM    GLUCOSE 163 01/09/2023 09:02 AM    GLUCOSE 253 03/28/2011 06:05 PM    PROT 4.2 01/05/2023 08:11 PM    CALCIUM 8.2 01/09/2023 09:02 AM    BILITOT 0.2 01/05/2023 08:11 PM    ALKPHOS 53 01/05/2023 08:11 PM    AST 10 01/05/2023 08:11 PM    ALT 7 01/05/2023 08:11 PM       POC Tests: No results for input(s): POCGLU, POCNA, POCK, POCCL, POCBUN, POCHEMO, POCHCT in the last 72 hours.     Coags:   Lab Results   Component Value Date/Time    PROTIME 23.7 01/05/2023 10:24 PM    INR 2.1 01/05/2023 10:24 PM    APTT 27.5 01/05/2023 10:24 PM       HCG (If Applicable): No results found for: PREGTESTUR, PREGSERUM, HCG, HCGQUANT     ABGs: No results found for: PHART, PO2ART, IFT7VTM, KZH9IKT, BEART, U8XMBMCK     Type & Screen (If Applicable):  No results found for: REX, [facility]    Anesthesia Evaluation  Patient summary reviewed no history of anesthetic complications:   Airway: Mallampati: II  TM distance: >3 FB   Neck ROM: full  Mouth opening: > = 3 FB   Dental:    (+) upper dentures and lower dentures      Pulmonary:Negative Pulmonary ROS breath sounds clear to auscultation                             Cardiovascular:  Exercise tolerance: good (>4 METS),   (+) hypertension:, dysrhythmias: atrial fibrillation, hyperlipidemia      ECG reviewed  Rhythm: regular  Rate: normal           Beta Blocker:  Not on Beta Blocker      ROS comment: EKG 1/2017:  Sinus rhythm with frequent premature ventricular complexes  Inferior infarct , age undetermined  Abnormal ECG  No previous ECGs available  Confirmed by Ralf Root ([id number]) on 1/6/2017 2:59:23 PM    EKG 5/7/2020: NSR with PACs and PVCs     Neuro/Psych:   Negative Neuro/Psych ROS              GI/Hepatic/Renal:   (+) renal disease: ESRD and dialysis,          ROS comment: Enlarged prostate  Diastases of abdominal wall with Small ventral hernia. Endo/Other:    (+) DiabetesType II DM, poorly controlled, , blood dyscrasia: anemia and anticoagulation therapy, arthritis:., .                  ROS comment: DKA this admission Abdominal:   (+) obese,           Vascular:   + PVD, aortic or cerebral, . ROS comment:   . Other Findings:                 Anesthesia Plan      MAC     ASA 4       Induction: intravenous. Anesthetic plan and risks discussed with patient. DOS STAFF ADDENDUM:    Pt seen and examined, chart reviewed (including anesthesia, drug and allergy history). Anesthetic plan, risks, benefits, alternatives, and personnel involved discussed with patient. Patient verbalized an understanding and agrees to proceed. Plan discussed with care team members and agreed upon.     Jazmine Herbert MD  Staff Anesthesiologist  2:21 PM    Jazmine Herbert MD   1/9/2023        JUAN R Perez - CRNA

## 2023-01-09 NOTE — PROGRESS NOTES
Physician Progress Note      PATIENT:               Candis Palma  CSN #:                  [id number]  :                       1945  ADMIT DATE:       2023 2:02 PM  [address] [place] DATE:  Ava Eugenia  PROVIDER #:        Willetta Urszula           QUERY TEXT:    Patient admitted with DKA. Per Gastroenterology consult noted on  \"   Elevated troponin-Likely demand ischemia/type II non-STEMI\". If possible,   please document in the progress notes and discharge summary if you are   evaluating and/or treating any of the following: The medical record reflects the following:  Risk Factors: Paroxysmal afib, DKA, Anemia  Clinical Indicators: H&P state elevated troponin likely secondary to demand   ischemia, Gastroenterology Elevated troponin  -Likely demand ischemia/type II non-STEMI, High Sensitivity Troponin T 52-40  Treatment: Monitoring troponin lab value, EKG  Options provided:  -- Type 2 MI  -- Demand Ischemia only, no MI  -- Other - I will add my own diagnosis  -- Disagree - Not applicable / Not valid  -- Disagree - Clinically unable to determine / Unknown  -- Refer to Clinical Documentation Reviewer    PROVIDER RESPONSE TEXT:    This patient has demand ischemia only, no MI. Query created by: Cal Webber on 2023 8:00 AM      QUERY TEXT:    Pt admitted with DKA type 2. Per Dietitian PN noted on  \" Moderate   malnutrition\". Please further specify type of malnutrition with documentation   in the medical record.     The medical record reflects the following:  Risk Factors: DM, Loss of appetite  Clinical Indicators:  Dietitian state Moderate malnutrition,    Critical care consult note state Protein calorie malnutrition, BMI 27, Body   Fat Loss:  Mild body fat loss Triceps, Orbital, Muscle Mass Loss:  Mild muscle   mass loss Temples (temporalis), Clavicles (pectoralis & deltoids), Hand   (interosseous),  Treatment: Nutritional supplement, Dietitian evaluation and monitoring    ASPEN Criteria:    https://aspenjournals. onlinelibrary. rutherford. com/doi/full/10.1177/822396889797541  5  Options provided:  -- Moderate Malnutrition  -- Other - I will add my own diagnosis  -- Disagree - Not applicable / Not valid  -- Disagree - Clinically unable to determine / Unknown  -- Refer to Clinical Documentation Reviewer    PROVIDER RESPONSE TEXT:    This patient has moderate malnutrition.     Query created by: Davin Huntley on 1/9/2023 8:05 AM      Electronically signed by:  Manning Harada DO 1/9/2023 12:36 PM

## 2023-01-09 NOTE — PROCEDURES
[address]                          ELECTROENCEPHALOGRAM REPORT    PATIENT NAME: Jeff Mosley                      :        1945  MED REC NO:   [id number]                            ROOM:       3294  ACCOUNT NO:   [de-identified]                           ADMIT DATE: 2023  PROVIDER:     Dianne Hall MD    DATE OF EE2023    PLACE OF SERVICE:  [facility]. EEG DIAGNOSIS:  Borderline abnormal report. A 19-channel EEG was recorded and demonstrates a background rhythm of 6  Hz independent of eye closure. Photic stimulation failed to show  driving. During the sleep portion of the record, there is no  activation. INTERPRETATION:  Borderline abnormal EEG secondary to theta range  slowing. This pattern of EEG abnormality is consistent with metabolic  encephalopathy. No epileptiform activity noted which does not rule out  underlying seizure disorder. Clinical correlation advised.         Omero Mooney MD    D: 2023 9:43:07       T: 2023 9:45:26     [email or web address]  Job#: [id number]     Doc#: [id number]

## 2023-01-10 LAB
ANION GAP SERPL CALCULATED.3IONS-SCNC: 6 MMOL/L (ref 7–16)
ANISOCYTOSIS: ABNORMAL
BASOPHILS ABSOLUTE: 0 E9/L (ref 0–0.2)
BASOPHILS RELATIVE PERCENT: 0.4 % (ref 0–2)
BLOOD CULTURE, ROUTINE: NORMAL
BLOOD CULTURE, ROUTINE: NORMAL
BUN BLDV-MCNC: 11 MG/DL (ref 6–23)
CALCIUM SERPL-MCNC: 8 MG/DL (ref 8.6–10.2)
CHLORIDE BLD-SCNC: 102 MMOL/L (ref 98–107)
CO2: 29 MMOL/L (ref 22–29)
CREAT SERPL-MCNC: 1 MG/DL (ref 0.7–1.2)
EOSINOPHILS ABSOLUTE: 0.34 E9/L (ref 0.05–0.5)
EOSINOPHILS RELATIVE PERCENT: 4.3 % (ref 0–6)
GFR SERPL CREATININE-BSD FRML MDRD: >60 ML/MIN/1.73
GLUCOSE BLD-MCNC: 161 MG/DL (ref 74–99)
HCT VFR BLD CALC: 24.9 % (ref 37–54)
HCT VFR BLD CALC: 26.3 % (ref 37–54)
HEMOGLOBIN: 7.8 G/DL (ref 12.5–16.5)
HEMOGLOBIN: 8.5 G/DL (ref 12.5–16.5)
HYPOCHROMIA: ABNORMAL
LYMPHOCYTES ABSOLUTE: 1.33 E9/L (ref 1.5–4)
LYMPHOCYTES RELATIVE PERCENT: 16.5 % (ref 20–42)
MAGNESIUM: 1.7 MG/DL (ref 1.6–2.6)
MCH RBC QN AUTO: 29.3 PG (ref 26–35)
MCHC RBC AUTO-ENTMCNC: 31.3 % (ref 32–34.5)
MCV RBC AUTO: 93.6 FL (ref 80–99.9)
METER GLUCOSE: 184 MG/DL (ref 74–99)
METER GLUCOSE: 222 MG/DL (ref 74–99)
METER GLUCOSE: 228 MG/DL (ref 74–99)
METER GLUCOSE: 265 MG/DL (ref 74–99)
METER GLUCOSE: 346 MG/DL (ref 74–99)
MONOCYTES ABSOLUTE: 0.47 E9/L (ref 0.1–0.95)
MONOCYTES RELATIVE PERCENT: 6.1 % (ref 2–12)
NEUTROPHILS ABSOLUTE: 5.69 E9/L (ref 1.8–7.3)
NEUTROPHILS RELATIVE PERCENT: 73 % (ref 43–80)
PDW BLD-RTO: 20.9 FL (ref 11.5–15)
PHOSPHORUS: 2.5 MG/DL (ref 2.5–4.5)
PLATELET # BLD: 275 E9/L (ref 130–450)
PMV BLD AUTO: 9.6 FL (ref 7–12)
POLYCHROMASIA: ABNORMAL
POTASSIUM SERPL-SCNC: 3.4 MMOL/L (ref 3.5–5)
RBC # BLD: 2.66 E12/L (ref 3.8–5.8)
SODIUM BLD-SCNC: 137 MMOL/L (ref 132–146)
WBC # BLD: 7.8 E9/L (ref 4.5–11.5)

## 2023-01-10 PROCEDURE — 6370000000 HC RX 637 (ALT 250 FOR IP): Performed by: INTERNAL MEDICINE

## 2023-01-10 PROCEDURE — 6360000002 HC RX W HCPCS: Performed by: NURSE PRACTITIONER

## 2023-01-10 PROCEDURE — 97110 THERAPEUTIC EXERCISES: CPT

## 2023-01-10 PROCEDURE — A4216 STERILE WATER/SALINE, 10 ML: HCPCS | Performed by: NURSE PRACTITIONER

## 2023-01-10 PROCEDURE — 80048 BASIC METABOLIC PNL TOTAL CA: CPT

## 2023-01-10 PROCEDURE — 36415 COLL VENOUS BLD VENIPUNCTURE: CPT

## 2023-01-10 PROCEDURE — 84100 ASSAY OF PHOSPHORUS: CPT

## 2023-01-10 PROCEDURE — C9113 INJ PANTOPRAZOLE SODIUM, VIA: HCPCS | Performed by: NURSE PRACTITIONER

## 2023-01-10 PROCEDURE — 1200000000 HC SEMI PRIVATE

## 2023-01-10 PROCEDURE — 85014 HEMATOCRIT: CPT

## 2023-01-10 PROCEDURE — 2580000003 HC RX 258: Performed by: NURSE PRACTITIONER

## 2023-01-10 PROCEDURE — 83735 ASSAY OF MAGNESIUM: CPT

## 2023-01-10 PROCEDURE — 97530 THERAPEUTIC ACTIVITIES: CPT

## 2023-01-10 PROCEDURE — 85025 COMPLETE CBC W/AUTO DIFF WBC: CPT

## 2023-01-10 PROCEDURE — 82962 GLUCOSE BLOOD TEST: CPT

## 2023-01-10 PROCEDURE — 85018 HEMOGLOBIN: CPT

## 2023-01-10 RX ORDER — NIFEDIPINE 30 MG/1
30 TABLET, FILM COATED, EXTENDED RELEASE ORAL DAILY
Status: DISCONTINUED | OUTPATIENT
Start: 2023-01-10 | End: 2023-01-10 | Stop reason: CLARIF

## 2023-01-10 RX ORDER — ATORVASTATIN CALCIUM 20 MG/1
20 TABLET, FILM COATED ORAL NIGHTLY
Status: DISCONTINUED | OUTPATIENT
Start: 2023-01-10 | End: 2023-01-11 | Stop reason: HOSPADM

## 2023-01-10 RX ORDER — NIFEDIPINE 30 MG/1
30 TABLET, EXTENDED RELEASE ORAL DAILY
Status: DISCONTINUED | OUTPATIENT
Start: 2023-01-10 | End: 2023-01-11 | Stop reason: HOSPADM

## 2023-01-10 RX ORDER — LISINOPRIL 20 MG/1
20 TABLET ORAL DAILY
Status: DISCONTINUED | OUTPATIENT
Start: 2023-01-10 | End: 2023-01-11 | Stop reason: HOSPADM

## 2023-01-10 RX ORDER — LISINOPRIL 20 MG/1
40 TABLET ORAL DAILY
Status: DISCONTINUED | OUTPATIENT
Start: 2023-01-10 | End: 2023-01-10

## 2023-01-10 RX ADMIN — INSULIN GLARGINE 4 UNITS: 100 INJECTION, SOLUTION SUBCUTANEOUS at 08:35

## 2023-01-10 RX ADMIN — ATORVASTATIN CALCIUM 20 MG: 20 TABLET, FILM COATED ORAL at 21:33

## 2023-01-10 RX ADMIN — SODIUM CHLORIDE, PRESERVATIVE FREE 40 MG: 5 INJECTION INTRAVENOUS at 21:33

## 2023-01-10 RX ADMIN — INSULIN LISPRO 1 UNITS: 100 INJECTION, SOLUTION INTRAVENOUS; SUBCUTANEOUS at 17:17

## 2023-01-10 RX ADMIN — ACETAMINOPHEN 650 MG: 325 TABLET ORAL at 08:34

## 2023-01-10 RX ADMIN — LISINOPRIL 20 MG: 20 TABLET ORAL at 12:17

## 2023-01-10 RX ADMIN — NIFEDIPINE 30 MG: 30 TABLET, FILM COATED, EXTENDED RELEASE ORAL at 12:17

## 2023-01-10 RX ADMIN — INSULIN LISPRO 3 UNITS: 100 INJECTION, SOLUTION INTRAVENOUS; SUBCUTANEOUS at 12:19

## 2023-01-10 RX ADMIN — INSULIN GLARGINE 4 UNITS: 100 INJECTION, SOLUTION SUBCUTANEOUS at 21:33

## 2023-01-10 RX ADMIN — SODIUM CHLORIDE, PRESERVATIVE FREE 40 MG: 5 INJECTION INTRAVENOUS at 08:34

## 2023-01-10 RX ADMIN — EMPAGLIFLOZIN 10 MG: 10 TABLET, FILM COATED ORAL at 12:17

## 2023-01-10 ASSESSMENT — PAIN DESCRIPTION - ORIENTATION: ORIENTATION: LEFT

## 2023-01-10 ASSESSMENT — PAIN SCALES - GENERAL
PAINLEVEL_OUTOF10: 5
PAINLEVEL_OUTOF10: 0

## 2023-01-10 ASSESSMENT — PAIN DESCRIPTION - LOCATION: LOCATION: SHOULDER

## 2023-01-10 NOTE — PROGRESS NOTES
Department of Internal Medicine  PN    PCP: Rosetta Rendon DO  Admitting Physician: Dr. Zach Jules  Consultants:   Date of Service: 1/5/2023    CHIEF COMPLAINT: Altered mental status    HISTORY OF PRESENT ILLNESS:    Patient is a 79-year-old male who presents to the ED with altered mental status. Patient was found on the floor at home unable to get up. Patient lives alone at home with 2 dogs. Otherwise patient states that he fell last night. Patient states that he fell while entering his home. He was able to get around but it was not able to ambulate. He slipped on the floor as he was not able to get onto the bed. Otherwise patient states he has noted bloody/melanotic stools the other day. States he has a previous upper endoscopy however is unsure of the results. Patient patient states that he has lost 40 pounds involuntarily over the last month or 2. Apparently patient suffered seizure as well on his way to the hospital.  He has no history of seizure. Overall patient has been weak over the last few weeks. He has also been having issues with constipation. 1/6/2023  Patient seen examined in ICU. Patient is very hard of hearing and does not have his hearing aids right now. Patient states he feels a lot better. He denies any chest or abdominal pain. He denies any nausea/vomiting or unusual shortness of breath. BUN/creatinine was 149/1.8 with CO2 electrolytes and 19. Lactic acid improved but still mildly elevated 2.5. Blood sugars improved. WBC still elevated 16.8 with hemoglobin is 6.6. Temperature 98.7 with heart rate of about 100 and blood pressure 151/80. O2 sat 100% on room air at rest with patient on insulin drip. Urinary output ranges 300-675 cc a shift. Patient is doing much better now than on admission. 1/7/2023  Patient seen examined in the ICU. Patient is extremely hard of hearing and seems to be worse today and not able to communicate very well because of this.   Case discussed with patient nurse at the bedside. 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 Patient had esophageal dilation performed. Orthopedic note from today shows right medial knee hematoma. BUN/creatinine is improved to 82/1.3 with CO2 electrolytes of 19. Blood sugars range from . Patient had a hemoglobin A1c of 5.9. WBC 17.1 with hemoglobin 7.7. Temperature is 90.1 with heart rate 88. Blood pressure 141/64. Patient is still into atrial fibrillation with the patient having O2 sat 98% on room air at rest.  Urine output is good ranging 450-1800 cc a shift. Case discussed with intensivist.  Patient had a bleeding scan today. Patient's insulin drip was stopped since last night. 1/8/2023  Patient seen examined on ICU. Patient doing better and is very hungry. Patient denies any chest or abdominal pain. BUN/creatinine 38/1.1 with normal electrolytes. Blood sugars ranging . Patient had elevated beta hydroxybutyrate yesterday 2.49. WBC 11.0 today with hemoglobin 7.3. Temperature is 98.3 with heart rate of 70 blood pressure 164/69. O2 sat 100% room air at rest.  Urine output ranges 700-2400 cc a shift. Patient did have 1 bowel movement yesterday. Nuclear medicine bleeding scan showed no evidence of active GI bleeding. Patient is to be transferred to telemetry floor. Increase activity and diet. Start subcu insulin. 1/9/2023  Patient seen examined on telemetry floor. Patient denies any chest or abdominal pain. The patient is complaining of his left shoulder which is a chronic complaint but seems to be little bit worse since he has been in the hospital.  Potassium 3.1 today with magnesium through 1.5. Blood sugars range 163-248. WBC 8.7 hemoglobin 8.1. Temperature 98.1 with heart rate 84 and blood pressure 167/74. O2 sat 96% room air at rest.  Blood sugars range 200-500 cc a shift.   Patient is set up for colonoscopy this afternoon. 1/10/2023  Patient seen examined on telemetry floor. Patient had colonoscopy yesterday which showed sigmoid diverticulosis with rectal polyp. Patient states that he feels little bit better and denies any chest or abdominal pain. BUN/creatinine 11/1.0 with serum potassium 3.4. Blood sugars ranging 161 349. WBC 7.8 with hemoglobin 7.8. Temperature 90.5 heart rate of 77 blood pressure 164/75. O2 sat 95% on room air at rest.  Urine output ranges 450-600 cc a shift. Case discussed with  and patient be transferred to temporary extended-care facility for rehab. Patient's oral intake is improving and patient's home medications will be reinstituted according to blood pressure and lab.     PAST MEDICAL Hx:  Past Medical History:   Diagnosis Date    Arthritis     Bilateral shoulder pain 05/2020    for TJAS Right 05/12/2020 Dr Bernabe Emery    Diabetes mellitus [facility])     Enlarged prostate     follows with Dr Sergio Sharma    History of Holter monitoring 07/29/2022    History of irregular heartbeat     follows with PCP    Hyperlipidemia     Hypertension     Leg wound, right 05/2020    recent history of; see podiatry notes in epic       PAST SURGICAL Hx:   Past Surgical History:   Procedure Laterality Date    CATARACT REMOVAL WITH IMPLANT Bilateral     COLONOSCOPY N/A 1/9/2023    COLONOSCOPY POLYPECTOMY HOT SNARE performed by Timmy Avalos MD at [address] [facility], [place], [address] [facility] Right 5/12/2020    RIGHT SHOULDER TOTAL ARTHROPLASTY REVERSE performed by Xiang Daniels MD at [address] N/A 1/6/2023    EGD ESOPHAGOGASTRODUODENOSCOPY DILATATION performed by Timmy Avalos MD at [address] Hx:  Family History   Problem Relation Age of Onset    No Known Problems Mother     No Known Problems Father        HOME MEDICATIONS:  Prior to Admission medications    Medication Sig Start Date End Date Taking?  Authorizing Provider   FARXIGA 5 MG tablet take 1 tablet by mouth once daily 10/23/22   Historical Provider, MD   apixaban (ELIQUIS) 5 MG TABS tablet Take 1 tablet by mouth 2 times daily 10/31/22 1/5/23  Wilian Lazcano MD   atorvastatin (LIPITOR) 40 MG tablet take 1 tablet by mouth daily 7/29/22   Historical Provider, MD   TRULICITY 1.5 YQ/0.9EQ SOPN inject 0.5 milliliters ( 1 AND 1/2 milligrams ) subcutaneously every week 9/16/22   Historical Provider, MD   Multiple Vitamins-Minerals (THERAPEUTIC MULTIVITAMIN-MINERALS) tablet Take 1 tablet by mouth daily Last taken 05/03/2020    Historical Provider, MD   Ascorbic Acid (VITAMIN C PO) Take 1 tablet by mouth daily Last taken 05/03    Historical Provider, MD   acetaminophen (TYLENOL) 500 MG tablet Take 500 mg by mouth every 6 hours as needed for Pain    Historical Provider, MD   metOLazone (ZAROXOLYN) 5 MG tablet Take 5 mg by mouth daily    Historical Provider, MD   lisinopril (PRINIVIL;ZESTRIL) 40 MG tablet Take 40 mg by mouth daily    Historical Provider, MD   metFORMIN (GLUCOPHAGE) 500 MG tablet Take 500 mg by mouth 2 times daily (with meals)    Historical Provider, MD   NIFEdipine (ADALAT CC) 30 MG extended release tablet Take 30 mg by mouth daily Take am day of surgery 05/12/2020    Historical Provider, MD       ALLERGIES:  Penicillins, Seasonal, and Sulfa antibiotics    SOCIAL Hx:  Social History     Socioeconomic History    Marital status: Single     Spouse name: Not on file    Number of children: Not on file    Years of education: Not on file    Highest education level: Not on file   Occupational History    Not on file   Tobacco Use    Smoking status: Never    Smokeless tobacco: Never   Vaping Use    Vaping Use: Never used   Substance and Sexual Activity    Alcohol use: Never    Drug use: Never    Sexual activity: Not on file   Other Topics Concern    Not on file   Social History Narrative    Not on file     Social Determinants of Health     Financial Resource Strain: Not on file   Food Insecurity: Not on file   Transportation Needs: Not on file   Physical Activity: Not on file   Stress: Not on file   Social Connections: Not on file   Intimate Partner Violence: Not on file   Housing Stability: Not on file       ROS: Positive in bold  General:   Denies chills, fatigue, fever, malaise, night sweats or weight loss    Psychological:   Denies anxiety, disorientation or hallucinations    ENT:    Denies epistaxis, headaches, vertigo or visual changes    Cardiovascular:   Denies any chest pain, irregular heartbeats, or palpitations. No paroxysmal nocturnal dyspnea. Respiratory:   Denies shortness of breath, coughing, sputum production, hemoptysis, or wheezing. No orthopnea. Gastrointestinal:   Denies nausea, vomiting, diarrhea, or constipation. Denies any abdominal pain. Denies change in bowel habits or stools. Genito-Urinary:    Denies any urgency, frequency, hematuria. Voiding without difficulty. Musculoskeletal:   Denies joint pain, joint stiffness, joint swelling or muscle pain    Neurology:    Denies any headache or focal neurological deficits. No weakness or paresthesia. Derm:    Denies any rashes, ulcers, or excoriations. Denies bruising. Extremities:   Denies any lower extremity swelling or edema. PHYSICAL EXAM: Abnormal findings noted  VITALS:  Vitals:    01/10/23 0717   BP: (!) 164/75   Pulse: 77   Resp: 18   Temp: 98.5 °F (36.9 °C)   SpO2: 93%         CONSTITUTIONAL:    Awake, alert, cooperative, no apparent distress, and appears stated age    EYES:    EOMI, sclera clear, conjunctiva normal    ENT:    Normocephalic, atraumatic, External ears without lesions. NECK:    Supple, symmetrical, trachea midline, no JVD    HEMATOLOGIC/LYMPHATICS:    No cervical lymphadenopathy and no supraclavicular lymphadenopathy    LUNGS:    Symmetric.  No increased work of breathing, good air exchange, clear to auscultation bilaterally, no wheezes, rhonchi, or rales,     CARDIOVASCULAR:    Normal apical impulse, regular rate and rhythm, normal S1 and S2, no S3 or S4, and no murmur noted    ABDOMEN:    soft, non-distended, non-tender,    MUSCULOSKELETAL:    There is no redness, warmth, or swelling of the joints. NEUROLOGIC:    Awake, alert, oriented to name, place and time. SKIN:    No bruising or bleeding. No redness, warmth, or swelling    EXTREMITIES:    Peripheral pulses present. No edema, cyanosis, or swelling.     LINES/CATHETERS     LABORATORY DATA:  CBC with Differential:    Lab Results   Component Value Date/Time    WBC 7.8 01/10/2023 07:45 AM    RBC 2.66 01/10/2023 07:45 AM    HGB 7.8 01/10/2023 07:45 AM    HCT 24.9 01/10/2023 07:45 AM     01/10/2023 07:45 AM    MCV 93.6 01/10/2023 07:45 AM    MCH 29.3 01/10/2023 07:45 AM    MCHC 31.3 01/10/2023 07:45 AM    RDW 20.9 01/10/2023 07:45 AM    LYMPHOPCT 16.5 01/10/2023 07:45 AM    MONOPCT 6.1 01/10/2023 07:45 AM    MYELOPCT 0.9 01/06/2023 11:59 AM    BASOPCT 0.4 01/10/2023 07:45 AM    MONOSABS 0.47 01/10/2023 07:45 AM    LYMPHSABS 1.33 01/10/2023 07:45 AM    EOSABS 0.34 01/10/2023 07:45 AM    BASOSABS 0.00 01/10/2023 07:45 AM     CMP:    Lab Results   Component Value Date/Time     01/10/2023 07:45 AM    K 3.4 01/10/2023 07:45 AM    K 4.4 12/26/2022 03:10 PM     01/10/2023 07:45 AM    CO2 29 01/10/2023 07:45 AM    BUN 11 01/10/2023 07:45 AM    CREATININE 1.0 01/10/2023 07:45 AM    GFRAA >60 05/13/2020 03:50 AM    LABGLOM >60 01/10/2023 07:45 AM    GLUCOSE 161 01/10/2023 07:45 AM    GLUCOSE 253 03/28/2011 06:05 PM    PROT 4.2 01/05/2023 08:11 PM    LABALBU 2.8 01/05/2023 08:11 PM    LABALBU 4.3 03/28/2011 06:05 PM    CALCIUM 8.0 01/10/2023 07:45 AM    BILITOT 0.2 01/05/2023 08:11 PM    ALKPHOS 53 01/05/2023 08:11 PM    AST 10 01/05/2023 08:11 PM    ALT 7 01/05/2023 08:11 PM       ASSESSMENT/PLAN:  DKA  Patient has not been taking his diabetic medications. Patient placed on insulin drip. Critical care consulted patient excepted to ICU. Anemia possibly secondary to GI bleed    He did describe melanotic/bloody stool. Rectal exam was positive for occult blood. Patient placed on Protonix. Eliquis held. GI consulted. Right knee effusion versus hematoma  Continue to monitor. orthopedic consultation. Non-insulin-dependent diabetes mellitus  RED  Continue IV fluids. Hold nephrotoxic medication. Esophageal thickening  GI consulted. Possible seizure  Apparently patient had 2 episodes of seizure-like activity prior to presentation to the hospital.  No history of seizure disorder. Neurology consulted. Elevated troponin  Likely secondary to demand ischemia    Hypertension  Hyperlipidemia  Atrial fibrillation on Eliquis  Enlarged prostate    Plan:  Patient admitted to ICU  Insulin drip with glucoscans every hour   Home medication reviewed  Patient received 1 unit of typed and crossed packed RBC on admission  Protonix 40 mg IV push twice daily  Consult GI  Consult intensivist  IV fluids normal saline    Insulin drip stopped 1/6 PM  GI bleeding scan 1/7  Increase diet  Increase activity    Transfer to monitored floor 1/8    Colonoscopy 1/9  Resume diabetic diet along with insulin and diabetic medication following colonoscopy  Increase activity-PT/OT    Start lisinopril 20 mg p.o. daily  Adalat 30 mg daily  Jardiance daily  Lantus 8 units SQ twice daily  KCl 20 mill colons p.o. x1    BMP, CBC in a.m.     Garrett Chin DO  10:11 AM  1/10/2023

## 2023-01-10 NOTE — PROGRESS NOTES
Comprehensive Nutrition Assessment    Type and Reason for Visit:  Reassess    Nutrition Recommendations/Plan:   Start ONS to optimize nutrition with malnutrition. Will continue to monitor. Malnutrition Assessment:  Malnutrition Status: Moderate malnutrition (01/06/23 1709)    Context:  Chronic Illness     Findings of the 6 clinical characteristics of malnutrition:  Energy Intake:  Mild decrease in energy intake (Comment)  Weight Loss:  Unable to assess ([place] d/t lack of measured wt hx per EMR, no significant wt changes noted. Per pt 40lb loss in 2 months but unable to verify.)     Body Fat Loss:  Mild body fat loss Triceps, Orbital   Muscle Mass Loss:  Mild muscle mass loss Temples (temporalis), Clavicles (pectoralis & deltoids), Hand (interosseous)  Fluid Accumulation:  Unable to assess     Strength:  Not Performed    Nutrition Assessment:    Pt now on PO diet after adm in DKA. Note anemia/GIB s/p EGD (1/6)- Hiatus hernia/esophageal stricture, now s/p Cscope - polyp removal (1/9) findings of diverticulosis coli/rectal polyp. Note RED. Hx DM, Afib. Start ONS to optimize nutrition & monitor. Nutrition Related Findings:    A&Ox4, abd WNL, +1/+2 edema, +5L I/Os   Wound Type: None       Current Nutrition Intake & Therapies:    Average Meal Intake: Unable to assess  Average Supplements Intake: None Ordered  ADULT DIET; Regular; 5 carb choices (75 gm/meal)    Anthropometric Measures:  Height: 5' 8\" (172.7 cm)  Ideal Body Weight (IBW): 154 lbs (70 kg)    Admission Body Weight: 180 lb 10 oz (81.9 kg) (1/5 bed scale)  Current Body Weight: 176 lb 12.8 oz (80.2 kg) (1/10 bed), 117.3 % IBW. Weight Source: Bed Scale  Current BMI (kg/m2): 26.9  Usual Body Weight:  ([place] d/t lack of measured wt per EMR, Pt reports UBW ~220lb)  Weight Adjustment For: No Adjustment  BMI Categories: Overweight (BMI 25.0-29. 9)    Estimated Daily Nutrient Needs:  Energy Requirements Based On: Formula  Weight Used for Energy Requirements: Current  Energy (kcal/day): 1800-2000kcal/day ( MSJ 1529 x 1.2-1.3)  Weight Used for Protein Requirements: Ideal  Protein (g/day): 95-105g/day ( 1.3-1.5g/kg IBW)  Method Used for Fluid Requirements: 1 ml/kcal  Fluid (ml/day): 1800-2000ml/day or 1ml/kcal    Nutrition Diagnosis:   Moderate malnutrition, In context of chronic illness related to inadequate protein-energy intake as evidenced by Criteria as identified in malnutrition assessment, mild muscle loss, mild loss of subcutaneous fat, poor intake prior to admission    Nutrition Interventions:   Food and/or Nutrient Delivery: Continue Current Diet, Start Oral Nutrition Supplement  Nutrition Education/Counseling: No recommendation at this time  Coordination of Nutrition Care: Continue to monitor while inpatient    Goals:  Previous Goal Met: Goal(s) Achieved (new goal)  Goals: PO intake 50% or greater  Specify Other Goals: Nutrition progression    Nutrition Monitoring and Evaluation:   Behavioral-Environmental Outcomes: None Identified  Food/Nutrient Intake Outcomes: Food and Nutrient Intake, Supplement Intake  Physical Signs/Symptoms Outcomes: Biochemical Data, Nutrition Focused Physical Findings, Skin, Weight, Chewing or Swallowing, GI Status, Fluid Status or Edema    Discharge Planning:     Too soon to determine     Dylon Canales 1122, MS, RD, LD  Contact: [phone]

## 2023-01-10 NOTE — PROCEDURES
[address]                               COLONOSCOPY REPORT    PATIENT NAME: Rashard Machado                      :        1945  MED REC NO:   [id number]                            ROOM:       8707  ACCOUNT NO:   [de-identified]                           ADMIT DATE: 2023  PROVIDER:     Alexey Evangelista MD    DATE OF PROCEDURE:  2023    PREOPERATIVE DIAGNOSIS:  Anemia. POSTOPERATIVE DIAGNOSES:  Diverticulosis coli, rectal polyp. OPERATION PERFORMED:  Colonoscopy. SURGEON:  Alexey Evangelista MD    INDICATIONS:  The patient is a 28-year-old with history of anemia. ASA  classification III. Informed consent. DIGITAL RECTAL EXAM:  Anatomic. OPERATIVE PROCEDURE:  The Olympus video colonoscope was introduced  through the anus. It was advanced gently until the terminal ileum. The  prep was good. The mucosa was normal.  Several diverticular openings in  the sigmoid. Polyp on the short stalk in the mid rectum at 15 cm,  excised with a snare and electrocoagulation. Small internal  hemorrhoids. Endoscope withdrawn. Estimated blood loss none.         Roya Liu MD    D: 2023 15:17:22       T: 2023 15:21:00     [email or web address]  Job#: [id number]     Doc#: [id number]    CC:

## 2023-01-10 NOTE — PROGRESS NOTES
PROGRESS NOTE  By Cong Chawla M.D. [facility]  Dr. Zack Bernal M.D.,  Dr. Leonora Vidal M.D.,   Dr. Kezia Medina D.O.,  Dr. Jewell Frias M.D.,  Dr. Alisha Pappas D.O.,          [place]  78 y.o.  male    SUBJECTIVE:  Denies abdominal pain. Denies nausea or vomiting    OBJECTIVE:    BP (!) 164/75   Pulse 77   Temp 98.5 °F (36.9 °C) (Oral)   Resp 18   Ht 5' 8\" (1.727 m)   Wt 176 lb 12.8 oz (80.2 kg)   SpO2 93%   BMI 26.88 kg/m²     General: NAD/ male  HEENT: Anicteric sclera/moist oral mucosa  Neck: Supple with trachea midline  Chest: Symmetric excursion/nonlabored respirations  Cor: Regular/S1-S2  Abd.: Soft and obese. Nontender  Extr.:  Decreased muscle tone and bulk throughout  Skin: Warm and dry/anicteric      DATA:    Monitor data reviewed -atrial fibrillation noted.        Lab Results   Component Value Date/Time    WBC 7.8 01/10/2023 07:45 AM    RBC 2.66 01/10/2023 07:45 AM    HGB 7.8 01/10/2023 07:45 AM    HCT 24.9 01/10/2023 07:45 AM    MCV 93.6 01/10/2023 07:45 AM    MCH 29.3 01/10/2023 07:45 AM    MCHC 31.3 01/10/2023 07:45 AM    RDW 20.9 01/10/2023 07:45 AM     01/10/2023 07:45 AM    MPV 9.6 01/10/2023 07:45 AM     Lab Results   Component Value Date/Time     01/10/2023 07:45 AM    K 3.4 01/10/2023 07:45 AM    K 4.4 12/26/2022 03:10 PM     01/10/2023 07:45 AM    CO2 29 01/10/2023 07:45 AM    BUN 11 01/10/2023 07:45 AM    CREATININE 1.0 01/10/2023 07:45 AM    CALCIUM 8.0 01/10/2023 07:45 AM    PROT 4.2 01/05/2023 08:11 PM    LABALBU 2.8 01/05/2023 08:11 PM    LABALBU 4.3 03/28/2011 06:05 PM    BILITOT 0.2 01/05/2023 08:11 PM    ALKPHOS 53 01/05/2023 08:11 PM    AST 10 01/05/2023 08:11 PM    ALT 7 01/05/2023 08:11 PM     Lab Results   Component Value Date/Time    LIPASE 99 01/05/2023 02:33 PM     No results found for: AMYLASE      ASSESSMENT/PLAN:  Patient Active Problem List   Diagnosis    Arthropathy of right shoulder    Diabetes mellitus ([facility] [place] [address]    Hyperlipidemia    Hypertension    Moderate obesity    Paroxysmal atrial fibrillation (HCC)    DKA, type 1, not at goal [facility])    Seizure ([facility] [address]    Moderate protein-calorie malnutrition ([facility] [address]     1. Anemia  -Acute on chronic  -Significant anemia with initial hemoglobin 5.7  -B12 deficient - supplemented  -Reported positive FOBT  -Small stool with blood per nursing  -Obtaining of iron studies given blood transfusion will be of limited clinical value  -Monitor H&H daily; defer transfusion to admitting  -EGD 1/6/2022 with findings of hiatal hernia, esophageal stricture (dilated), otherwise unremarkable exam, no bleeding source identified  -Bleeding scan negative 1/7/2023  -Colonoscopy 1/9/2023 revealing diverticular disease, internal hemorrhoids and rectal polyp which was removed. 2.  Abnormal CT esophagus  -CT abdomen pelvis 1/5/2022 showing possible circumferential thickening of the proximal esophagus with possible hiatal hernia  -EGD 1/6/2022 with findings of hiatal hernia, esophageal stricture (dilated), otherwise unremarkable exam, no bleeding source identified     3. Constipation  -CT showing moderate stool burden  -Adjust stool regimen as needed    4. Elevated troponin  -Likely demand ischemia/type II non-STEMI  -Per admitting/pertinent consultants     5. Seizure  -Per admitting     6. Diabetes mellitus / DKA  -Per admitting / critical care     Decreased H&H without evidence of overt bleed. EGD and colonoscopy as above  Consider outpatient video capsule endoscopy or if decreasing H&H continues inpatient nuclear medicine GI bleeding scan. Ewa Hobbs MD  1/10/2023  10:36 AM    NOTE:  This report was transcribed using voice recognition software. Every effort was made to ensure accuracy; however, inadvertent computerized transcription errors may be present.

## 2023-01-10 NOTE — ANESTHESIA POSTPROCEDURE EVALUATION
Department of Anesthesiology  Postprocedure Note    Patient: Charmaine Golden  MRN: [id number]  YOB: 1945  Date of evaluation: 1/9/2023      Procedure Summary     Date: 01/09/23 Room / Location: [address] [place] [address] 01 / [address] [facility], [place]    Anesthesia Start: 2833 Anesthesia Stop: 3160    Procedure: COLONOSCOPY POLYPECTOMY HOT SNARE Diagnosis:       Anemia, unspecified type      (Anemia, unspecified type [D64.9])    Surgeons: Krys Bangura MD Responsible Provider: Jose L Ortiz MD    Anesthesia Type: MAC ASA Status: 4          Anesthesia Type: No value filed.     Kaley Phase I: Kaley Score: 9    Kaley Phase II: Kaley Score: 10      Anesthesia Post Evaluation    Patient location during evaluation: PACU  Patient participation: complete - patient participated  Level of consciousness: awake and alert  Airway patency: patent  Nausea & Vomiting: no nausea and no vomiting  Complications: no  Cardiovascular status: hemodynamically stable  Respiratory status: acceptable  Hydration status: euvolemic

## 2023-01-10 NOTE — PROGRESS NOTES
Physical Therapy Treatment Note/Plan of Care    Room #:  9864/8258-55  Patient Name: Katt Navarro  YOB: 1945  MRN: [id number]    Date of Service: 1/10/2023     Tentative placement recommendation: Subacute rehab  Equipment recommendation: Roberto Cantu      Evaluating Physical Therapist: Juanita Holter, PT #[id number]      Specific Provider Orders/Date/Referring Provider :  01/07/23 1230    PT eval and treat  Start:  01/07/23 1230,   End:  01/07/23 1230,   ONE TIME,   Standing Count:  1 Occurrences,   R         Fabiola Mars DO     Admitting Diagnosis:   DKA, type 1, not at goal [facility]) [E10.10]     fall, states that he was too weak to get up back into the bed, and therefore spent the night on the floor, slept on it. He said in the morning, he had to drag himself on the floor in order to reach the phone. Surgery: none  Visit Diagnoses         Codes    Diabetic ketoacidosis without coma associated with other specified diabetes mellitus ([facility] [address]    -  Primary E13.10    Fall, initial encounter     W19. XXXA    Gastrointestinal hemorrhage, unspecified gastrointestinal hemorrhage type     K92.2    Anemia, unspecified type     D64.9    Lactic acidosis     E87.20    Hyperkalemia     E87.5    Acute kidney injury ([address]     N17.9            Patient Active Problem List   Diagnosis    Arthropathy of right shoulder    Diabetes mellitus ([address]    Hyperlipidemia    Hypertension    Moderate obesity    Paroxysmal atrial fibrillation (HCC)    DKA, type 1, not at goal [facility])    Seizure ([facility] [address]    Moderate protein-calorie malnutrition ([address]        ASSESSMENT of Current Deficits Patient exhibits decreased strength, balance, and endurance impairing functional mobility, transfers, gait , gait distance, and tolerance to activity.   Patient will require a wheeled walker for ambulation patient states \"oh I'm good if I hold onto something\"; during side steps and forward/backwards steps patient had an antalgic gait on the right side holding onto a chair for stability. Bump on lateral left foot, nursing aware. Patient requires continued skilled physical therapy to address concerns listed above for increased safety and function at discharge. PHYSICAL THERAPY  PLAN OF CARE       Physical therapy plan of care is established based on physician order,  patient diagnosis and clinical assessment    Current Treatment Recommendations:    -Bed Mobility: Lower extremity exercises   -Sitting Balance: Incorporate reaching activities to activate trunk muscles   -Standing Balance: Perform strengthening exercises in standing to promote motor control with or without upper extremity support   -Transfers: Provide instruction on proper hand and foot position for adequate transfer of weight onto lower extremities and use of gait device if needed and Cues for hand placement, technique and safety. Provide stabilization to prevent fall   -Gait: Gait training, Standing activities to improve: base of support, weight shift, weight bearing , and Pregait training to emphasize: Sequencing , Device control, Upright, and Safety   -Endurance: Utilize Supervised activities to increase level of endurance to allow for safe functional mobility including transfers and gait     PT long term treatment goals are located in below grid    Patient and or family understand(s) diagnosis, prognosis, and plan of care. Frequency of treatments: Patient will be seen  daily.          Prior Level of Function: Patient ambulated independently    Rehab Potential: good   for baseline    Past medical history:   Past Medical History:   Diagnosis Date    Arthritis     Bilateral shoulder pain 05/2020    for TJAS Right 05/12/2020 Dr Sharad Dias    Diabetes mellitus [facility])     Enlarged prostate     follows with Dr Glennis Cockayne    History of Holter monitoring 07/29/2022    History of irregular heartbeat     follows with PCP    Hyperlipidemia     Hypertension     Leg wound, right 05/2020    recent history of; see podiatry notes in epic     Past Surgical History:   Procedure Laterality Date    CATARACT REMOVAL WITH IMPLANT Bilateral     COLONOSCOPY N/A 1/9/2023    COLONOSCOPY POLYPECTOMY HOT SNARE performed by Amy Eddy MD at [address], COLON, [address] Right 5/12/2020    RIGHT SHOULDER TOTAL ARTHROPLASTY REVERSE performed by Eric Caruso MD at [address] N/A 1/6/2023    EGD ESOPHAGOGASTRODUODENOSCOPY DILATATION performed by Amy Eddy MD at [address] [facility] [address]:    Precautions:  warm compresses to help decrease the size of the hematoma right knee ,  elevation to decrease swellingfalls ,  WBAT - RLE    Imaging results: XR FEMUR RIGHT (MIN 2 VIEWS)    Result Date: 12/26/2022  EXAMINATION: XRAY VIEWS OF THE RIGHT FEMUR 12/26/2022 4:01 pm COMPARISON: None. HISTORY: ORDERING SYSTEM PROVIDED HISTORY: injury TECHNOLOGIST PROVIDED HISTORY: Reason for exam:->injury FINDINGS: There is no evidence of acute fracture. There is normal alignment. No acute joint abnormality. No focal osseous lesion. No focal soft tissue abnormality. There are degenerative changes and intra-articular calcifications in the visualized knee joint. Vascular calcification noted. No acute osseous abnormality. XR KNEE RIGHT (1-2 VIEWS)    Result Date: 12/26/2022  EXAMINATION: TWO XRAY VIEWS OF THE RIGHT KNEE 12/26/2022 4:01 pm    No acute bony abnormalities. Mild joint effusion. Chondrocalcinosis. XR TIBIA FIBULA RIGHT (2 VIEWS)    Result Date: 12/26/2022  EXAMINATION: XRAY VIEWS OF THE RIGHT TIBIA AND FIBULA 12/26/2022 4:01 pm    No acute osseous abnormality. CT Head W/O Contrast    Result Date: 1/5/2023  EXAMINATION: CT OF THE HEAD WITHOUT CONTRAST  1/5/2023 5:33 pm    1. There is no acute intracranial abnormality. Specifically, there is no intracranial hemorrhage.  2. Atrophy and periventricular leukomalacia,     CT CSpine W/O Contrast    Result Date: 1/5/2023  EXAMINATION: CT OF THE CERVICAL SPINE WITHOUT CONTRAST 1/5/2023 5:33 pm    1. There is no acute compression fracture or subluxation of the cervical spine. 2. Advanced multilevel degenerative disc and degenerative joint disease. NM GI BLOOD LOSS    Result Date: 1/7/2023  EXAMINATION: NUCLEAR MEDICINE GASTRIC BLEEDING STUDY 1/7/2023    No evidence of active GI bleeding during acquisition. RECOMMENDATIONS: If the patient shows hemodynamic signs of an active bleed in the next 20 hours, additional images can be acquired. CT ABDOMEN PELVIS W IV CONTRAST Additional Contrast? None    Result Date: 1/5/2023  EXAMINATION: CTA OF THE CHEST; CT OF THE ABDOMEN AND PELVIS WITH CONTRAST 1/5/2023 6:15 pm    No evidence of pulmonary embolism or acute pulmonary abnormality. Circumferential thickening of questionable esophageal is involving the proximal through mid esophagus correlate with symptomatology for the need of further investigation Abdomen and pelvis: No acute findings of the abdomen or pelvis with incidental findings as above including moderate prostatomegaly with central area of low attenuation. No associated gas locules maximum dimension 12 mm likely status post TURP findings of the central prostatic portion. No inflammatory findings of the urinary bladder or obstructing uropathy Hepatic steatosis        US DUP LOWER EXTREMITY RIGHT MARILIN    Result Date: 12/26/2022  EXAMINATION: DUPLEX VENOUS ULTRASOUND OF THE RIGHT LOWER EXTREMITY 12/26/2022 4:00 pm    No evidence of DVT in the right lower extremity.        Social history: Patient lives alone in a two story home resides first  with 3 steps, with rail  to enter  Walk in shower       Equipment owned: Shower chair,       [facility]   How much difficulty turning over in bed?: A Little  How much difficulty sitting down on / standing up from a chair with arms?: A Little  How much difficulty moving from lying on back to sitting on side of bed?: A Little  How much help from another person moving to and from a bed to a chair?: A Little  How much help from another person needed to walk in hospital room?: A Lot  How much help from another person for climbing 3-5 steps with a railing?: A Lot  AM-PAC Inpatient Mobility Raw Score : 16  AM-PAC Inpatient T-Scale Score : 40.78  Mobility Inpatient CMS 0-100% Score: 54.16  Mobility Inpatient CMS G-Code Modifier : CK    Nursing cleared patient for PT treatment. OBJECTIVE;   Initial Evaluation  Date: 1/9/2023 Treatment Date:    1/10/2023   Short Term/ Long Term   Goals   Was pt agreeable to Eval/treatment? Yes Yes  To be met in 3 days   Pain level   1-2/10  Right knee 1/10    Bed Mobility    Rolling: Not assessed     Supine to sit: Minimal assist of 1    Sit to supine: Minimal assist of 1    Scooting: Minimal assist of 1   Rolling: Not assessed    Supine to sit: Not assessed    Sit to supine: Not assessed    Scooting: Not assessed  patient was on bedside commode at beginning of session and then requested to be in a chair at end of session. Rolling: Supervision     Supine to sit: Supervision     Sit to supine: Supervision     Scooting: Supervision      Transfers Sit to stand:  Moderate assist of 1 Cues for hand placement and safety bed elevated Sit to stand: Minimal assist of 1 from commode multiple times   Sit to stand: Minimal assist of 1     Ambulation     20 feet using  wheeled walker with Minimal assist of 1   Patient with decreased hi and decreased step length and cues for upright posture, safety, and pacing 3x 5 side steps and 3 x 5 forward/backward steps using  holding onto anterior support from chair with Minimal assist of 1   Patient with antalgic gait right lower extremity and for safety     40 feet using  wheeled walker with Supervision     Stair negotiation: ascended and descended   Not assessed ROM Within functional limits  righ tknee hematoma  Increase range of motion 10% of affected joints    Strength BUE:  refer to OT eval  RLE:  3/5  LLE:  3+/5  Increase strength in affected mm groups by 1/3 grade   Balance Sitting EOB:  good -  Dynamic Standing:  fair wheeled walker  Sitting EOB: not assessed   Dynamic Standing: fair    Sitting EOB:  good    Dynamic Standing: fair +wheeled walker      Patient is Alert & Oriented x person, place, time, and situation and follows directions    Sensation:  Patient  denies numbness/tingling   Edema:  yes righ tknee    Endurance: fair       Vitals: room air   Blood Pressure at rest  Blood Pressure during session    Heart Rate at rest   Heart Rate during session     SPO2 at rest  %  SPO2 during session  %     Patient education  Patient educated on role of Physical Therapy, risks of immobility, safety and plan of care,  importance of mobility while in hospital , and ankle pumps, quad set and glut set for edema control, blood clot prevention     Patient response to education:   Pt verbalized understanding Pt demonstrated skill Pt requires further education in this area   Yes Partial Yes      Treatment:  Patient practiced and was instructed/facilitated in the following treatment:  patient on commode. Stood multiple times from commode. Seated/standing exercise. Ambulated with forward/backward steps and side steps with anterior support from chair. Patient was assisted to chair. Therapeutic Exercises:  ankle pumps, heel raises, long arc quad, seated marching, and standing marching, standing hamstring curls, standing hip abduction/adduction   x 10 reps. At end of session, patient in chair with     call light and phone within reach,  all lines and tubes intact, nursing notified. Patient would benefit from continued skilled Physical Therapy to improve functional independence and quality of life.          Patient's/ family goals   get stronger    Time in 135  Time out 158    Total Treatment Time  23 minutes      CPT codes:    Therapeutic activities (55461)   12 minutes  1 unit(s)  Therapeutic exercises (04392)   11 minutes  1 unit(s)    Artist CYN Schwartz [facility]#[id number]

## 2023-01-10 NOTE — DISCHARGE INSTR - COC
Continuity of Care Form    Patient Name: Carlos Cabrera   :  1945  MRN:  [id number]    Admit date:  2023  Discharge date:  2023    Code Status Order: Full Code   Advance Directives:     Admitting Physician:  Manning Harada, DO  PCP: Miguel A Lincoln DO    Discharging Nurse: [facility] Unit/Room#: 9775/6105-99  Discharging Unit Phone Number: [phone]    Emergency Contact:   Extended Emergency Contact Information  Primary Emergency Contact: [facility] Phone: [phone]  Relation: Other  Secondary Emergency Contact: [address] Phone: [phone]  Relation: Other   needed? No    Past Surgical History:  Past Surgical History:   Procedure Laterality Date    CATARACT REMOVAL WITH IMPLANT Bilateral     COLONOSCOPY N/A 2023    COLONOSCOPY POLYPECTOMY HOT SNARE performed by Nathan Akbar MD at [address] [facility], DIAGNOSTIC      EYE SURGERY      HERNIA REPAIR      SHOULDER SURGERY Right 2020    RIGHT SHOULDER TOTAL ARTHROPLASTY REVERSE performed by Roula Andersen MD at [address] [place] [address] [address] N/A 2023    EGD ESOPHAGOGASTRODUODENOSCOPY DILATATION performed by Nathan Akbar MD at [facility] ENDOSCOPY       Immunization History: There is no immunization history on file for this patient.     Active Problems:  Patient Active Problem List   Diagnosis Code    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    Diabetes mellitus (HCC) E11.9    Hyperlipidemia E78.5    Hypertension I10    Moderate obesity E66.8    Paroxysmal atrial fibrillation (HCC) I48.0    DKA, type 1, not at goal ([address] E10.10    Seizure ([facility] [address] R56.9    Moderate protein-calorie malnutrition ([facility] [address] [address] E44.0       Isolation/Infection:   Isolation            No Isolation          Patient Infection Status       Infection Onset Added Last Indicated Last Indicated By Review Planned Expiration Resolved Resolved By    None active    Resolved COVID-19 (Rule Out) 01/05/23 01/05/23 01/05/23 COVID-19, Rapid (Ordered)   01/05/23 Rule-Out Test Resulted    COVID-19 (Rule Out) 05/07/20 05/07/20 05/07/20 Covid-19 Ambulatory (Ordered)   05/08/20 Rule-Out Test Resulted            Nurse Assessment:  Last Vital Signs: /86   Pulse 77   Temp 98.5 °F (36.9 °C) (Oral)   Resp 18   Ht 5' 8\" (1.727 m)   Wt 176 lb 12.8 oz (80.2 kg)   SpO2 93%   BMI 26.88 kg/m²     Last documented pain score (0-10 scale): Pain Level: 5  Last Weight:   Wt Readings from Last 1 Encounters:   01/10/23 176 lb 12.8 oz (80.2 kg)     Mental Status:  disoriented, oriented, and thought processes intact    IV Access:  - None    Nursing Mobility/ADLs:  Walking   Assisted  Transfer  Independent  Bathing  Assisted  Dressing  Assisted  Toileting  Independent  Feeding  [address]  Assisted  Med Delivery   whole    Wound Care Documentation and Therapy:        Elimination:  Continence: Bowel: Yes  Bladder: Yes  Urinary Catheter: None   Colostomy/Ileostomy/Ileal Conduit: No       Date of Last BM: 1-9-2023    Intake/Output Summary (Last 24 hours) at 1/10/2023 1221  Last data filed at 1/10/2023 0843  Gross per 24 hour   Intake 400 ml   Output 1450 ml   Net -1050 ml     I/O last 3 completed shifts: In: 400 [I.V.:400]  Out: 2000 [Urine:2000]    Safety Concerns: At Risk for Falls and History of Seizures    Impairments/Disabilities:      None    Nutrition Therapy:  Current Nutrition Therapy:   - Oral Diet:  Carb Control 5 carbs/meal (2000kcals/day)    Routes of Feeding: Oral  Liquids: No Restrictions  Daily Fluid Restriction: no  Last Modified Barium Swallow with Video (Video Swallowing Test): not done    Treatments at the Time of Hospital Discharge:   Respiratory Treatments: ***  Oxygen Therapy:  is not on home oxygen therapy.   Ventilator:    - No ventilator support    Rehab Therapies: Physical Therapy and Occupational Therapy  Weight Bearing Status/Restrictions: No weight bearing restrictions  Other Medical Equipment (for information only, NOT a DME order):  {EQUIPMENT:227401090}  Other Treatments: ***    Patient's personal belongings (please select all that are sent with patient):  Glasses, Hearing Aides bilateral, Dentures upper    RN SIGNATURE:  Electronically signed by Sulema Pandey RN on 1/11/23 at 1:54 PM EST    CASE MANAGEMENT/SOCIAL WORK SECTION    Inpatient Status Date: ***    Readmission Risk Assessment Score:  Readmission Risk              Risk of Unplanned Readmission:  18           Discharging to Facility/ Agency   Name:  [facility]   Address: [address] [address]  Phone: [phone]  Fax:    Dialysis Facility (if applicable)   Name:  Address:  Dialysis Schedule:  Phone:  Fax:    / signature: Electronically signed by BRENDAN Felipe on 1/10/23 at 12:22 PM EST    PHYSICIAN SECTION    Prognosis: {Prognosis:6846219135}    Condition at Discharge: [facility] Patient Condition:782241607}    Rehab Potential (if transferring to Rehab): {Prognosis:6971613468}    Recommended Labs or Other Treatments After Discharge: ***    Physician Certification: I certify the above information and transfer of Sheri Ortiz  is necessary for the continuing treatment of the diagnosis listed and that he requires {Admit to Appropriate Level of Care:66514} for {GREATER/LESS:476819851} 30 days.      Update Admission H&P: {CHP DME Changes in XGJOJ:594918232}    PHYSICIAN SIGNATURE:  Electronically signed by Marcia Durant, on 1/11/23 at 2:37 PM EST

## 2023-01-10 NOTE — CARE COORDINATION
Ss note:1/10/643366:19 PM Neg covid on 1-5-23, Need RAPID COVID prior to discharge. Pt agreeable to SNF, per liaison Lyle Singh at [facility], pt has been accepted, NO PRECERT. Will need Hens and signed NASIR. Pt resides home alone. SW will follow.  BRENDAN Grajeda

## 2023-01-10 NOTE — CARE COORDINATION
Ss note: 1/10/2023.9:45 AM Neg covid on 1-5-23. Pt is on room air. Follow up visit to rm, pts friend is present, permitted sw to speak in front of friend. Pt is alert/oreinted times 4, pleasant. Resides home alone, PTA independent & driving, relays he has access to a walker, has a  once a week. Discussed therapy rec for short term rehab, pt agreeable prefers referrals to 1. [facility] [place] [address], referrals made await return calls. NO PRECERT for SNF. PCP is Dr. Pattie Sepulveda. No hx of [facility] or SNF. Relays his primary contact is his cousins  Rea Esters if he becvomes confused. Uses [facility] in [place]. Will need Hens and signed NASIR for SNF placement along with neg covid testing. SW will follow.  BRENDAN Rosa

## 2023-01-11 ENCOUNTER — APPOINTMENT (OUTPATIENT)
Dept: GENERAL RADIOLOGY | Age: 78
DRG: 638 | End: 2023-01-11
Payer: MEDICARE

## 2023-01-11 VITALS
BODY MASS INDEX: 26.95 KG/M2 | HEIGHT: 68 IN | SYSTOLIC BLOOD PRESSURE: 160 MMHG | TEMPERATURE: 97.4 F | RESPIRATION RATE: 16 BRPM | OXYGEN SATURATION: 96 % | WEIGHT: 177.8 LBS | HEART RATE: 73 BPM | DIASTOLIC BLOOD PRESSURE: 80 MMHG

## 2023-01-11 LAB
ANISOCYTOSIS: ABNORMAL
BASOPHILS ABSOLUTE: 0.07 E9/L (ref 0–0.2)
BASOPHILS RELATIVE PERCENT: 0.9 % (ref 0–2)
EOSINOPHILS ABSOLUTE: 0.2 E9/L (ref 0.05–0.5)
EOSINOPHILS RELATIVE PERCENT: 2.6 % (ref 0–6)
HCT VFR BLD CALC: 25.3 % (ref 37–54)
HEMOGLOBIN: 8.1 G/DL (ref 12.5–16.5)
HYPOCHROMIA: ABNORMAL
LYMPHOCYTES ABSOLUTE: 0.99 E9/L (ref 1.5–4)
LYMPHOCYTES RELATIVE PERCENT: 13.2 % (ref 20–42)
MCH RBC QN AUTO: 29.9 PG (ref 26–35)
MCHC RBC AUTO-ENTMCNC: 32 % (ref 32–34.5)
MCV RBC AUTO: 93.4 FL (ref 80–99.9)
METER GLUCOSE: 202 MG/DL (ref 74–99)
METER GLUCOSE: 323 MG/DL (ref 74–99)
MONOCYTES ABSOLUTE: 0.46 E9/L (ref 0.1–0.95)
MONOCYTES RELATIVE PERCENT: 6.1 % (ref 2–12)
NEUTROPHILS ABSOLUTE: 5.85 E9/L (ref 1.8–7.3)
NEUTROPHILS RELATIVE PERCENT: 77.2 % (ref 43–80)
OVALOCYTES: ABNORMAL
PDW BLD-RTO: 20.4 FL (ref 11.5–15)
PLATELET # BLD: 286 E9/L (ref 130–450)
PMV BLD AUTO: 9.4 FL (ref 7–12)
POIKILOCYTES: ABNORMAL
POLYCHROMASIA: ABNORMAL
RBC # BLD: 2.71 E12/L (ref 3.8–5.8)
SARS-COV-2, NAAT: NOT DETECTED
TARGET CELLS: ABNORMAL
WBC # BLD: 7.6 E9/L (ref 4.5–11.5)

## 2023-01-11 PROCEDURE — 6370000000 HC RX 637 (ALT 250 FOR IP): Performed by: INTERNAL MEDICINE

## 2023-01-11 PROCEDURE — C9113 INJ PANTOPRAZOLE SODIUM, VIA: HCPCS | Performed by: NURSE PRACTITIONER

## 2023-01-11 PROCEDURE — 82962 GLUCOSE BLOOD TEST: CPT

## 2023-01-11 PROCEDURE — 36415 COLL VENOUS BLD VENIPUNCTURE: CPT

## 2023-01-11 PROCEDURE — 87635 SARS-COV-2 COVID-19 AMP PRB: CPT

## 2023-01-11 PROCEDURE — 2580000003 HC RX 258: Performed by: INTERNAL MEDICINE

## 2023-01-11 PROCEDURE — A4216 STERILE WATER/SALINE, 10 ML: HCPCS | Performed by: NURSE PRACTITIONER

## 2023-01-11 PROCEDURE — 2580000003 HC RX 258: Performed by: NURSE PRACTITIONER

## 2023-01-11 PROCEDURE — 73630 X-RAY EXAM OF FOOT: CPT

## 2023-01-11 PROCEDURE — 6360000002 HC RX W HCPCS: Performed by: NURSE PRACTITIONER

## 2023-01-11 PROCEDURE — 85025 COMPLETE CBC W/AUTO DIFF WBC: CPT

## 2023-01-11 RX ORDER — INSULIN GLARGINE 100 [IU]/ML
4 INJECTION, SOLUTION SUBCUTANEOUS 2 TIMES DAILY
Qty: 10 ML | Refills: 3 | DISCHARGE
Start: 2023-01-11 | End: 2023-01-11 | Stop reason: HOSPADM

## 2023-01-11 RX ORDER — INSULIN LISPRO 100 [IU]/ML
0-4 INJECTION, SOLUTION INTRAVENOUS; SUBCUTANEOUS NIGHTLY
DISCHARGE
Start: 2023-01-11

## 2023-01-11 RX ORDER — INSULIN GLARGINE 100 [IU]/ML
6 INJECTION, SOLUTION SUBCUTANEOUS 2 TIMES DAILY
Qty: 10 ML | Refills: 3 | DISCHARGE
Start: 2023-01-11

## 2023-01-11 RX ORDER — INSULIN GLARGINE 100 [IU]/ML
6 INJECTION, SOLUTION SUBCUTANEOUS 2 TIMES DAILY
Status: DISCONTINUED | OUTPATIENT
Start: 2023-01-11 | End: 2023-01-11 | Stop reason: HOSPADM

## 2023-01-11 RX ORDER — PANTOPRAZOLE SODIUM 40 MG/1
40 TABLET, DELAYED RELEASE ORAL
Qty: 90 TABLET | Refills: 1 | DISCHARGE
Start: 2023-01-11

## 2023-01-11 RX ORDER — INSULIN LISPRO 100 [IU]/ML
0-4 INJECTION, SOLUTION INTRAVENOUS; SUBCUTANEOUS
DISCHARGE
Start: 2023-01-11

## 2023-01-11 RX ORDER — POLYETHYLENE GLYCOL 3350 17 G/17G
17 POWDER, FOR SOLUTION ORAL DAILY
Qty: 527 G | Refills: 1 | DISCHARGE
Start: 2023-01-12 | End: 2023-02-11

## 2023-01-11 RX ADMIN — INSULIN LISPRO 3 UNITS: 100 INJECTION, SOLUTION INTRAVENOUS; SUBCUTANEOUS at 12:40

## 2023-01-11 RX ADMIN — LISINOPRIL 20 MG: 20 TABLET ORAL at 08:34

## 2023-01-11 RX ADMIN — NIFEDIPINE 30 MG: 30 TABLET, FILM COATED, EXTENDED RELEASE ORAL at 08:34

## 2023-01-11 RX ADMIN — ACETAMINOPHEN 650 MG: 325 TABLET ORAL at 08:36

## 2023-01-11 RX ADMIN — INSULIN LISPRO 1 UNITS: 100 INJECTION, SOLUTION INTRAVENOUS; SUBCUTANEOUS at 08:39

## 2023-01-11 RX ADMIN — EMPAGLIFLOZIN 10 MG: 10 TABLET, FILM COATED ORAL at 08:34

## 2023-01-11 RX ADMIN — Medication 5 ML: at 01:02

## 2023-01-11 RX ADMIN — Medication 5 ML: at 01:05

## 2023-01-11 RX ADMIN — INSULIN GLARGINE 4 UNITS: 100 INJECTION, SOLUTION SUBCUTANEOUS at 08:39

## 2023-01-11 RX ADMIN — SODIUM CHLORIDE, PRESERVATIVE FREE 40 MG: 5 INJECTION INTRAVENOUS at 08:34

## 2023-01-11 ASSESSMENT — PAIN DESCRIPTION - ORIENTATION: ORIENTATION: LEFT

## 2023-01-11 ASSESSMENT — PAIN SCALES - GENERAL: PAINLEVEL_OUTOF10: 3

## 2023-01-11 ASSESSMENT — PAIN DESCRIPTION - LOCATION: LOCATION: SHOULDER

## 2023-01-11 NOTE — PLAN OF CARE
Problem: Discharge Planning  Goal: Discharge to home or other facility with appropriate resources  Outcome: Adequate for Discharge     Problem: Skin/Tissue Integrity  Goal: Absence of new skin breakdown  Description: 1. Monitor for areas of redness and/or skin breakdown  2. Assess vascular access sites hourly  3. Every 4-6 hours minimum:  Change oxygen saturation probe site  4. Every 4-6 hours:  If on nasal continuous positive airway pressure, respiratory therapy assess nares and determine need for appliance change or resting period.   Outcome: Adequate for Discharge     Problem: Safety - Adult  Goal: Free from fall injury  Outcome: Adequate for Discharge  Flowsheets (Taken 1/11/2023 0830)  Free From Fall Injury: Instruct family/caregiver on patient safety     Problem: ABCDS Injury Assessment  Goal: Absence of physical injury  Outcome: Adequate for Discharge  Flowsheets (Taken 1/11/2023 0830)  Absence of Physical Injury: Implement safety measures based on patient assessment     Problem: Chronic Conditions and Co-morbidities  Goal: Patient's chronic conditions and co-morbidity symptoms are monitored and maintained or improved  Outcome: Adequate for Discharge     Problem: Nutrition Deficit:  Goal: Optimize nutritional status  Outcome: Adequate for Discharge     Problem: Pain  Goal: Verbalizes/displays adequate comfort level or baseline comfort level  Outcome: Adequate for Discharge

## 2023-01-11 NOTE — PROGRESS NOTES
PROGRESS NOTE  By Lillian Vega M.D. [facility]  Dr. Sania Fernandez M.D.,  Dr. Tiffanie Pop M.D.,   Dr. Geraldo Ormond, D.O.,  Dr. Ben Garrett M.D.,  Dr. Vivi Lao D.O.,          Shanita Matt  78 y.o.  male    SUBJECTIVE:  Denies abdominal pain. Denies nausea vomiting    OBJECTIVE:    BP (!) 160/80   Pulse 73   Temp 97.4 °F (36.3 °C) (Oral)   Resp 16   Ht 5' 8\" (1.727 m)   Wt 177 lb 12.8 oz (80.6 kg)   SpO2 96%   BMI 27.03 kg/m²     General: NAD/ male. AAOx3  HEENT: Anicteric sclera/moist oral mucosa  Neck: Appears supple with trachea midline  Chest: Symmetric excursion/nonlabored respirations  Cor: Regular  Abd.: Soft appears obese and nontender  Extr.:  Decreased muscle tone and bulk, consistent with age and condition  Skin: Warm and dry. Anicteric        DATA:    Monitor data reviewed -atrial fibrillation noted.        Lab Results   Component Value Date/Time    WBC 7.6 01/11/2023 07:14 AM    RBC 2.71 01/11/2023 07:14 AM    HGB 8.1 01/11/2023 07:14 AM    HCT 25.3 01/11/2023 07:14 AM    MCV 93.4 01/11/2023 07:14 AM    MCH 29.9 01/11/2023 07:14 AM    MCHC 32.0 01/11/2023 07:14 AM    RDW 20.4 01/11/2023 07:14 AM     01/11/2023 07:14 AM    MPV 9.4 01/11/2023 07:14 AM     Lab Results   Component Value Date/Time     01/10/2023 07:45 AM    K 3.4 01/10/2023 07:45 AM    K 4.4 12/26/2022 03:10 PM     01/10/2023 07:45 AM    CO2 29 01/10/2023 07:45 AM    BUN 11 01/10/2023 07:45 AM    CREATININE 1.0 01/10/2023 07:45 AM    CALCIUM 8.0 01/10/2023 07:45 AM    PROT 4.2 01/05/2023 08:11 PM    LABALBU 2.8 01/05/2023 08:11 PM    LABALBU 4.3 03/28/2011 06:05 PM    BILITOT 0.2 01/05/2023 08:11 PM    ALKPHOS 53 01/05/2023 08:11 PM    AST 10 01/05/2023 08:11 PM    ALT 7 01/05/2023 08:11 PM     Lab Results   Component Value Date/Time    LIPASE 99 01/05/2023 02:33 PM     No results found for: AMYLASE      ASSESSMENT/PLAN:  Patient Active Problem List   Diagnosis    Arthropathy of right shoulder    Diabetes mellitus ([facility] [address]    Hyperlipidemia    Hypertension    Moderate obesity    Paroxysmal atrial fibrillation (HCC)    DKA, type 1, not at goal [facility])    Seizure ([facility] [address]    Moderate protein-calorie malnutrition ([facility] [address]     1. Anemia  -Acute on chronic  -Significant anemia with initial hemoglobin 5.7  -B12 deficient - supplemented  -Reported positive FOBT  -Small stool with blood per nursing  -Obtaining of iron studies given blood transfusion will be of limited clinical value  -Monitor H&H daily; defer transfusion to admitting  -EGD 1/6/2022 with findings of hiatal hernia, esophageal stricture (dilated), otherwise unremarkable exam, no bleeding source identified  -Bleeding scan negative 1/7/2023  -Colonoscopy 1/9/2023 revealing diverticular disease, internal hemorrhoids and rectal polyp which was removed. 2.  Abnormal CT esophagus  -CT abdomen pelvis 1/5/2022 showing possible circumferential thickening of the proximal esophagus with possible hiatal hernia  -EGD 1/6/2022 with findings of hiatal hernia, esophageal stricture (dilated), otherwise unremarkable exam, no bleeding source identified     3. Constipation  -CT showing moderate stool burden  -Adjust stool regimen as needed     4. Elevated troponin  -Likely demand ischemia/type II non-STEMI  -Per admitting/pertinent consultants     5. Seizure  -Per admitting     6. Diabetes mellitus / DKA  -Per admitting / critical care     Fluctuating H&H without evidence of overt bleed. EGD and colonoscopy as above  Consider outpatient video capsule endoscopy or if decreasing H&H continues inpatient nuclear medicine GI bleeding scan. At this time if H&H remained stable, can be discharged from GI POV to follow in the office in 2 to 3 weeks -patient to call for appointment and with questions/concerns at [phone]. Thank you for the opportunity to participate in the care of Mr. Ayde Scanlon.     Torrie Saenz MD  1/11/2023  10:03 AM    NOTE:  This report was transcribed using voice recognition software. Every effort was made to ensure accuracy; however, inadvertent computerized transcription errors may be present.

## 2023-01-11 NOTE — CARE COORDINATION
Ss note:1/11/202312:58 PM Rapid covid ordered today, result pending. Discharge order noted. Pt accepted at [place] [id number]. pt relays to arrange transport as all his family is working. Arranged Physician Ambulette transfer today at 3:00 pm. Hens completed, need signed NASIR. Pt will inform his family, nursing and liaison aware of discharge destination and time.  BRENDAN Muniz

## 2023-01-11 NOTE — DISCHARGE SUMMARY
Department of Internal Medicine      PCP: Gabriele Johnson DO  Admitting Physician: Dr. Don Lundberg  Consultants:   Date of Service: 1/5/2023    CHIEF COMPLAINT: Altered mental status    HISTORY OF PRESENT ILLNESS:    Patient is a 79-year-old male who presents to the ED with altered mental status. Patient was found on the floor at home unable to get up. Patient lives alone at home with 2 dogs. Otherwise patient states that he fell last night. Patient states that he fell while entering his home. He was able to get around but it was not able to ambulate. He slipped on the floor as he was not able to get onto the bed. Otherwise patient states he has noted bloody/melanotic stools the other day. States he has a previous upper endoscopy however is unsure of the results. Patient patient states that he has lost 40 pounds involuntarily over the last month or 2. Apparently patient suffered seizure as well on his way to the hospital.  He has no history of seizure. Overall patient has been weak over the last few weeks. He has also been having issues with constipation. 1/6/2023  Patient seen examined in ICU. Patient is very hard of hearing and does not have his hearing aids right now. Patient states he feels a lot better. He denies any chest or abdominal pain. He denies any nausea/vomiting or unusual shortness of breath. BUN/creatinine was 149/1.8 with CO2 electrolytes and 19. Lactic acid improved but still mildly elevated 2.5. Blood sugars improved. WBC still elevated 16.8 with hemoglobin is 6.6. Temperature 98.7 with heart rate of about 100 and blood pressure 151/80. O2 sat 100% on room air at rest with patient on insulin drip. Urinary output ranges 300-675 cc a shift. Patient is doing much better now than on admission. 1/7/2023  Patient seen examined in the ICU. Patient is extremely hard of hearing and seems to be worse today and not able to communicate very well because of this.   Case discussed with patient nurse at the bedside. 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 Patient had esophageal dilation performed. Orthopedic note from today shows right medial knee hematoma. BUN/creatinine is improved to 82/1.3 with CO2 electrolytes of 19. Blood sugars range from . Patient had a hemoglobin A1c of 5.9. WBC 17.1 with hemoglobin 7.7. Temperature is 90.1 with heart rate 88. Blood pressure 141/64. Patient is still into atrial fibrillation with the patient having O2 sat 98% on room air at rest.  Urine output is good ranging 450-1800 cc a shift. Case discussed with intensivist.  Patient had a bleeding scan today. Patient's insulin drip was stopped since last night. 1/8/2023  Patient seen examined on ICU. Patient doing better and is very hungry. Patient denies any chest or abdominal pain. BUN/creatinine 38/1.1 with normal electrolytes. Blood sugars ranging . Patient had elevated beta hydroxybutyrate yesterday 2.49. WBC 11.0 today with hemoglobin 7.3. Temperature is 98.3 with heart rate of 70 blood pressure 164/69. O2 sat 100% room air at rest.  Urine output ranges 700-2400 cc a shift. Patient did have 1 bowel movement yesterday. Nuclear medicine bleeding scan showed no evidence of active GI bleeding. Patient is to be transferred to telemetry floor. Increase activity and diet. Start subcu insulin. 1/9/2023  Patient seen examined on telemetry floor. Patient denies any chest or abdominal pain. The patient is complaining of his left shoulder which is a chronic complaint but seems to be little bit worse since he has been in the hospital.  Potassium 3.1 today with magnesium through 1.5. Blood sugars range 163-248. WBC 8.7 hemoglobin 8.1. Temperature 98.1 with heart rate 84 and blood pressure 167/74. O2 sat 96% room air at rest.  Blood sugars range 200-500 cc a shift.   Patient is set up for colonoscopy this afternoon. 1/10/2023  Patient seen examined on telemetry floor. Patient had colonoscopy yesterday which showed sigmoid diverticulosis with rectal polyp. Patient states that he feels little bit better and denies any chest or abdominal pain. BUN/creatinine 11/1.0 with serum potassium 3.4. Blood sugars ranging 161 349. WBC 7.8 with hemoglobin 7.8. Temperature 90.5 heart rate of 77 blood pressure 164/75. O2 sat 95% on room air at rest.  Urine output ranges 450-600 cc a shift. Case discussed with  and patient be transferred to temporary extended-care facility for rehab. Patient's oral intake is improving and patient's home medications will be reinstituted according to blood pressure and lab.    1/11/2023  Patient seen examined on telemetry floor. Patient states he feels a bit better. The patient has been complaining of his bottom of his left foot having pain with activity. Patient stated started about 4 months ago and is got worse the last 2 weeks. WBC 7.6 hemoglobin 8.1. Temperature 97.4 with heart rate of 74 blood pressure 160/80. O2 sat 96% on room air at rest.  Patient stable for discharge to extended-care facility.     PAST MEDICAL Hx:  Past Medical History:   Diagnosis Date    Arthritis     Bilateral shoulder pain 05/2020    for TJAS Right 05/12/2020 Dr Taylor Bowen    Diabetes mellitus [facility])     Enlarged prostate     follows with Dr Rico Garcia    History of Holter monitoring 07/29/2022    History of irregular heartbeat     follows with PCP    Hyperlipidemia     Hypertension     Leg wound, right 05/2020    recent history of; see podiatry notes in epic       PAST SURGICAL Hx:   Past Surgical History:   Procedure Laterality Date    CATARACT REMOVAL WITH IMPLANT Bilateral     COLONOSCOPY N/A 1/9/2023    COLONOSCOPY POLYPECTOMY HOT SNARE performed by Amy Eddy MD at [address] [facility], [place], [address] [facility] Right 5/12/2020 RIGHT SHOULDER TOTAL ARTHROPLASTY REVERSE performed by Fanny David MD at [address] N/A 1/6/2023    EGD ESOPHAGOGASTRODUODENOSCOPY DILATATION performed by Enrico Paniagua MD at [address] [facility] Hx:  Family History   Problem Relation Age of Onset    No Known Problems Mother     No Known Problems Father        HOME MEDICATIONS:  Prior to Admission medications    Medication Sig Start Date End Date Taking?  Authorizing Provider   FARXIGA 5 MG tablet take 1 tablet by mouth once daily 10/23/22   Historical Provider, MD   apixaban (ELIQUIS) 5 MG TABS tablet Take 1 tablet by mouth 2 times daily 10/31/22 1/5/23  Vidhya Arias MD   atorvastatin (LIPITOR) 40 MG tablet take 1 tablet by mouth daily 7/29/22   Historical Provider, MD   TRULICITY 1.5 CY/6.8GZ SOPN inject 0.5 milliliters ( 1 AND 1/2 milligrams ) subcutaneously every week 9/16/22   Historical Provider, MD   Multiple Vitamins-Minerals (THERAPEUTIC MULTIVITAMIN-MINERALS) tablet Take 1 tablet by mouth daily Last taken 05/03/2020    Historical Provider, MD   Ascorbic Acid (VITAMIN C PO) Take 1 tablet by mouth daily Last taken 05/03    Historical Provider, MD   acetaminophen (TYLENOL) 500 MG tablet Take 500 mg by mouth every 6 hours as needed for Pain    Historical Provider, MD   metOLazone (ZAROXOLYN) 5 MG tablet Take 5 mg by mouth daily    Historical Provider, MD   lisinopril (PRINIVIL;ZESTRIL) 40 MG tablet Take 40 mg by mouth daily    Historical Provider, MD   metFORMIN (GLUCOPHAGE) 500 MG tablet Take 500 mg by mouth 2 times daily (with meals)    Historical Provider, MD   NIFEdipine (ADALAT CC) 30 MG extended release tablet Take 30 mg by mouth daily Take am day of surgery 05/12/2020    Historical Provider, MD       ALLERGIES:  Penicillins, Seasonal, and Sulfa antibiotics    SOCIAL Hx:  Social History     Socioeconomic History    Marital status: Single     Spouse name: Not on file    Number of children: Not on file    Years of education: Not on file    Highest education level: Not on file   Occupational History    Not on file   Tobacco Use    Smoking status: Never    Smokeless tobacco: Never   Vaping Use    Vaping Use: Never used   Substance and Sexual Activity    Alcohol use: Never    Drug use: Never    Sexual activity: Not on file   Other Topics Concern    Not on file   Social History Narrative    Not on file     Social Determinants of Health     Financial Resource Strain: Not on file   Food Insecurity: Not on file   Transportation Needs: Not on file   Physical Activity: Not on file   Stress: Not on file   Social Connections: Not on file   Intimate Partner Violence: Not on file   Housing Stability: Not on file       ROS: Positive in bold  General:   Denies chills, fatigue, fever, malaise, night sweats or weight loss    Psychological:   Denies anxiety, disorientation or hallucinations    ENT:    Denies epistaxis, headaches, vertigo or visual changes    Cardiovascular:   Denies any chest pain, irregular heartbeats, or palpitations. No paroxysmal nocturnal dyspnea. Respiratory:   Denies shortness of breath, coughing, sputum production, hemoptysis, or wheezing. No orthopnea. Gastrointestinal:   Denies nausea, vomiting, diarrhea, or constipation. Denies any abdominal pain. Denies change in bowel habits or stools. Genito-Urinary:    Denies any urgency, frequency, hematuria. Voiding without difficulty. Musculoskeletal:   Denies joint pain, joint stiffness, joint swelling or muscle pain    Neurology:    Denies any headache or focal neurological deficits. No weakness or paresthesia. Derm:    Denies any rashes, ulcers, or excoriations. Denies bruising. Extremities:   Denies any lower extremity swelling or edema.       PHYSICAL EXAM: Abnormal findings noted  VITALS:  Vitals:    01/11/23 0834   BP: (!) 160/80   Pulse:    Resp:    Temp:    SpO2:          CONSTITUTIONAL:    Awake, alert, cooperative, no apparent distress, and appears stated age    EYES:    EOMI, sclera clear, conjunctiva normal    ENT:    Normocephalic, atraumatic, External ears without lesions. NECK:    Supple, symmetrical, trachea midline, no JVD    HEMATOLOGIC/LYMPHATICS:    No cervical lymphadenopathy and no supraclavicular lymphadenopathy    LUNGS:    Symmetric. No increased work of breathing, good air exchange, clear to auscultation bilaterally, no wheezes, rhonchi, or rales,     CARDIOVASCULAR:    Normal apical impulse, regular rate and rhythm, normal S1 and S2, no S3 or S4, and no murmur noted    ABDOMEN:    soft, non-distended, non-tender,    MUSCULOSKELETAL:    There is no redness, warmth, or swelling of the joints. NEUROLOGIC:    Awake, alert, oriented to name, place and time. SKIN:    No bruising or bleeding. No redness, warmth, or swelling    EXTREMITIES:    Peripheral pulses present. No edema, cyanosis, or swelling.     LINES/CATHETERS     LABORATORY DATA:  CBC with Differential:    Lab Results   Component Value Date/Time    WBC 7.6 01/11/2023 07:14 AM    RBC 2.71 01/11/2023 07:14 AM    HGB 8.1 01/11/2023 07:14 AM    HCT 25.3 01/11/2023 07:14 AM     01/11/2023 07:14 AM    MCV 93.4 01/11/2023 07:14 AM    MCH 29.9 01/11/2023 07:14 AM    MCHC 32.0 01/11/2023 07:14 AM    RDW 20.4 01/11/2023 07:14 AM    LYMPHOPCT 13.2 01/11/2023 07:14 AM    MONOPCT 6.1 01/11/2023 07:14 AM    MYELOPCT 0.9 01/06/2023 11:59 AM    BASOPCT 0.9 01/11/2023 07:14 AM    MONOSABS 0.46 01/11/2023 07:14 AM    LYMPHSABS 0.99 01/11/2023 07:14 AM    EOSABS 0.20 01/11/2023 07:14 AM    BASOSABS 0.07 01/11/2023 07:14 AM     CMP:    Lab Results   Component Value Date/Time     01/10/2023 07:45 AM    K 3.4 01/10/2023 07:45 AM    K 4.4 12/26/2022 03:10 PM     01/10/2023 07:45 AM    CO2 29 01/10/2023 07:45 AM    BUN 11 01/10/2023 07:45 AM    CREATININE 1.0 01/10/2023 07:45 AM    GFRAA >60 05/13/2020 03:50 AM    LABGLOM >60 01/10/2023 07:45 AM    GLUCOSE 161 01/10/2023 07:45 AM    GLUCOSE 253 03/28/2011 06:05 PM    PROT 4.2 01/05/2023 08:11 PM    LABALBU 2.8 01/05/2023 08:11 PM    LABALBU 4.3 03/28/2011 06:05 PM    CALCIUM 8.0 01/10/2023 07:45 AM    BILITOT 0.2 01/05/2023 08:11 PM    ALKPHOS 53 01/05/2023 08:11 PM    AST 10 01/05/2023 08:11 PM    ALT 7 01/05/2023 08:11 PM       ASSESSMENT/PLAN:  DKA-insulin-dependent diabetes mellitus type 2  Anemia possibly secondary to GI bleed  Right knee - hematoma  Non-insulin-dependent diabetes mellitus-switched to insulin  RED-resolved  6. Pink Prakash Pink Prakash Esophageal thickening-esophageal stricture per EGD 1/6  7. Possible seizure-probably provoked seizure from DKA, hyperglycemia, severe anemia-EEG unremarkable for seizure disorder and pattern compatible with metabolic encephalopathy  8.    Elevated troponin-secondary to demand ischemia  Hypertension  Hyperlipidemia  Paroxysmal atrial fibrillation on Eliquis  Enlarged prostate    Plan:  Discharge to extended-care facility-[facility]    Lantus 16 SQ twice daily  Glucoscans 4 times daily with sliding scale insulin  Protonix 40 mg daily MiraLAX 17 g daily    Eliquis 5 mg twice daily is on hold for 1 month and to be reevaluated by PCP with the patient having anemia and possible upper GI bleed    Resume other home meds    Patient probably needs capsule endoscopy at GI office outpatient    Patient to follow-up outpatient GI 1-2 weeks    Patient needs to follow-up primary care physician in 1 week        Matthew Wakefield DO  10:57 AM  1/11/2023

## 2023-02-08 ENCOUNTER — INITIAL CONSULT (OUTPATIENT)
Dept: SURGERY | Age: 78
End: 2023-02-08
Payer: MEDICARE

## 2023-02-08 ENCOUNTER — HOSPITAL ENCOUNTER (OUTPATIENT)
Age: 78
Discharge: HOME | End: 2023-02-08
Payer: MEDICARE

## 2023-02-08 ENCOUNTER — TELEPHONE (OUTPATIENT)
Dept: SURGERY | Age: 78
End: 2023-02-08

## 2023-02-08 VITALS
SYSTOLIC BLOOD PRESSURE: 139 MMHG | DIASTOLIC BLOOD PRESSURE: 85 MMHG | HEIGHT: 68 IN | TEMPERATURE: 98.4 F | WEIGHT: 177 LBS | BODY MASS INDEX: 26.83 KG/M2 | HEART RATE: 88 BPM

## 2023-02-08 DIAGNOSIS — D50.0 IRON DEFICIENCY ANEMIA DUE TO CHRONIC BLOOD LOSS: ICD-10-CM

## 2023-02-08 DIAGNOSIS — K40.90 LEFT INGUINAL HERNIA: Primary | ICD-10-CM

## 2023-02-08 LAB
ALBUMIN SERPL-MCNC: 4.4 G/DL (ref 3.5–5.2)
ALP BLD-CCNC: 115 U/L (ref 40–129)
ALT SERPL-CCNC: 15 U/L (ref 0–40)
ANION GAP SERPL CALCULATED.3IONS-SCNC: 11 MMOL/L (ref 7–16)
AST SERPL-CCNC: 16 U/L (ref 0–39)
BASOPHILS ABSOLUTE: 0.07 E9/L (ref 0–0.2)
BASOPHILS RELATIVE PERCENT: 1.1 % (ref 0–2)
BILIRUB SERPL-MCNC: 0.4 MG/DL (ref 0–1.2)
BUN BLDV-MCNC: 19 MG/DL (ref 6–23)
CALCIUM SERPL-MCNC: 10.2 MG/DL (ref 8.6–10.2)
CHLORIDE BLD-SCNC: 99 MMOL/L (ref 98–107)
CO2: 32 MMOL/L (ref 22–29)
CREAT SERPL-MCNC: 1.4 MG/DL (ref 0.7–1.2)
EOSINOPHILS ABSOLUTE: 0.19 E9/L (ref 0.05–0.5)
EOSINOPHILS RELATIVE PERCENT: 3.1 % (ref 0–6)
FERRITIN: 148 NG/ML
FOLATE: >20 NG/ML (ref 4.8–24.2)
GFR SERPL CREATININE-BSD FRML MDRD: 52 ML/MIN/1.73
GLUCOSE BLD-MCNC: 169 MG/DL (ref 74–99)
HCT VFR BLD CALC: 44.8 % (ref 37–54)
HEMOGLOBIN: 13.9 G/DL (ref 12.5–16.5)
IMMATURE GRANULOCYTES #: 0.03 E9/L
IMMATURE GRANULOCYTES %: 0.5 % (ref 0–5)
IMMATURE RETIC FRACT: 10.2 % (ref 2.3–13.4)
IRON SATURATION: 20 % (ref 20–55)
IRON: 60 MCG/DL (ref 59–158)
LYMPHOCYTES ABSOLUTE: 1.31 E9/L (ref 1.5–4)
LYMPHOCYTES RELATIVE PERCENT: 21.4 % (ref 20–42)
MCH RBC QN AUTO: 27.9 PG (ref 26–35)
MCHC RBC AUTO-ENTMCNC: 31 % (ref 32–34.5)
MCV RBC AUTO: 90 FL (ref 80–99.9)
MONOCYTES ABSOLUTE: 0.33 E9/L (ref 0.1–0.95)
MONOCYTES RELATIVE PERCENT: 5.4 % (ref 2–12)
NEUTROPHILS ABSOLUTE: 4.2 E9/L (ref 1.8–7.3)
NEUTROPHILS RELATIVE PERCENT: 68.5 % (ref 43–80)
PDW BLD-RTO: 15.2 FL (ref 11.5–15)
PLATELET # BLD: 251 E9/L (ref 130–450)
PMV BLD AUTO: 10.4 FL (ref 7–12)
POTASSIUM SERPL-SCNC: 4.1 MMOL/L (ref 3.5–5)
RBC # BLD: 4.98 E12/L (ref 3.8–5.8)
RETIC HGB EQUIVALENT: 33.4 PG (ref 28.2–36.6)
RETICULOCYTE ABSOLUTE COUNT: 0.03 E12/L
RETICULOCYTE COUNT PCT: 0.6 % (ref 0.4–1.9)
SODIUM BLD-SCNC: 142 MMOL/L (ref 132–146)
TOTAL IRON BINDING CAPACITY: 307 MCG/DL (ref 250–450)
TOTAL PROTEIN: 7.2 G/DL (ref 6.4–8.3)
VITAMIN B-12: 497 PG/ML (ref 211–946)
WBC # BLD: 6.1 E9/L (ref 4.5–11.5)

## 2023-02-08 PROCEDURE — 36415 COLL VENOUS BLD VENIPUNCTURE: CPT

## 2023-02-08 PROCEDURE — G8417 CALC BMI ABV UP PARAM F/U: HCPCS | Performed by: SURGERY

## 2023-02-08 PROCEDURE — 80053 COMPREHEN METABOLIC PANEL: CPT

## 2023-02-08 PROCEDURE — 99203 OFFICE O/P NEW LOW 30 MIN: CPT | Performed by: SURGERY

## 2023-02-08 PROCEDURE — 85025 COMPLETE CBC W/AUTO DIFF WBC: CPT

## 2023-02-08 PROCEDURE — 82607 VITAMIN B-12: CPT

## 2023-02-08 PROCEDURE — 83540 ASSAY OF IRON: CPT

## 2023-02-08 PROCEDURE — G8484 FLU IMMUNIZE NO ADMIN: HCPCS | Performed by: SURGERY

## 2023-02-08 PROCEDURE — 82728 ASSAY OF FERRITIN: CPT

## 2023-02-08 PROCEDURE — 3074F SYST BP LT 130 MM HG: CPT | Performed by: SURGERY

## 2023-02-08 PROCEDURE — G8427 DOCREV CUR MEDS BY ELIG CLIN: HCPCS | Performed by: SURGERY

## 2023-02-08 PROCEDURE — 3017F COLORECTAL CA SCREEN DOC REV: CPT | Performed by: SURGERY

## 2023-02-08 PROCEDURE — 85045 AUTOMATED RETICULOCYTE COUNT: CPT

## 2023-02-08 PROCEDURE — 3078F DIAST BP <80 MM HG: CPT | Performed by: SURGERY

## 2023-02-08 PROCEDURE — 82746 ASSAY OF FOLIC ACID SERUM: CPT

## 2023-02-08 PROCEDURE — 1036F TOBACCO NON-USER: CPT | Performed by: SURGERY

## 2023-02-08 PROCEDURE — 1111F DSCHRG MED/CURRENT MED MERGE: CPT | Performed by: SURGERY

## 2023-02-08 PROCEDURE — 83550 IRON BINDING TEST: CPT

## 2023-02-08 PROCEDURE — 1123F ACP DISCUSS/DSCN MKR DOCD: CPT | Performed by: SURGERY

## 2023-02-08 NOTE — TELEPHONE ENCOUNTER
Referral sent electronically and faxed to Dr. Isra Padron for anemia. Medical clearance request faxed to Dr. Kylee Bates and cardiac clearance request faxed to Dr. Ángela Lucero in anticipation for inguinal hernia repair with Dr. Page Rock. 6 week follow scheduled to follow up on anemia/clearances/surgery scheduling.   Electronically signed by Cadence Meier RN on 2/8/2023 at 10:26 AM

## 2023-02-08 NOTE — PROGRESS NOTES
General Surgery History and Physical  T [facility]    Patient's Name/Date of Birth: Jennifer Friedman / [id number]    Date: February 8, 2023     Surgeon: Magnolia Solis M.D.    PCP: Rocio Albright DO     Chief Complaint: left Inguinal bulge    HPI:   Jennifer Friedman is a 78 y.o. male who presents for evaluation of left groin bulge. Timing is constant, radiation to left groin, alleviated by none and started months ago and severity is 7/10. States pain has been worsening, no symptoms of bowel obstruction, nausea, vomiting, fever, chills, abdominal pain. States it protrudes with activity, it is reducible.     Patient Active Problem List   Diagnosis    Arthropathy of right shoulder    Diabetes mellitus ([facility] [address]    Hyperlipidemia    Hypertension    Moderate obesity    Paroxysmal atrial fibrillation (HCC)    DKA, type 1, not at goal [facility])    Seizure ([facility]    Moderate protein-calorie malnutrition ([facility] [address]       Past Medical History:   Diagnosis Date    Arthritis     Bilateral shoulder pain 05/2020    for TJAS Right 05/12/2020 Dr Ruchi Grant    Diabetes mellitus [facility])     Enlarged prostate     follows with Dr Gume Waldron    History of Holter monitoring 07/29/2022    History of irregular heartbeat     follows with PCP    Hyperlipidemia     Hypertension     Leg wound, right 05/2020    recent history of; see podiatry notes in epic       Past Surgical History:   Procedure Laterality Date    CATARACT REMOVAL WITH IMPLANT Bilateral     COLONOSCOPY N/A 1/9/2023    COLONOSCOPY POLYPECTOMY HOT SNARE performed by Ren Koch MD at [address] [facility], [address] [facility] Right 5/12/2020    RIGHT SHOULDER TOTAL ARTHROPLASTY REVERSE performed by Anival Farfan MD at [address] N/A 1/6/2023    EGD ESOPHAGOGASTRODUODENOSCOPY DILATATION performed by Ren Koch MD at [facility] 6 Reactions    Penicillins Other (See Comments)     Occurred as child, rxn unknown    Seasonal     Sulfa Antibiotics Other (See Comments)     Occurred as child, rxn unknown       The patient has a family history that is negative for severe cardiovascular or respiratory issues, negative for reaction to anesthesia. Patient does not report any history of gastrointestinal cancer in the mother or father    Time spent reviewing past medical, surgical, social and family history, vitals, nursing assessment and images. No changes from above documented history. Social History     Socioeconomic History    Marital status: Single     Spouse name: Not on file    Number of children: Not on file    Years of education: Not on file    Highest education level: Not on file   Occupational History    Not on file   Tobacco Use    Smoking status: Never    Smokeless tobacco: Never   Vaping Use    Vaping Use: Never used   Substance and Sexual Activity    Alcohol use: Never    Drug use: Never    Sexual activity: Not on file   Other Topics Concern    Not on file   Social History Narrative    Not on file     Social Determinants of Health     Financial Resource Strain: Not on file   Food Insecurity: Not on file   Transportation Needs: Not on file   Physical Activity: Not on file   Stress: Not on file   Social Connections: Not on file   Intimate Partner Violence: Not on file   Housing Stability: Not on file         A complete 10 system review was performed and are otherwise negative unless mentioned in the above HPI. Specific negatives are listed below but may not include all those reviewed.     General ROS: negative obtundation, AMS  ENT ROS: negative rhinorrhea, epistaxis  Allergy and Immunology ROS: negative itchy/watery eyes or nasal congestion  Hematological and Lymphatic ROS: negative spontaneous bleeding or bruising  Endocrine ROS: negative  lethargy, mood swings, palpitations or polydipsia/polyuria  Respiratory ROS: negative sputum changes, stridor, tachypnea or wheezing  Cardiovascular ROS: negative for - loss of consciousness, murmur or orthopnea  Gastrointestinal ROS: negative for - hematochezia or hematemesis  Genito-Urinary ROS: negative for -  genital discharge or hematuria  Musculoskeletal ROS: negative for - focal weakness, gangrene  Psych/Neuro ROS: negative for - visual or auditory hallucinations, suicidal ideation    Physical exam:   /85   Pulse 88   Temp 98.4 °F (36.9 °C)   Ht 5' 8\" (1.727 m)   Wt 177 lb (80.3 kg)   BMI 26.91 kg/m²   General appearance:  NAD, appears stated age  Head: NCAT, PERRLA, EOMI, red conjunctiva  Neck: supple, no masses, trachea midline  Lungs: Equal chest rise bilateral, no retractions, no wheezing  Heart: Reg rate  Abdomen: soft, nontender nondistended  Skin; warm and dry, no cyanosis  Gu: no cva tenderness, palpable inguinal hernia on the left side and no palpable defect on the contralateral side  Extremities: atraumatic, no focal motor deficits, no open wounds  Psych: No tremor, visual hallucinations        Radiology: I reviewed relevant abdominal imaging from this admission and that available in the EMR      Assessment:  Mamta Wells is a 78 y.o. male with left inguinal hernia, nonrecurrent  Patient Active Problem List   Diagnosis    Arthropathy of right shoulder    Diabetes mellitus ([facility] [address]    Hyperlipidemia    Hypertension    Moderate obesity    Paroxysmal atrial fibrillation (HCC)    DKA, type 1, not at goal [facility])    Seizure ([facility] [address]    Moderate protein-calorie malnutrition ([facility] [address]         Plan:  I discussed the above available imaging in the electronic medical record and reviewed images independently as well as in the office evaluation room with the patient when available. I selected relevant images and included them in the above note if they were available  I discussed at length inguinal anatomy, blood supply, neurovascular anatomy and options for surgical repair.   Discussed the incidence of incarceration and strangulation or emergency evaluation requiring surgery for inguinal hernia  I reviewed urgent care and medical specialist notes in the electronic medical record relating to the above complaints as well as any underlying medical conditions that may contribute to or prevent surgical intervention  I discussed nonoperative management, we also discussed reasons to seek urgent attention that are related to incarceration strangulation obstruction or ongoing problems with worsening obstipation  We discussed the risks of chronic groin pain associated with inguinal hernia surgery with or without mesh placement  We discussed surgical options excluding the use of mesh and locations where this may be performed and indications where this may be completed  I recommend proceeding to OR for laparoscopic robot assisted left inguinal hernia repair with mesh possible bilateral  Medical and cardiac clearance  Will need his blood count improved prior to surgery  Referral to hematology for iron infusions      This will be scheduled today for future date at the patient's request  Discussed the risk, benefits and alternatives of surgery including wound infections, bleeding, scar, recurrent hernia formation, mesh infection and migration, chronic groin pain, nerve injury, testicle injury, repeat procedures and the risks of general anesthetic including MI, CVA, sudden death or reactions to anesthetic medications. The patient understands the risks and alternatives and the possibility of converting to an open procedure. All questions were answered to the patient's satisfaction and they freely signed the consent.           Fermín Burris MD  10:05 AM  2/8/2023

## 2023-02-16 ENCOUNTER — OFFICE VISIT (OUTPATIENT)
Dept: ONCOLOGY | Age: 78
End: 2023-02-16
Payer: COMMERCIAL

## 2023-02-16 ENCOUNTER — HOSPITAL ENCOUNTER (OUTPATIENT)
Dept: INFUSION THERAPY | Age: 78
Discharge: HOME | End: 2023-02-16

## 2023-02-16 VITALS
BODY MASS INDEX: 26.89 KG/M2 | DIASTOLIC BLOOD PRESSURE: 75 MMHG | WEIGHT: 177.4 LBS | OXYGEN SATURATION: 99 % | TEMPERATURE: 98.4 F | HEART RATE: 71 BPM | SYSTOLIC BLOOD PRESSURE: 149 MMHG | HEIGHT: 68 IN

## 2023-02-16 DIAGNOSIS — K92.2 GASTROINTESTINAL HEMORRHAGE, UNSPECIFIED GASTROINTESTINAL HEMORRHAGE TYPE: Primary | ICD-10-CM

## 2023-02-16 PROCEDURE — 3074F SYST BP LT 130 MM HG: CPT | Performed by: INTERNAL MEDICINE

## 2023-02-16 PROCEDURE — 99205 OFFICE O/P NEW HI 60 MIN: CPT | Performed by: INTERNAL MEDICINE

## 2023-02-16 PROCEDURE — 3078F DIAST BP <80 MM HG: CPT | Performed by: INTERNAL MEDICINE

## 2023-02-16 PROCEDURE — 1123F ACP DISCUSS/DSCN MKR DOCD: CPT | Performed by: INTERNAL MEDICINE

## 2023-02-16 RX ORDER — FERROUS SULFATE 325(65) MG
325 TABLET ORAL EVERY OTHER DAY
Qty: 60 TABLET | Refills: 1 | Status: SHIPPED | OUTPATIENT
Start: 2023-02-16

## 2023-02-16 RX ORDER — DULAGLUTIDE 0.75 MG/.5ML
0.75 INJECTION, SOLUTION SUBCUTANEOUS WEEKLY
COMMUNITY

## 2023-02-16 NOTE — PROGRESS NOTES
[id number] [facility]  [place] [address]  [facility] [phone]  Attending Consult Note      Reason for Visit:   Anemia. Referring Physician:  Nroy Burnett MD    PCP:  Joanna Mcburney Debiec, DO    History of Present Illness:     Mr. Sebastian Tomas is a pleasant 72-year-old gentleman, with a past medical history significant for A-fib, on chronic integration with Eliquis, hypertension, hyperlipidemia, and type II DM, who was referred to the hematology office evaluation of anemia. The patient was brought to the ED on 1/5/2023 after being found on the floor, his initial blood work was remarkable for hemoglobin of 5.7G/DL, he was also found to be in DKA. The patient had an EGD and colonoscopy done revealing hiatal hernia, esophageal stricture, diverticulosis, and rectal polyp, the patient just had a capsule endoscopy done. The patient received packed RBCs transfusion and is on oral iron. The patient needs to have a left inguinal hernia repair done, which would be in April. His counts need to be optimized for the surgery. He is feeling well at this time, energy level had significantly improved, no bleeding that he is aware of. He is tolerating the oral iron without any side effects. Review of Systems;  CONSTITUTIONAL: No fever, chills. Good appetite and energy level. ENMT: Eyes: No diplopia; Nose: No epistaxis. Mouth: No sore throat. He is hard of hearing. RESPIRATORY: No hemoptysis, shortness of breath, cough. CARDIOVASCULAR: No chest pain, palpitations. GASTROINTESTINAL: No nausea/vomiting, abdominal pain, diarrhea/constipation. GENITOURINARY: No dysuria, urinary frequency, hematuria. NEURO: No syncope, presyncope, headache.   Remainder:  ROS NEGATIVE    Past Medical History:      Diagnosis Date    Arthritis     Bilateral shoulder pain 05/2020    for TJAS Right 05/12/2020 Dr Fei Mcginnis    Diabetes mellitus [facility]) Enlarged prostate     follows with Dr Wendy Zuniga    History of Holter monitoring 07/29/2022    History of irregular heartbeat     follows with PCP    Hyperlipidemia     Hypertension     Leg wound, right 05/2020    recent history of; see podiatry notes in epic     Patient Active Problem List   Diagnosis    Arthropathy of right shoulder    Diabetes mellitus ([facility] [address]    Hyperlipidemia    Hypertension    Moderate obesity    Paroxysmal atrial fibrillation (HCC)    DKA, type 1, not at goal [facility])    Seizure ([facility] [address]    Moderate protein-calorie malnutrition ([facility] [address]        Past Surgical History:      Procedure Laterality Date    CATARACT REMOVAL WITH IMPLANT Bilateral     COLONOSCOPY N/A 1/9/2023    COLONOSCOPY POLYPECTOMY HOT SNARE performed by Shakira Jimenez MD at [address] [facility], [address] [facility] Right 5/12/2020    RIGHT SHOULDER TOTAL ARTHROPLASTY REVERSE performed by Ashlyn Holloway MD at [address] [facility] N/A 1/6/2023    EGD ESOPHAGOGASTRODUODENOSCOPY DILATATION performed by Shakira Jimenez MD at [facility] ENDOSCOPY       Family History:  Family History   Problem Relation Age of Onset    Cancer Mother         skin    No Known Problems Father        Medications:  Reviewed and reconciled.     Social History:  Social History     Socioeconomic History    Marital status: Single     Spouse name: Not on file    Number of children: Not on file    Years of education: Not on file    Highest education level: Not on file   Occupational History    Not on file   Tobacco Use    Smoking status: Never    Smokeless tobacco: Never   Vaping Use    Vaping Use: Never used   Substance and Sexual Activity    Alcohol use: Never    Drug use: Never    Sexual activity: Not on file     Comment: not assessed   Other Topics Concern    Not on file   Social History Narrative    Not on file     Social Determinants of Health     Financial Resource Strain: Not on file   Food Insecurity: Not on file   Transportation Needs: Not on file   Physical Activity: Not on file   Stress: Not on file   Social Connections: Not on file   Intimate Partner Violence: Not on file   Housing Stability: Not on file       Allergies: Allergies   Allergen Reactions    Penicillins Other (See Comments)     Occurred as child, rxn unknown    Seasonal     Sulfa Antibiotics Other (See Comments)     Occurred as child, rxn unknown       Physical Exam:  BP (!) 149/75   Pulse 71   Temp 98.4 °F (36.9 °C)   Ht 5' 8\" (1.727 m)   Wt 177 lb 6.4 oz (80.5 kg)   SpO2 99%   BMI 26.97 kg/m²   GENERAL: Alert, oriented x 3, not in acute distress. HEENT: PERRLA; EOMI. Oropharynx clear. NECK: Supple. No palpable cervical or supraclavicular lymphadenopathy. LUNGS: Good air entry bilaterally. No wheezing, crackles or rhonchi. CARDIOVASCULAR: Regular rhythm, normal rate. ABDOMEN: Soft. Non-tender, non-distended. Positive bowel sounds. EXTREMITIES: Without clubbing, cyanosis, or edema. NEUROLOGIC: No focal deficits. ECOG PS 1    Impression/Plan:        Mr. Horacio Gonzalez is a pleasant 56-year-old gentleman, with a past medical history significant for A-fib, on chronic integration with Eliquis, hypertension, hyperlipidemia, and type II DM, who was referred to the hematology office evaluation of anemia. The patient was brought to the ED on 1/5/2023 after being found on the floor, his initial blood work was remarkable for hemoglobin of 5.7G/DL, he was also found to be in DKA. The patient had an EGD and colonoscopy done revealing hiatal hernia, esophageal stricture, diverticulosis, and rectal polyp, the patient just had a capsule endoscopy done. The patient received packed RBCs transfusion and is on oral iron. The patient needs to have a left inguinal hernia repair done, which would be in April. His counts need to be optimized for the surgery.   He is feeling well at this time, energy level had significantly improved, no bleeding that he is aware of. The patient had profound anemia, most likely was secondary to GI bleed in the setting of chronic anticoagulation with Eliquis, EGD and colonoscopy did not reveal active bleeding, the patient just had a capsule endoscopy done, results were requested. The patient's hemoglobin/hematocrit had improved to 13.9/44.8, he is not anemic at this time, iron panel is normal, recommended to continue with the oral iron, he is tolerating it well, he has a history of vitamin B12 deficiency, vitamin B12 had normalized, had increased to 497, no folate deficiency. The patient will have repeat CBCD and iron panel done in about a month, he will receive parenteral infusions as needed, will follow his blood hemoglobin hematocrit closely, we will make sure that his counts will be optimized for the inguinal hernia surgery in April. RTC in 1 month. Thank you for allowing us to participate in the care of Mr. Sebastian Tomas.     Cheryle Port, MD   HEMATOLOGY/MEDICAL [address] [address] [address]  [address] [address]  Dept: [place]: [phone]

## 2023-02-16 NOTE — PROGRESS NOTES
Saloni Matt   78 y.o. Referring Physician:     PCP: Diana Marin,     Vitals:    23 0828   BP: (!) 149/75   Pulse: 71   Temp: 98.4 °F (36.9 °C)   SpO2: 99%        Wt Readings from Last 3 Encounters:   23 177 lb 6.4 oz (80.5 kg)   23 177 lb (80.3 kg)   23 177 lb 12.8 oz (80.6 kg)        Body mass index is 26.97 kg/m². Chief Complaint: No chief complaint on file. Cancer Staging  No matching staging information was found for the patient. Prior Radiation Therapy? NO    Concurrent Chemo/radiation? NO    Prior Chemotherapy? NO    Prior Hormonal Therapy? NO    Head and Neck Cancer? No, patient does NOT have HN cancer.       LMP: na    Age at first Menses: na    : na    Para: na          Current Outpatient Medications:     Dulaglutide (TRULICITY) 5.09 YE/8.0NC SOPN, Inject 0.75 mg into the skin once a week, Disp: , Rfl:     apixaban (ELIQUIS) 5 MG TABS tablet, Take 1 tablet by mouth 2 times daily, Disp: 180 tablet, Rfl: 3    pantoprazole (PROTONIX) 40 MG tablet, Take 1 tablet by mouth every morning (before breakfast), Disp: 90 tablet, Rfl: 1    insulin glargine (LANTUS) 100 UNIT/ML injection vial, Inject 6 Units into the skin 2 times daily (Patient taking differently: Inject 8 Units into the skin 2 times daily), Disp: 10 mL, Rfl: 3    FARXIGA 5 MG tablet, take 1 tablet by mouth once daily, Disp: , Rfl:     atorvastatin (LIPITOR) 40 MG tablet, take 1 tablet by mouth daily, Disp: , Rfl:     Multiple Vitamins-Minerals (THERAPEUTIC MULTIVITAMIN-MINERALS) tablet, Take 1 tablet by mouth daily Last taken 2020, Disp: , Rfl:     Ascorbic Acid (VITAMIN C PO), Take 1 tablet by mouth daily Last taken , Disp: , Rfl:     acetaminophen (TYLENOL) 500 MG tablet, Take 500 mg by mouth every 6 hours as needed for Pain, Disp: , Rfl:     lisinopril (PRINIVIL;ZESTRIL) 40 MG tablet, Take 40 mg by mouth daily, Disp: , Rfl:     NIFEdipine (ADALAT CC) 30 MG extended release tablet, Take 30 mg by mouth daily Take am day of surgery 05/12/2020, Disp: , Rfl:     insulin lispro (HUMALOG) 100 UNIT/ML SOLN injection vial, Inject 0-4 Units into the skin 3 times daily (with meals) (Patient not taking: Reported on 2/16/2023), Disp: , Rfl:     insulin lispro (HUMALOG) 100 UNIT/ML SOLN injection vial, Inject 0-4 Units into the skin nightly (Patient not taking: Reported on 2/16/2023), Disp: , Rfl:        Past Medical History:   Diagnosis Date    Arthritis     Bilateral shoulder pain 05/2020    for TJAS Right 05/12/2020 Dr Lionel Jeans    Diabetes mellitus [facility])     Enlarged prostate     follows with Dr Ninfa Henderson    History of Holter monitoring 07/29/2022    History of irregular heartbeat     follows with PCP    Hyperlipidemia     Hypertension     Leg wound, right 05/2020    recent history of; see podiatry notes in epic       Past Surgical History:   Procedure Laterality Date    CATARACT REMOVAL WITH IMPLANT Bilateral     COLONOSCOPY N/A 1/9/2023    COLONOSCOPY POLYPECTOMY HOT SNARE performed by Latia Kim MD at [facility], [address] [facility] Right 5/12/2020    RIGHT SHOULDER TOTAL ARTHROPLASTY REVERSE performed by Krzysztof Yeh MD at [place] [address] ENDOSCOPY N/A 1/6/2023    EGD ESOPHAGOGASTRODUODENOSCOPY DILATATION performed by Latia Kim MD at [facility] ENDOSCOPY       Family History   Problem Relation Age of Onset    No Known Problems Mother     No Known Problems Father        Social History     Socioeconomic History    Marital status: Single     Spouse name: Not on file    Number of children: Not on file    Years of education: Not on file    Highest education level: Not on file   Occupational History    Not on file   Tobacco Use    Smoking status: Never    Smokeless tobacco: Never   Vaping Use    Vaping Use: Never used   Substance and Sexual Activity    Alcohol use: Never Drug use: Never    Sexual activity: Not on file   Other Topics Concern    Not on file   Social History Narrative    Not on file     Social Determinants of Health     Financial Resource Strain: Not on file   Food Insecurity: Not on file   Transportation Needs: Not on file   Physical Activity: Not on file   Stress: Not on file   Social Connections: Not on file   Intimate Partner Violence: Not on file   Housing Stability: Not on file           Occupation: farmer  Retired:  NO          REVIEW OF SYSTEMS:      Pacemaker/Defibulator/ICD:  No    Mediport: No           FALLS RISK SCREENING ASSESSMENT    Instructions:  Assess the patient and [place] the appropriate indicators that are present for fall risk identification. Total the numbers circled and assign a fall risk score from Table 2.  Reassess patient at a minimum every 12 weeks or with status change. Assessment   Date  2/16/2023     1. Mental Ability: confusion/cognitively impaired No - 0       2. Elimination Issues: incontinence, frequency No - 0       3. Ambulatory: use of assistive devices (walker, cane, off-loading devices), attached to equipment (IV pole, oxygen) No - 0     4. Sensory Limitations: dizziness, vertigo, impaired vision No - 0       5. Age 72 years or greater - 1       10. Medication: diuretics, strong analgesics, hypnotics, sedatives, antihypertensive agents   Yes - 3   7. Falls:  recent history of falls within the last 3 months (not to include slipping or tripping)   Yes - 7   TOTAL 11    If score of 4 or greater was education given? Yes       TABLE 2   Risk Score Risk Level Plan of Care   0-3 Little or  No Risk 1. Provide assistance as indicated for ambulation activities  2. Reorient confused/cognitively impaired patient  3. Call-light/bell within patient's reach  4. Chair/bed in low position, stretcher/bed with siderails up except when performing patient care activities  5. Educate patient/family/caregiver on falls prevention  6. Reassess in 12 weeks or with any noted change in patient condition which places them at a risk for a fall   4-6 Moderate Risk 1. Provide assistance as indicated for ambulation activities  2. Reorient confused/cognitively impaired patient  3. Call-light/bell within patient's reach  4. Chair/bed in low position, stretcher/bed with siderails up except when performing patient care activities  5. Educate patient/family/caregiver on falls prevention  6. Falls risk precaution (Yellow sticker Level II) placed on patient chart   7 or   Higher High Risk 1. Place patient in easily observable treatment room  2. Patient attended at all times by family member or staff  3. Provide assistance as indicated for ambulation activities  4. Reorient confused/cognitively impaired patient  5. Call-light/bell within patient's reach  6. Chair/bed in low position, stretcher/bed with siderails up except when performing patient care activities  7. Educate patient/family/caregiver on falls prevention  8. Falls risk precaution (Yellow sticker Level III) placed on patient chart           MALNUTRITION RISK SCREENING ASSESSMENT    Instructions:  Assess the patient and enter the appropriate indicators that are present for nutrition risk identification. Total the numbers entered and assign a risk score. Follow the appropriate action for total score listed below. Assessment   Date  2/16/2023     Have you lost weight without trying? 1- Yes, 0.5 kg to 5 kg     Have you been eating poorly because of a decreased appetite? 0- No   3. Do you have a diagnosis of head and neck cancer? 0- No                                                                                    TOTAL 1        Score of 0-1: No action  Score 2 or greater:   For Non-Diabetic Patient: Recommend adding Ensure Enlive 2 x daily and provide patient with Ensure wellness bag with coupons  For Diabetic Patient: Recommend adding Glucerna Shake 2 x daily and provide patient with Glucerna Wellness bag with coupons  Route to the dietitian via Anjelica Hale RN

## 2023-03-07 ENCOUNTER — OFFICE VISIT (OUTPATIENT)
Dept: CARDIOLOGY CLINIC | Age: 78
End: 2023-03-07
Payer: MEDICARE

## 2023-03-07 VITALS
HEART RATE: 69 BPM | BODY MASS INDEX: 26.52 KG/M2 | HEIGHT: 68 IN | WEIGHT: 175 LBS | RESPIRATION RATE: 16 BRPM | SYSTOLIC BLOOD PRESSURE: 124 MMHG | DIASTOLIC BLOOD PRESSURE: 74 MMHG

## 2023-03-07 DIAGNOSIS — Z01.810 PREOPERATIVE CARDIOVASCULAR EXAMINATION: ICD-10-CM

## 2023-03-07 DIAGNOSIS — E11.9 TYPE 2 DIABETES MELLITUS WITHOUT COMPLICATION, WITHOUT LONG-TERM CURRENT USE OF INSULIN (HCC): Chronic | ICD-10-CM

## 2023-03-07 DIAGNOSIS — E78.00 PURE HYPERCHOLESTEROLEMIA: Chronic | ICD-10-CM

## 2023-03-07 DIAGNOSIS — I10 PRIMARY HYPERTENSION: Chronic | ICD-10-CM

## 2023-03-07 DIAGNOSIS — I48.19 PERSISTENT ATRIAL FIBRILLATION (HCC): Primary | ICD-10-CM

## 2023-03-07 PROCEDURE — 3074F SYST BP LT 130 MM HG: CPT | Performed by: INTERNAL MEDICINE

## 2023-03-07 PROCEDURE — 93000 ELECTROCARDIOGRAM COMPLETE: CPT | Performed by: INTERNAL MEDICINE

## 2023-03-07 PROCEDURE — 99215 OFFICE O/P EST HI 40 MIN: CPT | Performed by: INTERNAL MEDICINE

## 2023-03-07 PROCEDURE — G8417 CALC BMI ABV UP PARAM F/U: HCPCS | Performed by: INTERNAL MEDICINE

## 2023-03-07 PROCEDURE — 1123F ACP DISCUSS/DSCN MKR DOCD: CPT | Performed by: INTERNAL MEDICINE

## 2023-03-07 PROCEDURE — 3017F COLORECTAL CA SCREEN DOC REV: CPT | Performed by: INTERNAL MEDICINE

## 2023-03-07 PROCEDURE — 3044F HG A1C LEVEL LT 7.0%: CPT | Performed by: INTERNAL MEDICINE

## 2023-03-07 PROCEDURE — 1036F TOBACCO NON-USER: CPT | Performed by: INTERNAL MEDICINE

## 2023-03-07 PROCEDURE — 2022F DILAT RTA XM EVC RTNOPTHY: CPT | Performed by: INTERNAL MEDICINE

## 2023-03-07 PROCEDURE — G8484 FLU IMMUNIZE NO ADMIN: HCPCS | Performed by: INTERNAL MEDICINE

## 2023-03-07 PROCEDURE — G8427 DOCREV CUR MEDS BY ELIG CLIN: HCPCS | Performed by: INTERNAL MEDICINE

## 2023-03-07 PROCEDURE — 3078F DIAST BP <80 MM HG: CPT | Performed by: INTERNAL MEDICINE

## 2023-03-07 RX ORDER — INSULIN GLARGINE 100 [IU]/ML
INJECTION, SOLUTION SUBCUTANEOUS
COMMUNITY
Start: 2023-02-26

## 2023-03-07 RX ORDER — METOLAZONE 5 MG/1
TABLET ORAL
COMMUNITY
Start: 2023-01-19

## 2023-03-07 NOTE — PROGRESS NOTES
OUTPATIENT CARDIOLOGY FOLLOW-UP    Name: Gloria Marrufo    Age: 78 y.o. Primary Care Physician: Mina Ferro DO    Date of Service: 3/7/2023    Chief Complaint: Persistent atrial fibrillation, hypertension, preoperative cardiovascular assessment    Interim History: Since his most recent evaluation, the patient was hospitalized with altered mental status in the face of melanotic stools and a severe anemia with hemoglobin levels of approximately 6.5 g/dL at time of presentation. He has subsequently undergone gastroenterology assessment with endoscopy demonstrating no evidence of an active bleeding site and needs of esophageal dilatation and colonoscopy demonstrating evidence of diverticular disease without evidence of active diverticulitis. He subsequently denies any recurrent bleeding in the face of resumption of his oral anticoagulation. At the time of that hospitalization, his atrial fibrillation had recurred with appropriate rate control and persists at time of evaluation with no interim reported symptoms of anginal-like chest discomfort or other ischemic equivalents, decompensated left ventricular systolic dysfunction or volume overload nor arrhythmia related manifestations. He additionally is presently significantly limited by the development of asymptomatic left inguinal hernia with plans of surgical intervention. He denies symptoms of a focal neurologic origin in the face of his existing anticoagulation. Review of Systems: The remainder of a complete multisystem review including consitutional, central nervous, respiratory, circulatory, gastrointestinal, genitourinary, endocrinologic, hematologic, musculoskeletal and psychiatric are negative.     Past Medical History:  Past Medical History:   Diagnosis Date    Arthritis     Bilateral shoulder pain 05/2020    for TJAS Right 05/12/2020 Dr Alexsander Mariano    Diabetes mellitus [facility])     Enlarged prostate     follows with Dr Josue Waldrop    History of Holter monitoring 07/29/2022    History of irregular heartbeat     follows with PCP    Hyperlipidemia     Hypertension     Leg wound, right 05/2020    recent history of; see podiatry notes in epic       Past Surgical History:  Past Surgical History:   Procedure Laterality Date    CATARACT REMOVAL WITH IMPLANT Bilateral     COLONOSCOPY N/A 1/9/2023    COLONOSCOPY POLYPECTOMY HOT SNARE performed by Armani Marquez MD at [facility] ENDOSCOPY    ENDOSCOPY, COLON, DIAGNOSTIC      EYE SURGERY      HERNIA REPAIR      SHOULDER SURGERY Right 5/12/2020    RIGHT SHOULDER TOTAL ARTHROPLASTY REVERSE performed by Juan Mejia MD at [facility] OR    TONSILLECTOMY      UPPER GASTROINTESTINAL ENDOSCOPY N/A 1/6/2023    EGD ESOPHAGOGASTRODUODENOSCOPY DILATATION performed by Armani Marquez MD at [facility] ENDOSCOPY       Family History:  Family History   Problem Relation Age of Onset    Cancer Mother         skin    No Known Problems Father        Social History:  Social History     Socioeconomic History    Marital status: Single     Spouse name: Not on file    Number of children: Not on file    Years of education: Not on file    Highest education level: Not on file   Occupational History    Not on file   Tobacco Use    Smoking status: Never    Smokeless tobacco: Never   Vaping Use    Vaping Use: Never used   Substance and Sexual Activity    Alcohol use: Never    Drug use: Never    Sexual activity: Not on file     Comment: not assessed   Other Topics Concern    Not on file   Social History Narrative    Not on file     Social Determinants of Health     Financial Resource Strain: Not on file   Food Insecurity: Not on file   Transportation Needs: Not on file   Physical Activity: Not on file   Stress: Not on file   Social Connections: Not on file   Intimate Partner Violence: Not on file   Housing Stability: Not on file       Allergies:  Allergies   Allergen Reactions    Penicillins Other (See Comments)     Occurred as child, rxn unknown    Seasonal      Sulfa Antibiotics Other (See Comments)     Occurred as child, rxn unknown       Current Medications:  Current Outpatient Medications   Medication Sig Dispense Refill    LANTUS SOLOSTAR 100 UNIT/ML injection pen inject 8 units subcutaneously twice a day      metOLazone (ZAROXOLYN) 5 MG tablet take 1 tablet by mouth every morning if needed for LEG SWELLING      Dulaglutide (TRULICITY) 0.75 MG/0.5ML SOPN Inject 0.75 mg into the skin once a week      ferrous sulfate (IRON 325) 325 (65 Fe) MG tablet Take 1 tablet by mouth every other day 60 tablet 1    apixaban (ELIQUIS) 5 MG TABS tablet Take 1 tablet by mouth 2 times daily 180 tablet 3    pantoprazole (PROTONIX) 40 MG tablet Take 1 tablet by mouth every morning (before breakfast) 90 tablet 1    FARXIGA 5 MG tablet take 1 tablet by mouth once daily      atorvastatin (LIPITOR) 40 MG tablet take 1 tablet by mouth daily      Multiple Vitamins-Minerals (THERAPEUTIC MULTIVITAMIN-MINERALS) tablet Take 1 tablet by mouth daily Last taken 05/03/2020      Ascorbic Acid (VITAMIN C PO) Take 1 tablet by mouth daily Last taken 05/03      acetaminophen (TYLENOL) 500 MG tablet Take 500 mg by mouth every 6 hours as needed for Pain      lisinopril (PRINIVIL;ZESTRIL) 40 MG tablet Take 40 mg by mouth daily      NIFEdipine (ADALAT CC) 30 MG extended release tablet Take 30 mg by mouth daily Take am day of surgery 05/12/2020       No current facility-administered medications for this visit.         Physical Exam:  /74   Pulse 69   Resp 16   Ht 5' 8\" (1.727 m)   Wt 175 lb (79.4 kg)   BMI 26.61 kg/m²   Wt Readings from Last 3 Encounters:   03/07/23 175 lb (79.4 kg)   02/16/23 177 lb 6.4 oz (80.5 kg)   02/08/23 177 lb (80.3 kg)     The patient is awake, alert and in no discomfort or distress. No gross musculoskeletal deformity is present. No significant skin or nail changes are present. Gross examination of head, eyes, nose and throat are negative. Jugular venous pressure is normal and  no carotid bruits are present. Normal respiratory effort is noted with no accessory muscle usage present. Lung fields are clear to ascultation. Cardiac examination is notable for an irregular rhythm with no palpable thrill. No gallop rhythm or cardiac murmur are identified. A benign abdominal examination is present with no masses or organomegaly. Intact pulses are present throughout all extremities and no peripheral edema is present. No focal neurologic deficits are present. Laboratory Tests:  Lab Results   Component Value Date    CREATININE 1.4 (H) 02/08/2023    BUN 19 02/08/2023     02/08/2023    K 4.1 02/08/2023    CL 99 02/08/2023    CO2 32 (H) 02/08/2023     No results found for: BNP  Lab Results   Component Value Date/Time    WBC 6.1 02/08/2023 10:39 AM    RBC 4.98 02/08/2023 10:39 AM    HGB 13.9 02/08/2023 10:39 AM    HCT 44.8 02/08/2023 10:39 AM    MCV 90.0 02/08/2023 10:39 AM    MCH 27.9 02/08/2023 10:39 AM    MCHC 31.0 02/08/2023 10:39 AM    RDW 15.2 02/08/2023 10:39 AM     02/08/2023 10:39 AM    MPV 10.4 02/08/2023 10:39 AM     No results for input(s): ALKPHOS, ALT, AST, PROT, BILITOT, BILIDIR, LABALBU in the last 72 hours. Lab Results   Component Value Date/Time    MG 1.7 01/10/2023 07:45 AM     Lab Results   Component Value Date/Time    PROTIME 23.7 01/05/2023 10:24 PM    INR 2.1 01/05/2023 10:24 PM     Lab Results   Component Value Date/Time    TSH 1.100 01/06/2023 11:59 AM     No components found for: CHLPL  No results found for: TRIG  No results found for: HDL  No results found for: [address]    Cardiac Tests:  ECG: A resting electrocardiogram demonstrates evidence of atrial fibrillation with a mean ventricular response of approximately 70 bpm with nonspecific ST changes      ASSESSMENT / PLAN: On a clinical basis, the patient presently appears compensated from a cardiovascular standpoint and at present I have maintained his existing medical regimen.   I have extensively discussed his recent hospitalization and need of continued careful monitoring for bleeding. He presently would be septal candidate for his proposed noncardiac surgical procedure with a low risk of ischemic events and a slightly increased risk of embolic events with the necessitation of temporary interruption of oral anticoagulation in the face of his persistent atrial fibrillation which we discussed at the time of his evaluation. I would presently recommend the interruption of his oral anticoagulation 48 hours prior to his surgical procedure with prompt resumption postoperatively once appropriate hemostasis has been achieved. Based on the asymptomatic nature of his atrial arrhythmias and relatively prompt recurrence and persistence, I feel that a rate control and anticoagulation strategy remains appropriate and have not further considered restoring sinus rhythm. Ongoing appropriate lifestyle modification to achieve weight reduction will continue to benefit diastolic cardiac performance in addition to that of reducing risk of the development of obstructive sleep apnea with associated adverse cardiovascular effects. Ongoing aggressive risk factor modification of blood pressure, diabetes and serum lipids will remain essential to reducing risk of future atherosclerotic development. I presently plan his clinical reevaluation in approximately 1 year and would happily reevaluate him in the interim should additional cardiovascular difficulties or concerns arise. The patient's current medication list, allergies, problem list and results of all previously ordered testing were reviewed at today's visit. Follow-up office visit in 1 year      Note: This report was completed using computerized voice recognition software. Every effort has been made to ensure accuracy, however; inadvertent computerized transcription errors may be present. Gricelda Perez.  Elin Chisholm, [facility] Cardiology    An electronic copy of this follow-up progress note was forwarded to Dr. Shobha Burnett

## 2023-03-15 ENCOUNTER — HOSPITAL ENCOUNTER (OUTPATIENT)
Dept: INFUSION THERAPY | Age: 78
Discharge: HOME | End: 2023-03-15
Payer: MEDICARE

## 2023-03-15 DIAGNOSIS — K92.2 GASTROINTESTINAL HEMORRHAGE, UNSPECIFIED GASTROINTESTINAL HEMORRHAGE TYPE: ICD-10-CM

## 2023-03-15 LAB
BASOPHILS # BLD: 0.04 E9/L (ref 0–0.2)
BASOPHILS NFR BLD: 0.8 % (ref 0–2)
EOSINOPHIL # BLD: 0.24 E9/L (ref 0.05–0.5)
EOSINOPHIL NFR BLD: 4.6 % (ref 0–6)
ERYTHROCYTE [DISTWIDTH] IN BLOOD BY AUTOMATED COUNT: 14.6 FL (ref 11.5–15)
FERRITIN SERPL-MCNC: 92 NG/ML
HCT VFR BLD AUTO: 43.2 % (ref 37–54)
HGB BLD-MCNC: 13.7 G/DL (ref 12.5–16.5)
IMM GRANULOCYTES # BLD: 0.02 E9/L
IMM GRANULOCYTES NFR BLD: 0.4 % (ref 0–5)
IRON SATN MFR SERPL: 21 % (ref 20–55)
IRON SERPL-MCNC: 66 MCG/DL (ref 59–158)
LYMPHOCYTES # BLD: 1.69 E9/L (ref 1.5–4)
LYMPHOCYTES NFR BLD: 32.6 % (ref 20–42)
MCH RBC QN AUTO: 28.1 PG (ref 26–35)
MCHC RBC AUTO-ENTMCNC: 31.7 % (ref 32–34.5)
MCV RBC AUTO: 88.7 FL (ref 80–99.9)
MONOCYTES # BLD: 0.46 E9/L (ref 0.1–0.95)
MONOCYTES NFR BLD: 8.9 % (ref 2–12)
NEUTROPHILS # BLD: 2.74 E9/L (ref 1.8–7.3)
NEUTS SEG NFR BLD: 52.7 % (ref 43–80)
PLATELET # BLD AUTO: 232 E9/L (ref 130–450)
PMV BLD AUTO: 10.4 FL (ref 7–12)
RBC # BLD AUTO: 4.87 E12/L (ref 3.8–5.8)
TIBC SERPL-MCNC: 311 MCG/DL (ref 250–450)
WBC # BLD: 5.2 E9/L (ref 4.5–11.5)

## 2023-03-15 PROCEDURE — 83550 IRON BINDING TEST: CPT

## 2023-03-15 PROCEDURE — 85025 COMPLETE CBC W/AUTO DIFF WBC: CPT

## 2023-03-15 PROCEDURE — 83540 ASSAY OF IRON: CPT

## 2023-03-15 PROCEDURE — 82728 ASSAY OF FERRITIN: CPT

## 2023-03-15 PROCEDURE — 36415 COLL VENOUS BLD VENIPUNCTURE: CPT

## 2023-03-16 ENCOUNTER — OFFICE VISIT (OUTPATIENT)
Dept: ONCOLOGY | Age: 78
End: 2023-03-16
Payer: MEDICARE

## 2023-03-16 VITALS
DIASTOLIC BLOOD PRESSURE: 80 MMHG | HEART RATE: 70 BPM | SYSTOLIC BLOOD PRESSURE: 153 MMHG | BODY MASS INDEX: 27.05 KG/M2 | HEIGHT: 68 IN | OXYGEN SATURATION: 99 % | TEMPERATURE: 97.7 F | WEIGHT: 178.5 LBS

## 2023-03-16 DIAGNOSIS — D64.9 ANEMIA, UNSPECIFIED TYPE: Primary | ICD-10-CM

## 2023-03-16 PROCEDURE — 3017F COLORECTAL CA SCREEN DOC REV: CPT | Performed by: INTERNAL MEDICINE

## 2023-03-16 PROCEDURE — 99214 OFFICE O/P EST MOD 30 MIN: CPT | Performed by: INTERNAL MEDICINE

## 2023-03-16 PROCEDURE — 1036F TOBACCO NON-USER: CPT | Performed by: INTERNAL MEDICINE

## 2023-03-16 PROCEDURE — 1123F ACP DISCUSS/DSCN MKR DOCD: CPT | Performed by: INTERNAL MEDICINE

## 2023-03-16 PROCEDURE — G8427 DOCREV CUR MEDS BY ELIG CLIN: HCPCS | Performed by: INTERNAL MEDICINE

## 2023-03-16 PROCEDURE — 3078F DIAST BP <80 MM HG: CPT | Performed by: INTERNAL MEDICINE

## 2023-03-16 PROCEDURE — 3074F SYST BP LT 130 MM HG: CPT | Performed by: INTERNAL MEDICINE

## 2023-03-16 PROCEDURE — G8417 CALC BMI ABV UP PARAM F/U: HCPCS | Performed by: INTERNAL MEDICINE

## 2023-03-16 PROCEDURE — G8484 FLU IMMUNIZE NO ADMIN: HCPCS | Performed by: INTERNAL MEDICINE

## 2023-03-16 RX ORDER — FERROUS SULFATE 325(65) MG
325 TABLET ORAL EVERY OTHER DAY
Qty: 60 TABLET | Refills: 1 | Status: SHIPPED | OUTPATIENT
Start: 2023-03-16

## 2023-03-16 NOTE — PROGRESS NOTES
[id number] [facility]  [place] [address]  Dept: [phone]: [phone]  Attending progress note      Reason for Visit:   Anemia. Referring Physician:  Chhaya Grant MD    PCP:  Teagan Hamilton DO    History of Present Illness:     Mr. Tegan Colón is a pleasant 72-year-old gentleman, with a past medical history significant for A-fib, on chronic integration with Eliquis, hypertension, hyperlipidemia, and type II DM, who was referred to the hematology office evaluation of anemia. The patient was brought to the ED on 1/5/2023 after being found on the floor, his initial blood work was remarkable for hemoglobin of 5.7G/DL, he was also found to be in DKA. The patient had an EGD and colonoscopy done revealing hiatal hernia, esophageal stricture, diverticulosis, and rectal polyp, the patient just had a capsule endoscopy done. The patient received packed RBCs transfusion and is on oral iron. The patient needs to have a left inguinal hernia repair done, which would be in April. His counts need to be optimized for the surgery. He is feeling well at this time, energy level had significantly improved, no bleeding that he is aware of. He is tolerating the oral iron without any side effects. No new problems. Review of Systems;  CONSTITUTIONAL: No fever, chills. Good appetite and energy level. ENMT: Eyes: No diplopia; Nose: No epistaxis. Mouth: No sore throat. He is hard of hearing. RESPIRATORY: No hemoptysis, shortness of breath, cough. CARDIOVASCULAR: No chest pain, palpitations. GASTROINTESTINAL: No nausea/vomiting, abdominal pain, diarrhea/constipation. GENITOURINARY: No dysuria, urinary frequency, hematuria. NEURO: No syncope, presyncope, headache.   Remainder:  ROS NEGATIVE    Past Medical History:      Diagnosis Date    Arthritis     Bilateral shoulder pain 05/2020    for TJAS Right 05/12/2020 Dr Campos Inman    Diabetes mellitus ([facility] [address]     Enlarged prostate     follows with Dr Lopez King    History of Holter monitoring 07/29/2022    History of irregular heartbeat     follows with PCP    Hyperlipidemia     Hypertension     Leg wound, right 05/2020    recent history of; see podiatry notes in epic     Patient Active Problem List   Diagnosis    Arthropathy of right shoulder    Diabetes mellitus ([facility] [address]    Hyperlipidemia    Hypertension    Moderate obesity    Paroxysmal atrial fibrillation (HCC)    DKA, type 1, not at goal [facility])    Seizure ([facility] [address]    Moderate protein-calorie malnutrition ([facility] [address]        Past Surgical History:      Procedure Laterality Date    CATARACT REMOVAL WITH IMPLANT Bilateral     COLONOSCOPY N/A 1/9/2023    COLONOSCOPY POLYPECTOMY HOT SNARE performed by Emeli Martínez MD at [address] [facility], [address] [facility] Right 5/12/2020    RIGHT SHOULDER TOTAL ARTHROPLASTY REVERSE performed by Agatha Brooke MD at [address] [place] [address] [address] N/A 1/6/2023    EGD ESOPHAGOGASTRODUODENOSCOPY DILATATION performed by Emeli Martínez MD at [facility] ENDOSCOPY       Family History:  Family History   Problem Relation Age of Onset    Cancer Mother         skin    No Known Problems Father        Medications:  Reviewed and reconciled.     Social History:  Social History     Socioeconomic History    Marital status: Single     Spouse name: Not on file    Number of children: Not on file    Years of education: Not on file    Highest education level: Not on file   Occupational History    Not on file   Tobacco Use    Smoking status: Never    Smokeless tobacco: Never   Vaping Use    Vaping Use: Never used   Substance and Sexual Activity    Alcohol use: Never    Drug use: Never    Sexual activity: Not on file     Comment: not assessed   Other Topics Concern    Not on file   Social History Narrative    Not on file     Social Determinants of Health Financial Resource Strain: Not on file   Food Insecurity: Not on file   Transportation Needs: Not on file   Physical Activity: Not on file   Stress: Not on file   Social Connections: Not on file   Intimate Partner Violence: Not on file   Housing Stability: Not on file       Allergies: Allergies   Allergen Reactions    Penicillins Other (See Comments)     Occurred as child, rxn unknown    Seasonal     Sulfa Antibiotics Other (See Comments)     Occurred as child, rxn unknown       Physical Exam:  BP (!) 153/80   Pulse 70   Temp 97.7 °F (36.5 °C)   Ht 5' 8\" (1.727 m)   Wt 178 lb 8 oz (81 kg)   SpO2 99%   BMI 27.14 kg/m²   GENERAL: Alert, oriented x 3, not in acute distress. HEENT: PERRLA; EOMI. Oropharynx clear. NECK: Supple. No palpable cervical or supraclavicular lymphadenopathy. LUNGS: Good air entry bilaterally. No wheezing, crackles or rhonchi. CARDIOVASCULAR: Regular rhythm, normal rate. ABDOMEN: Soft. Non-tender, non-distended. Positive bowel sounds. EXTREMITIES: Without clubbing, cyanosis, or edema. NEUROLOGIC: No focal deficits. ECOG PS 1    Impression/Plan:        Mr. Job Mullins is a pleasant 70-year-old gentleman, with a past medical history significant for A-fib, on chronic integration with Eliquis, hypertension, hyperlipidemia, and type II DM, who was referred to the hematology office evaluation of anemia. The patient was brought to the ED on 1/5/2023 after being found on the floor, his initial blood work was remarkable for hemoglobin of 5.7G/DL, he was also found to be in DKA. The patient had an EGD and colonoscopy done revealing hiatal hernia, esophageal stricture, diverticulosis, and rectal polyp, the patient just had a capsule endoscopy done. The patient received packed RBCs transfusion and is on oral iron. The patient needs to have a left inguinal hernia repair done, which would be in April. His counts need to be optimized for the surgery.   He is feeling well at this time, energy level had significantly improved, no bleeding that he is aware of. The patient had profound anemia, most likely was secondary to GI bleed in the setting of chronic anticoagulation with Eliquis, EGD and colonoscopy did not reveal active bleeding, the patient had a capsule endoscopy done, that did not reveal any bleeding. The patient's hemoglobin/hematocrit had improved to 13.7/43.2, iron panel is normal, he is not anemic at this time,recommended to continue with the oral iron, he is tolerating it well, he has a history of vitamin B12 deficiency, vitamin B12 had normalized, had increased to 497, no folate deficiency. We will follow his CBC and iron panel, no indication for parenteral infusion at this time, from hematology POV there is no contraindication for his surgery, blood counts are adequate for his for the inguinal hernia surgery in April. RTC in 3 months. Thank you for allowing us to participate in the care of Mr. Rhonda Handy.     Joe White MD   HEMATOLOGY/MEDICAL [address] [address] [address]  [address] [address]  Dept: [place]: [phone]

## 2023-03-22 ENCOUNTER — OFFICE VISIT (OUTPATIENT)
Dept: SURGERY | Age: 78
End: 2023-03-22
Payer: MEDICARE

## 2023-03-22 ENCOUNTER — TELEPHONE (OUTPATIENT)
Dept: SURGERY | Age: 78
End: 2023-03-22

## 2023-03-22 VITALS
BODY MASS INDEX: 26.98 KG/M2 | HEIGHT: 68 IN | HEART RATE: 83 BPM | DIASTOLIC BLOOD PRESSURE: 75 MMHG | SYSTOLIC BLOOD PRESSURE: 170 MMHG | WEIGHT: 178 LBS | TEMPERATURE: 97.8 F | RESPIRATION RATE: 18 BRPM

## 2023-03-22 DIAGNOSIS — K40.90 LEFT INGUINAL HERNIA: Primary | ICD-10-CM

## 2023-03-22 DIAGNOSIS — D50.0 IRON DEFICIENCY ANEMIA DUE TO CHRONIC BLOOD LOSS: ICD-10-CM

## 2023-03-22 PROCEDURE — 1036F TOBACCO NON-USER: CPT | Performed by: SURGERY

## 2023-03-22 PROCEDURE — G8417 CALC BMI ABV UP PARAM F/U: HCPCS | Performed by: SURGERY

## 2023-03-22 PROCEDURE — 99214 OFFICE O/P EST MOD 30 MIN: CPT | Performed by: SURGERY

## 2023-03-22 PROCEDURE — 3017F COLORECTAL CA SCREEN DOC REV: CPT | Performed by: SURGERY

## 2023-03-22 PROCEDURE — 3077F SYST BP >= 140 MM HG: CPT | Performed by: SURGERY

## 2023-03-22 PROCEDURE — G8484 FLU IMMUNIZE NO ADMIN: HCPCS | Performed by: SURGERY

## 2023-03-22 PROCEDURE — 3078F DIAST BP <80 MM HG: CPT | Performed by: SURGERY

## 2023-03-22 PROCEDURE — 1123F ACP DISCUSS/DSCN MKR DOCD: CPT | Performed by: SURGERY

## 2023-03-22 PROCEDURE — G8427 DOCREV CUR MEDS BY ELIG CLIN: HCPCS | Performed by: SURGERY

## 2023-03-22 RX ORDER — SODIUM CHLORIDE 9 MG/ML
INJECTION, SOLUTION INTRAVENOUS CONTINUOUS
OUTPATIENT
Start: 2023-03-22

## 2023-03-22 RX ORDER — SODIUM CHLORIDE 0.9 % (FLUSH) 0.9 %
5-40 SYRINGE (ML) INJECTION EVERY 12 HOURS SCHEDULED
OUTPATIENT
Start: 2023-03-22

## 2023-03-22 RX ORDER — SODIUM CHLORIDE 0.9 % (FLUSH) 0.9 %
5-40 SYRINGE (ML) INJECTION PRN
OUTPATIENT
Start: 2023-03-22

## 2023-03-22 RX ORDER — SODIUM CHLORIDE 9 MG/ML
INJECTION, SOLUTION INTRAVENOUS PRN
OUTPATIENT
Start: 2023-03-22

## 2023-03-22 NOTE — TELEPHONE ENCOUNTER
Per Dr. Burris, patient is scheduled for Laparoscopic robotic left inguinal hernia repair with mesh possible bilateral  at [facility] on 23. Surgery scheduled via [facility], surgeon's calendar updated. Dr. Burris to enter orders. Follow up appointment scheduled.    Patient to hold eliquis 2 days prior to surgery.   Electronically signed by Wendy Myers RN on 3/22/2023 at 9:42 AM    Prior Authorization Form:      DEMOGRAPHICS:                     Patient Name:  Babak Matt  Patient :  1945            Insurance:  Payor: MEDICARE / Plan: MEDICARE PART A AND B / Product Type: *No Product type* /   Insurance ID Number:    Payer/Plan Subscr  Sex Relation Sub. Ins. ID Effective Group Num   1. MEDICARE - ME* BABAK MATT 1945 Male Self [id number]                                    [address] [address]   2. BCBS - ANTHEM* BABAK MATT 1945 Male Self [id number]                                   [address]         DIAGNOSIS & PROCEDURE:                       Procedure/Operation: Laparoscopic robotic left inguinal hernia repair with mesh possible bilateral            CPT Code: 56791    Diagnosis:  left inguinal hernia    ICD10 Code: K40.90    Location:  [facility]    Surgeon:  Fatuma    SCHEDULING INFORMATION:                          Date: 23    Time: TBD              Anesthesia:  General                                                       Status:  Outpatient        Special Comments:         Electronically signed by Wendy Myers RN on 3/22/2023 at 9:42 AM

## 2023-03-22 NOTE — PROGRESS NOTES
available. I selected relevant images and included them in the above note if they were available  I discussed at length inguinal anatomy, blood supply, neurovascular anatomy and options for surgical repair. Discussed the incidence of incarceration and strangulation or emergency evaluation requiring surgery for inguinal hernia  I reviewed urgent care and medical specialist notes in the electronic medical record relating to the above complaints as well as any underlying medical conditions that may contribute to or prevent surgical intervention  I discussed nonoperative management, we also discussed reasons to seek urgent attention that are related to incarceration strangulation obstruction or ongoing problems with worsening obstipation  We discussed the risks of chronic groin pain associated with inguinal hernia surgery with or without mesh placement  We discussed surgical options excluding the use of mesh and locations where this may be performed and indications where this may be completed  I recommend proceeding to OR for laparoscopic robot assisted left inguinal hernia repair with mesh possible bilateral  Medical and cardiac clearance completed        This will be scheduled today for future date at the patient's request  Discussed the risk, benefits and alternatives of surgery including wound infections, bleeding, scar, recurrent hernia formation, mesh infection and migration, chronic groin pain, nerve injury, testicle injury, repeat procedures and the risks of general anesthetic including MI, CVA, sudden death or reactions to anesthetic medications. The patient understands the risks and alternatives and the possibility of converting to an open procedure. All questions were answered to the patient's satisfaction and they freely signed the consent.            Kurtis Lovelace MD  9:27 AM  3/22/2023

## 2023-04-17 ENCOUNTER — ANESTHESIA (OUTPATIENT)
Dept: OPERATING ROOM | Age: 78
End: 2023-04-17
Payer: MEDICARE

## 2023-04-17 ENCOUNTER — HOSPITAL ENCOUNTER (OUTPATIENT)
Age: 78
Setting detail: OUTPATIENT SURGERY
Discharge: HOME | End: 2023-04-17
Attending: SURGERY | Admitting: SURGERY
Payer: MEDICARE

## 2023-04-17 ENCOUNTER — ANESTHESIA EVENT (OUTPATIENT)
Dept: OPERATING ROOM | Age: 78
End: 2023-04-17
Payer: MEDICARE

## 2023-04-17 VITALS
DIASTOLIC BLOOD PRESSURE: 60 MMHG | TEMPERATURE: 97.4 F | HEART RATE: 84 BPM | RESPIRATION RATE: 20 BRPM | OXYGEN SATURATION: 93 % | SYSTOLIC BLOOD PRESSURE: 124 MMHG

## 2023-04-17 DIAGNOSIS — G89.18 POSTOPERATIVE PAIN: Primary | ICD-10-CM

## 2023-04-17 DIAGNOSIS — K40.90 LEFT INGUINAL HERNIA: ICD-10-CM

## 2023-04-17 PROBLEM — K40.20 NON-RECURRENT BILATERAL INGUINAL HERNIA WITHOUT OBSTRUCTION OR GANGRENE: Status: ACTIVE | Noted: 2023-03-22

## 2023-04-17 LAB — METER GLUCOSE: 106 MG/DL (ref 74–99)

## 2023-04-17 PROCEDURE — 2500000003 HC RX 250 WO HCPCS: Performed by: NURSE ANESTHETIST, CERTIFIED REGISTERED

## 2023-04-17 PROCEDURE — 2500000003 HC RX 250 WO HCPCS: Performed by: SURGERY

## 2023-04-17 PROCEDURE — 3700000001 HC ADD 15 MINUTES (ANESTHESIA): Performed by: SURGERY

## 2023-04-17 PROCEDURE — 6360000002 HC RX W HCPCS: Performed by: NURSE ANESTHETIST, CERTIFIED REGISTERED

## 2023-04-17 PROCEDURE — 2720000010 HC SURG SUPPLY STERILE: Performed by: SURGERY

## 2023-04-17 PROCEDURE — 6360000002 HC RX W HCPCS: Performed by: ANESTHESIOLOGY

## 2023-04-17 PROCEDURE — 2580000003 HC RX 258: Performed by: SURGERY

## 2023-04-17 PROCEDURE — C1781 MESH (IMPLANTABLE): HCPCS | Performed by: SURGERY

## 2023-04-17 PROCEDURE — 3600000014 HC SURGERY LEVEL 4 ADDTL 15MIN: Performed by: SURGERY

## 2023-04-17 PROCEDURE — 6360000002 HC RX W HCPCS: Performed by: SURGERY

## 2023-04-17 PROCEDURE — 7100000010 HC PHASE II RECOVERY - FIRST 15 MIN: Performed by: SURGERY

## 2023-04-17 PROCEDURE — 2580000003 HC RX 258: Performed by: NURSE ANESTHETIST, CERTIFIED REGISTERED

## 2023-04-17 PROCEDURE — 3600000004 HC SURGERY LEVEL 4 BASE: Performed by: SURGERY

## 2023-04-17 PROCEDURE — 7100000000 HC PACU RECOVERY - FIRST 15 MIN: Performed by: SURGERY

## 2023-04-17 PROCEDURE — 3700000000 HC ANESTHESIA ATTENDED CARE: Performed by: SURGERY

## 2023-04-17 PROCEDURE — 6360000002 HC RX W HCPCS

## 2023-04-17 PROCEDURE — 7100000001 HC PACU RECOVERY - ADDTL 15 MIN: Performed by: SURGERY

## 2023-04-17 PROCEDURE — 7100000011 HC PHASE II RECOVERY - ADDTL 15 MIN: Performed by: SURGERY

## 2023-04-17 PROCEDURE — 49650 LAP ING HERNIA REPAIR INIT: CPT | Performed by: SURGERY

## 2023-04-17 PROCEDURE — 82962 GLUCOSE BLOOD TEST: CPT

## 2023-04-17 PROCEDURE — 2709999900 HC NON-CHARGEABLE SUPPLY: Performed by: SURGERY

## 2023-04-17 DEVICE — 3DMAX LIGHT MESH, 12.2 CM X 17.0 CM (4.8" X 6.7"), LEFT, EXTRA LARGE
Type: IMPLANTABLE DEVICE | Site: INGUINAL | Status: FUNCTIONAL
Brand: 3DMAX

## 2023-04-17 DEVICE — 3DMAX LIGHT MESH, 12.2 CM X 17.0 CM (4.8" X 6.7"), RIGHT, EXTRA LARGE
Type: IMPLANTABLE DEVICE | Site: INGUINAL | Status: FUNCTIONAL
Brand: 3DMAX

## 2023-04-17 RX ORDER — DOCUSATE SODIUM 100 MG/1
100 CAPSULE, LIQUID FILLED ORAL 2 TIMES DAILY
Qty: 30 CAPSULE | Refills: 0 | Status: SHIPPED | OUTPATIENT
Start: 2023-04-17 | End: 2023-05-02

## 2023-04-17 RX ORDER — HYDRALAZINE HYDROCHLORIDE 20 MG/ML
10 INJECTION INTRAMUSCULAR; INTRAVENOUS
Status: DISCONTINUED | OUTPATIENT
Start: 2023-04-17 | End: 2023-04-17 | Stop reason: HOSPADM

## 2023-04-17 RX ORDER — DEXAMETHASONE SODIUM PHOSPHATE 10 MG/ML
INJECTION, SOLUTION INTRAMUSCULAR; INTRAVENOUS PRN
Status: DISCONTINUED | OUTPATIENT
Start: 2023-04-17 | End: 2023-04-17 | Stop reason: SDUPTHER

## 2023-04-17 RX ORDER — SODIUM CHLORIDE 9 MG/ML
INJECTION, SOLUTION INTRAVENOUS PRN
Status: DISCONTINUED | OUTPATIENT
Start: 2023-04-17 | End: 2023-04-17 | Stop reason: HOSPADM

## 2023-04-17 RX ORDER — OXYCODONE HYDROCHLORIDE AND ACETAMINOPHEN 5; 325 MG/1; MG/1
1 TABLET ORAL ONCE
Status: DISCONTINUED | OUTPATIENT
Start: 2023-04-17 | End: 2023-04-17 | Stop reason: HOSPADM

## 2023-04-17 RX ORDER — OXYCODONE HYDROCHLORIDE AND ACETAMINOPHEN 5; 325 MG/1; MG/1
1 TABLET ORAL EVERY 6 HOURS PRN
Qty: 10 TABLET | Refills: 0 | Status: SHIPPED | OUTPATIENT
Start: 2023-04-17 | End: 2023-04-20

## 2023-04-17 RX ORDER — KETOROLAC TROMETHAMINE 30 MG/ML
15 INJECTION, SOLUTION INTRAMUSCULAR; INTRAVENOUS ONCE
Status: COMPLETED | OUTPATIENT
Start: 2023-04-17 | End: 2023-04-17

## 2023-04-17 RX ORDER — METHOCARBAMOL 500 MG/1
500 TABLET, FILM COATED ORAL 4 TIMES DAILY PRN
Qty: 20 TABLET | Refills: 0 | Status: SHIPPED | OUTPATIENT
Start: 2023-04-17

## 2023-04-17 RX ORDER — MORPHINE SULFATE 2 MG/ML
2 INJECTION, SOLUTION INTRAMUSCULAR; INTRAVENOUS EVERY 5 MIN PRN
Status: DISCONTINUED | OUTPATIENT
Start: 2023-04-17 | End: 2023-04-17 | Stop reason: HOSPADM

## 2023-04-17 RX ORDER — IPRATROPIUM BROMIDE AND ALBUTEROL SULFATE 2.5; .5 MG/3ML; MG/3ML
1 SOLUTION RESPIRATORY (INHALATION)
Status: DISCONTINUED | OUTPATIENT
Start: 2023-04-17 | End: 2023-04-17 | Stop reason: HOSPADM

## 2023-04-17 RX ORDER — MIDAZOLAM HYDROCHLORIDE 1 MG/ML
2 INJECTION INTRAMUSCULAR; INTRAVENOUS
Status: DISCONTINUED | OUTPATIENT
Start: 2023-04-17 | End: 2023-04-17 | Stop reason: HOSPADM

## 2023-04-17 RX ORDER — SODIUM CHLORIDE 9 MG/ML
INJECTION, SOLUTION INTRAVENOUS CONTINUOUS
Status: DISCONTINUED | OUTPATIENT
Start: 2023-04-17 | End: 2023-04-17 | Stop reason: HOSPADM

## 2023-04-17 RX ORDER — PROPOFOL 10 MG/ML
INJECTION, EMULSION INTRAVENOUS PRN
Status: DISCONTINUED | OUTPATIENT
Start: 2023-04-17 | End: 2023-04-17 | Stop reason: SDUPTHER

## 2023-04-17 RX ORDER — MEPERIDINE HYDROCHLORIDE 25 MG/ML
INJECTION INTRAMUSCULAR; INTRAVENOUS; SUBCUTANEOUS
Status: COMPLETED
Start: 2023-04-17 | End: 2023-04-17

## 2023-04-17 RX ORDER — MORPHINE SULFATE 2 MG/ML
INJECTION, SOLUTION INTRAMUSCULAR; INTRAVENOUS
Status: COMPLETED
Start: 2023-04-17 | End: 2023-04-17

## 2023-04-17 RX ORDER — LIDOCAINE HYDROCHLORIDE 20 MG/ML
INJECTION, SOLUTION INTRAVENOUS PRN
Status: DISCONTINUED | OUTPATIENT
Start: 2023-04-17 | End: 2023-04-17 | Stop reason: SDUPTHER

## 2023-04-17 RX ORDER — ONDANSETRON 4 MG/1
4 TABLET, ORALLY DISINTEGRATING ORAL EVERY 8 HOURS PRN
Qty: 20 TABLET | Refills: 1 | Status: SHIPPED | OUTPATIENT
Start: 2023-04-17

## 2023-04-17 RX ORDER — ONDANSETRON 2 MG/ML
4 INJECTION INTRAMUSCULAR; INTRAVENOUS
Status: DISCONTINUED | OUTPATIENT
Start: 2023-04-17 | End: 2023-04-17 | Stop reason: HOSPADM

## 2023-04-17 RX ORDER — SODIUM CHLORIDE 0.9 % (FLUSH) 0.9 %
5-40 SYRINGE (ML) INJECTION EVERY 12 HOURS SCHEDULED
Status: DISCONTINUED | OUTPATIENT
Start: 2023-04-17 | End: 2023-04-17 | Stop reason: HOSPADM

## 2023-04-17 RX ORDER — KETOROLAC TROMETHAMINE 30 MG/ML
INJECTION, SOLUTION INTRAMUSCULAR; INTRAVENOUS
Status: COMPLETED
Start: 2023-04-17 | End: 2023-04-17

## 2023-04-17 RX ORDER — SODIUM CHLORIDE 0.9 % (FLUSH) 0.9 %
5-40 SYRINGE (ML) INJECTION PRN
Status: DISCONTINUED | OUTPATIENT
Start: 2023-04-17 | End: 2023-04-17 | Stop reason: HOSPADM

## 2023-04-17 RX ORDER — MIDAZOLAM HYDROCHLORIDE 1 MG/ML
INJECTION INTRAMUSCULAR; INTRAVENOUS PRN
Status: DISCONTINUED | OUTPATIENT
Start: 2023-04-17 | End: 2023-04-17 | Stop reason: SDUPTHER

## 2023-04-17 RX ORDER — METHOCARBAMOL 100 MG/ML
1000 INJECTION, SOLUTION INTRAMUSCULAR; INTRAVENOUS ONCE
Status: COMPLETED | OUTPATIENT
Start: 2023-04-17 | End: 2023-04-17

## 2023-04-17 RX ORDER — DIPHENHYDRAMINE HYDROCHLORIDE 50 MG/ML
12.5 INJECTION INTRAMUSCULAR; INTRAVENOUS
Status: DISCONTINUED | OUTPATIENT
Start: 2023-04-17 | End: 2023-04-17 | Stop reason: HOSPADM

## 2023-04-17 RX ORDER — LABETALOL HYDROCHLORIDE 5 MG/ML
10 INJECTION, SOLUTION INTRAVENOUS
Status: DISCONTINUED | OUTPATIENT
Start: 2023-04-17 | End: 2023-04-17 | Stop reason: HOSPADM

## 2023-04-17 RX ORDER — NEOSTIGMINE METHYLSULFATE 1 MG/ML
INJECTION, SOLUTION INTRAVENOUS PRN
Status: DISCONTINUED | OUTPATIENT
Start: 2023-04-17 | End: 2023-04-17 | Stop reason: SDUPTHER

## 2023-04-17 RX ORDER — FENTANYL CITRATE 50 UG/ML
INJECTION, SOLUTION INTRAMUSCULAR; INTRAVENOUS PRN
Status: DISCONTINUED | OUTPATIENT
Start: 2023-04-17 | End: 2023-04-17 | Stop reason: SDUPTHER

## 2023-04-17 RX ORDER — PROCHLORPERAZINE EDISYLATE 5 MG/ML
5 INJECTION INTRAMUSCULAR; INTRAVENOUS
Status: DISCONTINUED | OUTPATIENT
Start: 2023-04-17 | End: 2023-04-17 | Stop reason: HOSPADM

## 2023-04-17 RX ORDER — BUPIVACAINE HYDROCHLORIDE AND EPINEPHRINE 2.5; 5 MG/ML; UG/ML
INJECTION, SOLUTION EPIDURAL; INFILTRATION; INTRACAUDAL; PERINEURAL PRN
Status: DISCONTINUED | OUTPATIENT
Start: 2023-04-17 | End: 2023-04-17 | Stop reason: ALTCHOICE

## 2023-04-17 RX ORDER — ROCURONIUM BROMIDE 10 MG/ML
INJECTION, SOLUTION INTRAVENOUS PRN
Status: DISCONTINUED | OUTPATIENT
Start: 2023-04-17 | End: 2023-04-17 | Stop reason: SDUPTHER

## 2023-04-17 RX ORDER — GLYCOPYRROLATE 1 MG/5 ML
SYRINGE (ML) INTRAVENOUS PRN
Status: DISCONTINUED | OUTPATIENT
Start: 2023-04-17 | End: 2023-04-17 | Stop reason: SDUPTHER

## 2023-04-17 RX ORDER — MEPERIDINE HYDROCHLORIDE 25 MG/ML
12.5 INJECTION INTRAMUSCULAR; INTRAVENOUS; SUBCUTANEOUS EVERY 5 MIN PRN
Status: COMPLETED | OUTPATIENT
Start: 2023-04-17 | End: 2023-04-17

## 2023-04-17 RX ORDER — SODIUM CHLORIDE 9 MG/ML
INJECTION, SOLUTION INTRAVENOUS CONTINUOUS PRN
Status: DISCONTINUED | OUTPATIENT
Start: 2023-04-17 | End: 2023-04-17 | Stop reason: SDUPTHER

## 2023-04-17 RX ORDER — ONDANSETRON 2 MG/ML
INJECTION INTRAMUSCULAR; INTRAVENOUS PRN
Status: DISCONTINUED | OUTPATIENT
Start: 2023-04-17 | End: 2023-04-17 | Stop reason: SDUPTHER

## 2023-04-17 RX ORDER — METHOCARBAMOL 100 MG/ML
INJECTION, SOLUTION INTRAMUSCULAR; INTRAVENOUS
Status: COMPLETED
Start: 2023-04-17 | End: 2023-04-17

## 2023-04-17 RX ORDER — FENTANYL CITRATE 0.05 MG/ML
25 INJECTION, SOLUTION INTRAMUSCULAR; INTRAVENOUS EVERY 5 MIN PRN
Status: DISCONTINUED | OUTPATIENT
Start: 2023-04-17 | End: 2023-04-17 | Stop reason: HOSPADM

## 2023-04-17 RX ORDER — MORPHINE SULFATE 2 MG/ML
INJECTION, SOLUTION INTRAMUSCULAR; INTRAVENOUS
Status: DISCONTINUED
Start: 2023-04-17 | End: 2023-04-17 | Stop reason: WASHOUT

## 2023-04-17 RX ADMIN — MEPERIDINE HYDROCHLORIDE 12.5 MG: 25 INJECTION, SOLUTION INTRAMUSCULAR; INTRAVENOUS; SUBCUTANEOUS at 15:50

## 2023-04-17 RX ADMIN — MIDAZOLAM 1 MG: 1 INJECTION INTRAMUSCULAR; INTRAVENOUS at 14:08

## 2023-04-17 RX ADMIN — MORPHINE SULFATE 2 MG: 2 INJECTION, SOLUTION INTRAMUSCULAR; INTRAVENOUS at 15:34

## 2023-04-17 RX ADMIN — SODIUM CHLORIDE: 900 INJECTION, SOLUTION INTRAVENOUS at 14:06

## 2023-04-17 RX ADMIN — SODIUM CHLORIDE: 9 INJECTION, SOLUTION INTRAVENOUS at 12:46

## 2023-04-17 RX ADMIN — FENTANYL CITRATE 50 MCG: 50 INJECTION, SOLUTION INTRAMUSCULAR; INTRAVENOUS at 15:14

## 2023-04-17 RX ADMIN — FENTANYL CITRATE 50 MCG: 50 INJECTION, SOLUTION INTRAMUSCULAR; INTRAVENOUS at 14:34

## 2023-04-17 RX ADMIN — Medication 3 MG: at 15:18

## 2023-04-17 RX ADMIN — DEXAMETHASONE SODIUM PHOSPHATE 10 MG: 10 INJECTION, SOLUTION INTRAMUSCULAR; INTRAVENOUS at 14:18

## 2023-04-17 RX ADMIN — FENTANYL CITRATE 100 MCG: 50 INJECTION, SOLUTION INTRAMUSCULAR; INTRAVENOUS at 14:10

## 2023-04-17 RX ADMIN — METHOCARBAMOL 1000 MG: 100 INJECTION, SOLUTION INTRAMUSCULAR; INTRAVENOUS at 15:46

## 2023-04-17 RX ADMIN — LIDOCAINE HYDROCHLORIDE 60 MG: 20 INJECTION, SOLUTION INTRAVENOUS at 14:10

## 2023-04-17 RX ADMIN — KETOROLAC TROMETHAMINE 15 MG: 30 INJECTION, SOLUTION INTRAMUSCULAR; INTRAVENOUS at 15:35

## 2023-04-17 RX ADMIN — CEFAZOLIN 2000 MG: 2 INJECTION, POWDER, FOR SOLUTION INTRAMUSCULAR; INTRAVENOUS at 14:06

## 2023-04-17 RX ADMIN — Medication 0.6 MG: at 15:18

## 2023-04-17 RX ADMIN — MIDAZOLAM 1 MG: 1 INJECTION INTRAMUSCULAR; INTRAVENOUS at 14:04

## 2023-04-17 RX ADMIN — KETOROLAC TROMETHAMINE 15 MG: 30 INJECTION, SOLUTION INTRAMUSCULAR at 15:35

## 2023-04-17 RX ADMIN — MEPERIDINE HYDROCHLORIDE 12.5 MG: 25 INJECTION, SOLUTION INTRAMUSCULAR; INTRAVENOUS; SUBCUTANEOUS at 16:14

## 2023-04-17 RX ADMIN — PROPOFOL 150 MG: 10 INJECTION, EMULSION INTRAVENOUS at 14:10

## 2023-04-17 RX ADMIN — FENTANYL CITRATE 50 MCG: 50 INJECTION, SOLUTION INTRAMUSCULAR; INTRAVENOUS at 14:54

## 2023-04-17 RX ADMIN — ONDANSETRON 4 MG: 2 INJECTION INTRAMUSCULAR; INTRAVENOUS at 15:13

## 2023-04-17 RX ADMIN — ROCURONIUM BROMIDE 30 MG: 100 INJECTION, SOLUTION INTRAVENOUS at 14:10

## 2023-04-17 ASSESSMENT — PAIN DESCRIPTION - DESCRIPTORS
DESCRIPTORS: ACHING
DESCRIPTORS: ACHING;DISCOMFORT
DESCRIPTORS: ACHING
DESCRIPTORS: ACHING;DISCOMFORT
DESCRIPTORS: ACHING;DISCOMFORT

## 2023-04-17 ASSESSMENT — PAIN DESCRIPTION - LOCATION
LOCATION: ABDOMEN

## 2023-04-17 ASSESSMENT — PAIN DESCRIPTION - ORIENTATION
ORIENTATION: MID

## 2023-04-17 ASSESSMENT — PAIN DESCRIPTION - PAIN TYPE
TYPE: ACUTE PAIN;SURGICAL PAIN

## 2023-04-17 ASSESSMENT — PAIN SCALES - GENERAL
PAINLEVEL_OUTOF10: 6
PAINLEVEL_OUTOF10: 7
PAINLEVEL_OUTOF10: 7
PAINLEVEL_OUTOF10: 8
PAINLEVEL_OUTOF10: 4
PAINLEVEL_OUTOF10: 7
PAINLEVEL_OUTOF10: 8
PAINLEVEL_OUTOF10: 4

## 2023-04-17 ASSESSMENT — PAIN DESCRIPTION - FREQUENCY
FREQUENCY: CONTINUOUS

## 2023-04-17 ASSESSMENT — PAIN DESCRIPTION - ONSET
ONSET: ON-GOING
ONSET: PROGRESSIVE
ONSET: ON-GOING

## 2023-04-17 ASSESSMENT — PAIN - FUNCTIONAL ASSESSMENT
PAIN_FUNCTIONAL_ASSESSMENT: PREVENTS OR INTERFERES WITH MANY ACTIVE NOT PASSIVE ACTIVITIES
PAIN_FUNCTIONAL_ASSESSMENT: NONE - DENIES PAIN

## 2023-04-17 NOTE — PROGRESS NOTES
4/18/23 1830 reviewed discharge instructions with pt and his friend darin and darin's wife.  All verbalized understanding,pt nd ricks wife signed in agreement and pt given copy.kmo rn

## 2023-04-17 NOTE — ANESTHESIA PRE PROCEDURE
anemia and anticoagulation therapy, arthritis:., .                 Abdominal:   (+) obese,           Vascular:   + PVD, aortic or cerebral, . ROS comment:   . Other Findings:                 Anesthesia Plan      general     ASA 3       Induction: intravenous. MIPS: Postoperative opioids intended. Anesthetic plan and risks discussed with patient. Plan discussed with CRNA. DOS STAFF ADDENDUM:    Pt seen and examined, chart reviewed (including anesthesia, drug and allergy history). Anesthetic plan, risks, benefits, alternatives, and personnel involved discussed with patient. Patient verbalized an understanding and agrees to proceed. Plan discussed with care team members and agreed upon.     Osiris Kwok MD  Staff Anesthesiologist  1:12 PM    Osiris Kwok MD   4/17/2023        Osiris Kwok MD

## 2023-04-17 NOTE — PROGRESS NOTES
4/17//23 1739 per dr Juana Carson for dc\" pt aware and verbalized understanding that he would have to return if unable to void after 8 hours. Denies feeling uncomfortable or urge to urinate.  Pt states \"just so dry\" kmo rn

## 2023-04-17 NOTE — OP NOTE
vessels bilateral. The peritoneum was tacked closed in four locations to contain the mesh in the preperitoneal space. We then decompressed the abdomen and removed each one of our trocars. We performed inguinal block with 0.25% Marcaine 10 mL. We then reapproximated each one of the skin incisions using 4-0 Monocryl suture. SurgiGlue was placed over the top. The patient was awoken, extubated and transferred to the postoperative care unit in stable condition. All instrument counts, lap counts, and needle counts were correct at the completion of the procedure.     Octavia Talavera MD, MS  Minimally Invasive and Bariatric Surgery  [phone] (p)  4/17/2023  3:06 PM

## 2023-04-17 NOTE — DISCHARGE INSTRUCTIONS
instructions on the label. Do not take two or more pain medicines at the same time unless the doctor told you to. Many pain medicines have acetaminophen, which is Tylenol. Too much acetaminophen (Tylenol) can be harmful. If your doctor prescribed antibiotics, take them as directed. Do not stop taking them just because you feel better. You need to take the full course of antibiotics. If you think your pain medicine is making you sick to your stomach: Take your medicine after meals (unless your doctor has told you not to). Ask your doctor for a different pain medicine. Incision care  If you have strips of tape on the cut (incision) the doctor made, leave the tape on for a week or until it falls off. If you have staples closing the cut, you will need to visit your doctor in 1 to 2 weeks to have them removed. Wash the area daily with warm, soapy water and pat it dry. Follow-up care is a key part of your treatment and safety. Be sure to make and go to all appointments, and call your doctor if you are having problems. It's also a good idea to know your test results and keep a list of the medicines you take. When should you call for help? Call 911 anytime you think you may need emergency care. For example, call if:  You passed out (lost consciousness). You have sudden chest pain and shortness of breath, or you cough up blood. You have severe pain in your belly. Call your doctor now or seek immediate medical care if:  You are sick to your stomach and cannot keep fluids down. You have signs of a blood clot, such as:  Pain in your calf, back of the knee, thigh, or groin. Redness and swelling in your leg or groin. You have trouble passing urine or stool, especially if you have mild pain or swelling in your lower belly. Bright red blood has soaked through the bandage over your incision. Watch closely for any changes in your health, and be sure to contact your doctor if:  Your swelling is getting worse.

## 2023-04-17 NOTE — ANESTHESIA POSTPROCEDURE EVALUATION
Department of Anesthesiology  Postprocedure Note    Patient: Domitila Liu  MRN: [id number]  YOB: 1945  Date of evaluation: 4/17/2023      Procedure Summary     Date: 04/17/23 Room / Location: [address] [place] [address] / [address] [place] [address]    Anesthesia Start: 4009 Anesthesia Stop: 0796    Procedure: LAPAROSCOPIC BILATERAL INGUINAL HERNIA REPAIR WITH MESH (Left: Abdomen) Diagnosis:       Left inguinal hernia      (Left inguinal hernia [K40.90])    Surgeons: Marizol Reeves MD Responsible Provider: Murphy Lewis MD    Anesthesia Type: general ASA Status: 3          Anesthesia Type: No value filed.     Kaley Phase I: Kaley Score: 10    Kaley Phase II: Kaley Score: 10      Anesthesia Post Evaluation    Patient location during evaluation: PACU  Patient participation: complete - patient participated  Level of consciousness: awake  Airway patency: patent  Nausea & Vomiting: no nausea and no vomiting  Complications: no  Cardiovascular status: hemodynamically stable  Respiratory status: acceptable  Hydration status: euvolemic

## 2023-04-17 NOTE — H&P
General Surgery History and Physical  [facility]    Patient's Name/Date of Birth: Ruma Berg / [birth year]    Date: April 17, 2023     Surgeon: Scott Sarmiento M.D.    PCP: Jewell Duran DO     Chief Complaint: left Inguinal bulge    HPI:   Ruma Berg is a 78 y.o. male who presents for evaluation of left groin bulge. Timing is constant, radiation to left groin, alleviated by none and started months ago and severity is 7/10. States pain has been worsening, no symptoms of bowel obstruction, nausea, vomiting, fever, chills, abdominal pain. States it protrudes with activity, it is reducible. Follow up after medical and cardiac clearance. Seen by hematology with hgb improved hgb. Continues with worsening groin pain and would like to proceed with surgeyr.      Patient Active Problem List   Diagnosis    Arthropathy of right shoulder    Diabetes mellitus (HCC)    Hyperlipidemia    Hypertension    Moderate obesity    Paroxysmal atrial fibrillation (HCC)    DKA, type 1, not at goal [facility])    Seizure ([facility] [address]    Moderate protein-calorie malnutrition ([facility] [address]    Left inguinal hernia       Past Medical History:   Diagnosis Date    A-fib [facility])     Arthritis     Bilateral shoulder pain 05/2020    for TJAS Right 05/12/2020 Dr Katarzyna Martini    Diabetes mellitus [facility])     Enlarged prostate     follows with Dr Wicho Espino    History of blood transfusion 01/2023    History of Holter monitoring 07/29/2022    Hyperlipidemia     Hypertension     Leg wound, right 05/2020    recent history of; see podiatry notes in epic       Past Surgical History:   Procedure Laterality Date    CATARACT REMOVAL WITH IMPLANT Bilateral     COLONOSCOPY N/A 01/09/2023    COLONOSCOPY POLYPECTOMY HOT SNARE performed by Aguila Minaya MD at [address] [place] [address] teeth removed    ENDOSCOPY, COLON, DIAGNOSTIC      EYE SURGERY      HERNIA REPAIR      SHOULDER SURGERY Right 05/12/2020    RIGHT SHOULDER TOTAL ARTHROPLASTY REVERSE

## 2023-04-17 NOTE — PROGRESS NOTES
4/18/23 1930 dr Ila Heller sent a perfect serve message to let him know pt is unable to urinate but wants to go home. Awaiting response.  Kmo rn

## 2023-04-26 ENCOUNTER — OFFICE VISIT (OUTPATIENT)
Dept: SURGERY | Age: 78
End: 2023-04-26

## 2023-04-26 VITALS
OXYGEN SATURATION: 96 % | HEIGHT: 66 IN | SYSTOLIC BLOOD PRESSURE: 145 MMHG | DIASTOLIC BLOOD PRESSURE: 82 MMHG | TEMPERATURE: 98.2 F | WEIGHT: 175 LBS | BODY MASS INDEX: 28.12 KG/M2 | HEART RATE: 72 BPM | RESPIRATION RATE: 18 BRPM

## 2023-04-26 DIAGNOSIS — K40.20 NON-RECURRENT BILATERAL INGUINAL HERNIA WITHOUT OBSTRUCTION OR GANGRENE: ICD-10-CM

## 2023-04-26 DIAGNOSIS — K40.90 LEFT INGUINAL HERNIA: Primary | ICD-10-CM

## 2023-04-26 PROCEDURE — 99024 POSTOP FOLLOW-UP VISIT: CPT | Performed by: SURGERY

## 2023-04-26 NOTE — PROGRESS NOTES
General Surgery Office Note  Elijha Martinez MD, MS    Patient's Name/Date of Birth: Manny Vences / [birth year]    Date: April 26, 2023     Chief compaint: Postop visit from laparoscopic robot assisted bilateral inguinal hernia repair with mesh    Surgeon: Augustus Essex, MD    Patient Active Problem List   Diagnosis    Arthropathy of right shoulder    Diabetes mellitus ([facility] [address]    Hyperlipidemia    Hypertension    Moderate obesity    Paroxysmal atrial fibrillation ([facility] [address]    DKA, type 1, not at goal [facility])    Seizure ([facility] [address]    Moderate protein-calorie malnutrition ([facility] [address]    Non-recurrent bilateral inguinal hernia without obstruction or gangrene       Subjective: Doing well, no new complaints, pain improving, ambulating well, bowel function improving and off pain medication    Objective:  BP (!) 145/82   Pulse 72   Temp 98.2 °F (36.8 °C) (Temporal)   Resp 18   Ht 5' 6\" (1.676 m)   Wt 175 lb (79.4 kg)   SpO2 96%   BMI 28.25 kg/m²   Labs:  No results for input(s): WBC, HGB, HCT in the last 72 hours. Invalid input(s): PLR  Lab Results   Component Value Date    CREATININE 1.2 04/10/2023    BUN 22 04/10/2023     04/10/2023    K 3.7 04/10/2023    CL 99 04/10/2023    CO2 31 (H) 04/10/2023     No results for input(s): LIPASE, AMYLASE in the last 72 hours.       Review of Systems -  General ROS: negative for - chills, fatigue or malaise  ENT ROS: negative for - hearing change, nasal congestion or nasal discharge  Allergy and Immunology ROS: negative for - hives, itchy/watery eyes or nasal congestion  Hematological and Lymphatic ROS: negative for - blood clots, blood transfusions, bruising or fatigue  Endocrine ROS: negative for - malaise/lethargy, mood swings, palpitations or polydipsia/polyuria  Respiratory ROS: negative for - sputum changes, stridor, tachypnea or wheezing  Cardiovascular ROS: negative for - irregular heartbeat, loss of consciousness, murmur or orthopnea  Gastrointestinal

## 2023-06-28 DIAGNOSIS — K92.2 GASTROINTESTINAL HEMORRHAGE, UNSPECIFIED GASTROINTESTINAL HEMORRHAGE TYPE: Primary | ICD-10-CM

## 2023-06-29 ENCOUNTER — HOSPITAL ENCOUNTER (OUTPATIENT)
Dept: INFUSION THERAPY | Age: 78
Discharge: HOME | End: 2023-06-29
Payer: MEDICARE

## 2023-06-29 ENCOUNTER — OFFICE VISIT (OUTPATIENT)
Dept: ONCOLOGY | Age: 78
End: 2023-06-29
Payer: MEDICARE

## 2023-06-29 VITALS
HEIGHT: 66 IN | BODY MASS INDEX: 28.12 KG/M2 | HEART RATE: 69 BPM | DIASTOLIC BLOOD PRESSURE: 87 MMHG | OXYGEN SATURATION: 100 % | WEIGHT: 175 LBS | TEMPERATURE: 98.7 F | SYSTOLIC BLOOD PRESSURE: 163 MMHG

## 2023-06-29 DIAGNOSIS — D64.9 ANEMIA, UNSPECIFIED TYPE: Primary | ICD-10-CM

## 2023-06-29 DIAGNOSIS — K92.2 GASTROINTESTINAL HEMORRHAGE, UNSPECIFIED GASTROINTESTINAL HEMORRHAGE TYPE: ICD-10-CM

## 2023-06-29 LAB
BASOPHILS # BLD: 0.07 E9/L (ref 0–0.2)
BASOPHILS NFR BLD: 1.1 % (ref 0–2)
EOSINOPHIL # BLD: 0.45 E9/L (ref 0.05–0.5)
EOSINOPHIL NFR BLD: 6.8 % (ref 0–6)
ERYTHROCYTE [DISTWIDTH] IN BLOOD BY AUTOMATED COUNT: 14 FL (ref 11.5–15)
FERRITIN SERPL-MCNC: 106 NG/ML
HCT VFR BLD AUTO: 45.6 % (ref 37–54)
HGB BLD-MCNC: 14.8 G/DL (ref 12.5–16.5)
IMM GRANULOCYTES # BLD: 0.02 E9/L
IMM GRANULOCYTES NFR BLD: 0.3 % (ref 0–5)
IRON SATN MFR SERPL: 18 % (ref 20–55)
IRON SERPL-MCNC: 55 MCG/DL (ref 59–158)
LYMPHOCYTES # BLD: 1.46 E9/L (ref 1.5–4)
LYMPHOCYTES NFR BLD: 22.1 % (ref 20–42)
MCH RBC QN AUTO: 29.8 PG (ref 26–35)
MCHC RBC AUTO-ENTMCNC: 32.5 % (ref 32–34.5)
MCV RBC AUTO: 91.8 FL (ref 80–99.9)
MONOCYTES # BLD: 0.44 E9/L (ref 0.1–0.95)
MONOCYTES NFR BLD: 6.7 % (ref 2–12)
NEUTROPHILS # BLD: 4.16 E9/L (ref 1.8–7.3)
NEUTS SEG NFR BLD: 63 % (ref 43–80)
PLATELET # BLD AUTO: 256 E9/L (ref 130–450)
PMV BLD AUTO: 10.6 FL (ref 7–12)
RBC # BLD AUTO: 4.97 E12/L (ref 3.8–5.8)
TIBC SERPL-MCNC: 309 MCG/DL (ref 250–450)
WBC # BLD: 6.6 E9/L (ref 4.5–11.5)

## 2023-06-29 PROCEDURE — 3017F COLORECTAL CA SCREEN DOC REV: CPT | Performed by: INTERNAL MEDICINE

## 2023-06-29 PROCEDURE — 36415 COLL VENOUS BLD VENIPUNCTURE: CPT

## 2023-06-29 PROCEDURE — G8417 CALC BMI ABV UP PARAM F/U: HCPCS | Performed by: INTERNAL MEDICINE

## 2023-06-29 PROCEDURE — 82728 ASSAY OF FERRITIN: CPT

## 2023-06-29 PROCEDURE — 83550 IRON BINDING TEST: CPT

## 2023-06-29 PROCEDURE — 1036F TOBACCO NON-USER: CPT | Performed by: INTERNAL MEDICINE

## 2023-06-29 PROCEDURE — 85025 COMPLETE CBC W/AUTO DIFF WBC: CPT

## 2023-06-29 PROCEDURE — 99213 OFFICE O/P EST LOW 20 MIN: CPT | Performed by: INTERNAL MEDICINE

## 2023-06-29 PROCEDURE — 83540 ASSAY OF IRON: CPT

## 2023-06-29 PROCEDURE — 3078F DIAST BP <80 MM HG: CPT | Performed by: INTERNAL MEDICINE

## 2023-06-29 PROCEDURE — G8427 DOCREV CUR MEDS BY ELIG CLIN: HCPCS | Performed by: INTERNAL MEDICINE

## 2023-06-29 PROCEDURE — 3074F SYST BP LT 130 MM HG: CPT | Performed by: INTERNAL MEDICINE

## 2023-06-29 PROCEDURE — 1123F ACP DISCUSS/DSCN MKR DOCD: CPT | Performed by: INTERNAL MEDICINE

## 2023-09-27 ENCOUNTER — HOSPITAL ENCOUNTER (OUTPATIENT)
Dept: INFUSION THERAPY | Age: 78
Discharge: HOME | End: 2023-09-27

## 2023-09-27 DIAGNOSIS — K92.2 GASTROINTESTINAL HEMORRHAGE, UNSPECIFIED GASTROINTESTINAL HEMORRHAGE TYPE: Primary | ICD-10-CM

## 2023-09-28 ENCOUNTER — HOSPITAL ENCOUNTER (OUTPATIENT)
Dept: INFUSION THERAPY | Age: 78
Discharge: HOME | End: 2023-09-28
Payer: MEDICARE

## 2023-09-28 ENCOUNTER — OFFICE VISIT (OUTPATIENT)
Dept: ONCOLOGY | Age: 78
End: 2023-09-28
Payer: MEDICARE

## 2023-09-28 VITALS
WEIGHT: 177 LBS | HEIGHT: 66 IN | TEMPERATURE: 97.9 F | HEART RATE: 67 BPM | SYSTOLIC BLOOD PRESSURE: 133 MMHG | OXYGEN SATURATION: 98 % | DIASTOLIC BLOOD PRESSURE: 80 MMHG | BODY MASS INDEX: 28.45 KG/M2

## 2023-09-28 DIAGNOSIS — K92.2 GASTROINTESTINAL HEMORRHAGE, UNSPECIFIED GASTROINTESTINAL HEMORRHAGE TYPE: ICD-10-CM

## 2023-09-28 DIAGNOSIS — D64.9 ANEMIA, UNSPECIFIED TYPE: Primary | ICD-10-CM

## 2023-09-28 LAB
BASOPHILS # BLD: 0.06 K/UL (ref 0–0.2)
BASOPHILS NFR BLD: 1 % (ref 0–2)
EOSINOPHIL # BLD: 0.28 K/UL (ref 0.05–0.5)
EOSINOPHILS RELATIVE PERCENT: 4 % (ref 0–6)
ERYTHROCYTE [DISTWIDTH] IN BLOOD BY AUTOMATED COUNT: 13.5 % (ref 11.5–15)
FERRITIN SERPL-MCNC: 147 NG/ML
HCT VFR BLD AUTO: 42 % (ref 37–54)
HGB BLD-MCNC: 13.9 G/DL (ref 12.5–16.5)
IMM GRANULOCYTES # BLD AUTO: <0.03 K/UL (ref 0–0.58)
IMM GRANULOCYTES NFR BLD: 0 % (ref 0–5)
IRON SATN MFR SERPL: 41 % (ref 20–55)
IRON SERPL-MCNC: 102 UG/DL (ref 59–158)
LYMPHOCYTES NFR BLD: 1.87 K/UL (ref 1.5–4)
LYMPHOCYTES RELATIVE PERCENT: 30 % (ref 20–42)
MCH RBC QN AUTO: 30.3 PG (ref 26–35)
MCHC RBC AUTO-ENTMCNC: 33.1 G/DL (ref 32–34.5)
MCV RBC AUTO: 91.7 FL (ref 80–99.9)
MONOCYTES NFR BLD: 0.51 K/UL (ref 0.1–0.95)
MONOCYTES NFR BLD: 8 % (ref 2–12)
NEUTROPHILS NFR BLD: 57 % (ref 43–80)
NEUTS SEG NFR BLD: 3.58 K/UL (ref 1.8–7.3)
PLATELET # BLD AUTO: 239 K/UL (ref 130–450)
PMV BLD AUTO: 10.3 FL (ref 7–12)
RBC # BLD AUTO: 4.58 M/UL (ref 3.8–5.8)
TIBC SERPL-MCNC: 247 UG/DL (ref 250–450)
WBC OTHER # BLD: 6.3 K/UL (ref 4.5–11.5)

## 2023-09-28 PROCEDURE — 3078F DIAST BP <80 MM HG: CPT | Performed by: INTERNAL MEDICINE

## 2023-09-28 PROCEDURE — 83540 ASSAY OF IRON: CPT

## 2023-09-28 PROCEDURE — 36415 COLL VENOUS BLD VENIPUNCTURE: CPT

## 2023-09-28 PROCEDURE — 3074F SYST BP LT 130 MM HG: CPT | Performed by: INTERNAL MEDICINE

## 2023-09-28 PROCEDURE — 3017F COLORECTAL CA SCREEN DOC REV: CPT | Performed by: INTERNAL MEDICINE

## 2023-09-28 PROCEDURE — G8417 CALC BMI ABV UP PARAM F/U: HCPCS | Performed by: INTERNAL MEDICINE

## 2023-09-28 PROCEDURE — 82728 ASSAY OF FERRITIN: CPT

## 2023-09-28 PROCEDURE — 1036F TOBACCO NON-USER: CPT | Performed by: INTERNAL MEDICINE

## 2023-09-28 PROCEDURE — 85025 COMPLETE CBC W/AUTO DIFF WBC: CPT

## 2023-09-28 PROCEDURE — 99213 OFFICE O/P EST LOW 20 MIN: CPT | Performed by: INTERNAL MEDICINE

## 2023-09-28 PROCEDURE — 83550 IRON BINDING TEST: CPT

## 2023-09-28 PROCEDURE — G8427 DOCREV CUR MEDS BY ELIG CLIN: HCPCS | Performed by: INTERNAL MEDICINE

## 2023-09-28 PROCEDURE — 1123F ACP DISCUSS/DSCN MKR DOCD: CPT | Performed by: INTERNAL MEDICINE

## 2023-09-28 RX ORDER — FERROUS SULFATE 325(65) MG
325 TABLET ORAL EVERY OTHER DAY
Qty: 60 TABLET | Refills: 1 | Status: SHIPPED | OUTPATIENT
Start: 2023-09-28

## 2023-09-28 NOTE — PROGRESS NOTES
inguinal hernia repair done on 4/17/2023. The patient had profound anemia, most likely was secondary to GI bleed in the setting of chronic anticoagulation with Eliquis, EGD and colonoscopy did not reveal active bleeding, the patient had a capsule endoscopy done, that did not reveal any bleeding. The patient's hemoglobin/hematocrit had improved and remained stable, labs reviewed, hemoglobin 13.9 hematocrit 42, iron deficiency had resolved, and he has a component of chronic inflammation, recommended to continue with the oral iron every other day, he is tolerating it well, he has a history of vitamin B12 deficiency, vitamin B12 had normalized, had increased to 497, no folate deficiency. We will follow his CBC and iron panel, no indication for parenteral infusion at this time. RTC in 3 months. Thank you for allowing us to participate in the care of Mr. Graham Houser.     Michele Castillo MD   HEMATOLOGY/MEDICAL [place] [address]  Dept: [address]: [phone]

## 2024-01-03 RX ORDER — APIXABAN 5 MG/1
5 TABLET, FILM COATED ORAL 2 TIMES DAILY
Qty: 180 TABLET | Refills: 3 | Status: SHIPPED | OUTPATIENT
Start: 2024-01-03

## 2024-01-03 RX ORDER — APIXABAN 5 MG/1
5 TABLET, FILM COATED ORAL 2 TIMES DAILY
Qty: 180 TABLET | Refills: 3 | OUTPATIENT
Start: 2024-01-03

## 2024-01-29 ENCOUNTER — HOSPITAL ENCOUNTER (OUTPATIENT)
Dept: INFUSION THERAPY | Age: 79
Discharge: HOME | End: 2024-01-29
Payer: MEDICARE

## 2024-01-29 ENCOUNTER — OFFICE VISIT (OUTPATIENT)
Dept: ONCOLOGY | Age: 79
End: 2024-01-29
Payer: MEDICARE

## 2024-01-29 VITALS
BODY MASS INDEX: 29.39 KG/M2 | HEART RATE: 64 BPM | HEIGHT: 66 IN | SYSTOLIC BLOOD PRESSURE: 148 MMHG | TEMPERATURE: 97.7 F | OXYGEN SATURATION: 96 % | WEIGHT: 182.9 LBS | DIASTOLIC BLOOD PRESSURE: 78 MMHG

## 2024-01-29 DIAGNOSIS — K92.2 GASTROINTESTINAL HEMORRHAGE, UNSPECIFIED GASTROINTESTINAL HEMORRHAGE TYPE: ICD-10-CM

## 2024-01-29 DIAGNOSIS — K40.20 NON-RECURRENT BILATERAL INGUINAL HERNIA WITHOUT OBSTRUCTION OR GANGRENE: Primary | ICD-10-CM

## 2024-01-29 DIAGNOSIS — D64.9 ANEMIA, UNSPECIFIED TYPE: Primary | ICD-10-CM

## 2024-01-29 LAB
BASOPHILS # BLD: 0.07 K/UL (ref 0–0.2)
BASOPHILS NFR BLD: 1 % (ref 0–2)
EOSINOPHIL # BLD: 0.2 K/UL (ref 0.05–0.5)
EOSINOPHILS RELATIVE PERCENT: 4 % (ref 0–6)
ERYTHROCYTE [DISTWIDTH] IN BLOOD BY AUTOMATED COUNT: 13 % (ref 11.5–15)
FERRITIN SERPL-MCNC: 160 NG/ML
HCT VFR BLD AUTO: 42.5 % (ref 37–54)
HGB BLD-MCNC: 14.1 G/DL (ref 12.5–16.5)
IMM GRANULOCYTES # BLD AUTO: 0.03 K/UL (ref 0–0.58)
IMM GRANULOCYTES NFR BLD: 1 % (ref 0–5)
IRON SATN MFR SERPL: 28 % (ref 20–55)
IRON SERPL-MCNC: 74 UG/DL (ref 59–158)
LYMPHOCYTES NFR BLD: 1.62 K/UL (ref 1.5–4)
LYMPHOCYTES RELATIVE PERCENT: 29 % (ref 20–42)
MCH RBC QN AUTO: 30.2 PG (ref 26–35)
MCHC RBC AUTO-ENTMCNC: 33.2 G/DL (ref 32–34.5)
MCV RBC AUTO: 91 FL (ref 80–99.9)
MONOCYTES NFR BLD: 0.42 K/UL (ref 0.1–0.95)
MONOCYTES NFR BLD: 7 % (ref 2–12)
NEUTROPHILS NFR BLD: 59 % (ref 43–80)
NEUTS SEG NFR BLD: 3.32 K/UL (ref 1.8–7.3)
PLATELET # BLD AUTO: 234 K/UL (ref 130–450)
PMV BLD AUTO: 10.2 FL (ref 7–12)
RBC # BLD AUTO: 4.67 M/UL (ref 3.8–5.8)
TIBC SERPL-MCNC: 269 UG/DL (ref 250–450)
WBC OTHER # BLD: 5.7 K/UL (ref 4.5–11.5)

## 2024-01-29 PROCEDURE — 82728 ASSAY OF FERRITIN: CPT

## 2024-01-29 PROCEDURE — 3077F SYST BP >= 140 MM HG: CPT | Performed by: INTERNAL MEDICINE

## 2024-01-29 PROCEDURE — G8484 FLU IMMUNIZE NO ADMIN: HCPCS | Performed by: INTERNAL MEDICINE

## 2024-01-29 PROCEDURE — 99213 OFFICE O/P EST LOW 20 MIN: CPT | Performed by: INTERNAL MEDICINE

## 2024-01-29 PROCEDURE — 1036F TOBACCO NON-USER: CPT | Performed by: INTERNAL MEDICINE

## 2024-01-29 PROCEDURE — 3078F DIAST BP <80 MM HG: CPT | Performed by: INTERNAL MEDICINE

## 2024-01-29 PROCEDURE — 83550 IRON BINDING TEST: CPT

## 2024-01-29 PROCEDURE — 1123F ACP DISCUSS/DSCN MKR DOCD: CPT | Performed by: INTERNAL MEDICINE

## 2024-01-29 PROCEDURE — G8417 CALC BMI ABV UP PARAM F/U: HCPCS | Performed by: INTERNAL MEDICINE

## 2024-01-29 PROCEDURE — G8427 DOCREV CUR MEDS BY ELIG CLIN: HCPCS | Performed by: INTERNAL MEDICINE

## 2024-01-29 PROCEDURE — 36415 COLL VENOUS BLD VENIPUNCTURE: CPT

## 2024-01-29 PROCEDURE — 83540 ASSAY OF IRON: CPT

## 2024-01-29 PROCEDURE — 85025 COMPLETE CBC W/AUTO DIFF WBC: CPT

## 2024-01-29 PROCEDURE — 99212 OFFICE O/P EST SF 10 MIN: CPT

## 2024-01-29 NOTE — PROGRESS NOTES
[facility] MEDICAL ONCOLOGY  [address] [place] [address]  Dept: [phone]  Loc: [phone]  Attending progress note      Reason for Visit:   Anemia.    Referring Physician:  Fermín Burris MD    PCP:  Favian Hamilton DO    History of Present Illness:     Mr. Matt is a pleasant 72-year-old gentleman, with a past medical history significant for A-fib, on chronic integration with Eliquis, hypertension, hyperlipidemia, and type II DM, who was referred to the hematology office evaluation of anemia.  The patient was brought to the ED on 1/5/2023 after being found on the floor, his initial blood work was remarkable for hemoglobin of 5.7G/DL, he was also found to be in DKA. The patient had an EGD and colonoscopy done revealing hiatal hernia, esophageal stricture, diverticulosis, and rectal polyp, the patient just had a capsule endoscopy done.  The patient received packed RBCs transfusion and is on oral iron.  The patient had laparoscopic bilateral inguinal hernia repair done on 4/17/2023, he is feeling well at this time, the patient continues to be on oral iron, tolerating it well overall.  He denies any bleeding.  Energy level is fair.    Review of Systems;  CONSTITUTIONAL: No fever, chills. Good appetite, fair energy level  ENMT: Eyes: No diplopia; Nose: No epistaxis. Mouth: No sore throat.  He is hard of hearing.  RESPIRATORY: No hemoptysis, shortness of breath, cough.   CARDIOVASCULAR: No chest pain, palpitations.  GASTROINTESTINAL: No nausea/vomiting, abdominal pain, or change in his bowel habits.  GENITOURINARY: No dysuria, urinary frequency, hematuria.  NEURO: No syncope, presyncope, headache.  Remainder:  ROS NEGATIVE    Past Medical History:      Diagnosis Date    A-fib (HCC)     Arthritis     Bilateral shoulder pain 05/2020    for TJAS Right 05/12/2020 Dr MARCELO Mejia    Diabetes mellitus (HCC)     Enlarged prostate     follows with Dr Alcocer

## 2024-05-17 RX ORDER — FERROUS SULFATE 325(65) MG
1 TABLET ORAL EVERY OTHER DAY
Qty: 60 TABLET | Refills: 1 | Status: SHIPPED | OUTPATIENT
Start: 2024-05-17

## 2024-07-29 ENCOUNTER — HOSPITAL ENCOUNTER (OUTPATIENT)
Dept: INFUSION THERAPY | Age: 79
Discharge: HOME | End: 2024-07-29
Payer: MEDICARE

## 2024-07-29 ENCOUNTER — OFFICE VISIT (OUTPATIENT)
Dept: ONCOLOGY | Age: 79
End: 2024-07-29
Payer: MEDICARE

## 2024-07-29 VITALS
WEIGHT: 188.4 LBS | DIASTOLIC BLOOD PRESSURE: 62 MMHG | SYSTOLIC BLOOD PRESSURE: 148 MMHG | OXYGEN SATURATION: 99 % | BODY MASS INDEX: 30.28 KG/M2 | HEIGHT: 66 IN | HEART RATE: 68 BPM | TEMPERATURE: 97.9 F

## 2024-07-29 DIAGNOSIS — D64.9 ANEMIA, UNSPECIFIED TYPE: ICD-10-CM

## 2024-07-29 DIAGNOSIS — D64.9 ANEMIA, UNSPECIFIED TYPE: Primary | ICD-10-CM

## 2024-07-29 DIAGNOSIS — K92.2 GASTROINTESTINAL HEMORRHAGE, UNSPECIFIED GASTROINTESTINAL HEMORRHAGE TYPE: ICD-10-CM

## 2024-07-29 LAB
BASOPHILS # BLD: 0.07 K/UL (ref 0–0.2)
BASOPHILS NFR BLD: 1 % (ref 0–2)
EOSINOPHIL # BLD: 0.21 K/UL (ref 0.05–0.5)
EOSINOPHILS RELATIVE PERCENT: 3 % (ref 0–6)
ERYTHROCYTE [DISTWIDTH] IN BLOOD BY AUTOMATED COUNT: 12.7 % (ref 11.5–15)
FERRITIN SERPL-MCNC: 231 NG/ML
HCT VFR BLD AUTO: 40.8 % (ref 37–54)
HGB BLD-MCNC: 14 G/DL (ref 12.5–16.5)
IMM GRANULOCYTES # BLD AUTO: <0.03 K/UL (ref 0–0.58)
IMM GRANULOCYTES NFR BLD: 0 % (ref 0–5)
IRON SATN MFR SERPL: 30 % (ref 20–55)
IRON SERPL-MCNC: 71 UG/DL (ref 59–158)
LYMPHOCYTES NFR BLD: 1.69 K/UL (ref 1.5–4)
LYMPHOCYTES RELATIVE PERCENT: 25 % (ref 20–42)
MCH RBC QN AUTO: 30.3 PG (ref 26–35)
MCHC RBC AUTO-ENTMCNC: 34.3 G/DL (ref 32–34.5)
MCV RBC AUTO: 88.3 FL (ref 80–99.9)
MONOCYTES NFR BLD: 0.51 K/UL (ref 0.1–0.95)
MONOCYTES NFR BLD: 8 % (ref 2–12)
NEUTROPHILS NFR BLD: 63 % (ref 43–80)
NEUTS SEG NFR BLD: 4.32 K/UL (ref 1.8–7.3)
PLATELET # BLD AUTO: 274 K/UL (ref 130–450)
PMV BLD AUTO: 9.6 FL (ref 7–12)
RBC # BLD AUTO: 4.62 M/UL (ref 3.8–5.8)
TIBC SERPL-MCNC: 239 UG/DL (ref 250–450)
VIT B12 SERPL-MCNC: 785 PG/ML (ref 211–946)
WBC OTHER # BLD: 6.8 K/UL (ref 4.5–11.5)

## 2024-07-29 PROCEDURE — 1123F ACP DISCUSS/DSCN MKR DOCD: CPT | Performed by: INTERNAL MEDICINE

## 2024-07-29 PROCEDURE — 99213 OFFICE O/P EST LOW 20 MIN: CPT | Performed by: INTERNAL MEDICINE

## 2024-07-29 PROCEDURE — 3077F SYST BP >= 140 MM HG: CPT | Performed by: INTERNAL MEDICINE

## 2024-07-29 PROCEDURE — G8427 DOCREV CUR MEDS BY ELIG CLIN: HCPCS | Performed by: INTERNAL MEDICINE

## 2024-07-29 PROCEDURE — G8417 CALC BMI ABV UP PARAM F/U: HCPCS | Performed by: INTERNAL MEDICINE

## 2024-07-29 PROCEDURE — 83540 ASSAY OF IRON: CPT

## 2024-07-29 PROCEDURE — 85025 COMPLETE CBC W/AUTO DIFF WBC: CPT

## 2024-07-29 PROCEDURE — 3078F DIAST BP <80 MM HG: CPT | Performed by: INTERNAL MEDICINE

## 2024-07-29 PROCEDURE — 99212 OFFICE O/P EST SF 10 MIN: CPT

## 2024-07-29 PROCEDURE — 83550 IRON BINDING TEST: CPT

## 2024-07-29 PROCEDURE — 82728 ASSAY OF FERRITIN: CPT

## 2024-07-29 PROCEDURE — 36415 COLL VENOUS BLD VENIPUNCTURE: CPT

## 2024-07-29 PROCEDURE — 1036F TOBACCO NON-USER: CPT | Performed by: INTERNAL MEDICINE

## 2024-07-29 PROCEDURE — 82607 VITAMIN B-12: CPT

## 2024-07-29 RX ORDER — ORAL SEMAGLUTIDE 7 MG/1
1 TABLET ORAL DAILY
COMMUNITY
Start: 2024-07-08

## 2024-07-29 NOTE — PROGRESS NOTES
[facility] MEDICAL ONCOLOGY  [address] [place] [address]  Dept: [phone]  Loc: [phone]  Attending progress note      Reason for Visit:   Anemia.    Referring Physician:  Fermín Burris MD    PCP:  Favian Hamilton DO    History of Present Illness:     Mr. Matt is a pleasant 78-year-old gentleman, with a past medical history significant for A-fib, on chronic integration with Eliquis, hypertension, hyperlipidemia, and type II DM, who was referred to the hematology office evaluation of anemia.  The patient was brought to the ED on 1/5/2023 after being found on the floor, his initial blood work was remarkable for hemoglobin of 5.7G/DL, he was also found to be in DKA. The patient had an EGD and colonoscopy done revealing hiatal hernia, esophageal stricture, diverticulosis, and rectal polyp, the patient just had a capsule endoscopy done.  The patient received packed RBCs transfusion and is on oral iron.  The patient had laparoscopic bilateral inguinal hernia repair done on 4/17/2023, he is feeling well at this time, the patient continues to be on oral iron, tolerating it well overall.  No reported side effects.  He denies any bleeding.  He is feeling cold.    Review of Systems;  CONSTITUTIONAL: No fever, chills. Good appetite, fair energy level  ENMT: Eyes: No diplopia; Nose: No epistaxis. Mouth: No sore throat.  He is hard of hearing.  RESPIRATORY: No hemoptysis, shortness of breath, cough.   CARDIOVASCULAR: No chest pain, palpitations.  GASTROINTESTINAL: No nausea/vomiting, abdominal pain, or change in his bowel habits.  GENITOURINARY: No dysuria, urinary frequency, hematuria.  NEURO: No syncope, presyncope, headache.  Remainder:  ROS NEGATIVE    Past Medical History:      Diagnosis Date    A-fib (HCC)     Arthritis     Bilateral shoulder pain 05/2020    for TJAS Right 05/12/2020 Dr MARCELO Mejia    Diabetes mellitus (HCC)     Enlarged prostate

## 2024-10-25 PROBLEM — E10.10 DKA, TYPE 1, NOT AT GOAL (HCC): Status: RESOLVED | Noted: 2023-01-05 | Resolved: 2024-10-25

## 2024-10-31 ENCOUNTER — PROCEDURE VISIT (OUTPATIENT)
Dept: AUDIOLOGY | Age: 79
End: 2024-10-31
Payer: MEDICARE

## 2024-10-31 ENCOUNTER — OFFICE VISIT (OUTPATIENT)
Dept: ENT CLINIC | Age: 79
End: 2024-10-31

## 2024-10-31 VITALS
WEIGHT: 180.4 LBS | HEART RATE: 74 BPM | BODY MASS INDEX: 28.99 KG/M2 | SYSTOLIC BLOOD PRESSURE: 136 MMHG | DIASTOLIC BLOOD PRESSURE: 68 MMHG | HEIGHT: 66 IN

## 2024-10-31 DIAGNOSIS — H66.41 SUPPURATIVE OTITIS MEDIA OF RIGHT EAR, UNSPECIFIED CHRONICITY: ICD-10-CM

## 2024-10-31 DIAGNOSIS — H69.91 ETD (EUSTACHIAN TUBE DYSFUNCTION), RIGHT: ICD-10-CM

## 2024-10-31 DIAGNOSIS — H69.91 DYSFUNCTION OF RIGHT EUSTACHIAN TUBE: ICD-10-CM

## 2024-10-31 DIAGNOSIS — H92.11 OTORRHEA OF RIGHT EAR: Primary | ICD-10-CM

## 2024-10-31 DIAGNOSIS — H60.391 OTHER INFECTIVE OTITIS EXTERNA OF RIGHT EAR, UNSPECIFIED CHRONICITY: ICD-10-CM

## 2024-10-31 DIAGNOSIS — H92.11 OTORRHEA, RIGHT EAR: Primary | ICD-10-CM

## 2024-10-31 PROCEDURE — 92567 TYMPANOMETRY: CPT

## 2024-10-31 RX ORDER — AZELASTINE 1 MG/ML
2 SPRAY, METERED NASAL 2 TIMES DAILY
Qty: 30 ML | Refills: 1 | Status: SHIPPED | OUTPATIENT
Start: 2024-10-31

## 2024-10-31 RX ORDER — DOXYCYCLINE HYCLATE 100 MG
TABLET ORAL
COMMUNITY
Start: 2024-10-25

## 2024-10-31 RX ORDER — FLUTICASONE PROPIONATE 50 MCG
2 SPRAY, SUSPENSION (ML) NASAL DAILY
Qty: 16 G | Refills: 1 | Status: SHIPPED | OUTPATIENT
Start: 2024-10-31

## 2024-10-31 ASSESSMENT — ENCOUNTER SYMPTOMS
SINUS PRESSURE: 0
SINUS PAIN: 0
RHINORRHEA: 0

## 2024-10-31 NOTE — PROGRESS NOTES
This patient was referred for tympanometric testing by LINDA Fox due to  recurrent right ear infections and right ear pain . Patient reported a history of a tympanoplasty several years ago by Dr. Barber and reported that he has not had any significant problems since the surgery. Patient currently wears a hearing aid in his left ear.      Tympanometry revealed flat tympanograms in the right ear and normal middle ear peak pressure and compliance in the left ear.    The results were reviewed with the patient and ordering provider.     Recommendations for follow up will be made pending physician consult.      Vic Smith, CCC-A  Clinical Audiologist  [place] License: [id number]    Electronically signed by Brigida Morin on 10/31/2024 at 12:30 PM

## 2024-10-31 NOTE — PROGRESS NOTES
Pulses: Normal pulses.   Pulmonary:      Effort: Pulmonary effort is normal. No respiratory distress.      Breath sounds: No stridor.   Musculoskeletal:         General: Normal range of motion.      Cervical back: Normal range of motion. No rigidity. No muscular tenderness.   Skin:     General: Skin is warm and dry.   Neurological:      General: No focal deficit present.      Mental Status: He is alert and oriented to person, place, and time.   Psychiatric:         Mood and Affect: Mood normal.         Behavior: Behavior normal.         Thought Content: Thought content normal.         Judgment: Judgment normal.           Tympanogram reviewed with patient.  Reveals type B - Flat with effusion curve in the right ear, with type A curve in the left ear.  IMPRESSION/PLAN:    Babak was seen today for new patient.    Diagnoses and all orders for this visit:    Otorrhea, right ear  -     Culture, Ear    Other infective otitis externa of right ear, unspecified chronicity    Suppurative otitis media of right ear, unspecified chronicity    Dysfunction of right eustachian tube    Other orders  -     fluticasone (FLONASE) 50 MCG/ACT nasal spray; 2 sprays by Each Nostril route daily  -     azelastine (ASTELIN) 0.1 % nasal spray; 2 sprays by Nasal route 2 times daily Use in each nostril as directed      Purulent drainage in the debris suctioned from right ear canal without difficulty.  Patient tolerated well.  Right TM is bulging with middle ear effusion.  He will continue with his doxycycline as previously prescribed by his PCP.  Culture is obtained of the drainage from the right ear.  He will be notified of any medication changes necessary based on results.  He will be placed on Flonase 2 sprays each nostril once daily with Astelin 2 sprays each nostril twice daily for his right eustachian tube dysfunction.  He will follow-up again in 1 month for reevaluation.  Discussed possible myringotomy and tube placement if no

## 2024-11-03 LAB
CULTURE: ABNORMAL
DIRECT EXAM: ABNORMAL
SPECIMEN DESCRIPTION: ABNORMAL

## 2024-11-04 LAB
CULTURE: ABNORMAL
DIRECT EXAM: ABNORMAL
SPECIMEN DESCRIPTION: ABNORMAL

## 2024-11-04 RX ORDER — CLOTRIMAZOLE AND BETAMETHASONE DIPROPIONATE 10; .64 MG/G; MG/G
CREAM TOPICAL
Qty: 1 EACH | Refills: 1 | Status: SHIPPED | OUTPATIENT
Start: 2024-11-04

## 2024-11-04 RX ORDER — CLINDAMYCIN HYDROCHLORIDE 150 MG/1
300 CAPSULE ORAL 3 TIMES DAILY
Qty: 60 CAPSULE | Refills: 0 | Status: SHIPPED | OUTPATIENT
Start: 2024-11-04 | End: 2024-11-14

## 2024-11-04 NOTE — PROGRESS NOTES
Ear culture positive for staph and yeast.  Patient placed on clindamycin 300 mg 3 times daily for 10 days with Lotrisone cream to the affected ear twice daily for 14 days.

## 2024-11-06 ENCOUNTER — OFFICE VISIT (OUTPATIENT)
Dept: CARDIOLOGY CLINIC | Age: 79
End: 2024-11-06

## 2024-11-06 VITALS
WEIGHT: 177 LBS | SYSTOLIC BLOOD PRESSURE: 136 MMHG | RESPIRATION RATE: 16 BRPM | DIASTOLIC BLOOD PRESSURE: 60 MMHG | HEART RATE: 67 BPM | BODY MASS INDEX: 28.45 KG/M2 | HEIGHT: 66 IN

## 2024-11-06 DIAGNOSIS — E11.9 TYPE 2 DIABETES MELLITUS WITHOUT COMPLICATION, WITHOUT LONG-TERM CURRENT USE OF INSULIN (HCC): ICD-10-CM

## 2024-11-06 DIAGNOSIS — I48.0 PAROXYSMAL ATRIAL FIBRILLATION (HCC): Primary | ICD-10-CM

## 2024-11-06 DIAGNOSIS — I10 PRIMARY HYPERTENSION: Chronic | ICD-10-CM

## 2024-11-06 DIAGNOSIS — E78.00 PURE HYPERCHOLESTEROLEMIA: Chronic | ICD-10-CM

## 2024-11-06 RX ORDER — BETAMETHASONE DIPROPIONATE 0.5 MG/G
CREAM TOPICAL 2 TIMES DAILY
COMMUNITY

## 2024-11-06 RX ORDER — EMPAGLIFLOZIN 25 MG/1
25 TABLET, FILM COATED ORAL DAILY
COMMUNITY
Start: 2024-08-26 | End: 2024-11-06 | Stop reason: ALTCHOICE

## 2024-11-06 NOTE — PROGRESS NOTES
OUTPATIENT CARDIOLOGY FOLLOW-UP    Name: Babak Matt    Age: 79 y.o.    Primary Care Physician: Favian Hamilton DO    Date of Service: 11/6/2024    Chief Complaint: Paroxysmal atrial fibrillation, hypertension    Interim History: Since his most recent evaluation, the patient remains compensated from a cardiac standpoint and denies symptoms of anginal-like chest discomfort or other ischemic equivalents nor manifestations of decompensated left ventricular systolic dysfunction or volume overload.  He denies arrhythmia related manifestations.  Within the past year, he is experienced no further reports of melanotic stools or gastrointestinal bleeding with no recurrence of his previously noted anemia.  At the time of assessment he remains normotensive with the status of his diabetes and serum lipids unavailable for review.    Review of Systems:   The remainder of a complete multisystem review including consitutional, central nervous, respiratory, circulatory, gastrointestinal, genitourinary, endocrinologic, hematologic, musculoskeletal and psychiatric are negative.    Past Medical History:  Past Medical History:   Diagnosis Date    A-fib (HCC)     Arthritis     Bilateral shoulder pain 05/2020    for TJAS Right 05/12/2020 Dr MARCELO Mejia    Diabetes mellitus (HCC)     Enlarged prostate     follows with Dr Alcocer    History of blood transfusion 01/2023    History of Holter monitoring 07/29/2022    Hyperlipidemia     Hypertension     Leg wound, right 05/2020    recent history of; see podiatry notes in epic       Past Surgical History:  Past Surgical History:   Procedure Laterality Date    CATARACT REMOVAL WITH IMPLANT Bilateral     COLONOSCOPY N/A 01/09/2023    COLONOSCOPY POLYPECTOMY HOT SNARE performed by Armani Marquez MD at [facility]    DENTAL SURGERY      uper teeth removed    ENDOSCOPY, COLON, DIAGNOSTIC      EYE SURGERY      HERNIA REPAIR      HERNIA REPAIR Left 4/17/2023    LAPAROSCOPIC BILATERAL INGUINAL

## 2024-11-07 LAB
CULTURE: ABNORMAL
Lab: ABNORMAL
SPECIMEN DESCRIPTION: ABNORMAL

## 2024-11-13 ENCOUNTER — TELEPHONE (OUTPATIENT)
Dept: ENT CLINIC | Age: 79
End: 2024-11-13

## 2024-11-13 NOTE — TELEPHONE ENCOUNTER
Patient called office and LM stating ear is still red on the insdie and request another round of antibiotics

## 2024-11-14 NOTE — TELEPHONE ENCOUNTER
Called patient and advised per Afshin Vo:   Continue with Lotrisone cream and will see next week as scheduled.  Once the ear is clean will reevaluate necessity for additional antibiotics. Patient verbalized understanding

## 2024-11-18 ENCOUNTER — PROCEDURE VISIT (OUTPATIENT)
Dept: AUDIOLOGY | Age: 79
End: 2024-11-18
Payer: MEDICARE

## 2024-11-18 ENCOUNTER — OFFICE VISIT (OUTPATIENT)
Dept: ENT CLINIC | Age: 79
End: 2024-11-18
Payer: MEDICARE

## 2024-11-18 VITALS
WEIGHT: 181.9 LBS | DIASTOLIC BLOOD PRESSURE: 81 MMHG | SYSTOLIC BLOOD PRESSURE: 148 MMHG | BODY MASS INDEX: 29.23 KG/M2 | HEART RATE: 71 BPM | HEIGHT: 66 IN

## 2024-11-18 DIAGNOSIS — H60.391 OTHER INFECTIVE OTITIS EXTERNA OF RIGHT EAR, UNSPECIFIED CHRONICITY: Primary | ICD-10-CM

## 2024-11-18 DIAGNOSIS — H69.92 DYSFUNCTION OF LEFT EUSTACHIAN TUBE: Primary | ICD-10-CM

## 2024-11-18 DIAGNOSIS — H60.391 OTHER INFECTIVE CHRONIC OTITIS EXTERNA OF RIGHT EAR: ICD-10-CM

## 2024-11-18 DIAGNOSIS — H66.41 SUPPURATIVE OTITIS MEDIA OF RIGHT EAR, UNSPECIFIED CHRONICITY: ICD-10-CM

## 2024-11-18 DIAGNOSIS — H69.91 DYSFUNCTION OF RIGHT EUSTACHIAN TUBE: ICD-10-CM

## 2024-11-18 PROCEDURE — 1036F TOBACCO NON-USER: CPT | Performed by: NURSE PRACTITIONER

## 2024-11-18 PROCEDURE — 1123F ACP DISCUSS/DSCN MKR DOCD: CPT | Performed by: NURSE PRACTITIONER

## 2024-11-18 PROCEDURE — 1159F MED LIST DOCD IN RCRD: CPT | Performed by: NURSE PRACTITIONER

## 2024-11-18 PROCEDURE — 1160F RVW MEDS BY RX/DR IN RCRD: CPT | Performed by: NURSE PRACTITIONER

## 2024-11-18 PROCEDURE — 92567 TYMPANOMETRY: CPT | Performed by: AUDIOLOGIST

## 2024-11-18 PROCEDURE — 4130F TOPICAL PREP RX AOE: CPT | Performed by: NURSE PRACTITIONER

## 2024-11-18 PROCEDURE — G8484 FLU IMMUNIZE NO ADMIN: HCPCS | Performed by: NURSE PRACTITIONER

## 2024-11-18 PROCEDURE — G8417 CALC BMI ABV UP PARAM F/U: HCPCS | Performed by: NURSE PRACTITIONER

## 2024-11-18 PROCEDURE — 3077F SYST BP >= 140 MM HG: CPT | Performed by: NURSE PRACTITIONER

## 2024-11-18 PROCEDURE — 3079F DIAST BP 80-89 MM HG: CPT | Performed by: NURSE PRACTITIONER

## 2024-11-18 PROCEDURE — 99213 OFFICE O/P EST LOW 20 MIN: CPT | Performed by: NURSE PRACTITIONER

## 2024-11-18 PROCEDURE — G8427 DOCREV CUR MEDS BY ELIG CLIN: HCPCS | Performed by: NURSE PRACTITIONER

## 2024-11-18 ASSESSMENT — ENCOUNTER SYMPTOMS
SHORTNESS OF BREATH: 0
STRIDOR: 0
RHINORRHEA: 0
RESPIRATORY NEGATIVE: 1
EYES NEGATIVE: 1
SINUS PAIN: 0
SINUS PRESSURE: 0

## 2024-11-18 NOTE — PROGRESS NOTES
This patient was referred for tympanometric testing by LINDA Fox due to ETD, left ear and otitis externa, right ear.    Tympanometry revealed a flat tympanogram, right ear and negative middle ear peak pressure, with normal compliance, left ear.      The results were reviewed with the patient and ordering provider.     Recommendations for follow up will be made pending ordering provider consult.    Kiran Jose CCC/DREW  Audiologist  [id number]  NPI#:  [id number]      Electronically signed by Brigida Burgos on 11/18/2024 at 9:04 AM

## 2024-11-18 NOTE — PROGRESS NOTES
Pupils: Pupils are equal, round, and reactive to light.   Cardiovascular:      Rate and Rhythm: Normal rate and regular rhythm.      Pulses: Normal pulses.   Pulmonary:      Effort: Pulmonary effort is normal. No respiratory distress.      Breath sounds: No stridor.   Musculoskeletal:         General: Normal range of motion.      Cervical back: Normal range of motion. No rigidity. No muscular tenderness.   Skin:     General: Skin is warm and dry.   Neurological:      General: No focal deficit present.      Mental Status: He is alert and oriented to person, place, and time.   Psychiatric:         Mood and Affect: Mood normal.         Behavior: Behavior normal.         Thought Content: Thought content normal.         Judgment: Judgment normal.         Ear Culture:  1 Result Note      Component    Specimen Description .EAR   Direct Exam Swab collections are low-yield and rarely indicated. Generally, the specimen volume when collected by swab is small, reducing the probability of isolating organisms: many organisms adhere to the fibers of the swab, which reduces the opportunity or recovering organisms. Interpret results with caution.    MANY EPITHELIAL CELLS    NO Polymorphonuclear leukocytes    FEW GRAM POSITIVE COCCI    FEW GRAM POSITIVE COCCI IN PAIRS Abnormal    Culture STAPHYLOCOCCUS EPIDERMIDIS Identification by MALDI-TOF MODERATE GROWTH No further workup Abnormal     STAPHYLOCOCCUS CAPITIS Identification by MALDI-TOF MODERATE GROWTH No further workup Abnormal     GORGE ALBICANS Identification by MALDI-TOF MODERATE GROWTH No further workup Abnormal     FUNGAL GROWTH MODERATE GROWTH Organism sent to Reference laboratory   Resulting Agency [facility]                    Tympanogram reviewed with patient.  Reveals type B - Flat with effusion curve in the right ear, with type A curve in the left ear.  IMPRESSION/PLAN:    Babak was seen today for follow-up.    Diagnoses and all orders for this

## 2024-11-22 ENCOUNTER — TELEPHONE (OUTPATIENT)
Dept: ENT CLINIC | Age: 79
End: 2024-11-22

## 2024-11-22 RX ORDER — FLUTICASONE PROPIONATE 50 MCG
2 SPRAY, SUSPENSION (ML) NASAL DAILY
Qty: 3 EACH | Refills: 0 | Status: SHIPPED | OUTPATIENT
Start: 2024-11-22

## 2024-11-29 ENCOUNTER — TELEPHONE (OUTPATIENT)
Dept: ENT CLINIC | Age: 79
End: 2024-11-29

## 2024-11-29 NOTE — TELEPHONE ENCOUNTER
Patient called office requesting a call back. Patient believes appointment is 12/2 but has not received a reminder call so wanted to double check.     Returned patient call and advised patient appointment is 12/19. Patient understood.

## 2024-12-03 ENCOUNTER — TELEPHONE (OUTPATIENT)
Dept: ENT CLINIC | Age: 79
End: 2024-12-03

## 2024-12-03 NOTE — TELEPHONE ENCOUNTER
Patient called office and LM stating he see's NP Tavo 12/19 but he has been having ear pain the past two nights. Would like sooner appointment if available.    Patient rescheduled.

## 2024-12-05 ENCOUNTER — OFFICE VISIT (OUTPATIENT)
Dept: ENT CLINIC | Age: 79
End: 2024-12-05

## 2024-12-05 ENCOUNTER — PROCEDURE VISIT (OUTPATIENT)
Dept: AUDIOLOGY | Age: 79
End: 2024-12-05
Payer: MEDICARE

## 2024-12-05 VITALS
HEIGHT: 66 IN | WEIGHT: 183.4 LBS | SYSTOLIC BLOOD PRESSURE: 139 MMHG | HEART RATE: 69 BPM | RESPIRATION RATE: 18 BRPM | DIASTOLIC BLOOD PRESSURE: 65 MMHG | OXYGEN SATURATION: 95 % | TEMPERATURE: 98 F | BODY MASS INDEX: 29.47 KG/M2

## 2024-12-05 DIAGNOSIS — H69.91 ETD (EUSTACHIAN TUBE DYSFUNCTION), RIGHT: Primary | ICD-10-CM

## 2024-12-05 DIAGNOSIS — H66.41 SUPPURATIVE OTITIS MEDIA OF RIGHT EAR, UNSPECIFIED CHRONICITY: ICD-10-CM

## 2024-12-05 DIAGNOSIS — H69.91 DYSFUNCTION OF RIGHT EUSTACHIAN TUBE: Primary | ICD-10-CM

## 2024-12-05 DIAGNOSIS — H61.22 IMPACTED CERUMEN OF LEFT EAR: ICD-10-CM

## 2024-12-05 PROCEDURE — 92567 TYMPANOMETRY: CPT

## 2024-12-05 RX ORDER — AZELASTINE 1 MG/ML
2 SPRAY, METERED NASAL 2 TIMES DAILY
Qty: 90 ML | Refills: 1 | Status: SHIPPED | OUTPATIENT
Start: 2024-12-05

## 2024-12-05 RX ORDER — FLUTICASONE PROPIONATE 50 MCG
2 SPRAY, SUSPENSION (ML) NASAL DAILY
Qty: 3 EACH | Refills: 0 | Status: SHIPPED | OUTPATIENT
Start: 2024-12-05

## 2024-12-05 RX ORDER — CIPROFLOXACIN HYDROCHLORIDE 3.5 MG/ML
4 SOLUTION/ DROPS TOPICAL 2 TIMES DAILY
Qty: 5 ML | Refills: 1 | Status: SHIPPED | OUTPATIENT
Start: 2024-12-05 | End: 2024-12-12

## 2024-12-05 ASSESSMENT — ENCOUNTER SYMPTOMS
RESPIRATORY NEGATIVE: 1
SINUS PRESSURE: 1
SINUS PAIN: 0
EYES NEGATIVE: 1
RHINORRHEA: 0
SHORTNESS OF BREATH: 0
STRIDOR: 0

## 2024-12-05 NOTE — PROGRESS NOTES
This patient was referred for tympanometric testing by LINDA Fox due to eustachian tube dysfunction.     Tympanometry revealed flat tympanograms in the right ear and revealed normal middle ear peak pressure and compliance in the left ear.    The results were reviewed with the patient and ordering provider.     Recommendations for follow up will be made pending physician consult.      Vic Smith, CCC-A  Clinical Audiologist  [place] License: [id number]    Electronically signed by Brigida Morin on 12/5/2024 at 9:52 AM

## 2024-12-05 NOTE — PROGRESS NOTES
[facility]  JESSE LayneO. Ms.Ed        Patient Name:  Babak Matt  :  1945     CHIEF C/O:    Chief Complaint   Patient presents with    Follow-up     1 month follow up with tymp, possibly myringotomy. Patient reports he was doing well however since  has felt quite a bit of pressure on the right side of face, neck, and right ear. Reports until this a.m. he was feeling the pressure behind the right eye as well.       HISTORY OBTAINED FROM:  patient    HISTORY OF PRESENT ILLNESS:       Babak is a 79 y.o. year old male, here today for follow up of:       Right middle ear effusion with pressure and muffled hearing.  Patient was last seen approximately 3 weeks ago and continues to have pressure and fullness in the right ear with muffled hearing.  He has been undergoing treatment with Flonase and Astelin nasal spray for right middle ear effusion but states he has not seen any improvement in his symptoms since starting the treatment.  He also complains of increased sinus congestion and pressure on the right side.  He denies any significant rhinorrhea or postnasal drainage at this time.  Denies any recent fevers.  Denies any drainage from the ear.         Past Medical History:   Diagnosis Date    A-fib (HCC)     Arthritis     Bilateral shoulder pain 2020    for TJAS Right 2020 Dr MARCELO Mejia    Diabetes mellitus (HCC)     Enlarged prostate     follows with Dr Alcocer    History of blood transfusion 2023    History of Holter monitoring 2022    Hyperlipidemia     Hypertension     Leg wound, right 2020    recent history of; see podiatry notes in epic     Past Surgical History:   Procedure Laterality Date    CATARACT REMOVAL WITH IMPLANT Bilateral     COLONOSCOPY N/A 2023    COLONOSCOPY POLYPECTOMY HOT SNARE performed by Armani Marquez MD at [facility]    DENTAL SURGERY      uper teeth removed    ENDOSCOPY, COLON, DIAGNOSTIC      EYE SURGERY      HERNIA REPAIR

## 2024-12-12 ENCOUNTER — OFFICE VISIT (OUTPATIENT)
Dept: ENT CLINIC | Age: 79
End: 2024-12-12

## 2024-12-12 VITALS
DIASTOLIC BLOOD PRESSURE: 82 MMHG | BODY MASS INDEX: 29.03 KG/M2 | SYSTOLIC BLOOD PRESSURE: 168 MMHG | HEART RATE: 88 BPM | RESPIRATION RATE: 16 BRPM | HEIGHT: 67 IN | TEMPERATURE: 98.1 F | WEIGHT: 185 LBS | OXYGEN SATURATION: 97 %

## 2024-12-12 DIAGNOSIS — H66.41 SUPPURATIVE OTITIS MEDIA OF RIGHT EAR, UNSPECIFIED CHRONICITY: ICD-10-CM

## 2024-12-12 DIAGNOSIS — H92.11 OTORRHEA, RIGHT EAR: Primary | ICD-10-CM

## 2024-12-12 DIAGNOSIS — Z45.89 TYMPANOSTOMY TUBE CHECK: ICD-10-CM

## 2024-12-12 PROCEDURE — 99024 POSTOP FOLLOW-UP VISIT: CPT | Performed by: NURSE PRACTITIONER

## 2024-12-12 RX ORDER — DOXYCYCLINE HYCLATE 100 MG
100 TABLET ORAL 2 TIMES DAILY
Qty: 20 TABLET | Refills: 0 | Status: SHIPPED | OUTPATIENT
Start: 2024-12-12 | End: 2024-12-22

## 2024-12-12 RX ORDER — FLUTICASONE PROPIONATE 50 MCG
2 SPRAY, SUSPENSION (ML) NASAL DAILY
Qty: 3 EACH | Refills: 1 | Status: SHIPPED | OUTPATIENT
Start: 2024-12-12

## 2024-12-13 NOTE — PROGRESS NOTES
Subjective:      Patient ID:  Babak Matt is a 79 y.o. male.    HPI Comments: Pt returns for check right ear tube, there have been infections since last visit.  Patient states he has continued drainage from the right ear.    Tubes were placed 1 week ago December 2024     Patient's medications, allergies, past medical, surgical, social and family histories were reviewed and updated as appropriate.      Review of Systems   Constitutional: Negative.  Negative for crying and unexpected weight change.   HENT: EAR DISCHARGE: Yes; EAR PAIN: No  Eyes: Negative.  Negative for visual disturbance.   Respiratory: Negative.  Negative for stridor.    Cardiovascular: Negative.  Negative for chest pain.   Gastrointestinal: Negative.  Negative for abdominal distention, nausea and vomiting.   Skin: Negative.  Negative for color change.   Neurological: Negative for facial asymmetry.   Hematological: Negative.    Psychiatric/Behavioral: Negative.  Negative for hallucinations.   All other systems reviewed and are negative.          Objective:   Physical Exam   Constitutional: Patient appears well-developed and well-nourished.   HENT:   Head: Normocephalic and atraumatic. There is normal jaw occlusion.     Right Ear:   Cerumen Impaction: No  PE tube visualized: Yes   In the TM: Yes   Tube blocked: No   Drainage: Yes   Infection: Yes    Left Ear:   Cerumen Impaction: No  PE tube visualized: No   In the TM: No   Tube blocked: No   Drainage: No   Infection: No      Nose: Nose normal.   Mouth/Throat: Mucous membranes are moist. Dentition is normal. Oropharynx is clear.          Eyes: Conjunctivae and EOM are normal. Pupils are equal, round, and reactive to light.   Neck: Normal range of motion. Neck supple.   Cardiovascular: Regular rhythm,    Pulmonary/Chest: Effort normal and breath sounds normal.   Abdominal: Full and soft.   Musculoskeletal: Normal range of motion.   Neurological: Alert.   Skin: Skin is warm.         Assessment:

## 2024-12-15 LAB
CULTURE: ABNORMAL
CULTURE: ABNORMAL
DIRECT EXAM: ABNORMAL
SPECIMEN DESCRIPTION: ABNORMAL

## 2024-12-16 ENCOUNTER — TELEPHONE (OUTPATIENT)
Dept: ENT CLINIC | Age: 79
End: 2024-12-16

## 2024-12-16 DIAGNOSIS — H92.11 OTORRHEA, RIGHT EAR: Primary | ICD-10-CM

## 2024-12-16 DIAGNOSIS — H66.41 SUPPURATIVE OTITIS MEDIA OF RIGHT EAR, UNSPECIFIED CHRONICITY: ICD-10-CM

## 2024-12-16 DIAGNOSIS — R51.9 FACIAL PAIN: ICD-10-CM

## 2024-12-16 DIAGNOSIS — H69.91 DYSFUNCTION OF RIGHT EUSTACHIAN TUBE: ICD-10-CM

## 2024-12-16 NOTE — TELEPHONE ENCOUNTER
Notified patient of instructions per DEXTER Vo. Patient reports continued soreness and \"achy face\". Advised patient to continue taking the medication as prescribed, notified SIVA Vo. Per SIVA Vo, this nurse to call patient on Thursday 12/19/24 to inquire on symptoms and effectiveness of antibiotic.    ----- Message from JUAN R Fox CNP sent at 12/16/2024  8:12 AM EST -----  Continue with current meds and follow up.

## 2024-12-19 NOTE — TELEPHONE ENCOUNTER
Called patient to obtain update on symptoms. Patient states he is having achy feeling around right side of head and ear. No improvement, difficulty sleeping due to the pain. Pain radiates to the jaw as well.

## 2025-01-06 ENCOUNTER — OFFICE VISIT (OUTPATIENT)
Dept: ENT CLINIC | Age: 80
End: 2025-01-06
Payer: MEDICARE

## 2025-01-06 VITALS
WEIGHT: 181.4 LBS | DIASTOLIC BLOOD PRESSURE: 85 MMHG | OXYGEN SATURATION: 94 % | BODY MASS INDEX: 28.47 KG/M2 | RESPIRATION RATE: 18 BRPM | SYSTOLIC BLOOD PRESSURE: 128 MMHG | HEIGHT: 67 IN | HEART RATE: 81 BPM | TEMPERATURE: 97.5 F

## 2025-01-06 DIAGNOSIS — H69.91 DYSFUNCTION OF RIGHT EUSTACHIAN TUBE: ICD-10-CM

## 2025-01-06 DIAGNOSIS — H71.91 CHOLESTEATOMA OF RIGHT EAR: ICD-10-CM

## 2025-01-06 DIAGNOSIS — H66.41 SUPPURATIVE OTITIS MEDIA OF RIGHT EAR, UNSPECIFIED CHRONICITY: Primary | ICD-10-CM

## 2025-01-06 PROCEDURE — 3074F SYST BP LT 130 MM HG: CPT | Performed by: NURSE PRACTITIONER

## 2025-01-06 PROCEDURE — 1036F TOBACCO NON-USER: CPT | Performed by: NURSE PRACTITIONER

## 2025-01-06 PROCEDURE — 99214 OFFICE O/P EST MOD 30 MIN: CPT | Performed by: NURSE PRACTITIONER

## 2025-01-06 PROCEDURE — 1160F RVW MEDS BY RX/DR IN RCRD: CPT | Performed by: NURSE PRACTITIONER

## 2025-01-06 PROCEDURE — G8417 CALC BMI ABV UP PARAM F/U: HCPCS | Performed by: NURSE PRACTITIONER

## 2025-01-06 PROCEDURE — 1159F MED LIST DOCD IN RCRD: CPT | Performed by: NURSE PRACTITIONER

## 2025-01-06 PROCEDURE — M1308 PR FLU IMMUNIZE NO ADMIN: HCPCS | Performed by: NURSE PRACTITIONER

## 2025-01-06 PROCEDURE — 1123F ACP DISCUSS/DSCN MKR DOCD: CPT | Performed by: NURSE PRACTITIONER

## 2025-01-06 PROCEDURE — 3079F DIAST BP 80-89 MM HG: CPT | Performed by: NURSE PRACTITIONER

## 2025-01-06 PROCEDURE — G8427 DOCREV CUR MEDS BY ELIG CLIN: HCPCS | Performed by: NURSE PRACTITIONER

## 2025-01-06 RX ORDER — PREDNISONE 10 MG/1
10 TABLET ORAL DAILY
Qty: 10 TABLET | Refills: 0 | Status: SHIPPED | OUTPATIENT
Start: 2025-01-06 | End: 2025-01-16

## 2025-01-06 RX ORDER — PEN NEEDLE, DIABETIC 31 GX5/16"
NEEDLE, DISPOSABLE MISCELLANEOUS
COMMUNITY
Start: 2024-12-31

## 2025-01-06 RX ORDER — AZELASTINE 1 MG/ML
SPRAY, METERED NASAL
Qty: 30 ML | Refills: 3 | Status: SHIPPED | OUTPATIENT
Start: 2025-01-06

## 2025-01-06 ASSESSMENT — ENCOUNTER SYMPTOMS
SINUS PAIN: 0
SHORTNESS OF BREATH: 0
EYES NEGATIVE: 1
RESPIRATORY NEGATIVE: 1
RHINORRHEA: 0
STRIDOR: 0
SINUS PRESSURE: 0

## 2025-01-06 NOTE — PROGRESS NOTES
Astelin 1 to 2 sprays each nostril twice daily.  He is also given prednisone 10 mg once daily for 10 days for inflammation and discomfort.  He is to call the office if no improvement in his symptoms is also to monitor his blood glucose levels closely.  He will be referred to Dr. Gibbs at [facility] for otology and further evaluation of possible cholesteatoma of the right middle ear.  He will follow-up in 6 weeks for reevaluation.  Call for any new or worsening symptoms prior to his next appointment.      Miki Vo, MSN, FNP-C  [facility] - Ear, Nose and Throat    The information contained in this note has been dictated using drug and medical speech recognition software and may contain errors

## 2025-01-09 ENCOUNTER — TELEPHONE (OUTPATIENT)
Dept: ENT CLINIC | Age: 80
End: 2025-01-09

## 2025-01-18 ENCOUNTER — HOSPITAL ENCOUNTER (OUTPATIENT)
Dept: RADIOLOGY | Facility: EXTERNAL LOCATION | Age: 80
Discharge: HOME | End: 2025-01-18
Payer: COMMERCIAL

## 2025-01-20 ENCOUNTER — CLINICAL SUPPORT (OUTPATIENT)
Dept: AUDIOLOGY | Facility: CLINIC | Age: 80
End: 2025-01-20
Payer: COMMERCIAL

## 2025-01-20 ENCOUNTER — APPOINTMENT (OUTPATIENT)
Dept: OTOLARYNGOLOGY | Facility: CLINIC | Age: 80
End: 2025-01-20
Payer: COMMERCIAL

## 2025-01-20 VITALS — HEIGHT: 67 IN | TEMPERATURE: 98.6 F | WEIGHT: 177 LBS | BODY MASS INDEX: 27.78 KG/M2

## 2025-01-20 DIAGNOSIS — H71.91 CHOLESTEATOMA OF RIGHT EAR: ICD-10-CM

## 2025-01-20 DIAGNOSIS — H71.21 CHOLESTEATOMA OF RIGHT MIDDLE EAR AND MASTOID: ICD-10-CM

## 2025-01-20 DIAGNOSIS — H69.91 DYSFUNCTION OF RIGHT EUSTACHIAN TUBE: ICD-10-CM

## 2025-01-20 DIAGNOSIS — H66.3X1 CHRONIC SUPPURATIVE OTITIS MEDIA OF RIGHT EAR, UNSPECIFIED OTITIS MEDIA LOCATION: ICD-10-CM

## 2025-01-20 DIAGNOSIS — H91.93 BILATERAL HEARING LOSS, UNSPECIFIED HEARING LOSS TYPE: Primary | ICD-10-CM

## 2025-01-20 PROCEDURE — 92567 TYMPANOMETRY: CPT

## 2025-01-20 PROCEDURE — 99205 OFFICE O/P NEW HI 60 MIN: CPT | Performed by: OTOLARYNGOLOGY

## 2025-01-20 PROCEDURE — 92557 COMPREHENSIVE HEARING TEST: CPT

## 2025-01-20 PROCEDURE — 1159F MED LIST DOCD IN RCRD: CPT | Performed by: OTOLARYNGOLOGY

## 2025-01-20 RX ORDER — HYDROCODONE BITARTRATE AND ACETAMINOPHEN 5; 325 MG/1; MG/1
1 TABLET ORAL EVERY 6 HOURS PRN
Qty: 30 TABLET | Refills: 0 | Status: SHIPPED | OUTPATIENT
Start: 2025-01-20 | End: 2025-01-28

## 2025-01-20 ASSESSMENT — PATIENT HEALTH QUESTIONNAIRE - PHQ9
1. LITTLE INTEREST OR PLEASURE IN DOING THINGS: NOT AT ALL
SUM OF ALL RESPONSES TO PHQ9 QUESTIONS 1 AND 2: 0
2. FEELING DOWN, DEPRESSED OR HOPELESS: NOT AT ALL

## 2025-01-20 ASSESSMENT — PAIN - FUNCTIONAL ASSESSMENT: PAIN_FUNCTIONAL_ASSESSMENT: 0-10

## 2025-01-20 ASSESSMENT — PAIN SCALES - GENERAL: PAINLEVEL_OUTOF10: 5 - MODERATE PAIN

## 2025-01-20 NOTE — PROGRESS NOTES
"Chief Complaint:    Chief Complaint   Patient presents with    Hearing Loss     Referred by:  Frantz Hubbard MD    HISTORY OF PRESENT ILLNESS:  This is a 78 yo male who presents today for a right ear infection. Patient with a remote history of a right tympanoplasty with Dr. Gonzales. More recently has been using hearing aids. Right side is not tolerating the hearing aid due to pain and drainage. Recent exam and imaging concerning for a cholesteatoma. No prior left ear surgeries.   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  No past surgical history on file.    Current Outpatient Medications:     HYDROcodone-acetaminophen (Norco) 5-325 mg tablet, Take 1 tablet by mouth every 6 hours if needed for severe pain (7 - 10) for up to 8 days., Disp: 30 tablet, Rfl: 0    Review of patient's allergies indicates:  Allergies   Allergen Reactions    Penicillins Anaphylaxis    Sulfur Hives       Social History and Family History: reviewed in the EMR    New patients fill out a 10-system review of systems survey that gets reviewed at their initial visit. Pertinent positives have been incorporated into the HPI.    PHYSICAL EXAM:   Vitals:    01/20/25 1143   Temp: 37 °C (98.6 °F)   TempSrc: Temporal   Weight: 80.3 kg (177 lb)   Height: 1.702 m (5' 7\")     The patient is well-developed, well-nourished and in no acute distress.    The patient is alert and oriented to time, person, and place with appropriate mood and affect.     EARS: Examination of the external ears was normal using visual inspection. Handheld otoscopy was inadequate to provide fine detail and depth perception necessary for a full diagnostic assessment of the tympanic membrane and middle ear space. The otologic microscope was utilized to improve visualization. Use of the otologic microscope facilitates cleaning of the EAC and three-dimensional, magnified visualization of the tympanic membrane and middle ear structures for diagnosis of " observable pathology and anatomical variations. Otoscopic and/or microscopic evaluation reveals:        Right:  External auditory canal: narrow/collapsed, anterior TM with bulging area and purulent drainage concerning for EAC lesion/cholesteatoma; cerumen removed  Tympanic Membrane: difficult to assess   Middle ear: retracted      Left: External auditory canal: patent   Tympanic Membrane: intact and normal   Middle ear: well aerated     Eyes:  EOMI, vision grossly intact, no nystagmus   Neurologic:  CN 2-12 intact including normal facial movement and sensation     DATA REVIEWED:   AUDIOGRAMS   Date - 1/20/25      IMAGING  - reviewed  - Right mastoid sclerotic and what air cells are present have opacification; Right TM retracted, possible scutum erosion; lateral EAC with edema concerning for abscess vs cholesteatoma    OTHER  - none    PROCEDURE:  Cerumen Removal  Ear(s): right  A copious amount of wax blocked the external auditory canal, which prevented proper examination of the canal and tympanic membrane.  Therefore, using the binocular belkis-microscope I removed the wax with micro-otologic instruments. This includes a Thomason pick, loop curette and otologic suctions. After the wax removal and the canal was clean, I continued my exam of the external auditory canal and tympanic membrane. Please see the above exam section for exam details. The patient tolerated the procedure well.       ASSESSMENT & PLAN:  Problem List Items Addressed This Visit    None  Visit Diagnoses       Cholesteatoma of right ear        Relevant Medications    HYDROcodone-acetaminophen (Norco) 5-325 mg tablet          Right Cholesteatoma, possible EAC component vs EAC abscess.  Discussed management which will require surgery.  -plan for right tympanomasoidectomy with OCR and cartilage graft (no endoscope). Possible canal wall down. [facility] OR due to atrial fibrillation.   - pain medication prescribed  - ok to use otic drops as needed until  surgery    Prakash Mesa M.D.            Otology/Neurotology      Department of Otolaryngology - Head and Neck Surgery     [facility]     Phone/Appointments: [phone]      Fax: [phone]      E-mail: [email or web address]

## 2025-01-20 NOTE — PROGRESS NOTES
AUDIOLOGY ADULT AUDIOMETRIC EVALUATION     Name: Syed Montez   : 1945 Age: 79 y.o.   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  Word recognition in quiet is poor in the right ear, and good in the left ear.  Tympanometry indicates restricted eardrum mobility consistent with outer/middle ear involvement in the right ear, and middle ear pressure and eardrum compliance within normal limits in the left ear.   Amplification needs: Continue full-time use of devices, expect when activities preclude device safety.    Today's test results are hearing loss requiring medical/otologic and audiologic follow-up.     RECOMMENDATIONS   Continue medical follow up with primary care provider and/or Ears Nose and Throat (ENT) provider as recommended.  Return for audiologic evaluation in conjunction with medical management or annually (whichever is sooner) to monitor hearing sensitivity and assess middle ear status or sooner should concerns arise. The audiology department can be reached at [phone] to schedule an appointment.   Strive for full-time device use during waking hours, except when activities preclude device safety.  Avoid exposure to loud sounds by moving away from the noise, turning down the volume, or wearing proper hearing protection correctly.  Consider use of good communication strategies. These include but are not limited to the following: get Syed's attention before speaking to him, close the distance between Syed and who is speaking, limit background noise, allow Syed access to visual cues (i.e. facial expressions/mouth movements, pictures, written instructions, etc.). When in situations where background noise cannot be avoided, position yourself so that the background noise is to your back, and you communication partner is seated in front of  "you, ideally with a quiet area or wall behind them.     HISTORY   History obtained from patient report and chart review; please see medical records for complete history. Mr. Montez was seen today for an initial audiologic evaluation in conjunction with an evaluation with Prakash Mesa MD.    Reason for visit: Cyst in right ear canal; cannot hear out of right ear; started with an earache in September 2024   Change in Hearing: yes in the right ear greater than the left ear  Tinnitus: denied  Otalgia: yes, 5/10 in the right ear, goes up to 10/10 at night.   Aural Pressure/Fullness: yes in the right ear  Recent Ear Infections/Otorrhea: yes in the right ear; none for the past week  Dizziness: denied  History of Ear Surgeries: History of right tympanoplasty with Dr. Gonzales in 1959, right PET about a month ago  History of Noise Exposure: yes, occupational noise exposure  Hearing Aid Use: Has two hearing aids but is currently only wearing left hearing aid; has worn hearing aids for 15 years.   Falls within the last year: denied  Other Significant History: denied    See same day encounter with Prakash Mesa MD in the Ears Nose and Throat (ENT) department for additional information.     EVALUATION AND PATIENT EDUCATION   The following is a brief interpretation of the obtained findings from the audiologic evaluation. Discussed results and recommendations with Mr. Montez. Questions were addressed and the patient was encouraged to contact our department at [phone] should concerns arise.     TEST RESULTS - See scanned audiogram in \"Media.\"   Otoscopic Evaluation:   Right Ear: Occluding debris preventing visualization of the tympanic membrane.   Left Ear: Ear canal clear, tympanic membrane visualized.       Tympanometry (226 Hz): An objective evaluation of middle ear function. CPT code: 98348   Right Ear: Type B, restricted eardrum mobility consistent with outer/middle ear involvement.   Left Ear: Type A, middle ear pressure " and tympanic membrane compliance within normal limits.       Acoustic Reflexes: An objective measure of auditory and facial nerve pathways.   (Probe) Right Ear (ipsi right stimulus ear; contralateral left stimulus ear):   Did not test.   (Probe) Left Ear (ipsi left stimulus ear; contralateral right stimulus ear):   Did not test.       Distortion Product Otoacoustic Emissions (DPOAE): An objective measurement of responses generated by the cochlea when simultaneously stimulated by two pure tone frequencies. CPT code: 26798   Right Ear: Did not test.   Left Ear: Did not test.   Present OAEs suggest normal or near cochlear outer hair cell function for corresponding frequency region(s). Absent OAEs with normal middle ear function can be consistent with some degree of hearing loss. Assessment of cochlear outer hair cell function may be impacted by outer or middle ear function.       Test technique: Conventional Audiometry via headphones.   An evaluation of hearing sensitivity via air and bone conduction and speech recognition. CPT code: 26763    Reliability: good       Pure Tone Audiometry:    Right Ear: Profound rising to severe likely mixed hearing loss for 125-8000 Hz.   Left Ear: Severe to profound likely sensorineural hearing loss for 125-8000 Hz.  Could not mask bone conduction thresholds due to insufficient masking levels.       Speech Audiometry:    Right Ear: Speech Reception Threshold (SRT) was obtained at 80 dB HL. This is in good agreement with two frequency Pure Tone Average (PTA).  Word Recognition scores in quiet were poor (44%) when words were presented at 100 dB HL. These results are based on [facility] Auditory Test No.6 (NU-6) (N=25) and contralateral masking was presented at 60 dB HL.   Left Ear: Speech Reception Threshold (SRT) was obtained at 70 dB HL. This is in good agreement with three frequency Pure Tone Average (PTA).  Word Recognition scores in quiet were good (80%) when words were  presented at 95 dB HL. These results are based on [facility] Auditory Test No.6 (NU-6) (N=25).       Comparison to previous results: No previous results available.          Liya Ortiz, AUD, CCC-A  Licensed Clinical Audiologist    Degree of Hearing Decibel Range  Key   Within Normal Limits 0-20  CNT Could Not Test   Slight 21-25  DNT Did Not Test   Mild 26-40  ECV Ear Canal Volume   Moderate 41-55  OAE Otoacoustic Emissions   Moderately-Severe 56-70  SIN Speech in Noise   Severe 71-90  TM Tympanic Membrane   Profound 91+  HA Hearing Aid   Sensorineural Hearing Loss SNHL  MLV Monitored Live Voice   Conductive Hearing Loss CHL  WNL Within Normal Limits   Noise-Induced Hearing Loss NIHL

## 2025-01-23 ENCOUNTER — APPOINTMENT (OUTPATIENT)
Facility: CLINIC | Age: 80
End: 2025-01-23
Payer: COMMERCIAL

## 2025-01-23 PROBLEM — H66.3X1 CHRONIC SUPPURATIVE OTITIS MEDIA OF RIGHT EAR: Status: ACTIVE | Noted: 2025-01-20

## 2025-01-23 PROBLEM — H71.21 CHOLESTEATOMA OF RIGHT MIDDLE EAR AND MASTOID: Status: ACTIVE | Noted: 2025-01-20

## 2025-01-24 ENCOUNTER — TELEPHONE (OUTPATIENT)
Dept: CARDIOLOGY CLINIC | Age: 80
End: 2025-01-24

## 2025-01-24 NOTE — TELEPHONE ENCOUNTER
Jsesica Tolliver patient is having a right tympanomastoidectomy with OCR    Scheduled 2-19-25 at  ear, nose and throat     Requesting risk for surgery  and anticoagulant instructions

## 2025-01-24 NOTE — TELEPHONE ENCOUNTER
----- Message from ROSE MARY DIAMOND MA sent at 1/23/2025  3:35 PM EST -----  Babak is having surgery 2/19/25 and needs clearance from Dr. Tolliver  His last OV was 11/6/24

## 2025-01-27 ENCOUNTER — HOSPITAL ENCOUNTER (OUTPATIENT)
Dept: INFUSION THERAPY | Age: 80
Discharge: HOME | End: 2025-01-27
Payer: MEDICARE

## 2025-01-27 ENCOUNTER — OFFICE VISIT (OUTPATIENT)
Dept: ONCOLOGY | Age: 80
End: 2025-01-27
Payer: MEDICARE

## 2025-01-27 VITALS
OXYGEN SATURATION: 97 % | BODY MASS INDEX: 27.64 KG/M2 | HEART RATE: 74 BPM | DIASTOLIC BLOOD PRESSURE: 74 MMHG | HEIGHT: 67 IN | WEIGHT: 176.1 LBS | TEMPERATURE: 97.8 F | SYSTOLIC BLOOD PRESSURE: 143 MMHG

## 2025-01-27 DIAGNOSIS — K92.2 GASTROINTESTINAL HEMORRHAGE, UNSPECIFIED GASTROINTESTINAL HEMORRHAGE TYPE: ICD-10-CM

## 2025-01-27 DIAGNOSIS — D64.9 ANEMIA, UNSPECIFIED TYPE: Primary | ICD-10-CM

## 2025-01-27 LAB
BASOPHILS # BLD: 0.04 K/UL (ref 0–0.2)
BASOPHILS NFR BLD: 1 % (ref 0–2)
EOSINOPHIL # BLD: 0.14 K/UL (ref 0.05–0.5)
EOSINOPHILS RELATIVE PERCENT: 2 % (ref 0–6)
ERYTHROCYTE [DISTWIDTH] IN BLOOD BY AUTOMATED COUNT: 12.9 % (ref 11.5–15)
FERRITIN SERPL-MCNC: 411 NG/ML
HCT VFR BLD AUTO: 41.4 % (ref 37–54)
HGB BLD-MCNC: 14 G/DL (ref 12.5–16.5)
IMM GRANULOCYTES # BLD AUTO: 0.06 K/UL (ref 0–0.58)
IMM GRANULOCYTES NFR BLD: 1 % (ref 0–5)
IRON SATN MFR SERPL: 20 % (ref 20–55)
IRON SERPL-MCNC: 46 UG/DL (ref 59–158)
LYMPHOCYTES NFR BLD: 1.44 K/UL (ref 1.5–4)
LYMPHOCYTES RELATIVE PERCENT: 18 % (ref 20–42)
MCH RBC QN AUTO: 30.6 PG (ref 26–35)
MCHC RBC AUTO-ENTMCNC: 33.8 G/DL (ref 32–34.5)
MCV RBC AUTO: 90.6 FL (ref 80–99.9)
MONOCYTES NFR BLD: 0.55 K/UL (ref 0.1–0.95)
MONOCYTES NFR BLD: 7 % (ref 2–12)
NEUTROPHILS NFR BLD: 73 % (ref 43–80)
NEUTS SEG NFR BLD: 5.84 K/UL (ref 1.8–7.3)
PLATELET # BLD AUTO: 316 K/UL (ref 130–450)
PMV BLD AUTO: 9.4 FL (ref 7–12)
RBC # BLD AUTO: 4.57 M/UL (ref 3.8–5.8)
TIBC SERPL-MCNC: 235 UG/DL (ref 250–450)
WBC OTHER # BLD: 8.1 K/UL (ref 4.5–11.5)

## 2025-01-27 PROCEDURE — G8427 DOCREV CUR MEDS BY ELIG CLIN: HCPCS | Performed by: INTERNAL MEDICINE

## 2025-01-27 PROCEDURE — 1036F TOBACCO NON-USER: CPT | Performed by: INTERNAL MEDICINE

## 2025-01-27 PROCEDURE — 82728 ASSAY OF FERRITIN: CPT

## 2025-01-27 PROCEDURE — G8417 CALC BMI ABV UP PARAM F/U: HCPCS | Performed by: INTERNAL MEDICINE

## 2025-01-27 PROCEDURE — 3078F DIAST BP <80 MM HG: CPT | Performed by: INTERNAL MEDICINE

## 2025-01-27 PROCEDURE — 1160F RVW MEDS BY RX/DR IN RCRD: CPT | Performed by: INTERNAL MEDICINE

## 2025-01-27 PROCEDURE — 1159F MED LIST DOCD IN RCRD: CPT | Performed by: INTERNAL MEDICINE

## 2025-01-27 PROCEDURE — 1123F ACP DISCUSS/DSCN MKR DOCD: CPT | Performed by: INTERNAL MEDICINE

## 2025-01-27 PROCEDURE — 36415 COLL VENOUS BLD VENIPUNCTURE: CPT

## 2025-01-27 PROCEDURE — 83550 IRON BINDING TEST: CPT

## 2025-01-27 PROCEDURE — 3077F SYST BP >= 140 MM HG: CPT | Performed by: INTERNAL MEDICINE

## 2025-01-27 PROCEDURE — 85025 COMPLETE CBC W/AUTO DIFF WBC: CPT

## 2025-01-27 PROCEDURE — 99213 OFFICE O/P EST LOW 20 MIN: CPT | Performed by: INTERNAL MEDICINE

## 2025-01-27 PROCEDURE — 99212 OFFICE O/P EST SF 10 MIN: CPT

## 2025-01-27 PROCEDURE — 83540 ASSAY OF IRON: CPT

## 2025-01-27 RX ORDER — FERROUS SULFATE 325(65) MG
1 TABLET ORAL EVERY OTHER DAY
Qty: 90 TABLET | Refills: 5 | Status: SHIPPED | OUTPATIENT
Start: 2025-01-27

## 2025-01-27 NOTE — PROGRESS NOTES
[facility] MEDICAL ONCOLOGY  [address] [address]  Dept: [phone]  Loc: [phone]  Attending progress note      Reason for Visit:   Anemia.    Referring Physician:  Fermín Burris MD    PCP:  Favian Hamilton DO    History of Present Illness:     Mr. Matt is a pleasant 78-year-old gentleman, with a past medical history significant for A-fib, on chronic integration with Eliquis, hypertension, hyperlipidemia, and type II DM, who was referred to the hematology office evaluation of anemia.  The patient was brought to the ED on 1/5/2023 after being found on the floor, his initial blood work was remarkable for hemoglobin of 5.7G/DL, he was also found to be in DKA. The patient had an EGD and colonoscopy done revealing hiatal hernia, esophageal stricture, diverticulosis, and rectal polyp, the patient just had a capsule endoscopy done.  The patient received packed RBCs transfusion and is on oral iron.  The patient had laparoscopic bilateral inguinal hernia repair done on 4/17/2023, he is feeling well at this time, the patient continues to be on oral iron, tolerating it well overall.  No reported side effects.  He denies any bleeding.  He is following with ENT for possible cholesteatoma, Dr. Short at [facility].    Review of Systems;  CONSTITUTIONAL: No fever, chills. Good appetite, fair energy level  ENMT: Eyes: No diplopia; Nose: No epistaxis. Mouth: No sore throat.  He is hard of hearing.  RESPIRATORY: No hemoptysis, shortness of breath, cough.   CARDIOVASCULAR: No chest pain, palpitations.  GASTROINTESTINAL: No nausea/vomiting, abdominal pain, or change in his bowel habits.  GENITOURINARY: No dysuria, urinary frequency, hematuria.  NEURO: No syncope, presyncope, headache.  Remainder:  ROS NEGATIVE    Past Medical History:      Diagnosis Date    A-fib (HCC)     Arthritis     Bilateral shoulder pain 05/2020    for TJAS Right 05/12/2020

## 2025-02-04 RX ORDER — DAPAGLIFLOZIN 10 MG/1
5 TABLET, FILM COATED ORAL EVERY 24 HOURS
COMMUNITY

## 2025-02-04 RX ORDER — NIFEDIPINE 30 MG/1
30 TABLET, FILM COATED, EXTENDED RELEASE ORAL
COMMUNITY

## 2025-02-04 RX ORDER — LISINOPRIL 40 MG/1
40 TABLET ORAL DAILY
COMMUNITY

## 2025-02-04 RX ORDER — INSULIN GLARGINE 100 [IU]/ML
8 INJECTION, SOLUTION SUBCUTANEOUS 2 TIMES DAILY
COMMUNITY

## 2025-02-04 RX ORDER — FLUTICASONE PROPIONATE 50 MCG
1 SPRAY, SUSPENSION (ML) NASAL DAILY
COMMUNITY

## 2025-02-04 RX ORDER — ATORVASTATIN CALCIUM 40 MG/1
40 TABLET, FILM COATED ORAL DAILY
COMMUNITY

## 2025-02-04 RX ORDER — METOLAZONE 5 MG/1
5 TABLET ORAL DAILY
COMMUNITY

## 2025-02-04 RX ORDER — PANTOPRAZOLE SODIUM 40 MG/1
40 TABLET, DELAYED RELEASE ORAL
COMMUNITY

## 2025-02-05 ASSESSMENT — ENCOUNTER SYMPTOMS
APNEA: 1
PALPITATIONS: 1
ARTHRALGIAS: 1

## 2025-02-05 NOTE — H&P (VIEW-ONLY)
CPM/PAT Evaluation       Name: Syed Montez   /Age: 1945       TELEMEDICINE ENCOUNTER  Patient was interviewed by telephone for preadmission testing perioperative risk assessment prior to surgery.    DATE OF CONSULT: 2025  REFERRING PROVIDER: Dr. 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  He was placed on 6 doxycycline and Cipro drops.  Drainage has resolved, but he continues to have fullness and pressure in the ear that radiates to the right side of his face.  He also notes muffled hearing in the right ear.  He did have an ear tube placed in his right ear about 2 months ago.  He underwent a CT scan of the IAC which showed complete opacification of the right middle ear space and mastoid.  Concern for cholesteatoma.  Patient has been referred to preadmission testing in anticipation of a right tympanomastoidectomy with acicular chain reconstruction and cartilage graft of right ear.  Patient with medical history significant for atrial fibrillation, hypertension, hyperlipidemia (followed by cardiologist Dr. Piyush Joseph of [facility]); obstructive sleep apnea intolerant to CPAP; type 2 diabetes; GERD; osteoarthritis.  Patient has no other medical complaints at this time, and reports to be in usual state of health without any recent illnesses, hospitalizations, and no current or remote infections/fevers.    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    Chronic suppurative otitis media of right ear        PAST MEDICAL HISTORY  Past Medical History:   Diagnosis Date    Arthritis of multiple sites     Atrial fibrillation  (Multi)     Bilateral shoulder pain     Enlarged prostate     GERD (gastroesophageal reflux disease)     Hyperlipidemia     Hypertension     Obstructive sleep apnea     Intolerant to CPAP    Type 2 diabetes mellitus         SURGICAL HISTORY  Past Surgical History:   Procedure Laterality Date    CATARACT EXTRACTION      COLONOSCOPY      With polypectomy    DENTAL SURGERY      HERNIA REPAIR      Laparoscopic bilateral inguinal hernia repair with mesh    PROSTATE SURGERY      TONSILLECTOMY      TOTAL SHOULDER ARTHROPLASTY Right     Right total shoulder arthroplasty reverse    UPPER GASTROINTESTINAL ENDOSCOPY          ANESTHESIA HISTORY  Denies problems with anesthesia in the past such as PONV, prolonged sedation, awareness, dental damage, aspiration, cardiac arrest, difficult intubation, or unexpected hospital admissions. Denies family history of malignant hyperthermia, or pseudocholinesterase deficiency.     SOCIAL HISTORY  Never smoker; no alcohol or recreational drug use.  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  He states this is a very physical lifestyle, and he has no problems with chest pain or abnormal breathing with activities.  METS 3.5-4    FAMILY HISTORY  No family history on file.     ALLERGIES  Allergies   Allergen Reactions    Penicillins Anaphylaxis    Sulfur Hives        MEDICATIONS  No current facility-administered medications for this encounter.    Current Outpatient Medications:     apixaban (Eliquis) 5 mg tablet, Take 1 tablet (5 mg) by mouth 2 times a day., Disp: , Rfl:     atorvastatin (Lipitor) 40 mg tablet, Take 1 tablet (40 mg) by mouth once daily., Disp: , Rfl:     dapagliflozin propanediol (Farxiga) 10 mg, Take 0.5 tablets (5 mg) by mouth once every 24 hours., Disp: , Rfl:     insulin glargine (Lantus U-100 Insulin) 100 unit/mL injection, Inject 8 Units under the skin 2 times a day. Take as directed per insulin instructions., Disp: , Rfl:  "    lisinopril 40 mg tablet, Take 1 tablet (40 mg) by mouth once daily., Disp: , Rfl:     metOLazone (Zaroxolyn) 5 mg tablet, Take 1 tablet (5 mg) by mouth once daily., Disp: , Rfl:     NIFEdipine ER (Adalat CC) 30 mg 24 hr tablet, Take 1 tablet (30 mg) by mouth once daily in the morning. Take before meals. Do not crush, chew, or split., Disp: , Rfl:     pantoprazole (ProtoNix) 40 mg EC tablet, Take 1 tablet (40 mg) by mouth once daily in the morning. Take before meals. Do not crush, chew, or split., Disp: , Rfl:     semaglutide (Rybelsus) 7 mg tablet, Take 1 tablet (7 mg) by mouth once daily., Disp: , Rfl:     fluticasone (Flonase) 50 mcg/actuation nasal spray, Administer 1 spray into each nostril once daily. Shake gently. Before first use, prime pump. After use, clean tip and replace cap., Disp: , Rfl:      REVIEW OF SYSTEMS  Review of Systems   HENT:  Positive for hearing loss (Right-sided with history of ear infections).    Respiratory:  Positive for apnea.    Cardiovascular:  Positive for palpitations (Atrial fibrillation).   Musculoskeletal:  Positive for arthralgias.   All other systems reviewed and are negative.    STOP BANG:  Positive for CLARISA intolerant to CPAP    PHYSICAL EXAM  Deferred    AIRWAY EXAM  Deferred    VITALS  No vitals taken for telemedicine visit  BMI Readings from Last 1 Encounters:   01/20/25 27.72 kg/m²      BP Readings from Last 4 Encounters:   No data found for BP   136/60 from 11/06/2024    LABS  No results found for: \"WBC\", \"HGB\", \"HCT\", \"MCV\", \"PLT\"   No results found for: \"GLUCOSE\", \"CALCIUM\", \"NA\", \"K\", \"CO2\", \"CL\", \"BUN\", \"CREATININE\"   Lab Results   Component Value Date    HGBA1C 5.9 (H) 01/06/2023      Labs drawn on 01/27/2025 at [facility].  Awaiting upload into epic.    Cardiac IMAGING  ECG from 01/31/2023  Atrial fibrillation with rapid ventricular response   Nonspecific ST and T wave abnormality   Abnormal ECG When compared with ECG of 06-JAN-2017 09:45,  Atrial " fibrillation has replaced Sinus rhythm Vent. rate has increased BY 41 BPM Criteria for Inferior infarct are no longer present   Confirmed by Mal Vergara ([id number]) on 1/6/2023 12:15:39 PM    Echocardiogram from 10/27/2022  Summary Left ventricular internal dimensions were normal in diastole and systole.   Mild left ventricular concentric hypertrophy noted.   No regional wall motion abnormalities seen.   Normal left ventricular ejection fraction.   The left atrium is mildly dilated.   Interatrial septum appears aneurysmal.  Mild mitral annular calcification.   The aortic valve appears mildly sclerotic.       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 A copy of these instructions has been unloaded to  [email or web address] along with a copy sent to either home email address or mailed to home address.  This note was created in part upon personal review of patient's medical records.  Speech recognition transcription software was used in the creation of this note. Despite proofreading, several typographical errors might be present that might affect the meaning of the content.

## 2025-02-05 NOTE — CPM/PAT H&P
CPM/PAT Evaluation       Name: Syed Montez   /Age: 1945       TELEMEDICINE ENCOUNTER  Patient was interviewed by telephone for preadmission testing perioperative risk assessment prior to surgery.    DATE OF CONSULT: 2025  REFERRING PROVIDER: Dr. 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  He was placed on 6 doxycycline and Cipro drops.  Drainage has resolved, but he continues to have fullness and pressure in the ear that radiates to the right side of his face.  He also notes muffled hearing in the right ear.  He did have an ear tube placed in his right ear about 2 months ago.  He underwent a CT scan of the IAC which showed complete opacification of the right middle ear space and mastoid.  Concern for cholesteatoma.  Patient has been referred to preadmission testing in anticipation of a right tympanomastoidectomy with acicular chain reconstruction and cartilage graft of right ear.  Patient with medical history significant for atrial fibrillation, hypertension, hyperlipidemia (followed by cardiologist Dr. Piyush Joseph of [facility]); obstructive sleep apnea intolerant to CPAP; type 2 diabetes; GERD; osteoarthritis.  Patient has no other medical complaints at this time, and reports to be in usual state of health without any recent illnesses, hospitalizations, and no current or remote infections/fevers.    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    Chronic suppurative otitis media of right ear        PAST MEDICAL HISTORY  Past Medical History:   Diagnosis Date    Arthritis of multiple sites     Atrial fibrillation  (Multi)     Bilateral shoulder pain     Enlarged prostate     GERD (gastroesophageal reflux disease)     Hyperlipidemia     Hypertension     Obstructive sleep apnea     Intolerant to CPAP    Type 2 diabetes mellitus         SURGICAL HISTORY  Past Surgical History:   Procedure Laterality Date    CATARACT EXTRACTION      COLONOSCOPY      With polypectomy    DENTAL SURGERY      HERNIA REPAIR      Laparoscopic bilateral inguinal hernia repair with mesh    PROSTATE SURGERY      TONSILLECTOMY      TOTAL SHOULDER ARTHROPLASTY Right     Right total shoulder arthroplasty reverse    UPPER GASTROINTESTINAL ENDOSCOPY          ANESTHESIA HISTORY  Denies problems with anesthesia in the past such as PONV, prolonged sedation, awareness, dental damage, aspiration, cardiac arrest, difficult intubation, or unexpected hospital admissions. Denies family history of malignant hyperthermia, or pseudocholinesterase deficiency.     SOCIAL HISTORY  Never smoker; no alcohol or recreational drug use.  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  He states this is a very physical lifestyle, and he has no problems with chest pain or abnormal breathing with activities.  METS 3.5-4    FAMILY HISTORY  No family history on file.     ALLERGIES  Allergies   Allergen Reactions    Penicillins Anaphylaxis    Sulfur Hives        MEDICATIONS  No current facility-administered medications for this encounter.    Current Outpatient Medications:     apixaban (Eliquis) 5 mg tablet, Take 1 tablet (5 mg) by mouth 2 times a day., Disp: , Rfl:     atorvastatin (Lipitor) 40 mg tablet, Take 1 tablet (40 mg) by mouth once daily., Disp: , Rfl:     dapagliflozin propanediol (Farxiga) 10 mg, Take 0.5 tablets (5 mg) by mouth once every 24 hours., Disp: , Rfl:     insulin glargine (Lantus U-100 Insulin) 100 unit/mL injection, Inject 8 Units under the skin 2 times a day. Take as directed per insulin instructions., Disp: , Rfl:  "    lisinopril 40 mg tablet, Take 1 tablet (40 mg) by mouth once daily., Disp: , Rfl:     metOLazone (Zaroxolyn) 5 mg tablet, Take 1 tablet (5 mg) by mouth once daily., Disp: , Rfl:     NIFEdipine ER (Adalat CC) 30 mg 24 hr tablet, Take 1 tablet (30 mg) by mouth once daily in the morning. Take before meals. Do not crush, chew, or split., Disp: , Rfl:     pantoprazole (ProtoNix) 40 mg EC tablet, Take 1 tablet (40 mg) by mouth once daily in the morning. Take before meals. Do not crush, chew, or split., Disp: , Rfl:     semaglutide (Rybelsus) 7 mg tablet, Take 1 tablet (7 mg) by mouth once daily., Disp: , Rfl:     fluticasone (Flonase) 50 mcg/actuation nasal spray, Administer 1 spray into each nostril once daily. Shake gently. Before first use, prime pump. After use, clean tip and replace cap., Disp: , Rfl:      REVIEW OF SYSTEMS  Review of Systems   HENT:  Positive for hearing loss (Right-sided with history of ear infections).    Respiratory:  Positive for apnea.    Cardiovascular:  Positive for palpitations (Atrial fibrillation).   Musculoskeletal:  Positive for arthralgias.   All other systems reviewed and are negative.    STOP BANG:  Positive for CLARISA intolerant to CPAP    PHYSICAL EXAM  Deferred    AIRWAY EXAM  Deferred    VITALS  No vitals taken for telemedicine visit  BMI Readings from Last 1 Encounters:   01/20/25 27.72 kg/m²      BP Readings from Last 4 Encounters:   No data found for BP   136/60 from 11/06/2024    LABS  No results found for: \"WBC\", \"HGB\", \"HCT\", \"MCV\", \"PLT\"   No results found for: \"GLUCOSE\", \"CALCIUM\", \"NA\", \"K\", \"CO2\", \"CL\", \"BUN\", \"CREATININE\"   Lab Results   Component Value Date    HGBA1C 5.9 (H) 01/06/2023      Labs drawn on 01/27/2025 at [facility].  Awaiting upload into epic.    Cardiac IMAGING  ECG from 01/31/2023  Atrial fibrillation with rapid ventricular response   Nonspecific ST and T wave abnormality   Abnormal ECG When compared with ECG of 06-JAN-2017 09:45,  Atrial " fibrillation has replaced Sinus rhythm Vent. rate has increased BY 41 BPM Criteria for Inferior infarct are no longer present   Confirmed by Mal Vergara ([id number]) on 1/6/2023 12:15:39 PM    Echocardiogram from 10/27/2022  Summary Left ventricular internal dimensions were normal in diastole and systole.   Mild left ventricular concentric hypertrophy noted.   No regional wall motion abnormalities seen.   Normal left ventricular ejection fraction.   The left atrium is mildly dilated.   Interatrial septum appears aneurysmal.  Mild mitral annular calcification.   The aortic valve appears mildly sclerotic.       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 A copy of these instructions has been unloaded to  [employer] along with a copy sent to either home email address or mailed to home address.  This note was created in part upon personal review of patient's medical records.  Speech recognition transcription software was used in the creation of this note. Despite proofreading, several typographical errors might be present that might affect the meaning of the content.

## 2025-02-05 NOTE — PREPROCEDURE INSTRUCTIONS
Pre-Operative Instructions &?Checklist      Your surgery has been scheduled at [facility] at [address] [place], [address], [address], in the [facility]. Parking is to the left of the main entrance.     You will be contacted about the time of your surgery the day before your surgery (if your surgery is on a Monday, you will be called the Friday before surgery). If you are unable to answer the phone, a detailed voicemail message will be left. Make sure that your voicemail box is not full so a message can be left. If you have not received a call by 3:00 pm you may call [phone] between the hours of 3:00 and 4:00 pm. Please be available by phone the night before/day of surgery in case there is a change in the schedule which may require you to arrive earlier/later.     ?     14 DAYS BEFORE SURGERY STOP TAKING WEIGHT LOSS MEDICATIONS      ?7 DAYS BEFORE SURGERY STOP THESE MEDICATIONS:       *Multiple Vitamins containing Vitamin E       * Herbal supplements, Fish Oil, garlic pills, turmeric, CoQ enzyme       *Stop taking aspirin, aspirin-containing products, and NSAID's like Advil, Motrin, Aleve, Ibuprofen, Meloxicam, Celecoxib, and Diclofenac.. Tylenol is okay to take for pain relief.        *If you are currently taking Coumadin/Warfarin, we will have to coordinate that with your PCP &/or the Anticoagulation Clinic.     3 DAYS BEFORE SURGERY  Stop taking Farxiga. This medication may be resumed the day following surgery.    48 HOURS BEFORE SURGERY  Stop taking Eliquis as directed by your cardiologist, Dr. Piyush Joseph. Consult with your surgeon as to when to resume this medication.    THE DAY BEFORE SURGERY:       *Do not eat any food after midnight the night before surgery.        *You are permitted to have no more than 4 ounces of clear liquids such as water, apple juice, plain tea or coffee (no milk or creamer), clear electrolyte-replenishing         drinks such as Pedialyte,  Gatorade, or Powerade  (not yogurt or pulp-containing smoothies or juices such as orange juice) up to 3 hours before your arrival time.     THE EVENING BEFORE SURGERY  Take your full dose of Lantus long-acting insulin    DAY OF SURGERY TAKE THESE MEDICATIONS (if it is not listed, do not take it.)    Take: Nifedipine; metolazone; pantoprazole  If taking medications in tablet/capsule form take with a small sip of water.     ON THE MORNING OF SURGERY:       *Shower either the night before your surgery or the morning of your surgery       *Do not use moisturizers, creams, lotions or perfume, or make-up.       *Wear comfortable, loose fitting clothing.        *All jewelry and valuables should be left at home.       *Prosthetic devices such as contact lenses, hearing aids, dentures, eyelash extensions, hairpins and body piercings must be removed before surgery. Bring         containers for eyeglasses/contacts, dentures, or hearing aids with you.     ? Diabetics: Please check fasting blood sugars upon waking up. ?If fasting blood sugars are <80ml/dl, please drink 100ml/3oz. of apple juice no later than 2 hours prior                    to surgery.     ?BRING WITH YOU:        *Photo ID and insurance card       *Current list of medicines and allergies       *Pacemaker/Defibrillator/Heart stent cards       *Copy of your complete Advanced Directive/DHPOA, or proof of guardianship-if applicable     ?SMOKING:       *Quitting smoking can make a huge difference to your health and recovery from surgery. ?       *If you need help with quitting, call [phone].       ALCOHOL:       *No alcoholic beverages for 48 hours before surgery.     ?AFTER OUTPATIENT SURGERY:       *A responsible adult MUST accompany you at the time of discharge and stay with you for 24 hours after your surgery.       *You may NOT drive yourself home after surgery.       *You may use a taxi or ride sharing service ([employer], Uber) to return home ONLY if you are  accompanied by a friend or family member       *Instructions for resuming your medications will be provided by your surgeon.     CONTACT SURGEON'S OFFICE IF YOU DEVELOP:       *Fever =/>?100.4 F        *New respiratory symptoms (e.g. cough, shortness of breath, respiratory distress, sore throat)       *Recent loss of taste or smell       *Flu like symptoms such as headache, fatigue or gastrointestinal symptoms       *If you develop any open sores, shingles, burning or painful urination    AND/OR:       *You no longer wish to have the surgery.       *Any other personal circumstances change that may lead to the need to cancel or defer this surgery.       *You were admitted to any hospital within one week of your planned procedure.     ?If you have any questions regarding these preoperative instructions, you may call [phone]. If you have questions regarding you surgical procedure, or post-operative care/recovery please call your surgeon's office.     Link to [facility] Laboratory Services Locations   [email or web address]     Link to [facility] [employer]   [email or web address]

## 2025-02-12 ENCOUNTER — ANESTHESIA EVENT (OUTPATIENT)
Dept: OPERATING ROOM | Facility: CLINIC | Age: 80
End: 2025-02-12
Payer: MEDICARE

## 2025-02-19 ENCOUNTER — HOSPITAL ENCOUNTER (OUTPATIENT)
Facility: CLINIC | Age: 80
Setting detail: OUTPATIENT SURGERY
Discharge: HOME | End: 2025-02-19
Attending: OTOLARYNGOLOGY | Admitting: OTOLARYNGOLOGY
Payer: MEDICARE

## 2025-02-19 ENCOUNTER — ANESTHESIA (OUTPATIENT)
Dept: OPERATING ROOM | Facility: CLINIC | Age: 80
End: 2025-02-19
Payer: MEDICARE

## 2025-02-19 VITALS
DIASTOLIC BLOOD PRESSURE: 74 MMHG | BODY MASS INDEX: 27.09 KG/M2 | HEIGHT: 67 IN | TEMPERATURE: 97.3 F | RESPIRATION RATE: 16 BRPM | SYSTOLIC BLOOD PRESSURE: 141 MMHG | WEIGHT: 172.62 LBS | HEART RATE: 66 BPM | OXYGEN SATURATION: 95 %

## 2025-02-19 DIAGNOSIS — H71.21 CHOLESTEATOMA OF RIGHT MIDDLE EAR AND MASTOID: Primary | ICD-10-CM

## 2025-02-19 DIAGNOSIS — H66.3X1 CHRONIC SUPPURATIVE OTITIS MEDIA OF RIGHT EAR, UNSPECIFIED OTITIS MEDIA LOCATION: ICD-10-CM

## 2025-02-19 PROBLEM — E10.9 TYPE 1 DIABETES MELLITUS WITHOUT COMPLICATION: Status: ACTIVE | Noted: 2025-02-19

## 2025-02-19 PROBLEM — I10 HTN (HYPERTENSION): Status: ACTIVE | Noted: 2025-02-19

## 2025-02-19 PROBLEM — E78.5 HYPERLIPIDEMIA: Status: ACTIVE | Noted: 2025-02-19

## 2025-02-19 PROBLEM — N40.0 BENIGN PROSTATIC HYPERPLASIA WITHOUT LOWER URINARY TRACT SYMPTOMS: Status: ACTIVE | Noted: 2025-02-19

## 2025-02-19 PROBLEM — K21.9 GASTROESOPHAGEAL REFLUX DISEASE WITHOUT ESOPHAGITIS: Status: ACTIVE | Noted: 2025-02-19

## 2025-02-19 PROBLEM — M19.049 OSTEOARTHRITIS OF HAND: Status: ACTIVE | Noted: 2025-02-19

## 2025-02-19 PROBLEM — G47.33 OSA (OBSTRUCTIVE SLEEP APNEA): Status: ACTIVE | Noted: 2025-02-19

## 2025-02-19 PROBLEM — I48.91 ATRIAL FIBRILLATION (MULTI): Status: ACTIVE | Noted: 2025-02-19

## 2025-02-19 LAB — GLUCOSE BLD MANUAL STRIP-MCNC: 95 MG/DL (ref 74–99)

## 2025-02-19 PROCEDURE — 3700000001 HC GENERAL ANESTHESIA TIME - INITIAL BASE CHARGE: Performed by: OTOLARYNGOLOGY

## 2025-02-19 PROCEDURE — A69646 PR TYMPANOPLAS/MASTOIDEC,RAD,REBLD OSSI: Performed by: ANESTHESIOLOGY

## 2025-02-19 PROCEDURE — 2780000003 HC OR 278 NO HCPCS: Performed by: OTOLARYNGOLOGY

## 2025-02-19 PROCEDURE — 7100000009 HC PHASE TWO TIME - INITIAL BASE CHARGE: Performed by: OTOLARYNGOLOGY

## 2025-02-19 PROCEDURE — 2500000004 HC RX 250 GENERAL PHARMACY W/ HCPCS (ALT 636 FOR OP/ED): Performed by: ANESTHESIOLOGIST ASSISTANT

## 2025-02-19 PROCEDURE — 82947 ASSAY GLUCOSE BLOOD QUANT: CPT

## 2025-02-19 PROCEDURE — 69646 REVISE MIDDLE EAR & MASTOID: CPT | Performed by: OTOLARYNGOLOGY

## 2025-02-19 PROCEDURE — 7100000002 HC RECOVERY ROOM TIME - EACH INCREMENTAL 1 MINUTE: Performed by: OTOLARYNGOLOGY

## 2025-02-19 PROCEDURE — 3600000003 HC OR TIME - INITIAL BASE CHARGE - PROCEDURE LEVEL THREE: Performed by: OTOLARYNGOLOGY

## 2025-02-19 PROCEDURE — 2500000005 HC RX 250 GENERAL PHARMACY W/O HCPCS: Performed by: OTOLARYNGOLOGY

## 2025-02-19 PROCEDURE — 2500000001 HC RX 250 WO HCPCS SELF ADMINISTERED DRUGS (ALT 637 FOR MEDICARE OP): Performed by: ANESTHESIOLOGY

## 2025-02-19 PROCEDURE — 7100000001 HC RECOVERY ROOM TIME - INITIAL BASE CHARGE: Performed by: OTOLARYNGOLOGY

## 2025-02-19 PROCEDURE — 2720000007 HC OR 272 NO HCPCS: Performed by: OTOLARYNGOLOGY

## 2025-02-19 PROCEDURE — A69646 PR TYMPANOPLAS/MASTOIDEC,RAD,REBLD OSSI: Performed by: ANESTHESIOLOGIST ASSISTANT

## 2025-02-19 PROCEDURE — 2500000004 HC RX 250 GENERAL PHARMACY W/ HCPCS (ALT 636 FOR OP/ED): Performed by: OTOLARYNGOLOGY

## 2025-02-19 PROCEDURE — 3700000002 HC GENERAL ANESTHESIA TIME - EACH INCREMENTAL 1 MINUTE: Performed by: OTOLARYNGOLOGY

## 2025-02-19 PROCEDURE — 99100 ANES PT EXTEME AGE<1 YR&>70: CPT | Performed by: ANESTHESIOLOGY

## 2025-02-19 PROCEDURE — 3600000008 HC OR TIME - EACH INCREMENTAL 1 MINUTE - PROCEDURE LEVEL THREE: Performed by: OTOLARYNGOLOGY

## 2025-02-19 PROCEDURE — 7100000010 HC PHASE TWO TIME - EACH INCREMENTAL 1 MINUTE: Performed by: OTOLARYNGOLOGY

## 2025-02-19 PROCEDURE — 15733 MUSC MYOQ/FSCQ FLP H&N PEDCL: CPT | Performed by: OTOLARYNGOLOGY

## 2025-02-19 DEVICE — IMPLANTABLE DEVICE: Type: IMPLANTABLE DEVICE | Site: EAR | Status: FUNCTIONAL

## 2025-02-19 RX ORDER — DOXYCYCLINE 100 MG/1
100 CAPSULE ORAL 2 TIMES DAILY
Qty: 10 CAPSULE | Refills: 0 | Status: SHIPPED | OUTPATIENT
Start: 2025-02-19 | End: 2025-02-24

## 2025-02-19 RX ORDER — HYDROCODONE BITARTRATE AND ACETAMINOPHEN 5; 325 MG/1; MG/1
1 TABLET ORAL EVERY 6 HOURS PRN
Qty: 15 TABLET | Refills: 0 | Status: SHIPPED | OUTPATIENT
Start: 2025-02-19

## 2025-02-19 RX ORDER — ACETAMINOPHEN 325 MG/1
650 TABLET ORAL EVERY 4 HOURS PRN
Status: DISCONTINUED | OUTPATIENT
Start: 2025-02-19 | End: 2025-02-19 | Stop reason: HOSPADM

## 2025-02-19 RX ORDER — ALBUTEROL SULFATE 0.83 MG/ML
2.5 SOLUTION RESPIRATORY (INHALATION) ONCE AS NEEDED
Status: DISCONTINUED | OUTPATIENT
Start: 2025-02-19 | End: 2025-02-19 | Stop reason: HOSPADM

## 2025-02-19 RX ORDER — LIDOCAINE HYDROCHLORIDE 10 MG/ML
0.1 INJECTION, SOLUTION EPIDURAL; INFILTRATION; INTRACAUDAL; PERINEURAL ONCE
Status: DISCONTINUED | OUTPATIENT
Start: 2025-02-19 | End: 2025-02-19 | Stop reason: HOSPADM

## 2025-02-19 RX ORDER — SODIUM CHLORIDE 0.9 G/100ML
INJECTION, SOLUTION IRRIGATION AS NEEDED
Status: DISCONTINUED | OUTPATIENT
Start: 2025-02-19 | End: 2025-02-19 | Stop reason: HOSPADM

## 2025-02-19 RX ORDER — MUPIROCIN 20 MG/G
OINTMENT TOPICAL AS NEEDED
Status: DISCONTINUED | OUTPATIENT
Start: 2025-02-19 | End: 2025-02-19 | Stop reason: HOSPADM

## 2025-02-19 RX ORDER — POVIDONE-IODINE 10 %
SOLUTION, NON-ORAL TOPICAL AS NEEDED
Status: DISCONTINUED | OUTPATIENT
Start: 2025-02-19 | End: 2025-02-19 | Stop reason: HOSPADM

## 2025-02-19 RX ORDER — LIDOCAINE HYDROCHLORIDE 20 MG/ML
INJECTION, SOLUTION INFILTRATION; PERINEURAL AS NEEDED
Status: DISCONTINUED | OUTPATIENT
Start: 2025-02-19 | End: 2025-02-19

## 2025-02-19 RX ORDER — LABETALOL HYDROCHLORIDE 5 MG/ML
5 INJECTION, SOLUTION INTRAVENOUS ONCE AS NEEDED
Status: DISCONTINUED | OUTPATIENT
Start: 2025-02-19 | End: 2025-02-19 | Stop reason: HOSPADM

## 2025-02-19 RX ORDER — SODIUM CHLORIDE, SODIUM LACTATE, POTASSIUM CHLORIDE, CALCIUM CHLORIDE 600; 310; 30; 20 MG/100ML; MG/100ML; MG/100ML; MG/100ML
50 INJECTION, SOLUTION INTRAVENOUS CONTINUOUS
Status: DISCONTINUED | OUTPATIENT
Start: 2025-02-19 | End: 2025-02-19 | Stop reason: HOSPADM

## 2025-02-19 RX ORDER — FENTANYL CITRATE 50 UG/ML
INJECTION, SOLUTION INTRAMUSCULAR; INTRAVENOUS AS NEEDED
Status: DISCONTINUED | OUTPATIENT
Start: 2025-02-19 | End: 2025-02-19

## 2025-02-19 RX ORDER — METOCLOPRAMIDE HYDROCHLORIDE 5 MG/ML
10 INJECTION INTRAMUSCULAR; INTRAVENOUS ONCE AS NEEDED
Status: DISCONTINUED | OUTPATIENT
Start: 2025-02-19 | End: 2025-02-19 | Stop reason: HOSPADM

## 2025-02-19 RX ORDER — SODIUM CHLORIDE, SODIUM LACTATE, POTASSIUM CHLORIDE, CALCIUM CHLORIDE 600; 310; 30; 20 MG/100ML; MG/100ML; MG/100ML; MG/100ML
INJECTION, SOLUTION INTRAVENOUS CONTINUOUS PRN
Status: DISCONTINUED | OUTPATIENT
Start: 2025-02-19 | End: 2025-02-19

## 2025-02-19 RX ORDER — FENTANYL CITRATE 50 UG/ML
50 INJECTION, SOLUTION INTRAMUSCULAR; INTRAVENOUS EVERY 5 MIN PRN
Status: DISCONTINUED | OUTPATIENT
Start: 2025-02-19 | End: 2025-02-19 | Stop reason: HOSPADM

## 2025-02-19 RX ORDER — FENTANYL CITRATE 50 UG/ML
25 INJECTION, SOLUTION INTRAMUSCULAR; INTRAVENOUS EVERY 5 MIN PRN
Status: DISCONTINUED | OUTPATIENT
Start: 2025-02-19 | End: 2025-02-19 | Stop reason: HOSPADM

## 2025-02-19 RX ORDER — CEFAZOLIN 1 G/1
INJECTION, POWDER, FOR SOLUTION INTRAVENOUS AS NEEDED
Status: DISCONTINUED | OUTPATIENT
Start: 2025-02-19 | End: 2025-02-19

## 2025-02-19 RX ORDER — MIDAZOLAM HYDROCHLORIDE 1 MG/ML
INJECTION, SOLUTION INTRAMUSCULAR; INTRAVENOUS AS NEEDED
Status: DISCONTINUED | OUTPATIENT
Start: 2025-02-19 | End: 2025-02-19

## 2025-02-19 RX ORDER — LIDOCAINE HYDROCHLORIDE AND EPINEPHRINE 10; 10 UG/ML; MG/ML
INJECTION, SOLUTION INFILTRATION; PERINEURAL AS NEEDED
Status: DISCONTINUED | OUTPATIENT
Start: 2025-02-19 | End: 2025-02-19 | Stop reason: HOSPADM

## 2025-02-19 RX ORDER — CIPROFLOXACIN 2 MG/ML
INJECTION, SOLUTION INTRAVENOUS CONTINUOUS PRN
Status: COMPLETED | OUTPATIENT
Start: 2025-02-19 | End: 2025-02-19

## 2025-02-19 RX ORDER — OXYCODONE HYDROCHLORIDE 5 MG/1
5 TABLET ORAL EVERY 4 HOURS PRN
Status: DISCONTINUED | OUTPATIENT
Start: 2025-02-19 | End: 2025-02-19 | Stop reason: HOSPADM

## 2025-02-19 RX ORDER — ONDANSETRON HYDROCHLORIDE 2 MG/ML
4 INJECTION, SOLUTION INTRAVENOUS ONCE AS NEEDED
Status: DISCONTINUED | OUTPATIENT
Start: 2025-02-19 | End: 2025-02-19 | Stop reason: HOSPADM

## 2025-02-19 RX ORDER — PROPOFOL 10 MG/ML
INJECTION, EMULSION INTRAVENOUS AS NEEDED
Status: DISCONTINUED | OUTPATIENT
Start: 2025-02-19 | End: 2025-02-19

## 2025-02-19 RX ORDER — OFLOXACIN 3 MG/ML
4 SOLUTION AURICULAR (OTIC) 2 TIMES DAILY
Qty: 5 ML | Refills: 3 | Status: SHIPPED | OUTPATIENT
Start: 2025-02-19

## 2025-02-19 RX ORDER — APREPITANT 40 MG/1
40 CAPSULE ORAL ONCE
Status: DISCONTINUED | OUTPATIENT
Start: 2025-02-19 | End: 2025-02-19 | Stop reason: HOSPADM

## 2025-02-19 RX ADMIN — FENTANYL CITRATE 50 MCG: 0.05 INJECTION, SOLUTION INTRAMUSCULAR; INTRAVENOUS at 12:16

## 2025-02-19 RX ADMIN — PROPOFOL 150 MG: 10 INJECTION, EMULSION INTRAVENOUS at 12:17

## 2025-02-19 RX ADMIN — PROPOFOL 50 MCG/KG/MIN: 10 INJECTION, EMULSION INTRAVENOUS at 12:25

## 2025-02-19 RX ADMIN — PROPOFOL 50 MG: 10 INJECTION, EMULSION INTRAVENOUS at 12:20

## 2025-02-19 RX ADMIN — MIDAZOLAM 2 MG: 1 INJECTION INTRAMUSCULAR; INTRAVENOUS at 12:14

## 2025-02-19 RX ADMIN — ACETAMINOPHEN 650 MG: 325 TABLET ORAL at 15:24

## 2025-02-19 RX ADMIN — FENTANYL CITRATE 50 MCG: 0.05 INJECTION, SOLUTION INTRAMUSCULAR; INTRAVENOUS at 12:57

## 2025-02-19 RX ADMIN — LIDOCAINE HYDROCHLORIDE 100 MG: 20 INJECTION, SOLUTION INFILTRATION; PERINEURAL at 12:17

## 2025-02-19 RX ADMIN — SODIUM CHLORIDE, POTASSIUM CHLORIDE, SODIUM LACTATE AND CALCIUM CHLORIDE: 600; 310; 30; 20 INJECTION, SOLUTION INTRAVENOUS at 12:10

## 2025-02-19 RX ADMIN — CEFAZOLIN 2 G: 1 INJECTION, POWDER, FOR SOLUTION INTRAMUSCULAR; INTRAVENOUS at 12:23

## 2025-02-19 RX ADMIN — DEXAMETHASONE SODIUM PHOSPHATE 4 MG: 4 INJECTION INTRA-ARTICULAR; INTRALESIONAL; INTRAMUSCULAR; INTRAVENOUS; SOFT TISSUE at 12:26

## 2025-02-19 SDOH — HEALTH STABILITY: MENTAL HEALTH: CURRENT SMOKER: 0

## 2025-02-19 ASSESSMENT — PAIN SCALES - GENERAL
PAIN_LEVEL: 0
PAINLEVEL_OUTOF10: 3
PAINLEVEL_OUTOF10: 0 - NO PAIN

## 2025-02-19 ASSESSMENT — PAIN - FUNCTIONAL ASSESSMENT
PAIN_FUNCTIONAL_ASSESSMENT: 0-10
PAIN_FUNCTIONAL_ASSESSMENT: UNABLE TO SELF-REPORT
PAIN_FUNCTIONAL_ASSESSMENT: 0-10

## 2025-02-19 ASSESSMENT — COLUMBIA-SUICIDE SEVERITY RATING SCALE - C-SSRS
2. HAVE YOU ACTUALLY HAD ANY THOUGHTS OF KILLING YOURSELF?: NO
6. HAVE YOU EVER DONE ANYTHING, STARTED TO DO ANYTHING, OR PREPARED TO DO ANYTHING TO END YOUR LIFE?: NO
1. IN THE PAST MONTH, HAVE YOU WISHED YOU WERE DEAD OR WISHED YOU COULD GO TO SLEEP AND NOT WAKE UP?: NO

## 2025-02-19 NOTE — ANESTHESIA PROCEDURE NOTES
Airway  Date/Time: 2/19/2025 12:18 PM  Urgency: elective    Airway not difficult    Staffing  Performed: MADAY   Authorized by: Kashif Duenas MD    Performed by: MADAY Stanley  Patient location during procedure: OR    Indications and Patient Condition  Indications for airway management: anesthesia and airway protection  Spontaneous ventilation: present  Sedation level: deep  Preoxygenated: yes  Patient position: sniffing  Mask difficulty assessment: 1 - vent by mask  Planned trial extubation    Final Airway Details  Final airway type: supraglottic airway      Successful airway: Supreme  Size 4     Number of attempts at approach: 1  Ventilation between attempts: none  Number of other approaches attempted: 0

## 2025-02-19 NOTE — OP NOTE
Patient: Syed Montez  MRN: [id number]  : 1945    DATE OF PROCEDURE: 2025   PREOPERATIVE DIAGNOSIS: right chronic otitis media, mixed hearing loss  POSTOPERATIVE DIAGNOSIS: same     PROCEDURES:   1) right Canal-wall down tympanomastoidectomy with ossicular chain reconstruction (CPT 10337)  2) Inferior Mastoid Muscle Flap (CPT 97336)    SURGEON: Prakash Mesa MD     RESIDENT/FELLOW ASSITANT: Blanca Preston MD     ANESTHESIA: General  ESTIMATED BLOOD LOSS: 20 mL  SPECIMEN: None   COMPLICATIONS: None  CONDITION: Stable to the post-anesthesia care unit.     INDICATIONS FOR PROCEDURE: Syed Montez is a 79 y.o.-year-old male with severe chronic ear disease. The extent of disease and anatomy made a CWD tympanomastoidectomy approach the best option. The nuances of a CWD cavity were discussed with the patient and the patient wished to proceed with the surgery.    INTRAOPERATIVE FINDINGS:   Perforation size and location: None > TM was thickened and had polypoid changes  Middle ear inflammation: Yes - severe adhesions  Mastoid inflammation: Yes - severe adhesions  Cholesteatoma: None  Facial nerve dehiscence: None  Chorda tympani nerve: resected  Malleus: Head removed  Incus: Eroded - removed  Stapes: Eroded - removed  Ossicular chain reconstruction: TORP w/out footplate shoe ([facility]) - 3.75 mm  Graft material: temporalis fascia and conchal cartilage  Graft position: underlay  Cavity: obliterated with inferior mastoid flap    PROCEDURE IN DETAIL:   Informed consent was obtained and the operative ear was marked. The patient was transported to the operating room. I conducted a timeout. General anesthesia was induced, the patient was intubated without complication, and the bed was turned 180-degrees. Facial nerve monitoring was setup and confirmed to be working. Minimal hair was trimmed in the postauricular region, then it was injected with 1% lidocaine with 1:100,000 epinephrine. The operative ear was  prepped in a sterile fashion with betadine paint and draped in the standard otologic fashion. A pre-surgical pause was performed to confirm correct side and procedure.     The procedure began by bringing the operative microscope into the field. Using an otologic speculum the canal was examined, cleaned, and the vascular strip was injected with additional 1% lidocaine with 1:100,000 epinephrine. A round knife was then used to make a horizontal canal incision. See above findings section for description of the disease in the ear canal and tympanic membrane.     Next, a 15-blade scalpel was used to make a postauricular incision. Tissue was dissected above the periosteum until the ear canal was encountered. Temporalis fascia was harvested and set to dry on the back table. When the periosteum was adequately exposed, cauterization was used to form an inferiorly based mastoid flap pedicled off of the postauricular artery. This periosteal-muscle flap was dissected inferiorly to the mastoid tip. Care was taken to avoid the digastric muscle and occipital artery. Once elevated the flap was secured under a Weitlaner retractor.    The operative microscope was brought into the field. Skin was elevated off the poserior external canal until the previously made horizontal cut was identified. A scalpel was used to make a vascular strip, which was also retained under a Weitlaner retractor. A tympanomeatal flap was elevated allowing entrance into the middle ear space. The middle ear was entered inferiorly and landmarks were identified until the stapes/oval window niche could be identified. See the above findings for description of the ossicles. Once the status of the ossicular chain and incudostapedial joint was confirmed attention was turned to the mastoid cavity.     A complete mastoidectomy was performed using an otologic drill with various sized cutting and omaira burrs. The sigmoid sinus, posterior fossa plate, tegmen, and lateral  semicircular canals were identified. The posterior external auditory canal wall was removed along with any retained skin. Next, the facial ridge was lowered to facilitate complete exposure of the mastoid cavity, epitympanum, mesotympanum, retrotympanum, protympanum, and hypotympanum. All mastoid air cells and granulation tissue were removed. The disease was removed from all sections of the ear. Additional saucerization was performed. The digastric ridge was identified and the mastoid tip was removed. Hemostasis was obtained. The mastoid was throughly irrigated with saline solution containing antibiotic throughout this procedure.  The surgical field was re-evaluated and all disease was confirmed to be removed. See operative findings section for description of the disease.    A Lempert speculum was used to open the external auditory canal. A 15-blade scalpel was used to make canal incisions at 12-o'clock and 6-o'clock from the osseous-cartilaginous junction out lateral into the conchal bowl. Scissors were used to dissect the skin flap from the underlying conchal cartilage. The conchal cartilage, its underlying soft tissue, and the cartilage of the superior helical root were removed and then cut to size on the back table. Hemostasis was obtained. At conclusion of the meatoplasty there was appropriate access to the middle ear space and mastoid cavity. The wound was again irrigated.     Working through the postauricular incision again, the inferior mastoid flap was laid into the mastoid cavity to obliterate the dead space. Then the remnant tympanic membrane was elevated. The Eustachian tube was blocked with antibiotic soaked gel foam. The fascia graft was placed in an underlay fashion and supported with gel foam. An ossicular sizer was used and the ossicular prosthesis was cut to the appropriate length. An ossiculoplasty was performed and supported with additional gel foam. The prosthesis was capped with cartilage and  cartilage was also used to support the attic region in an attempt to recreate some middle ear space. The fascia and tympanomeatal flap were draped over the cartilage and lateral to the inferior mastoid flap. The reconstructed tympanic membrane was secured with gel foam. The deep layer of the incision was closed with 3-0 Vicryl sutures. Then looking through the ear canal the laterally based skin flap was set into the mastoid cavity making sure it did not overlap any underlying skin. This was secured with gel foam. The remainder of the incision was closed with 3-0 Vicryl thrown in a simple interrupted fashion. The superficial layer was closed with skin glue. All counts were confirmed to be correct. A mastoid dressing was placed and the patient was handed back to anesthesia. I was present and participated in all portions of the procedure.    Prakash Mesa M.D.      Otology/Neurotology   Department of Otolaryngology - Head and Neck Surgery  [facility]  Phone/Appointments: [phone]   Fax: [phone]   E-mail: [email or web address]

## 2025-02-19 NOTE — INTERVAL H&P NOTE
H&P reviewed. The patient was examined and there are no changes to the H&P.    Right tympanomastoidectomy

## 2025-02-19 NOTE — DISCHARGE INSTRUCTIONS
PATIENT INSTRUCTIONS FOR:   TYMPANOMASTOIDECTOMY    A tympanomastoiodectomy is performed to reconstruct your eardrum (tympanic membrane) and clean out the sinus behind your ear (the mastoid). This is often done because there is a hole in the eardrum (i.e. perforation), some type of structural problem with the eardrum (ie retraction), trapped skin (ie cholesteatoma), or chronic ear disease/congestion. These problems can cause hearing loss, chronic infection, and even lead to more serious conditions.. There are many different ways to repair the eardrum and address ear disease depending on where the problem is and how extensive it is.    This surgery involves making an incision in the ear canal and behind the ear as well. The eardrum is lifted up to allow evaluation of the middle ear space and bones of hearing. If necessary, the bones of hearing can be rebuilt with a titanium prosthesis (MRI compatible). The ear drum is typically rebuilt with cartilage or tissue from your ear canal, but there are different materials and techniques that can be used. The incision behind the ear allows access to your mastoid, which is drilled open to access and remove disease. This is an outpatient procedure, meaning you will go home the same day. Recovery from surgery generally is 7-10 days and no strenuous activity is recommended for 10-days. The first post-op appointment is roughly 3-weeks after surgery. The hearing is rechecked (audiogram) 3-4 months  after surgery. Please call the office if these are not scheduled.     MEDICATIONS  Ear drops: A topical antibiotic ear drop will be prescribed. This is to dissolve packing in your ear canal and prevent infection while everything heals. Start the ear drop 1 week after surgery and continue them until your first follow-up appointment. The doctor will then advise if further use is needed.     Antibiotic: An oral antibiotic will also be prescribed to help prevent postoperative infection.  Start the antibiotic the evening you go home from surgery and take until the prescription is finished, typically 5-days. Avoid taking on an empty stomach.     Pain medication: Narcotic pain medication is not always needed after ear surgery, but is often prescribed. For adults, consider a non-narcotic pain regimen of alternating ibuprofen 800 mg every 4 hours and acetaminophen 500-1000 mg every 6 hours. Do not exceed 4000 mg of acetaminophen per day. If a narcotic is needed, please take as prescribed and only as needed. Do not drive or operate machinery while using narcotics. Avoid taking pain medication on an empty stomach.     AFTER-CARE  Incisional Care:   1) Behind the ear:  Remove the ear wrap (mastoid dressing) the day after your surgery. It can then be left off, but some patients like to sleep with it on the first week after surgery. Your incision is closed with sutures under the skin that will dissolve on their own. The outer layer of skin is closed with skin glue (i.e. liquid Band-Aid). If  the incision is dry (no bleeding), then just leave the skin glue in place. If there is mild bleeding through the skin glue, it can be cleaned with hydrogen peroxide and covered with an over-the-counter antibiotic ointment or Vaseline.  Hold pressure  if the bleeding persists. Call the office if there are any concerns. It is ok to shower 24-hours after surgery. If some water gets in contact with the incision/skin glue it is ok, but try to avoid soaking the incision.     2) Ear canal: Incisions were made in your ear canal. They secured with packing.  It is important to keep the ear canal dry, aside from the antibiotic drops that you will start the week after surgery. It  is ok to shower 24-hours after surgery. When showering, coat a cotton ball with Vaseline and place that in the ear canal to keep it dry. After the shower, remove the Vaseline coated cotton ball and replace it with a fresh, dry cotton ball.  It is normal to  have some bloody ooze and pain during the first week after the procedure. If there is bleeding from the ear canal, the cotton ball may need to replaced more frequently (2-5 times a day). Please call the office if the ear canal bleeding is excessive or concerning. There may also be an incision at the front of the ear canal where the cartilage graft was taken. These sutures will dissolve on their own and no additional care for that incision is needed. Do not pick, pull, rub or scratch your incision. Always wash your hands before and after contact with incision.  Please keep a fresh cotton ball in your ear until your 1st post op visit.    Other:  You may experience some soreness when chewing, tinnitus, and even hear/experience a crackling noise as the ear heals.     You may experience temporary taste changes or altered taste.    It is not unusual to experience dizziness for a couple days following an ear surgery. Call the office if this fails to improve by 3-4 days following surgery or if the vertigo is severe. Avoid lying on the operative ear for 1 week after surgery. No strenuous activity for 10 days after surgery, including no heavy lifting over 10 lbs. Do not travel on an airplane for 4 weeks, unless otherwise approved. Avoid situations where you might have to make sudden head movements. Do not blow your nose or sneeze with your mouth closed. Try not to blow your nose for at least 1 week after surgery. If you use CPAP, ask your doctor about when it is safe to resume its use. Call the office if you have a temperature over 100.3, your incision is red, swollen or coming apart, or if you have excessive drainage from your ear or incision.    Do not wait until your appointment to report any problems or questions. The office number is [phone] and ask to speak with Dr. Mesa's nurse. For urgent concerns after hours or on weekends: Call [phone] or the [facility]  ([phone]) and ask for the  on-call otolaryngology/ENT resident.     FOLLOW-UP SCHEDULE  The typical tympanomastoidectomy patient care path is as follows:   - 3-4 weeks post-op: 1st follow-up appointment, removal of ear canal packing, discuss your healing course, and review the surgical details   - 3-4 months post-op: audiogram, review your healing course, and counseling on hearing rehabilitation if needed   - 12 months post-op: audiogram, ear examination, and discuss future monitoring needs   - Annual visits: yearly audiogram and ear examination until otherwise specified     Chronic ear disease and cholesteatoma (if present) are lifelong conditions that require long-term follow-up. Monitoring the ear drum will also require long term follow-up. Please discuss the long-term follow-up needs with Dr. Mesa and his team.

## 2025-02-19 NOTE — ANESTHESIA POSTPROCEDURE EVALUATION
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 [H71.21])      (Chronic suppurative otitis media of right ear, unspecified otitis media location [H66.3X1])    Surgeons: Prakash Mesa MD Responsible Provider: Kashif Duenas MD    Anesthesia Type: general ASA Status: 3            Anesthesia Type: general    Vitals Value Taken Time   /69 02/19/25 1523   Temp 36.3 °C (97.3 °F) 02/19/25 1454   Pulse 85 02/19/25 1523   Resp 16 02/19/25 1523   SpO2 95 % 02/19/25 1523       Anesthesia Post Evaluation    Patient location during evaluation: PACU  Patient participation: complete - patient participated  Level of consciousness: awake  Pain score: 0  Pain management: adequate  Airway patency: patent  Cardiovascular status: acceptable  Respiratory status: acceptable  Hydration status: acceptable  Postoperative Nausea and Vomiting: none        No notable events documented.

## 2025-02-19 NOTE — ANESTHESIA PREPROCEDURE EVALUATION
Patient: Syed Montez    Procedure Information       Date/Time: 02/19/25 1300    Procedure: Tympanomastoidectomy, with OCR surgery on the ear and middle ear, possible cartilage graft. (Right) - Likely a canal wall down tympanomastoidectomy with OCR; possible cartilage graft    Location: [facility] OR 01 / [facility]    Surgeons: Prakash Mesa MD            Relevant Problems   Anesthesia (within normal limits)      Cardiac   (+) Atrial fibrillation (Multi)   (+) HTN (hypertension)   (+) Hyperlipidemia      Pulmonary   (+) CLARISA (obstructive sleep apnea)      GI   (+) Gastroesophageal reflux disease without esophagitis      /Renal   (+) Benign prostatic hyperplasia without lower urinary tract symptoms      Endocrine   (+) Type 1 diabetes mellitus without complication      Musculoskeletal   (+) Osteoarthritis of hand      ID   (+) Chronic suppurative otitis media of right ear       Clinical information reviewed:   Tobacco  Allergies  Meds  Problems  Med Hx  Surg Hx   Fam Hx  Soc   Hx        NPO Detail:  NPO/Void Status  Date of Last Liquid: 02/18/25  Time of Last Liquid: 2300  Date of Last Solid: 02/18/25  Time of Last Solid: 2300         Physical Exam    Airway  Mallampati: II  TM distance: >3 FB  Neck ROM: full     Cardiovascular   Rhythm: irregular     Dental   (+) upper dentures     Pulmonary - normal exam     Abdominal - normal exam         Anesthesia Plan    History of general anesthesia?: yes  History of complications of general anesthesia?: no    ASA 3     general     The patient is not a current smoker.    intravenous induction   Postoperative administration of opioids is intended.  Anesthetic plan and risks discussed with patient.    Plan discussed with CRNA and CAA.

## 2025-02-20 ENCOUNTER — TELEPHONE (OUTPATIENT)
Facility: CLINIC | Age: 80
End: 2025-02-20
Payer: MEDICARE

## 2025-02-20 NOTE — TELEPHONE ENCOUNTER
Attempted to call patient regarding post op course after having surgery yesterday. Instructed to call office during business hours or ENT on call after business hours or the weekend for urgent concerns.

## 2025-02-27 ENCOUNTER — TELEPHONE (OUTPATIENT)
Dept: OTOLARYNGOLOGY | Facility: CLINIC | Age: 80
End: 2025-02-27
Payer: MEDICARE

## 2025-02-27 NOTE — TELEPHONE ENCOUNTER
Returned patient message about duration of ear drop use. Advised patient to continue use of ear drops until Post Op appointment on 3/10.

## 2025-03-10 ENCOUNTER — APPOINTMENT (OUTPATIENT)
Dept: OTOLARYNGOLOGY | Facility: CLINIC | Age: 80
End: 2025-03-10
Payer: COMMERCIAL

## 2025-03-10 VITALS — BODY MASS INDEX: 27.94 KG/M2 | TEMPERATURE: 98.6 F | HEIGHT: 67 IN | WEIGHT: 178 LBS

## 2025-03-10 DIAGNOSIS — H90.A31 MIXED CONDUCTIVE AND SENSORINEURAL HEARING LOSS OF RIGHT EAR WITH RESTRICTED HEARING OF LEFT EAR: Primary | ICD-10-CM

## 2025-03-10 DIAGNOSIS — H65.491 OTHER CHRONIC NONSUPPURATIVE OTITIS MEDIA OF RIGHT EAR: ICD-10-CM

## 2025-03-10 DIAGNOSIS — H70.91 MASTOIDITIS OF RIGHT SIDE: ICD-10-CM

## 2025-03-10 PROBLEM — H66.90 CHRONIC OTITIS MEDIA: Status: ACTIVE | Noted: 2025-03-10

## 2025-03-10 PROBLEM — H71.91 CHOLESTEATOMA OF RIGHT EAR: Status: ACTIVE | Noted: 2025-03-10

## 2025-03-10 PROCEDURE — 99024 POSTOP FOLLOW-UP VISIT: CPT | Performed by: OTOLARYNGOLOGY

## 2025-03-10 PROCEDURE — 1159F MED LIST DOCD IN RCRD: CPT | Performed by: OTOLARYNGOLOGY

## 2025-03-10 ASSESSMENT — PATIENT HEALTH QUESTIONNAIRE - PHQ9
SUM OF ALL RESPONSES TO PHQ9 QUESTIONS 1 AND 2: 0
2. FEELING DOWN, DEPRESSED OR HOPELESS: NOT AT ALL
1. LITTLE INTEREST OR PLEASURE IN DOING THINGS: NOT AT ALL

## 2025-03-10 NOTE — PROGRESS NOTES
Chief Complaint:  hearing loss, ear infections  Referred by:  Frantz Hubbard NP (otolaryngology)    Interval: Patient follows up after right ear surgery.  Surgical details reviewed.    Treatment History    2/19/25 -right canal wall down tympanomastoidectomy w/TORP  Perforation size and location: None > TM was thickened and had polypoid changes  Middle ear inflammation: Yes - severe adhesions  Mastoid inflammation: Yes - severe adhesions  Cholesteatoma: None  Facial nerve dehiscence: None  Chorda tympani nerve: resected  Malleus: Head removed  Incus: Eroded - removed  Stapes: Eroded - removed  Ossicular chain reconstruction: TORP w/out footplate shoe ([employer]) - 3.75 mm  Graft material: temporalis fascia and conchal cartilage  Graft position: underlay  Cavity: obliterated with inferior mastoid flap      HISTORY OF PRESENT ILLNESS:  This is a 78 yo male who presents today for a right ear infection. Patient with a remote history of a right tympanoplasty with Dr. Gonzales. More recently has been using hearing aids. Right side is not tolerating the hearing aid due to pain and drainage. Recent exam and imaging concerning for a cholesteatoma. No prior left ear surgeries.   Soc- joseph, lives in [place], [place]; presents with his son    Past Medical History:   Diagnosis Date    Arthritis of multiple sites     Atrial fibrillation (Multi)     Bilateral shoulder pain     Enlarged prostate     GERD (gastroesophageal reflux disease)     Hyperlipidemia     Hypertension     Obstructive sleep apnea     Intolerant to CPAP    Type 2 diabetes mellitus      Past Surgical History:   Procedure Laterality Date    CATARACT EXTRACTION      COLONOSCOPY      With polypectomy    DENTAL SURGERY      HERNIA REPAIR      Laparoscopic bilateral inguinal hernia repair with mesh    PROSTATE SURGERY      TONSILLECTOMY      TOTAL SHOULDER ARTHROPLASTY Right     Right total shoulder arthroplasty reverse    UPPER GASTROINTESTINAL ENDOSCOPY          Current Outpatient Medications:     apixaban (Eliquis) 5 mg tablet, Take 1 tablet (5 mg) by mouth 2 times a day., Disp: , Rfl:     atorvastatin (Lipitor) 40 mg tablet, Take 1 tablet (40 mg) by mouth once daily., Disp: , Rfl:     dapagliflozin propanediol (Farxiga) 10 mg, Take 0.5 tablets (5 mg) by mouth once every 24 hours., Disp: , Rfl:     fluticasone (Flonase) 50 mcg/actuation nasal spray, Administer 1 spray into each nostril once daily. Shake gently. Before first use, prime pump. After use, clean tip and replace cap., Disp: , Rfl:     HYDROcodone-acetaminophen (Norco) 5-325 mg tablet, Take 1 tablet by mouth every 6 hours if needed for severe pain (7 - 10)., Disp: 15 tablet, Rfl: 0    insulin glargine (Lantus U-100 Insulin) 100 unit/mL injection, Inject 8 Units under the skin 2 times a day. Take as directed per insulin instructions., Disp: , Rfl:     lisinopril 40 mg tablet, Take 1 tablet (40 mg) by mouth once daily., Disp: , Rfl:     metOLazone (Zaroxolyn) 5 mg tablet, Take 1 tablet (5 mg) by mouth once daily., Disp: , Rfl:     NIFEdipine ER (Adalat CC) 30 mg 24 hr tablet, Take 1 tablet (30 mg) by mouth once daily in the morning. Take before meals. Do not crush, chew, or split., Disp: , Rfl:     ofloxacin (Floxin) 0.3 % otic solution, Administer 4 drops into the right ear 2 times a day., Disp: 5 mL, Rfl: 3    pantoprazole (ProtoNix) 40 mg EC tablet, Take 1 tablet (40 mg) by mouth once daily in the morning. Take before meals. Do not crush, chew, or split., Disp: , Rfl:     semaglutide (Rybelsus) 7 mg tablet, Take 1 tablet (7 mg) by mouth once daily., Disp: , Rfl:     Review of patient's allergies indicates:  Allergies   Allergen Reactions    Penicillins Anaphylaxis    Sulfur Hives       Social History and Family History: reviewed in the EMR    New patients fill out a 10-system review of systems survey that gets reviewed at their initial visit. Pertinent positives have been incorporated into the  "HPI.    PHYSICAL EXAM:   Vitals:    03/10/25 0921   Temp: 37 °C (98.6 °F)   TempSrc: Temporal   Weight: 80.7 kg (178 lb)   Height: 1.702 m (5' 7\")     The patient is well-developed, well-nourished and in no acute distress.    The patient is alert and oriented to time, person, and place with appropriate mood and affect.     EARS: Examination of the external ears was normal using visual inspection. Handheld otoscopy was inadequate to provide fine detail and depth perception necessary for a full diagnostic assessment of the tympanic membrane and middle ear space. The otologic microscope was utilized to improve visualization. Use of the otologic microscope facilitates cleaning of the EAC and three-dimensional, magnified visualization of the tympanic membrane and middle ear structures for diagnosis of observable pathology and anatomical variations. Otoscopic and/or microscopic evaluation reveals:        Right:  External auditory canal: improved patency, meatoplasty healing well  Tympanic Membrane: reconstruction intact  Middle ear: unable to assess    * incision healing      Left: External auditory canal: patent   Tympanic Membrane: intact and normal   Middle ear: well aerated     Eyes:  EOMI, vision grossly intact, no nystagmus   Neurologic:  CN 2-12 intact including normal facial movement and sensation     DATA REVIEWED:   AUDIOGRAMS   Date - 1/20/25      IMAGING  - reviewed  - Right mastoid sclerotic and what air cells are present have opacification; Right TM retracted, possible scutum erosion; lateral EAC with edema concerning for abscess vs cholesteatoma    OTHER  - none    PROCEDURE:  -none    ASSESSMENT & PLAN:  Problem List Items Addressed This Visit          ENT    Chronic otitis media    Mastoiditis of right side    Mixed conductive and sensorineural hearing loss of right ear with restricted hearing of left ear - Primary     Right Chronic otitis media/mastoiditis, now s/p right canal wall down " tympanomastoidectomy.  Good early healing.    - continue otic drops for 2-weeks  - follow-up 3-4 weeks for cleaning; 3-months for audiogram    Prakash Mesa M.D.      Otology/Neurotology   Department of Otolaryngology - Head and Neck Surgery  [facility]  Phone/Appointments: [phone]   Fax: [phone]   E-mail: [email or web address]

## 2025-03-10 NOTE — LETTER
March 10, 2025     Frantz Hubbard, APRN-CNP  [address]  [place] [address]    Patient: Syed Montez   YOB: 1945   Date of Visit: 3/10/2025       Dear Dr. Frantz Hubbard, APRN-CNP:    Thank you for referring Syed Montez to me for evaluation. Below are my notes for this consultation.  If you have questions, please do not hesitate to call me. I look forward to following your patient along with you.     This patient had severe chronic otitis media/mastoiditis with polypoid disease.  His anatomy and disease required a canal wall down tympanomastoidectomy.  I will continue to follow him closely as he heals.  At some point I may transition his care back to your office for occasional cavity cleanings.  Thank you for the referral.  I will keep you updated.    Sincerely,     Prakash Mesa MD      CC: No Recipients  ______________________________________________________________________________________    Chief Complaint:  hearing loss, ear infections  Referred by:  Frantz Hubbard NP (otolaryngology)    Interval: Patient follows up after right ear surgery.  Surgical details reviewed.    Treatment History    2/19/25 -right canal wall down tympanomastoidectomy w/TORP  Perforation size and location: None > TM was thickened and had polypoid changes  Middle ear inflammation: Yes - severe adhesions  Mastoid inflammation: Yes - severe adhesions  Cholesteatoma: None  Facial nerve dehiscence: None  Chorda tympani nerve: resected  Malleus: Head removed  Incus: Eroded - removed  Stapes: Eroded - removed  Ossicular chain reconstruction: TORP w/out footplate shoe ([employer] [facility] [facility]) - 3.75 mm  Graft material: temporalis fascia and conchal cartilage  Graft position: underlay  Cavity: obliterated with inferior mastoid flap      HISTORY OF PRESENT ILLNESS:  This is a 80 yo male who presents today for a right ear infection. Patient with a remote history of a right tympanoplasty with Dr. Gonzales. More  recently has been using hearing aids. Right side is not tolerating the hearing aid due to pain and drainage. Recent exam and imaging concerning for a cholesteatoma. No prior left ear surgeries.   Bharathi joseph, lives in [place], [place]; presents with his son    Past Medical History:   Diagnosis Date   • Arthritis of multiple sites    • Atrial fibrillation (Multi)    • Bilateral shoulder pain    • Enlarged prostate    • GERD (gastroesophageal reflux disease)    • Hyperlipidemia    • Hypertension    • Obstructive sleep apnea     Intolerant to CPAP   • Type 2 diabetes mellitus      Past Surgical History:   Procedure Laterality Date   • CATARACT EXTRACTION     • COLONOSCOPY      With polypectomy   • DENTAL SURGERY     • HERNIA REPAIR      Laparoscopic bilateral inguinal hernia repair with mesh   • PROSTATE SURGERY     • TONSILLECTOMY     • TOTAL SHOULDER ARTHROPLASTY Right     Right total shoulder arthroplasty reverse   • UPPER GASTROINTESTINAL ENDOSCOPY         Current Outpatient Medications:   •  apixaban (Eliquis) 5 mg tablet, Take 1 tablet (5 mg) by mouth 2 times a day., Disp: , Rfl:   •  atorvastatin (Lipitor) 40 mg tablet, Take 1 tablet (40 mg) by mouth once daily., Disp: , Rfl:   •  dapagliflozin propanediol (Farxiga) 10 mg, Take 0.5 tablets (5 mg) by mouth once every 24 hours., Disp: , Rfl:   •  fluticasone (Flonase) 50 mcg/actuation nasal spray, Administer 1 spray into each nostril once daily. Shake gently. Before first use, prime pump. After use, clean tip and replace cap., Disp: , Rfl:   •  HYDROcodone-acetaminophen (Norco) 5-325 mg tablet, Take 1 tablet by mouth every 6 hours if needed for severe pain (7 - 10)., Disp: 15 tablet, Rfl: 0  •  insulin glargine (Lantus U-100 Insulin) 100 unit/mL injection, Inject 8 Units under the skin 2 times a day. Take as directed per insulin instructions., Disp: , Rfl:   •  lisinopril 40 mg tablet, Take 1 tablet (40 mg) by mouth once daily., Disp: , Rfl:   •  metOLazone (Zaroxolyn)  "5 mg tablet, Take 1 tablet (5 mg) by mouth once daily., Disp: , Rfl:   •  NIFEdipine ER (Adalat CC) 30 mg 24 hr tablet, Take 1 tablet (30 mg) by mouth once daily in the morning. Take before meals. Do not crush, chew, or split., Disp: , Rfl:   •  ofloxacin (Floxin) 0.3 % otic solution, Administer 4 drops into the right ear 2 times a day., Disp: 5 mL, Rfl: 3  •  pantoprazole (ProtoNix) 40 mg EC tablet, Take 1 tablet (40 mg) by mouth once daily in the morning. Take before meals. Do not crush, chew, or split., Disp: , Rfl:   •  semaglutide (Rybelsus) 7 mg tablet, Take 1 tablet (7 mg) by mouth once daily., Disp: , Rfl:     Review of patient's allergies indicates:  Allergies   Allergen Reactions   • Penicillins Anaphylaxis   • Sulfur Hives       Social History and Family History: reviewed in the EMR    New patients fill out a 10-system review of systems survey that gets reviewed at their initial visit. Pertinent positives have been incorporated into the HPI.    PHYSICAL EXAM:   Vitals:    03/10/25 0921   Temp: 37 °C (98.6 °F)   TempSrc: Temporal   Weight: 80.7 kg (178 lb)   Height: 1.702 m (5' 7\")     The patient is well-developed, well-nourished and in no acute distress.    The patient is alert and oriented to time, person, and place with appropriate mood and affect.     EARS: Examination of the external ears was normal using visual inspection. Handheld otoscopy was inadequate to provide fine detail and depth perception necessary for a full diagnostic assessment of the tympanic membrane and middle ear space. The otologic microscope was utilized to improve visualization. Use of the otologic microscope facilitates cleaning of the EAC and three-dimensional, magnified visualization of the tympanic membrane and middle ear structures for diagnosis of observable pathology and anatomical variations. Otoscopic and/or microscopic evaluation reveals:        Right:  External auditory canal: improved patency, meatoplasty healing " well  Tympanic Membrane: reconstruction intact  Middle ear: unable to assess    * incision healing      Left: External auditory canal: patent   Tympanic Membrane: intact and normal   Middle ear: well aerated     Eyes:  EOMI, vision grossly intact, no nystagmus   Neurologic:  CN 2-12 intact including normal facial movement and sensation     DATA REVIEWED:   AUDIOGRAMS   Date - 1/20/25      IMAGING  - reviewed  - Right mastoid sclerotic and what air cells are present have opacification; Right TM retracted, possible scutum erosion; lateral EAC with edema concerning for abscess vs cholesteatoma    OTHER  - none    PROCEDURE:  -none    ASSESSMENT & PLAN:  Problem List Items Addressed This Visit          ENT    Chronic otitis media    Mastoiditis of right side    Mixed conductive and sensorineural hearing loss of right ear with restricted hearing of left ear - Primary     Right Chronic otitis media/mastoiditis, now s/p right canal wall down tympanomastoidectomy.  Good early healing.    - continue otic drops for 2-weeks  - follow-up 3-4 weeks for cleaning; 3-months for audiogram    Prakash Mesa M.D.      Otology/Neurotology   Department of Otolaryngology - Head and Neck Surgery  [facility]  Phone/Appointments: [phone]   Fax: [phone]   E-mail: [email or web address]

## 2025-03-11 ENCOUNTER — APPOINTMENT (OUTPATIENT)
Dept: OTOLARYNGOLOGY | Facility: CLINIC | Age: 80
End: 2025-03-11
Payer: COMMERCIAL

## 2025-03-31 ENCOUNTER — APPOINTMENT (OUTPATIENT)
Dept: OTOLARYNGOLOGY | Facility: CLINIC | Age: 80
End: 2025-03-31
Payer: MEDICARE

## 2025-03-31 VITALS — BODY MASS INDEX: 28.04 KG/M2 | WEIGHT: 179 LBS | TEMPERATURE: 98.6 F

## 2025-03-31 DIAGNOSIS — H70.91 MASTOIDITIS OF RIGHT SIDE: Primary | ICD-10-CM

## 2025-03-31 PROCEDURE — 1159F MED LIST DOCD IN RCRD: CPT | Performed by: OTOLARYNGOLOGY

## 2025-03-31 PROCEDURE — 99024 POSTOP FOLLOW-UP VISIT: CPT | Performed by: OTOLARYNGOLOGY

## 2025-03-31 NOTE — PROGRESS NOTES
Chief Complaint:  hearing loss, ear infections  Referred by:  Frantz Hubbard NP (otolaryngology)    Interval: Patient follows up after right ear surgery.  Surgical details reviewed. Drainage reducing. Pt having issues with left hearing aid.     Treatment History    2/19/25 -right canal wall down tympanomastoidectomy w/TORP  Perforation size and location: None > TM was thickened and had polypoid changes  Middle ear inflammation: Yes - severe adhesions  Mastoid inflammation: Yes - severe adhesions  Cholesteatoma: None  Facial nerve dehiscence: None  Chorda tympani nerve: resected  Malleus: Head removed  Incus: Eroded - removed  Stapes: Eroded - removed  Ossicular chain reconstruction: TORP w/out footplate shoe ([employer]) - 3.75 mm  Graft material: temporalis fascia and conchal cartilage  Graft position: underlay  Cavity: obliterated with inferior mastoid flap      HISTORY OF PRESENT ILLNESS:  This is a 80 yo male who presents today for a right ear infection. Patient with a remote history of a right tympanoplasty with Dr. Gonzales. More recently has been using hearing aids. Right side is not tolerating the hearing aid due to pain and drainage. Recent exam and imaging concerning for a cholesteatoma. No prior left ear surgeries.   Soc- joseph, lives in [place], [place]; presents with his son    Past Medical History:   Diagnosis Date    Arthritis of multiple sites     Atrial fibrillation (Multi)     Bilateral shoulder pain     Enlarged prostate     GERD (gastroesophageal reflux disease)     Hyperlipidemia     Hypertension     Obstructive sleep apnea     Intolerant to CPAP    Type 2 diabetes mellitus      Past Surgical History:   Procedure Laterality Date    CATARACT EXTRACTION      COLONOSCOPY      With polypectomy    DENTAL SURGERY      HERNIA REPAIR      Laparoscopic bilateral inguinal hernia repair with mesh    PROSTATE SURGERY      TONSILLECTOMY      TOTAL SHOULDER ARTHROPLASTY Right     Right total shoulder  arthroplasty reverse    UPPER GASTROINTESTINAL ENDOSCOPY         Current Outpatient Medications:     apixaban (Eliquis) 5 mg tablet, Take 1 tablet (5 mg) by mouth 2 times a day., Disp: , Rfl:     atorvastatin (Lipitor) 40 mg tablet, Take 1 tablet (40 mg) by mouth once daily., Disp: , Rfl:     dapagliflozin propanediol (Farxiga) 10 mg, Take 0.5 tablets (5 mg) by mouth once every 24 hours., Disp: , Rfl:     fluticasone (Flonase) 50 mcg/actuation nasal spray, Administer 1 spray into each nostril once daily. Shake gently. Before first use, prime pump. After use, clean tip and replace cap., Disp: , Rfl:     HYDROcodone-acetaminophen (Norco) 5-325 mg tablet, Take 1 tablet by mouth every 6 hours if needed for severe pain (7 - 10)., Disp: 15 tablet, Rfl: 0    insulin glargine (Lantus U-100 Insulin) 100 unit/mL injection, Inject 8 Units under the skin 2 times a day. Take as directed per insulin instructions., Disp: , Rfl:     lisinopril 40 mg tablet, Take 1 tablet (40 mg) by mouth once daily., Disp: , Rfl:     metOLazone (Zaroxolyn) 5 mg tablet, Take 1 tablet (5 mg) by mouth once daily., Disp: , Rfl:     NIFEdipine ER (Adalat CC) 30 mg 24 hr tablet, Take 1 tablet (30 mg) by mouth once daily in the morning. Take before meals. Do not crush, chew, or split., Disp: , Rfl:     ofloxacin (Floxin) 0.3 % otic solution, Administer 4 drops into the right ear 2 times a day., Disp: 5 mL, Rfl: 3    pantoprazole (ProtoNix) 40 mg EC tablet, Take 1 tablet (40 mg) by mouth once daily in the morning. Take before meals. Do not crush, chew, or split., Disp: , Rfl:     semaglutide (Rybelsus) 7 mg tablet, Take 1 tablet (7 mg) by mouth once daily., Disp: , Rfl:     Review of patient's allergies indicates:  Allergies   Allergen Reactions    Penicillins Anaphylaxis    Sulfur Hives       Social History and Family History: reviewed in the EMR    New patients fill out a 10-system review of systems survey that gets reviewed at their initial visit.  Pertinent positives have been incorporated into the HPI.    PHYSICAL EXAM:   Vitals:    03/31/25 0842   Temp: 37 °C (98.6 °F)   TempSrc: Temporal   Weight: 81.2 kg (179 lb)     The patient is well-developed, well-nourished and in no acute distress.    The patient is alert and oriented to time, person, and place with appropriate mood and affect.     EARS: Examination of the external ears was normal using visual inspection. Handheld otoscopy was inadequate to provide fine detail and depth perception necessary for a full diagnostic assessment of the tympanic membrane and middle ear space. The otologic microscope was utilized to improve visualization. Use of the otologic microscope facilitates cleaning of the EAC and three-dimensional, magnified visualization of the tympanic membrane and middle ear structures for diagnosis of observable pathology and anatomical variations. Otoscopic and/or microscopic evaluation reveals:        Right:  External auditory canal: improved patency, meatoplasty well healed; some granulation in the mastoid cavity  Tympanic Membrane: reconstruction intact  Middle ear: unable to assess    * incision healed      Left: External auditory canal: patent   Tympanic Membrane: intact and normal   Middle ear: well aerated     Eyes:  EOMI, vision grossly intact, no nystagmus   Neurologic:  CN 2-12 intact including normal facial movement and sensation     DATA REVIEWED:   AUDIOGRAMS   Date - 1/20/25      IMAGING  - reviewed  - Right mastoid sclerotic and what air cells are present have opacification; Right TM retracted, possible scutum erosion; lateral EAC with edema concerning for abscess vs cholesteatoma    OTHER  - none    PROCEDURE:  -none    ASSESSMENT & PLAN:  Problem List Items Addressed This Visit          ENT    Mastoiditis of right side - Primary     Right Chronic otitis media/mastoiditis, now s/p right canal wall down tympanomastoidectomy.  Good healing, still awaiting some epithelialization in  the cavity.  Will need to monitor for middle ear effusion  - ok to intermittently use right hearing aid    Continue left hearing aid    - otic drops as needed  - follow-up 2-months with audiogram    Prakash Mesa M.D.      Otology/Neurotology   Department of Otolaryngology - Head and Neck Surgery  [facility]  Phone/Appointments: [phone]   Fax: [phone]   E-mail: [email or web address]

## 2025-06-02 ENCOUNTER — CLINICAL SUPPORT (OUTPATIENT)
Dept: AUDIOLOGY | Facility: CLINIC | Age: 80
End: 2025-06-02
Payer: MEDICARE

## 2025-06-02 DIAGNOSIS — H69.91 DYSFUNCTION OF RIGHT EUSTACHIAN TUBE: ICD-10-CM

## 2025-06-02 DIAGNOSIS — H91.93 BILATERAL HEARING LOSS, UNSPECIFIED HEARING LOSS TYPE: Primary | ICD-10-CM

## 2025-06-02 PROCEDURE — 92557 COMPREHENSIVE HEARING TEST: CPT

## 2025-06-02 PROCEDURE — 92567 TYMPANOMETRY: CPT

## 2025-06-02 ASSESSMENT — PAIN SCALES - GENERAL: PAINLEVEL_OUTOF10: 0 - NO PAIN

## 2025-06-02 ASSESSMENT — PAIN - FUNCTIONAL ASSESSMENT: PAIN_FUNCTIONAL_ASSESSMENT: 0-10

## 2025-06-02 NOTE — PROGRESS NOTES
AUDIOLOGY ADULT AUDIOMETRIC EVALUATION     Name: Syed Montez   : 1945 Age: 79 y.o.   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 Improvement in thresholds and word recognition in the right ear, and essentially stable in the left ear since last evaluation on 2025.  Word recognition in quiet is good in the right ear, and fair in the left ear.  Tympanometry indicates restricted eardrum mobility consistent with outer/middle ear involvement in the right ear, and middle ear pressure and eardrum compliance within normal limits in the left ear.   Amplification needs: Continue full-time use of devices, expect when activities preclude device safety.    Today's test results are hearing loss requiring medical/otologic and audiologic follow-up.     RECOMMENDATIONS   Continue medical follow up with primary care provider and/or Ears Nose and Throat (ENT) provider as recommended.  Return for audiologic evaluation in conjunction with medical management or annually (whichever is sooner) to monitor hearing sensitivity and assess middle ear status or sooner should concerns arise. The audiology department can be reached at [phone] to schedule an appointment.   Follow up with managing audiologist for hearing aid programming and cleaning. Patient was provided with a copy of today's audiogram.  Avoid exposure to loud sounds by moving away from the noise, turning down the volume, or wearing proper hearing protection correctly.  Consider use of good communication strategies. These include but are not limited to the following: get Syed's attention before speaking to him, close the distance between Syed and who is speaking, limit background noise, allow Syed access to visual cues (i.e. facial expressions/mouth movements, pictures, written instructions, etc.). When in situations where background noise cannot be avoided, position yourself so that  the background noise is to your back, and you communication partner is seated in front of you, ideally with a quiet area or wall behind them.     HISTORY    History obtained from patient report and chart review; please see medical records for complete history. Mr. Montez was seen today for a follow-up audiologic evaluation at the request of Prakash Mesa MD.  Reason for visit: Post op; No significant change in hearing perceived since surgery. He says that he may be hearing more from his right ear but it is hard to tell. Currently only wearing left hearing aid. Reports left hearing aid is slipping out of his ear; hears better when he pushes it back in. Going to his managing audiologist on Thursday for hearing aid adjustments; requested a copy of today's hearing test.   Change in Hearing: yes in the right ear  Tinnitus: denied  Otalgia: denied. Reports some numbness at the top of his right ear.   Aural Pressure/Fullness: yes, intermittently in the right ear  Recent Ear Infections/Otorrhea: denied  Dizziness: denied  History of Ear Surgeries: yes, he is s/p right Canal-wall down tympanomastoidectomy with ossicular chain reconstruction which was performed on 2/19/2025 by Prakash Mesa MD  Falls within the last year: denied  Other Significant History: denied    Previous audiometric testing performed on 1/20/2025 revealed Profound to severe likely mixed hearing loss in the right ear, and severe to profound likely sensorineural hearing loss in the left ear. Word recognition in quiet is poor in the right ear, and good in the left ear. Tympanometry indicates restricted eardrum mobility consistent with outer/middle ear involvement in the right ear, and middle ear pressure and eardrum compliance within normal limits in the left ear. Amplification needs: Continue full-time use of devices, expect when activities preclude device safety.    Recall from previous encounter in the audiology department on 1/20/2025: Mr. Montez was seen  "for an audiologic evaluation in conjunction with an encounter in the Ears Nose and Throat (ENT) department with Prakash Mesa MD due to right hearing loss and a cyst the right ear canal. Onset of symptoms began with an earache in September 2024. They report aural fullness and otalgia in the right ear (rated 5/10, increasing to 10/10 at night), and a recent ear infection, though no drainage in the past week. The patient denies tinnitus, dizziness, and recent falls. Medical history includes a right tympanoplasty in 1959 and a right pressure equalization tube (PET) placement about a month ago. There is a history of occupational noise exposure. The patient has used hearing aids for 15 years but is currently only wearing the left aid. No other significant history was reported.    EVALUATION AND PATIENT EDUCATION   The following is a brief interpretation of the obtained findings from the audiologic evaluation. Discussed results and recommendations with Mr. Montez. Questions were addressed and the patient was encouraged to contact our department at [phone] should concerns arise.     TEST RESULTS - See scanned audiogram in \"Media.\"   Otoscopic Evaluation:   Right Ear: Ear canal clear, tympanic membrane visualized.   Left Ear: Ear canal clear, tympanic membrane visualized.       Tympanometry (226 Hz): An objective evaluation of middle ear function. CPT code: 21591   Right Ear: Type B, restricted eardrum mobility consistent with outer/middle ear involvement.   Left Ear: Type A, middle ear pressure and tympanic membrane compliance within normal limits.       Acoustic Reflexes: An objective measure of auditory and facial nerve pathways.   (Probe) Right Ear (ipsi right stimulus ear; contralateral left stimulus ear):   Did not test.   (Probe) Left Ear (ipsi left stimulus ear; contralateral right stimulus ear):   Did not test.       Distortion Product Otoacoustic Emissions (DPOAE): An objective measurement of responses " generated by the cochlea when simultaneously stimulated by two pure tone frequencies. CPT code: 53402   Right Ear: Did not test.   Left Ear: Did not test.   Present OAEs suggest normal or near cochlear outer hair cell function for corresponding frequency region(s). Absent OAEs with normal middle ear function can be consistent with some degree of hearing loss. Assessment of cochlear outer hair cell function may be impacted by outer or middle ear function.       Test technique: Conventional Audiometry via headphones.   An evaluation of hearing sensitivity via air and bone conduction and speech recognition. CPT code: 63517   Reliability: good       Pure Tone Audiometry:    Right Ear: Essentially severe likely mixed hearing loss for 125-8000 Hz.    Left Ear: Essentially severe likely mixed hearing loss for 125-8000 Hz.   Could not mask bone conduction due to insufficient masking levels.        Speech Audiometry:    Right Ear: Speech Reception Threshold (SRT) was obtained at 70 dB HL. This is in good agreement with three frequency Pure Tone Average (PTA).  Word Recognition scores in quiet were good (84%) when words were presented at 100 dB HL. These results are based on [facility] Auditory Test No.6 (NU-6) (N=25).   Left Ear: Speech Reception Threshold (SRT) was obtained at 75 dB HL. This is in good agreement with three frequency Pure Tone Average (PTA).  Word Recognition scores in quiet were fair (76%) when words were presented at 100 dB HL. These results are based on [facility] Auditory Test No.6 (NU-6) (N=25).         Liya Ortiz, AUD, CCC-A  Licensed Clinical Audiologist    Degree of Hearing Decibel Range  Key   Within Normal Limits 0-20  CNT Could Not Test   Slight 21-25  DNT Did Not Test   Mild 26-40  ECV Ear Canal Volume   Moderate 41-55  OAE Otoacoustic Emissions   Moderately-Severe 56-70  SIN Speech in Noise   Severe 71-90  TM Tympanic Membrane   Profound 91+  HA Hearing Aid    Sensorineural Hearing Loss SNHL  MLV Monitored Live Voice   Conductive Hearing Loss CHL  WNL Within Normal Limits   Noise-Induced Hearing Loss NIHL

## 2025-06-09 RX ORDER — FLUTICASONE PROPIONATE 50 MCG
SPRAY, SUSPENSION (ML) NASAL
Qty: 48 G | Refills: 1 | Status: SHIPPED | OUTPATIENT
Start: 2025-06-09

## 2025-07-25 ENCOUNTER — TELEPHONE (OUTPATIENT)
Facility: CLINIC | Age: 80
End: 2025-07-25
Payer: MEDICARE

## 2025-07-25 DIAGNOSIS — J01.90 ACUTE SINUSITIS, RECURRENCE NOT SPECIFIED, UNSPECIFIED LOCATION: ICD-10-CM

## 2025-07-25 RX ORDER — DOXYCYCLINE 100 MG/1
100 CAPSULE ORAL 2 TIMES DAILY
Qty: 28 CAPSULE | Refills: 0 | Status: SHIPPED | OUTPATIENT
Start: 2025-07-25 | End: 2025-08-08

## 2025-07-25 NOTE — TELEPHONE ENCOUNTER
Returned patients call. Patient is stating he has a sinus infection that is causing him ear pain. Instructed patient to use the ear drops if he develops drainage from his ear and advised we would send an antibiotic to his pharmacy.

## 2025-08-04 ENCOUNTER — TELEPHONE (OUTPATIENT)
Dept: OTOLARYNGOLOGY | Facility: CLINIC | Age: 80
End: 2025-08-04
Payer: MEDICARE

## 2025-08-04 NOTE — TELEPHONE ENCOUNTER
Patient called in again stating he is still having pain in his ear that is keeping him up at night. He has three days left of oral antibiotics. Patient does not notice drainage from the ear. He also says he cannot use his hearing aids due to the discomfort. Scheduled a visit on 8/18 to see Dr. Mesa. MD made aware.

## 2025-08-18 ENCOUNTER — APPOINTMENT (OUTPATIENT)
Dept: OTOLARYNGOLOGY | Facility: CLINIC | Age: 80
End: 2025-08-18
Payer: MEDICARE

## 2025-08-18 VITALS — WEIGHT: 178 LBS | TEMPERATURE: 97.3 F | BODY MASS INDEX: 27.94 KG/M2 | HEIGHT: 67 IN

## 2025-08-18 DIAGNOSIS — H70.91 MASTOIDITIS OF RIGHT SIDE: Primary | ICD-10-CM

## 2025-08-18 DIAGNOSIS — H71.91 CHOLESTEATOMA OF RIGHT EAR: ICD-10-CM

## 2025-08-18 DIAGNOSIS — H61.21 IMPACTED CERUMEN OF RIGHT EAR: ICD-10-CM

## 2025-08-18 DIAGNOSIS — H90.A31 MIXED CONDUCTIVE AND SENSORINEURAL HEARING LOSS OF RIGHT EAR WITH RESTRICTED HEARING OF LEFT EAR: ICD-10-CM

## 2025-08-18 PROCEDURE — 1159F MED LIST DOCD IN RCRD: CPT | Performed by: OTOLARYNGOLOGY

## 2025-08-18 PROCEDURE — 69210 REMOVE IMPACTED EAR WAX UNI: CPT | Performed by: OTOLARYNGOLOGY

## 2025-08-18 PROCEDURE — 99214 OFFICE O/P EST MOD 30 MIN: CPT | Performed by: OTOLARYNGOLOGY

## 2025-08-18 ASSESSMENT — PATIENT HEALTH QUESTIONNAIRE - PHQ9
2. FEELING DOWN, DEPRESSED OR HOPELESS: NOT AT ALL
SUM OF ALL RESPONSES TO PHQ9 QUESTIONS 1 AND 2: 0
1. LITTLE INTEREST OR PLEASURE IN DOING THINGS: NOT AT ALL

## 2025-10-20 ENCOUNTER — APPOINTMENT (OUTPATIENT)
Dept: OTOLARYNGOLOGY | Facility: CLINIC | Age: 80
End: 2025-10-20
Payer: MEDICARE

## (undated) DEVICE — TUBING, SUCTION, 9/32" X 10', STRAIGHT: Brand: MEDLINE

## (undated) DEVICE — STAPLER INT DIA5MM 25 ABSRB STRP FIX DISP FOR HERN MESH

## (undated) DEVICE — GLOVE SURG SZ 7 CRM LTX FREE POLYISOPRENE POLYMER BEAD ANTI

## (undated) DEVICE — Device

## (undated) DEVICE — GLOVE ORANGE PI 7 1/2   MSG9075

## (undated) DEVICE — SYRINGE IRRIG 60ML SFT PLIABLE BLB EZ TO GRP 1 HND USE W/

## (undated) DEVICE — INSUFFLATION NEEDLE TO ESTABLISH PNEUMOPERITONEUM.: Brand: INSUFFLATION NEEDLE

## (undated) DEVICE — [HIGH FLOW INSUFFLATOR,  DO NOT USE IF PACKAGE IS DAMAGED,  KEEP DRY,  KEEP AWAY FROM SUNLIGHT,  PROTECT FROM HEAT AND RADIOACTIVE SOURCES.]: Brand: PNEUMOSURE

## (undated) DEVICE — COVER HNDL LT DISP

## (undated) DEVICE — NEEDLE, HYPODERMIC, MONOJECT, 27 G X 1.5 IN

## (undated) DEVICE — TAPE, SILK, DURAPORE, 3 IN X 10 YD, LF

## (undated) DEVICE — GOWN ISOLATN REG YEL M WT MULTIPLY SIDETIE LEV 2

## (undated) DEVICE — LUBRICANT SURG JELLY ST BACTER TUBE 4.25OZ

## (undated) DEVICE — GOWN,SIRUS,NONRNF,SETINSLV,XL,20/CS: Brand: MEDLINE

## (undated) DEVICE — COMB, HAIR, 7 IN, PLASTIC, BLACK

## (undated) DEVICE — KIT PLT RATIO DISPNS KT 2IN CANN TIP SPRY TIP DISP MAGELLAN

## (undated) DEVICE — CONTAINER SPEC COLL 960ML POLYPR TRIANG GRAD INTAKE/OUTPUT

## (undated) DEVICE — BLADE CLIPPER GEN PURP NS

## (undated) DEVICE — GOWN,SIRUS,POLYRNF,BRTHSLV,XL,30/CS: Brand: MEDLINE

## (undated) DEVICE — ELECTRO LUBE IS A SINGLE PATIENT USE DEVICE THAT IS INTENDED TO BE USED ON ELECTROSURGICAL ELECTRODES TO REDUCE STICKING.: Brand: KEY SURGICAL ELECTRO LUBE

## (undated) DEVICE — REST, HEAD, BAGEL, 9 IN

## (undated) DEVICE — BAND, RUBBER, 3 IN, STERILE

## (undated) DEVICE — STRIP, SKIN CLOSURE, STERI STRIP, REINFORCED, 0.5 X 4 IN

## (undated) DEVICE — CLEANER, WIPE, INSTRUMENT, 3.25 X 3.25 IN

## (undated) DEVICE — INTENDED FOR TISSUE SEPARATION, AND OTHER PROCEDURES THAT REQUIRE A SHARP SURGICAL BLADE TO PUNCTURE OR CUT.: Brand: BARD-PARKER ® STAINLESS STEEL BLADES

## (undated) DEVICE — SUTURE FIBERWIRE SZ 2 W/ TAPERED NEEDLE BLUE L38IN NONABSORB BLU L26.5MM 1/2 CIRCLE AR7200

## (undated) DEVICE — BUR, CUTTER 5MM CARBIDE ROUND

## (undated) DEVICE — DRAPE, SURGICAL, OTOLOGY GLASSCOCK

## (undated) DEVICE — DISPOSABLE DISTAL ATTACHMENT: Brand: DISPOSABLE DISTAL ATTACHMENT

## (undated) DEVICE — HEWSON SUTURE RETRIEVER: Brand: HEWSON SUTURE RETRIEVER

## (undated) DEVICE — DRAPE,U/ SHT,SPLIT,PLAS,STERIL: Brand: MEDLINE

## (undated) DEVICE — ELECTRODE PT RET AD L9FT HI MOIST COND ADH HYDRGEL CORDED

## (undated) DEVICE — SUTURE ABSRB L6IN L37MM 0 GS-21 GRN 1/2 CIR TAPR PNT NDL VLOCL0306

## (undated) DEVICE — CUP, SOLUTION

## (undated) DEVICE — 3M™ IOBAN™ 2 ANTIMICROBIAL INCISE DRAPE 6650EZ: Brand: IOBAN™ 2

## (undated) DEVICE — TRAP SURG QUAD PARABOLA SLOT DSGN SFTY SCRN TRAPEASE

## (undated) DEVICE — NITINOL PILOT WIRE: Brand: REUNION

## (undated) DEVICE — MANIFOLD, 4 PORT NEPTUNE STANDARD

## (undated) DEVICE — DRESSING PETRO W3XL8IN OIL EMUL N ADH GZ KNIT IMPREG CELOS

## (undated) DEVICE — PAD, GROUNDING, ELECTROSURGICAL, W/15 FT CABLE, POLYHESIVE II, ADULT, LF

## (undated) DEVICE — CLEANER LENS C-CLEAR

## (undated) DEVICE — BLADE, OPHTHALMIC, NEEDLE, CUTTING EDGE, 3 MM

## (undated) DEVICE — BIT DRL L5IN DIA2MM STD ST S STL TWST BUSA

## (undated) DEVICE — Device: Brand: DEFENDO VALVE AND CONNECTOR KIT

## (undated) DEVICE — NEEDLE HYPO 22GA L1.5IN BLK POLYPR HUB S STL REG BVL STR

## (undated) DEVICE — DRAPE, INCISE, ANTIMICROBIAL, IOBAN 2, LARGE, 17 X 23 IN, DISPOSABLE, STERILE

## (undated) DEVICE — ELECTRODE, PAIRED SUBDERMAL OTO

## (undated) DEVICE — DOUBLE BASIN SET: Brand: MEDLINE INDUSTRIES, INC.

## (undated) DEVICE — SOLUTION IV IRRIG POUR BRL 0.9% SODIUM CHL 2F7124

## (undated) DEVICE — PACK PROCEDURE SURG GEN CUST

## (undated) DEVICE — FORCEPS BX L240CM JAW DIA2.8MM L CAP W/ NDL MIC MESH TOOTH

## (undated) DEVICE — SOLUTION IV 500ML 0.9% SOD CHL PH 5 INJ USP VIAFLX PLAS

## (undated) DEVICE — WAX, BONE, 2.5 GM

## (undated) DEVICE — 2108 SERIES SAGITTAL BLADE, OFFSET (20.0 X 0.89 X 80.0MM)

## (undated) DEVICE — TOWEL,OR,DSP,ST,BLUE,STD,6/PK,12PK/CS: Brand: MEDLINE

## (undated) DEVICE — SUTURE, VICRYL, 3-0,18 IN, SH, UNDYED

## (undated) DEVICE — YANKAUER,BULB TIP,W/O VENT,RIGID,STERILE: Brand: MEDLINE

## (undated) DEVICE — PAD,ABDOMINAL,5"X9",ST,LF,25/BX: Brand: MEDLINE INDUSTRIES, INC.

## (undated) DEVICE — ALCOHOL RUBBING ISO 16OZ 70%

## (undated) DEVICE — NEEDLE, HYPODERMIC, MONOJECT, TRI-BEVELED, ANTI-CORING, 27 G X 1.25 IN, LUER LOCK HUB, YELLOW

## (undated) DEVICE — 3M™ STERI-DRAPE™ U-DRAPE 1015: Brand: STERI-DRAPE™

## (undated) DEVICE — DRAPE, TOWEL, STERI DRAPE, 17 X 11 IN, PLASTIC, STERILE

## (undated) DEVICE — TUBING, SUCTION, OTOMED

## (undated) DEVICE — GARMENT,MEDLINE,DVT,INT,CALF,MED, GEN2: Brand: MEDLINE

## (undated) DEVICE — DRAPE,REIN 53X77,STERILE: Brand: MEDLINE

## (undated) DEVICE — KENDALL 450 SERIES MONITORING FOAM ELECTRODE - RECTANGULAR SHAPE ( 3/PK): Brand: KENDALL

## (undated) DEVICE — PACK SURG LAP CHOLE CUSTOM

## (undated) DEVICE — CATHETER, IV, ANGIOCATH, 20 G X 1.88 IN, FEP POLYMER

## (undated) DEVICE — SOLUTION, BSS IRRIGATING 15ML

## (undated) DEVICE — MARKER,SKIN,WI/RULER AND LABELS: Brand: MEDLINE

## (undated) DEVICE — Z DISCONTINUED PER MEDLINE USE 2741943 DRESSING AQUACEL 10 IN ALG W9XL25CM SIL CVR WTRPRF VIR BACT BARR ANTIMIC

## (undated) DEVICE — SUTURE, MONOCRYLIC, 4-0, P3, MONO 18

## (undated) DEVICE — SHEET DRAPE FULL 70X100

## (undated) DEVICE — 6 X 9  1.75MIL 4-WALL LABGUARD: Brand: MINIGRIP COMMERCIAL LLC

## (undated) DEVICE — CANNULA SEAL

## (undated) DEVICE — APPLICATOR PREP 26ML 0.7% IOD POVACRYLEX 74% ISO ALC ST

## (undated) DEVICE — PACKING, PLAIN, CURITY, 0.25 IN X 5 YD, STERILE

## (undated) DEVICE — DRILL BITT: Brand: REUNION

## (undated) DEVICE — ARM DRAPE

## (undated) DEVICE — SYRINGE MED 10ML TRNSLUC BRL PLUNG BLK MRK POLYPR CTRL

## (undated) DEVICE — NEEDLE, HYPODERMIC, 25 G X 1.5 IN, A BEVEL, STERILE

## (undated) DEVICE — STOCKINETTE, IMPERVIOUS, 12 X 48 IN, LF, STERILE

## (undated) DEVICE — SPONGE LAP W18XL18IN WHT COT 4 PLY FLD STRUNG RADPQ DISP ST

## (undated) DEVICE — FORCE BIPOLAR: Brand: ENDOWRIST

## (undated) DEVICE — APPLICATOR MEDICATED 26 CC SOLUTION HI LT ORNG CHLORAPREP

## (undated) DEVICE — NEEDLE HYPO 25GA L1.5IN BLU POLYPR HUB S STL REG BVL STR

## (undated) DEVICE — MASK,FACE,MAXFLUIDPROTECT,SHIELD/ERLPS: Brand: MEDLINE

## (undated) DEVICE — 4.5MM PERIPHERAL SCREW
Type: IMPLANTABLE DEVICE | Site: SHOULDER | Status: NON-FUNCTIONAL
Brand: REUNION

## (undated) DEVICE — KIT BEDSIDE REVITAL OX 500ML

## (undated) DEVICE — 4-PORT MANIFOLD: Brand: NEPTUNE 2

## (undated) DEVICE — SPONGE GZ 4IN 4IN 4 PLY N WVN AVANT

## (undated) DEVICE — STERILE PVP: Brand: MEDLINE INDUSTRIES, INC.

## (undated) DEVICE — COVER, CART, 45 X 27 X 48 IN, CLEAR

## (undated) DEVICE — GAUZE,SPONGE,4"X4",8PLY,STRL,LF,10/TRAY: Brand: MEDLINE

## (undated) DEVICE — CLOTH SURG PREP PREOPERATIVE CHLORHEXIDINE GLUC 2% READYPREP

## (undated) DEVICE — DRESSING, EAR, GLASSCOCK, ADULT

## (undated) DEVICE — BLADELESS OBTURATOR: Brand: WECK VISTA

## (undated) DEVICE — DRAPE, SMARTDRAPE, FOR TIVATO MICROSCOPE

## (undated) DEVICE — 1000 S-DRAPE TOWEL DRAPE 10/BX: Brand: STERI-DRAPE™

## (undated) DEVICE — 3M™ COBAN™ NL STERILE NON-LATEX SELF-ADHERENT WRAP, 2084S, 4 IN X 5 YD (10 CM X 4,5 M), 18 ROLLS/CASE: Brand: 3M™ COBAN™

## (undated) DEVICE — CONVERTORS STOCKINETTE: Brand: CONVERTORS

## (undated) DEVICE — TUBING, SUCTION, 1/4" X 10', STRAIGHT: Brand: MEDLINE

## (undated) DEVICE — COVER,LIGHT HANDLE,FLX,1/PK: Brand: MEDLINE INDUSTRIES, INC.

## (undated) DEVICE — DRESSING, OTOPORE,STANDARD CYLINDER

## (undated) DEVICE — PROTECTOR, NERVE, ULNAR, PINK

## (undated) DEVICE — BLOCK BITE 60FR CAREGUARD

## (undated) DEVICE — SOLUTION IV IRRIG WATER 1000ML POUR BRL 2F7114

## (undated) DEVICE — TROCAR: Brand: KII SLEEVE

## (undated) DEVICE — MEGA SUTURECUT ND: Brand: ENDOWRIST

## (undated) DEVICE — COLUMN DRAPE

## (undated) DEVICE — ADAPTER CLEANING PORPOISE CLEANING

## (undated) DEVICE — SNARE ENDOSCP L240CM DIA2.4MM LOOP W20MM RND INSUL STIFF

## (undated) DEVICE — SYRINGE 20ML LL S/C 50

## (undated) DEVICE — DECANTER: Brand: UNBRANDED

## (undated) DEVICE — BUR, ELITE, ROUND DIAMOND, 2.0 MM, COARSE

## (undated) DEVICE — PLUMEPORT ACTIV LAPAROSCOPIC SMOKE FILTRATION DEVICE: Brand: PLUMEPORT ACTIVE

## (undated) DEVICE — STRIP,CLOSURE,WOUND,MEDI-STRIP,1/2X4: Brand: MEDLINE

## (undated) DEVICE — PACK,SHOULDER SPLIT: Brand: MEDLINE

## (undated) DEVICE — SYRINGE, 1 CC, LUER LOCK